# Patient Record
Sex: MALE | Race: AMERICAN INDIAN OR ALASKA NATIVE
[De-identification: names, ages, dates, MRNs, and addresses within clinical notes are randomized per-mention and may not be internally consistent; named-entity substitution may affect disease eponyms.]

---

## 2017-03-22 ENCOUNTER — HOSPITAL ENCOUNTER (INPATIENT)
Dept: HOSPITAL 31 - C.ER | Age: 67
LOS: 2 days | Discharge: HOME | DRG: 309 | End: 2017-03-24
Attending: INTERNAL MEDICINE | Admitting: INTERNAL MEDICINE
Payer: COMMERCIAL

## 2017-03-22 VITALS — BODY MASS INDEX: 34.9 KG/M2

## 2017-03-22 DIAGNOSIS — I95.9: ICD-10-CM

## 2017-03-22 DIAGNOSIS — N40.0: ICD-10-CM

## 2017-03-22 DIAGNOSIS — I10: ICD-10-CM

## 2017-03-22 DIAGNOSIS — E11.9: ICD-10-CM

## 2017-03-22 DIAGNOSIS — B19.20: ICD-10-CM

## 2017-03-22 DIAGNOSIS — N39.0: ICD-10-CM

## 2017-03-22 DIAGNOSIS — R00.1: Primary | ICD-10-CM

## 2017-03-22 LAB
ALBUMIN/GLOB SERPL: 1 {RATIO} (ref 1–2.1)
ALP SERPL-CCNC: 160 U/L (ref 38–126)
ALT SERPL-CCNC: 66 U/L (ref 21–72)
AST SERPL-CCNC: 80 U/L (ref 17–59)
BACTERIA #/AREA URNS HPF: (no result) /[HPF]
BASOPHILS # BLD AUTO: 0 K/UL (ref 0–0.2)
BASOPHILS NFR BLD: 0.6 % (ref 0–2)
BILIRUB SERPL-MCNC: 1 MG/DL (ref 0.2–1.3)
BILIRUB UR-MCNC: NEGATIVE MG/DL
BUN SERPL-MCNC: 15 MG/DL (ref 9–20)
BUN SERPL-MCNC: 16 MG/DL (ref 9–20)
CALCIUM SERPL-MCNC: 8.4 MG/DL (ref 8.6–10.4)
CALCIUM SERPL-MCNC: 8.5 MG/DL (ref 8.6–10.4)
CHLORIDE SERPL-SCNC: 100 MMOL/L (ref 98–107)
CHLORIDE SERPL-SCNC: 99 MMOL/L (ref 98–107)
CO2 SERPL-SCNC: 25 MMOL/L (ref 22–30)
CO2 SERPL-SCNC: 27 MMOL/L (ref 22–30)
EOSINOPHIL # BLD AUTO: 0 K/UL (ref 0–0.7)
EOSINOPHIL NFR BLD: 0.4 % (ref 0–4)
ERYTHROCYTE [DISTWIDTH] IN BLOOD BY AUTOMATED COUNT: 14.9 % (ref 11.5–14.5)
GLOBULIN SER-MCNC: 3.6 GM/DL (ref 2.2–3.9)
GLUCOSE SERPL-MCNC: 184 MG/DL (ref 75–110)
GLUCOSE SERPL-MCNC: 250 MG/DL (ref 75–110)
GLUCOSE UR STRIP-MCNC: NORMAL MG/DL
HCT VFR BLD CALC: 33.1 % (ref 35–51)
HYALINE CASTS #/AREA URNS LPF: (no result) /LPF (ref 0–2)
KETONES UR STRIP-MCNC: NEGATIVE MG/DL
LEUKOCYTE ESTERASE UR-ACNC: (no result) LEU/UL
LYMPHOCYTES # BLD AUTO: 1.6 K/UL (ref 1–4.3)
LYMPHOCYTES NFR BLD AUTO: 25.8 % (ref 20–40)
MCH RBC QN AUTO: 37 PG (ref 27–31)
MCHC RBC AUTO-ENTMCNC: 34.2 G/DL (ref 33–37)
MCV RBC AUTO: 108.2 FL (ref 80–94)
MONOCYTES # BLD: 0.6 K/UL (ref 0–0.8)
MONOCYTES NFR BLD: 9.2 % (ref 0–10)
NRBC BLD AUTO-RTO: 0 % (ref 0–2)
PH UR STRIP: 6 [PH] (ref 5–8)
PLATELET # BLD: 83 K/UL (ref 130–400)
PMV BLD AUTO: 9.2 FL (ref 7.2–11.7)
POTASSIUM SERPL-SCNC: 3.5 MMOL/L (ref 3.6–5.2)
POTASSIUM SERPL-SCNC: 3.5 MMOL/L (ref 3.6–5.2)
PROT SERPL-MCNC: 7.1 G/DL (ref 6.3–8.3)
PROT UR STRIP-MCNC: NEGATIVE MG/DL
RBC # UR STRIP: (no result) /UL
RBC #/AREA URNS HPF: 8 /HPF (ref 0–3)
SODIUM SERPL-SCNC: 138 MMOL/L (ref 132–148)
SODIUM SERPL-SCNC: 141 MMOL/L (ref 132–148)
SP GR UR STRIP: 1.01 (ref 1–1.03)
TSH SERPL-ACNC: 2.92 MIU/L (ref 0.46–4.68)
UROBILINOGEN UR-MCNC: 2 MG/DL (ref 0.2–1)
WBC # BLD AUTO: 6.3 K/UL (ref 4.8–10.8)
WBC #/AREA URNS HPF: 464 /HPF (ref 0–5)

## 2017-03-22 RX ADMIN — INSULIN ASPART SCH: 100 INJECTION, SOLUTION INTRAVENOUS; SUBCUTANEOUS at 22:30

## 2017-03-22 RX ADMIN — INSULIN ASPART SCH UNITS: 100 INJECTION, SUSPENSION SUBCUTANEOUS at 16:48

## 2017-03-22 RX ADMIN — CEFTRIAXONE SCH MLS/HR: 1 INJECTION, SOLUTION INTRAVENOUS at 15:26

## 2017-03-22 RX ADMIN — INSULIN ASPART SCH UNIT: 100 INJECTION, SOLUTION INTRAVENOUS; SUBCUTANEOUS at 16:45

## 2017-03-22 NOTE — C.PDOC
History Of Present Illness


67 y/o male is sent to the ED by PMD, Dr. Mckeon, for dizziness. Patient 

reports he was at his doctor's office today when he started to feel dizzy but 

symptoms have now resolved. Patient denies any shortness of breath, chest pain, 

palpitations, vomiting, or fever.





Time Seen by Provider: 17 11:20


Chief Complaint (Nursing): Dizziness/Lightheaded


History Per: Patient


History/Exam Limitations: no limitations


Onset/Duration Of Symptoms: Mins, Sudden Onset, Persistent


Current Symptoms Are (Timing): Gone


Fall Associated With With Symptoms: No


Recent travel outside of the [place]: No





Past Medical History


Reviewed: Historical Data, Nursing Documentation, Vital Signs


Vital Signs: 


 Last Vital Signs











Temp  97.4 F L  17 12:31


 


Pulse  50 L  17 12:31


 


Resp  18   17 12:31


 


BP  120/72   17 12:31


 


Pulse Ox  98   17 12:49














- Medical History


PMH: Anemia, Diabetes, Gastritis, HTN, Hyperlipidemia


Surgical History: No Surg Hx





- [facility] Procedures








ESOPHAGOGASTRODUODENOSCOPY [EGD] W/CLOSED BIOPSY (04/25/15)


INSERT GASTRIC TUBE NEC (04/25/15)








Family History: States: Unknown Family Hx





- Social History


Hx Tobacco Use: Yes


Hx Alcohol Use: Yes (occasionally)


Hx Substance Use: No





- Immunization History


Hx Tetanus Toxoid Vaccination: No


Hx Influenza Vaccination: Yes ()


Hx Pneumococcal Vaccination: No





Review Of Systems


Except As Marked, All Systems Reviewed And Found Negative.


Constitutional: Negative for: Fever


Cardiovascular: Negative for: Chest Pain, Palpitations


Respiratory: Negative for: Shortness of Breath


Gastrointestinal: Negative for: Vomiting


Neurological: Positive for: Dizziness





Physical Exam





- Physical Exam


Appears: Non-toxic, No Acute Distress


Skin: Normal Color, Warm, Dry


Head: Atraumatic, Normacephalic


Eye(s): bilateral: Normal Inspection, PERRL, EOMI


Neck: Normal ROM, Supple


Chest: Symmetrical


Cardiovascular: Rhythm Regular


Respiratory: Normal Breath Sounds, No Rales, No Rhonchi, No Wheezing


Gastrointestinal/Abdominal: Soft, No Tenderness, Hernia, Other (obese)


Back: Normal Inspection, No CVA Tenderness


Extremity: Normal ROM, No Swelling


Neurological/Psych: Oriented x3, Normal Speech, Normal Cognition, Normal 

Cranial Nerves (II-XII intact), Normal Motor, Normal Sensation


Gait: Steady





ED Course And Treatment





- Laboratory Results


Result Diagrams: 


 17 11:32





 17 11:32


Lab Interpretation: Abnormal (mild anemia, + elev glu)


ECG: Interpreted By Me


ECG Rhythm: Sinus Bradycardia


ECG Interpretation: Abnormal


Rate From EC


O2 Sat by Pulse Oximetry: 98 (ra)





- Radiology


CXR: Interpreted by Me


CXR Interpretation: Yes: No Acute Disease, Heart Size (+ megaly)


Reevaluation Time: 12:50


Reassessment Condition: Unchanged





- Physician Consult Information


Outcome Of Conversation: 1230: d/w Dr. Jatin Leon to Tele obs.





Medical Decision Making


Medical Decision Making: 


Plan:


* EKG


* CXR


* Blood Work


* Blood Culture


* Urinalysis








dizzy ? related to [place]?


baseline labs





Disposition


Doctor Will See Patient In The: Hospital


Counseled Patient/Family Regarding: Studies Performed, Diagnosis





- Disposition


Disposition: HOSPITALIZED


Disposition Time: 12:51


Condition: GOOD





- Clinical Impression


Clinical Impression: 


 Symptomatic bradycardia





- Scribe Statement


The provider has reviewed the documentation as recorded by the Scribe (Puja Miller)


Provider Attestation: 





All medical record entries made by the Scribe were at my direction and 

personally dictated by me. I have reviewed the chart and agree that the record 

accurately reflects my personal performance of the history, physical exam, 

medical decision making, and the department course for this patient. I have 

also personally directed, reviewed, and agree with the discharge instructions 

and disposition.

## 2017-03-22 NOTE — RAD
PROCEDURE:  CHEST RADIOGRAPH, 1 VIEW



HISTORY:

SOB



COMPARISON:

06/12/2015



FINDINGS:



LUNGS:

Clear.



PLEURA:

No pneumothorax or pleural fluid seen.



CARDIOVASCULAR:

The cardiomediastinal silhouette is suboptimally evaluated.  There is 

obscuration of the aortic arch.



OSSEOUS STRUCTURES:

The osseous structures demonstrate degenerative changes. 



VISUALIZED UPPER ABDOMEN:

Upper abdomen is suboptimally evaluated.



OTHER FINDINGS:

None. 



IMPRESSION:

Suboptimally evaluated cardiomediastinal silhouette. 



Obscured visualization of aortic arch. This could be due to 

technique.  Recommend PA lateral or repeat chest radiographs with 

optimal inspiration.

## 2017-03-23 VITALS — RESPIRATION RATE: 20 BRPM

## 2017-03-23 LAB — T4 SERPL-MCNC: 14.8 UG/DL (ref 5.5–11)

## 2017-03-23 RX ADMIN — FLUTICASONE PROPIONATE SCH SPRAYS: 50 SPRAY, METERED NASAL at 22:56

## 2017-03-23 RX ADMIN — INSULIN ASPART SCH UNIT: 100 INJECTION, SOLUTION INTRAVENOUS; SUBCUTANEOUS at 08:08

## 2017-03-23 RX ADMIN — PANTOPRAZOLE SODIUM SCH MG: 40 TABLET, DELAYED RELEASE ORAL at 09:24

## 2017-03-23 RX ADMIN — MULTIPLE VITAMINS W/ MINERALS TAB SCH TAB: TAB at 09:24

## 2017-03-23 RX ADMIN — INSULIN ASPART SCH UNITS: 100 INJECTION, SUSPENSION SUBCUTANEOUS at 18:25

## 2017-03-23 RX ADMIN — INSULIN ASPART SCH UNIT: 100 INJECTION, SOLUTION INTRAVENOUS; SUBCUTANEOUS at 18:24

## 2017-03-23 RX ADMIN — ENOXAPARIN SODIUM SCH MG: 40 INJECTION SUBCUTANEOUS at 09:23

## 2017-03-23 RX ADMIN — INSULIN ASPART SCH UNITS: 100 INJECTION, SUSPENSION SUBCUTANEOUS at 08:09

## 2017-03-23 RX ADMIN — INSULIN ASPART SCH: 100 INJECTION, SOLUTION INTRAVENOUS; SUBCUTANEOUS at 22:15

## 2017-03-23 RX ADMIN — CEFTRIAXONE SCH MLS/HR: 1 INJECTION, SOLUTION INTRAVENOUS at 09:24

## 2017-03-23 RX ADMIN — ROSUVASTATIN CALCIUM SCH MG: 5 TABLET, FILM COATED ORAL at 09:24

## 2017-03-23 RX ADMIN — ROSUVASTATIN CALCIUM SCH MG: 5 TABLET, FILM COATED ORAL at 21:26

## 2017-03-23 RX ADMIN — INSULIN ASPART SCH UNIT: 100 INJECTION, SOLUTION INTRAVENOUS; SUBCUTANEOUS at 12:05

## 2017-03-23 NOTE — CARD
--------------- APPROVED REPORT --------------





EKG Measurement

Heart [id number]

SD [id number]

[id number]

[id number]

[id number]



<Conclusion>

Sinus bradycardia with 1st degree AV block

Minimal voltage criteria for LVH, may be normal variant

Borderline ECG

## 2017-03-23 NOTE — CARD
--------------- APPROVED REPORT --------------





EXAM: Two-dimensional and M-mode echocardiogram with Doppler and 

color Doppler.



Other Information 

Quality : AverageRhythm : NSR



INDICATION

Syncope 



RISK FACTORS

Hypertension 

Diabetes



M-Mode DIMENSIONS 

RVDd1.57   (2.1-3.2cm)Left Atrium (MM)4.04   (2.5-4.0cm)

IVSd1.03   (0.7-1.1cm)Aortic Root2.72   (2.2-3.7cm)

LVDd5.19   (4.0-5.6cm)Aortic Cusp Exc.2.10   (1.5-2.0cm)

PWd1.03   (0.7-1.1cm)FS (%) 36   %

LVDs3.34   (2.0-3.8cm)LVEF (%)65   (>50%)



Aortic Valve

AoV Peak Gdxrthta443.0cm/Higinio Peak GR.4mmHg



Mitral Valve

MV E Utdyvmcu64.9cm/sMV A Ovdnpmnp086.1cm/sE/A ratio0.8



TDI

E/Lateral E'0.0E/Medial E'0.0



Tricuspid Valve

TR Peak Ezfmqqxi640vc/sTR Peak Gr.7xiDuJGJT11jlMk



 LEFT VENTRICLE 

The left ventricle is normal size.

There is normal left ventricular wall thickness. 

The left ventricular function is normal.

The left ventricular ejection fraction is within the normal range.

No regional wall motion abnormalities noted.

Transmitral Doppler flow pattern is Grade I-abnormal relaxation 

pattern.

No left ventricle thrombus noted on this study.

There is no ventricular septal defect visualized.

There is no left ventricular aneurysm. 

There is no mass noted in the left ventricle.



 RIGHT VENTRICLE 

The right ventricle is normal size.

There is normal right ventricular wall thickness.

The right ventricular systolic function is normal.



 ATRIA 

The left atrium is mildly dilated.

The right atrium size is normal.

The interatrial septum is intact with no evidence for an atrial 

septal defect.



 AORTIC VALVE 

The aortic valve is normal in structure and function.

No aortic regurgitation is present. 

There is no aortic valvular stenosis. 

There is no aortic valvular vegetation.



 MITRAL VALVE 

The mitral valve is normal in structure and function.

There is no evidence of mitral valve prolapse.

There is no mitral valve stenosis. 

There is no mitral valve regurgitation noted.



 TRICUSPID VALVE 

The tricuspid valve is normal in structure and function.

There is mild tricuspid regurgitation.

Right ventricular systolic pressure is estimated at less than 30 

mmHg. 

There is no tricuspid valve prolapse or vegetation.

There is no tricuspid valve stenosis. 



 PULMONIC VALVE 

The pulmonary valve is normal in structure and function.

There is no pulmonic valvular regurgitation. 

There is no pulmonic valvular stenosis.



 GREAT VESSELS 

The aortic root is normal in size.

The ascending aorta is normal in size.

The pulmonary artery is normal.

The IVC is normal in size and collapses >50% with inspiration.



 PERICARDIAL EFFUSION 

The pericardium appears normal.

There is no pleural effusion.



<Conclusion>

The left ventricular function is normal.

The left ventricular ejection fraction is within the normal range.

No regional wall motion abnormalities noted.

The left atrium is mildly dilated.

## 2017-03-23 NOTE — CP.PCM.HP
History of Present Illness





- History of Present Illness


History of Present Illness: 


cheif complain; dizziness





67 y/o AA male well known to me with h/o diabetes, htn, hyperlipidemia, BPH, 

Non complint with meds, diet & follow up, lives independently , drinks alcohol 

came in with c/o  dizzines at my office today , even he couldnot stand up so 99 

was called and he was transferred to ER  pt did not recall when he started to 

feel dizzy but symptoms have now resolved. Patient denies any shortness of 

breath, chest pain, palpitations, vomiting, or fever.





Review of Systems





- Review of Systems


Systems not reviewed;Unavailable: Acuity of Condition





- Constitutional


Constitutional: Anorexia, Fatigue, Lethargy, Malaise





- EENT


Eyes: absent: As Per HPI, Blind Spots, Blurred Vision, Change in Vision, 

Decreased Night Vision, Diplopia, Discharge, Dry Eye, Exophthalmos, Floaters, 

Irritation, Itchy Eyes, Loss of Peripheral Vision, Pain, Photophobia, Requires 

Corrective Lenses, Sees Flashes, Spots in Vision, Tunnel Vision, Other Visual 

Disturbances, Loss of Vision, Other


Ears: Dizziness.  absent: As Per HPI, Decreased Hearing, Ear Discharge, Ear Pain

, Tinnitus, Abnormal Hearing, Disequilibrium, Other


Nose/Mouth/Throat: absent: As Per HPI, Epistaxis, Nasal Congestion, Nasal 

Discharge, Nasal Obstruction, Nasal Trauma, Nose Pain, Post Nasal Drip, Sinus 

Pain, Sinus Pressure, Bleeding Gums, Change in Voice, Dental Pain, Dry Mouth, 

Dysphagia, Halitosis, Hoarsness, Lip Swelling, Mouth Lesions, Mouth Pain, 

Odynophagia, Sore Throat, Throat Swelling, Tongue Swelling, Facial Pain, Neck 

Pain, Neck Mass, Other





- Cardiovascular


Cardiovascular: absent: As Per HPI, Acrocyanosis, Chest Pain, Chest Pain at Rest

, Chest Pain with Activity, Claudication, Diaphoresis, Dyspnea, Dyspnea on 

Exertion, Edema, Irregular Heart Rhythm, Pain Radiating to Arm/Neck/Jaw, Leg 

Edema, Leg Ulcers, Lightheadedness, Orthopnea, Palpitations, Paroxysmal 

Nocturnal Dyspnea, Pedal Edema, Radiating Pain, Rapid Heart Rate, Slow Heart 

Rate, Syncope, Other





- Respiratory


Respiratory: absent: As Per HPI, Cough, Dyspnea, Hemoptysis, Dyspnea on Exertion

, Wheezing, Snoring, Stridor, Pain on Inspiration, Chest Congestion, Excessive 

Mucous Production, Change in Mucous Color, Pain with Coughing, Other





- Gastrointestinal


Gastrointestinal: absent: As Per HPI, Abdominal Pain, Belching, Bloating, 

Change in Bowel Habits, Change in Stool Character, Coffee Ground Emesis, 

Constipation, Cramping, Diarrhea, Dyspepsia, Dysphagia, Early Satiety, 

Excessive Flatus, Fecal Incontinence, Heartburn, Hematemesis, Hematochezia, 

Loose Stools, Melena, Nausea, Odynophagia, Temesmus, Vomiting, Other





- Genitourinary


Genitourinary: absent: As Per HPI, Change in Urinary Stream, Difficulty 

Urinating, Dysuria, Flank Pain, Hematuria, Pyuria, Nocturia, Urinary 

Incontinence, Urinary Frequency, Urinary Hesitance, Urinary Urgency, Voiding 

Freq/Small Amts, Freq UTI, Hx Renal/Bladder Calculi, Hx /Renal Surgery, 

Bladder Distension, Other





- Integumentary


Integumentary: absent: As Per HPI, Acne, Alopecia, Bleeding Lesions, Change in 

Hair, Change in Nails, Change in Pigmentation, Changing Lesions, Dry Skin, 

Erythema, Furuncle, Hirsutism, Lesions, New Lesions, Non-Healing Lesions, 

Photosensitivity, Pruritus, Rash, Skin Pain, Skin Ulcer, Sores, Striae, Swelling

, Unusual Bruising, Wounds, Jaundice, Other





- Neurological


Neurological: Dizziness.  absent: As Per HPI, Abnormal Gait, Abnormal Hearing, 

Abnormal Movements, Abnormal Speech, Behavioral Changes, Burning Sensations, 

Confusion, Convulsions, Disequilibrium, Numbness, Focal Weakness, Frequent Falls

, Headaches, Lack of Coordination, Loss of Vision, Memory Loss, Paresthesias, 

Radicular Pain, Restless Legs, Sensory Deficit, Syncope, Tingling, Tremor, 

Vertigo, Weakness, Other Visual Disturbances, Other





Past Patient History





- Past Medical History & Family History


Past Medical History?: Yes





- Past Social History


Smoking Status: Light Smoker < 10 Cigarettes Daily





- CARDIAC


Hx Hypertension: Yes





- PULMONARY


Hx Respiratory Disorders: No





- NEUROLOGICAL


Hx Neurological Disorder: No





- HEENT


Hx HEENT Problems: No


Hx Sinusitis: Yes





- RENAL


Other/Comment: pt claims he has kidney problem





- ENDOCRINE/METABOLIC


Hx Diabetes Mellitus Type 2: Yes





- HEMATOLOGICAL/ONCOLOGICAL


Hx Anemia: Yes





- INTEGUMENTARY


Hx Dermatological Problems: No





- MUSCULOSKELETAL/RHEUMATOLOGICAL


Hx Musculoskeletal Disorders: No


Hx Falls: No





- GASTROINTESTINAL


Hx Gastritis: Yes





- GENITOURINARY/GYNECOLOGICAL


Hx Genitourinary Disorders: Yes


Hx Urinary Tract Infection: Yes





- PSYCHIATRIC


Hx Substance Use: No





- SURGICAL HISTORY


Hx Surgeries: Yes


Hx Herniorrhaphy: Yes (2012)





- ANESTHESIA


Hx Anesthesia: Yes


Hx Anesthesia Reactions: No


Hx Malignant Hyperthermia: No





Meds


Allergies/Adverse Reactions: 


 Allergies











Allergy/AdvReac Type Severity Reaction Status Date / Time


 


aspirin Allergy   Verified 03/22/17 10:47














Physical Exam





- Constitutional


Appears: No Acute Distress





- Head Exam


Head Exam: ATRAUMATIC, NORMAL INSPECTION, NORMOCEPHALIC


Additional comments: 


adentinous oral cavity poor oral hygeine





- Eye Exam


Eye Exam: EOMI, Normal appearance, PERRL


Pupil Exam: NORMAL ACCOMODATION, PERRL





- Respiratory Exam


Respiratory Exam: Clear to Auscultation Bilateral, NORMAL BREATHING PATTERN





- Cardiovascular Exam


Cardiovascular Exam: Bradycardia, +S1, +S2





- GI/Abdominal Exam


GI & Abdominal Exam: Distended, Normal Bowel Sounds, Organomegaly.  absent: 

Bruit, Diminished Bowel Sounds, Firm, Guarding, Hernia, Hyperactive Bowel Sounds

, Hypoactive Bowel Sounds, Mass, Pulsatile Mass, Rebound, Rigid, Soft, 

Tenderness





- Extremities Exam


Extremities exam: Positive for: pedal edema





Results





- Vital Signs


Recent Vital Signs: 





 Last Vital Signs











Temp  98.2 F   03/22/17 18:25


 


Pulse  58 L  03/22/17 20:00


 


Resp  20   03/22/17 18:25


 


BP  114/72   03/22/17 18:25


 


Pulse Ox  100   03/22/17 18:25














- Labs


Result Diagrams: 


 03/22/17 11:32





 03/22/17 17:20


Labs: 





 Laboratory Results - last 24 hr











  03/22/17 03/22/17 03/22/17





  16:26 17:20 22:14


 


Sodium   138 


 


Potassium   3.5 L 


 


Chloride   99 


 


Carbon Dioxide   25 


 


Anion Gap   18 


 


BUN   16 


 


Creatinine   1.1 


 


Est GFR ( Amer)   > 60 


 


Est GFR (Non-Af Amer)   > 60 


 


POC Glucose (mg/dL)  273 H  255 H  96


 


Random Glucose   250 H 


 


Calcium   8.5 L 


 


Troponin I   < 0.0120 


 


TSH 3rd Generation   2.92 














Assessment & Plan


(1) Symptomatic bradycardia


Status: Acute





(2) Diabetes mellitus


Status: Chronic





(3) Hepatitis C infection


Status: Chronic





(4) Hypertension


Status: Chronic





(5) Hypotension


Status: Acute





(6) BPH (benign prostatic hyperplasia)


Status: Acute

## 2017-03-24 VITALS — TEMPERATURE: 97.7 F | DIASTOLIC BLOOD PRESSURE: 73 MMHG | SYSTOLIC BLOOD PRESSURE: 127 MMHG

## 2017-03-24 VITALS — OXYGEN SATURATION: 98 %

## 2017-03-24 VITALS — HEART RATE: 62 BPM

## 2017-03-24 LAB
BUN SERPL-MCNC: 17 MG/DL (ref 9–20)
CALCIUM SERPL-MCNC: 8.6 MG/DL (ref 8.6–10.4)
CHLORIDE SERPL-SCNC: 100 MMOL/L (ref 98–107)
CO2 SERPL-SCNC: 25 MMOL/L (ref 22–30)
GLUCOSE SERPL-MCNC: 147 MG/DL (ref 75–110)
POTASSIUM SERPL-SCNC: 3.4 MMOL/L (ref 3.6–5.2)
SODIUM SERPL-SCNC: 139 MMOL/L (ref 132–148)

## 2017-03-24 RX ADMIN — PANTOPRAZOLE SODIUM SCH MG: 40 TABLET, DELAYED RELEASE ORAL at 10:10

## 2017-03-24 RX ADMIN — INSULIN ASPART SCH UNITS: 100 INJECTION, SUSPENSION SUBCUTANEOUS at 08:15

## 2017-03-24 RX ADMIN — CEFTRIAXONE SCH MLS/HR: 1 INJECTION, SOLUTION INTRAVENOUS at 10:00

## 2017-03-24 RX ADMIN — MULTIPLE VITAMINS W/ MINERALS TAB SCH TAB: TAB at 10:09

## 2017-03-24 RX ADMIN — FLUTICASONE PROPIONATE SCH SPRAYS: 50 SPRAY, METERED NASAL at 10:52

## 2017-03-24 RX ADMIN — FLUTICASONE PROPIONATE SCH SPRAYS: 50 SPRAY, METERED NASAL at 10:10

## 2017-03-24 RX ADMIN — INSULIN ASPART SCH UNIT: 100 INJECTION, SOLUTION INTRAVENOUS; SUBCUTANEOUS at 08:13

## 2017-03-24 RX ADMIN — INSULIN ASPART SCH UNIT: 100 INJECTION, SOLUTION INTRAVENOUS; SUBCUTANEOUS at 13:09

## 2017-03-24 RX ADMIN — ENOXAPARIN SODIUM SCH MG: 40 INJECTION SUBCUTANEOUS at 10:10

## 2017-03-24 NOTE — CP.PCM.PN
Subjective





- Date & Time of Evaluation


Date of Evaluation: 03/23/17





- Subjective


Subjective: 


pt seen &examined, he is improving, bradycardia is improved, afebrile, no sob, 

off beta blocker, we discontinued his antihypertensive meds, continue on 

antibiotics





Objective





- Vital Signs/Intake and Output


Vital Signs (last 24 hours): 


 











Temp Pulse Resp BP Pulse Ox


 


 97.7 F   62   20   127/73   100 


 


 03/24/17 07:00  03/24/17 07:30  03/24/17 07:00  03/24/17 07:00  03/24/17 07:00








Intake and Output: 


 











 03/24/17 03/24/17





 06:59 18:59


 


Output Total 550 


 


Balance -550 














- Labs


Labs: 


 





 03/24/17 07:23 











- Constitutional


Appears: No Acute Distress





- Head Exam


Head Exam: ATRAUMATIC, NORMAL INSPECTION, NORMOCEPHALIC





- Eye Exam


Eye Exam: EOMI, Normal appearance, PERRL


Pupil Exam: NORMAL ACCOMODATION, PERRL





- Respiratory Exam


Respiratory Exam: Clear to Ausculation Bilateral, NORMAL BREATHING PATTERN





- Cardiovascular Exam


Cardiovascular Exam: REGULAR RHYTHM, +S1, +S2.  absent: Murmur





- GI/Abdominal Exam


GI & Abdominal Exam: Soft, Normal Bowel Sounds.  absent: Tenderness





Assessment and Plan


(1) Symptomatic bradycardia


Status: Acute





(2) Diabetes mellitus


Status: Chronic





(3) Hepatitis C infection


Status: Chronic





(4) Hypertension


Status: Chronic





(5) Hypotension


Status: Acute





(6) BPH (benign prostatic hyperplasia)


Status: Acute

## 2017-03-24 NOTE — CP.PCM.PN
Subjective





- Date & Time of Evaluation


Date of Evaluation: 03/24/17


Time of Evaluation: 11:00





- Subjective


Subjective: 


Pt seen today , denies any dizziness, sob, chest pain, palpitations , 


No overnight events recorded on monitor 


HR - Improved  above 60,s





Objective





- Vital Signs/Intake and Output


Vital Signs (last 24 hours): 


 











Temp Pulse Resp BP Pulse Ox


 


 97.7 F   62   20   127/73   100 


 


 03/24/17 07:00  03/24/17 07:30  03/24/17 07:00  03/24/17 07:00  03/24/17 07:00








Intake and Output: 


 











 03/24/17 03/24/17





 06:59 18:59


 


Output Total 550 


 


Balance -550 














- Medications


Medications: 


 Current Medications





Enoxaparin Sodium (Lovenox)  40 mg SC DAILY Formerly [facility], 1928–1983


   Last Admin: 03/24/17 10:10 Dose:  40 mg


Fluticasone Propionate (Flonase)  2 spr FILIPE DAILY Formerly [facility], 1928–1983


   Last Admin: 03/24/17 10:52 Dose:  2 sprays


Ceftriaxone Sodium (Rocephin Iv 1 Gm Duplex)  50 mls @ 100 mls/hr IVPB DAILY Formerly [facility], 1928–1983


   Last Admin: 03/24/17 10:00 Dose:  100 mls/hr


Insulin Aspart (Novolog)  0 unit SC ACHS Formerly [facility], 1928–1983


   PRN Reason: Protocol


   Last Admin: 03/24/17 13:09 Dose:  3 unit


Insulin Aspart (Novolog Mix 70/30 (70/30 Units/Ml))  10 units SC ACBD Formerly [facility], 1928–1983


   Last Admin: 03/24/17 08:15 Dose:  10 units


Levothyroxine Sodium (Synthroid)  25 mcg PO DAILY@0630 Formerly [facility], 1928–1983


   Last Admin: 03/24/17 06:23 Dose:  25 mcg


Metformin HCl (Glucophage)  500 mg PO BIDCC [facility], 1928–1983


   Last Admin: 03/24/17 08:15 Dose:  500 mg


Multivitamins/Minerals (Therapeutic-M Tab)  1 tab PO DAILY Formerly [facility], 1928–1983


   Last Admin: 03/24/17 10:09 Dose:  1 tab


Pantoprazole Sodium (Protonix Ec Tab)  40 mg PO DAILY Formerly [facility], 1928–1983


   Last Admin: 03/24/17 10:10 Dose:  40 mg


Rosuvastatin Calcium (Crestor)  2.5 mg PO HS [facility], 1928–1983


   Last Admin: 03/23/17 21:26 Dose:  2.5 mg


Tamsulosin HCl (Flomax)  0.4 mg PO DAILY Formerly [facility], 1928–1983


   Last Admin: 03/24/17 10:10 Dose:  0.4 mg


Tramadol HCl (Ultram)  50 mg PO TID PRN


   PRN Reason: Pain, moderate (4-7)











- Labs


Labs: 


 





 03/24/17 07:23 











Assessment and Plan





- Assessment and Plan (Free Text)


Assessment: 


A/P 


 66 yr old  male admitted for  symptomatic bradycardia/ hypotension /UTI 


HR- improved after discounted metoprolol and cardura 


HR - above 60,s  


labs- WNL 


U/a + - STRATED ON ROCEPHIN 


Urinew culture - Contaminated 


D/w Dr. Mckeon, stable for discharge home today and f/u with Dr. Mckeon 

office in 1 week 


Discharge plan discussed with patient , including meidcation and f/u visit  who 

understands an dagrees with plan 


Patient instructed to returns to ED if symptoms returns 


[facility] e [facility] to [facility]

## 2017-03-24 NOTE — CP.PCM.DIS
Provider





- Provider


Date of Admission: 


03/23/17 11:09





Attending physician: 


Harrison Mckeon MD








Diagnosis





- Discharge Diagnosis


(1) Symptomatic bradycardia


Status: Acute





(2) Diabetes mellitus


Status: Chronic





(3) Hepatitis C infection


Status: Chronic





(4) Hypertension


Status: Chronic





(5) Hypotension


Status: Acute





(6) BPH (benign prostatic hyperplasia)


Status: Acute








Hospital Course





- Lab Results


Lab Results: 


 Most Recent Lab Values











WBC  6.3 K/uL (4.8-10.8)   03/22/17  11:32    


 


RBC  3.06 Mil/uL (4.40-5.90)  L  03/22/17  11:32    


 


Hgb  11.3 g/dL (12.0-18.0)  L  03/22/17  11:32    


 


Hct  33.1 % (35.0-51.0)  L  03/22/17  11:32    


 


MCV  108.2 fL (80.0-94.0)  H D 03/22/17  11:32    


 


MCH  37.0 pg (27.0-31.0)  H  03/22/17  11:32    


 


MCHC  34.2 g/dL (33.0-37.0)   03/22/17  11:32    


 


RDW  14.9 % (11.5-14.5)  H  03/22/17  11:32    


 


Plt Count  83 K/uL (130-400)  L D 03/22/17  11:32    


 


MPV  9.2 fL (7.2-11.7)   03/22/17  11:32    


 


Neut % (Auto)  64.0 % (50.0-75.0)   03/22/17  11:32    


 


Lymph % (Auto)  25.8 % (20.0-40.0)   03/22/17  11:32    


 


Mono % (Auto)  9.2 % (0.0-10.0)   03/22/17  11:32    


 


Eos % (Auto)  0.4 % (0.0-4.0)   03/22/17  11:32    


 


Baso % (Auto)  0.6 % (0.0-2.0)   03/22/17  11:32    


 


Neut #  4.0 K/uL (1.8-7.0)   03/22/17  11:32    


 


Lymph #  1.6 K/uL (1.0-4.3)   03/22/17  11:32    


 


Mono #  0.6 K/uL (0.0-0.8)   03/22/17  11:32    


 


Eos #  0.0 K/uL (0.0-0.7)   03/22/17  11:32    


 


Baso #  0.0 K/uL (0.0-0.2)   03/22/17  11:32    


 


Differential Comment     03/22/17  11:32    


 


Sodium  139 mmol/L (132-148)   03/24/17  07:23    


 


Potassium  3.4 mmol/L (3.6-5.2)  L  03/24/17  07:23    


 


Chloride  100 mmol/L ()   03/24/17  07:23    


 


Carbon Dioxide  25 mmol/L (22-30)   03/24/17  07:23    


 


Anion Gap  17  (10-20)   03/24/17  07:23    


 


BUN  17 mg/dL (9-20)   03/24/17  07:23    


 


Creatinine  0.9 MG/DL (0.8-1.5)   03/24/17  07:23    


 


Est GFR ( Amer)  > 60   03/24/17  07:23    


 


Est GFR (Non-Af Amer)  > 60   03/24/17  07:23    


 


POC Glucose (mg/dL)  220 mg/dL ()  H  03/24/17  11:06    


 


Random Glucose  147 mg/dL ()  H  03/24/17  07:23    


 


Calcium  8.6 mg/dl (8.6-10.4)   03/24/17  07:23    


 


Total Bilirubin  1.0 mg/dL (0.2-1.3)   03/22/17  11:32    


 


AST  80 U/L (17-59)  H D 03/22/17  11:32    


 


ALT  66 U/L (21-72)   03/22/17  11:32    


 


Alkaline Phosphatase  160 U/L ()  H D 03/22/17  11:32    


 


Ammonia  9 umol/L (9-33)   03/22/17  11:32    


 


Troponin I  < 0.0120 ng/mL (0.00-0.120)   03/23/17  04:12    


 


NT-Pro-B Natriuret Pep  122 pg/mL (0-900)   03/22/17  11:32    


 


Total Protein  7.1 g/dL (6.3-8.3)   03/22/17  11:32    


 


Albumin  3.5 g/dL (3.5-5.0)  D 03/22/17  11:32    


 


Globulin  3.6 gm/dL (2.2-3.9)   03/22/17  11:32    


 


Albumin/Globulin Ratio  1.0  (1.0-2.1)   03/22/17  11:32    


 


Thyroxine (T4)  14.8 ug/dL (5.5-11.0)  H  03/23/17  04:12    


 


TSH 3rd Generation  2.92 mIU/L (0.46-4.68)   03/22/17  17:20    


 


Urine Color  Cesilia  (YELLOW)   03/22/17  12:23    


 


Urine Clarity  Hazy  (Clear)   03/22/17  12:23    


 


Urine pH  6.0  (5.0-8.0)   03/22/17  12:23    


 


Ur Specific Gravity  1.013  (1.003-1.030)   03/22/17  12:23    


 


Urine Protein  Negative mg/dL (NEGATIVE)   03/22/17  12:23    


 


Urine Glucose (UA)  Normal mg/dL (Normal)   03/22/17  12:23    


 


Urine Ketones  Negative mg/dL (NEGATIVE)   03/22/17  12:23    


 


Urine Blood  1+  (NEGATIVE)  H  03/22/17  12:23    


 


Urine Nitrate  Negative  (NEGATIVE)   03/22/17  12:23    


 


Urine Bilirubin  Negative  (NEGATIVE)   03/22/17  12:23    


 


Urine Urobilinogen  2.0 mg/dL (0.2-1.0)   03/22/17  12:23    


 


Ur Leukocyte Esterase  3+ Ricki/uL (Negative)  H  03/22/17  12:23    


 


Urine WBC (Auto)  464 /hpf (0-5)  H  03/22/17  12:23    


 


Urine RBC (Auto)  8 /hpf (0-3)  H  03/22/17  12:23    


 


Ur Squamous Epith Cells  < 1 /hpf (0-5)   03/22/17  12:23    


 


Urine Bacteria  Occ  (<OCC)  H  03/22/17  12:23    


 


Hyaline Casts  3-5 /lpf (0-2)  H  03/22/17  12:23    














- Hospital Course


Hospital Course: 


pt is for discharge, bradycardia resolved will continue out pateint antibiotics 

for uti, off beta blockers and antihypertensive meds, pt is feeling better and 

improved clinincally





Discharge Exam





- Head Exam


Head Exam: ATRAUMATIC, NORMAL INSPECTION, NORMOCEPHALIC





- Eye Exam


Eye Exam: EOMI, Normal appearance, PERRL


Pupil Exam: NORMAL ACCOMODATION, PERRL





- ENT Exam


ENT Exam: Mucous Membranes Moist





- Respiratory Exam


Respiratory Exam: Clear to PA & Lateral





- Cardiovascular Exam


Cardiovascular Exam: REGULAR RHYTHM, +S1, +S2





- GI/Abdominal Exam


GI & Abdominal Exam: Normal Bowel Sounds





- Neurological Exam


Neurological exam: Alert, CN II-XII Intact, Normal Gait, Oriented x3, Reflexes 

Normal





- Psychiatric Exam


Psychiatric exam: Normal Affect, Normal Mood





Discharge Plan





- Discharge Medications


Prescriptions: 


Losartan [Cozaar] 25 mg PO DAILY #30 tab


Tamsulosin [Flomax] 0.4 mg PO DAILY #30 cap


Fluticasone Nasal [Flonase] 2 spray FILIPE DAILY #1 spr


Insulin Lispro Protamin/Lispro [Humalog Mix 75-25 Kwikpen] 10 unit SC BID #5 

insuln.pen


Cephalexin [cephalexin] 500 mg PO BID #14 cap


Pravastatin Sodium [Pravachol] 20 mg PO DAILY #30 tab


Omeprazole Magnesium [Prilosec Otc] 1 tab PO DAILY #30 tablet.dr


Multivit, Iron, Min #5, FA [Strovite Forte Caplet] 1 tab PO DAILY #30 tablet


Levothyroxine [Synthroid] 0.025 mg PO AMHS #30 tab


metFORMIN [glucOPHAGE] 500 mg PO BIDCC #60 tab





- Follow Up Plan


Condition: GOOD


Disposition: HOME/ ROUTINE


Instructions:  Cephalexin (By mouth), Levothyroxine (By mouth), Omeprazole (By 

mouth), Pravastatin (By mouth), Losartan (By mouth), Metformin (By mouth), 

Tamsulosin (By mouth), Insulin Lispro Protamine/Insulin Lispro (By injection), 

Fluticasone (Into the nose), Heart Healthy Diet (DC), Diabetic Foot Care (DC), 

Meal Planning with the Plate Method (DC), Meal Planning with Diabetes Exchanges 

(DC), Bradycardia (DC)


Additional Instructions: 


f/u with  office in 1 week 


Continue medication as per Med. Rec


Please  the medication from [facility] pharmacy- prescription e prescribed to 

pharmacy 


Referrals: 


Harrison Mckeon MD [Staff Provider] -

## 2017-09-30 ENCOUNTER — HOSPITAL ENCOUNTER (INPATIENT)
Dept: HOSPITAL 31 - C.ER | Age: 67
LOS: 32 days | Discharge: HOME | DRG: 329 | End: 2017-11-01
Attending: INTERNAL MEDICINE | Admitting: INTERNAL MEDICINE
Payer: COMMERCIAL

## 2017-09-30 VITALS — BODY MASS INDEX: 34.9 KG/M2

## 2017-09-30 DIAGNOSIS — Z79.4: ICD-10-CM

## 2017-09-30 DIAGNOSIS — B19.20: ICD-10-CM

## 2017-09-30 DIAGNOSIS — R16.1: ICD-10-CM

## 2017-09-30 DIAGNOSIS — K74.69: ICD-10-CM

## 2017-09-30 DIAGNOSIS — F17.210: ICD-10-CM

## 2017-09-30 DIAGNOSIS — D50.0: ICD-10-CM

## 2017-09-30 DIAGNOSIS — Z87.11: ICD-10-CM

## 2017-09-30 DIAGNOSIS — K56.2: ICD-10-CM

## 2017-09-30 DIAGNOSIS — K43.0: Primary | ICD-10-CM

## 2017-09-30 DIAGNOSIS — K56.50: ICD-10-CM

## 2017-09-30 DIAGNOSIS — D69.6: ICD-10-CM

## 2017-09-30 DIAGNOSIS — E87.6: ICD-10-CM

## 2017-09-30 DIAGNOSIS — L02.211: ICD-10-CM

## 2017-09-30 DIAGNOSIS — E83.51: ICD-10-CM

## 2017-09-30 DIAGNOSIS — R18.8: ICD-10-CM

## 2017-09-30 DIAGNOSIS — K92.2: ICD-10-CM

## 2017-09-30 DIAGNOSIS — T81.4XXA: ICD-10-CM

## 2017-09-30 DIAGNOSIS — E87.2: ICD-10-CM

## 2017-09-30 DIAGNOSIS — K76.6: ICD-10-CM

## 2017-09-30 DIAGNOSIS — E78.5: ICD-10-CM

## 2017-09-30 DIAGNOSIS — I10: ICD-10-CM

## 2017-09-30 DIAGNOSIS — E46: ICD-10-CM

## 2017-09-30 DIAGNOSIS — R97.0: ICD-10-CM

## 2017-09-30 DIAGNOSIS — E11.65: ICD-10-CM

## 2017-09-30 LAB
ALBUMIN/GLOB SERPL: 1 {RATIO} (ref 1–2.1)
ALP SERPL-CCNC: 79 U/L (ref 38–126)
ALT SERPL-CCNC: 57 U/L (ref 21–72)
APTT BLD: 25 SECONDS (ref 21–34)
APTT BLD: 31 SECONDS (ref 21–34)
AST SERPL-CCNC: 58 U/L (ref 17–59)
BASOPHILS # BLD AUTO: 0.1 K/UL (ref 0–0.2)
BASOPHILS NFR BLD: 0.7 % (ref 0–2)
BILIRUB SERPL-MCNC: 0.7 MG/DL (ref 0.2–1.3)
BUN SERPL-MCNC: 21 MG/DL (ref 9–20)
CALCIUM SERPL-MCNC: 7.8 MG/DL (ref 8.6–10.4)
CHLORIDE SERPL-SCNC: 108 MMOL/L (ref 98–107)
CO2 SERPL-SCNC: 19 MMOL/L (ref 22–30)
EOSINOPHIL # BLD AUTO: 0 K/UL (ref 0–0.7)
EOSINOPHIL NFR BLD: 0.5 % (ref 0–4)
ERYTHROCYTE [DISTWIDTH] IN BLOOD BY AUTOMATED COUNT: 15.6 % (ref 11.5–14.5)
GLOBULIN SER-MCNC: 3.1 GM/DL (ref 2.2–3.9)
GLUCOSE SERPL-MCNC: 114 MG/DL (ref 75–110)
HCT VFR BLD CALC: 30.4 % (ref 35–51)
INR PPP: 1.1
INR PPP: 1.2
LYMPHOCYTES # BLD AUTO: 3.9 K/UL (ref 1–4.3)
LYMPHOCYTES NFR BLD AUTO: 45.9 % (ref 20–40)
MCH RBC QN AUTO: 37.2 PG (ref 27–31)
MCHC RBC AUTO-ENTMCNC: 34.7 G/DL (ref 33–37)
MCV RBC AUTO: 107.2 FL (ref 80–94)
MONOCYTES # BLD: 0.7 K/UL (ref 0–0.8)
MONOCYTES NFR BLD: 8.9 % (ref 0–10)
NRBC BLD AUTO-RTO: 0 % (ref 0–2)
PLATELET # BLD: 87 K/UL (ref 130–400)
PMV BLD AUTO: 8.3 FL (ref 7.2–11.7)
POTASSIUM SERPL-SCNC: 3.4 MMOL/L (ref 3.6–5.2)
PROT SERPL-MCNC: 6.3 G/DL (ref 6.3–8.3)
SODIUM SERPL-SCNC: 148 MMOL/L (ref 132–148)
WBC # BLD AUTO: 8.5 K/UL (ref 4.8–10.8)

## 2017-09-30 RX ADMIN — WATER SCH MLS/HR: 1 INJECTION INTRAMUSCULAR; INTRAVENOUS; SUBCUTANEOUS at 21:42

## 2017-09-30 RX ADMIN — FLUTICASONE PROPIONATE SCH SPRAYS: 50 SPRAY, METERED NASAL at 12:15

## 2017-09-30 RX ADMIN — DEXTROSE AND SODIUM CHLORIDE SCH MLS/HR: 5; 900 INJECTION, SOLUTION INTRAVENOUS at 16:15

## 2017-09-30 RX ADMIN — INSULIN ASPART SCH: 100 INJECTION, SOLUTION INTRAVENOUS; SUBCUTANEOUS at 08:20

## 2017-09-30 RX ADMIN — CEFTRIAXONE SCH MLS/30 MIN: 1 INJECTION, SOLUTION INTRAVENOUS at 05:20

## 2017-09-30 RX ADMIN — WATER SCH MLS/HR: 1 INJECTION INTRAMUSCULAR; INTRAVENOUS; SUBCUTANEOUS at 14:05

## 2017-09-30 RX ADMIN — ROSUVASTATIN CALCIUM SCH MG: 5 TABLET, FILM COATED ORAL at 21:42

## 2017-09-30 RX ADMIN — WATER SCH MLS/HR: 1 INJECTION INTRAMUSCULAR; INTRAVENOUS; SUBCUTANEOUS at 06:18

## 2017-09-30 RX ADMIN — DEXTROSE AND SODIUM CHLORIDE SCH MLS/HR: 5; 900 INJECTION, SOLUTION INTRAVENOUS at 05:34

## 2017-09-30 RX ADMIN — INSULIN ASPART SCH: 100 INJECTION, SOLUTION INTRAVENOUS; SUBCUTANEOUS at 21:43

## 2017-09-30 RX ADMIN — INSULIN ASPART SCH: 100 INJECTION, SOLUTION INTRAVENOUS; SUBCUTANEOUS at 12:17

## 2017-09-30 RX ADMIN — INSULIN ASPART SCH UNIT: 100 INJECTION, SOLUTION INTRAVENOUS; SUBCUTANEOUS at 17:16

## 2017-09-30 NOTE — CP.PCM.CON
History of Present Illness





- History of Present Illness


History of Present Illness: 





General Surgery- Dr. Hahn





66M PMHx HTN, HLD, DM, Ventral hernia repair w/ mesh presented to the hospital 

with ABD pain started 2 days ago described as sharp middle of abd. Pt last BM 

was yesterday. Currently passing flatus. Denies F/C CP/SOB 





PMH: DM, HTN, HLD


PSH: Hernia repair w/ Mesh


ALL; NKDA


SocialHx: ETOH,Tobacco use, denies drug use  





Review of Systems





- Review of Systems


All systems: reviewed and no additional remarkable complaints except





- Constitutional


Constitutional: As Per HPI





Past Patient History





- Past Medical History & Family History


Past Medical History?: Yes





- Past Social History


Smoking Status: Light Smoker < 10 Cigarettes Daily





- CARDIAC


Hx Hypertension: Yes





- PULMONARY


Hx Respiratory Disorders: No





- NEUROLOGICAL


Hx Neurological Disorder: No





- HEENT


Hx HEENT Problems: No





- RENAL


Other/Comment: pt claims he has kidney problem





- ENDOCRINE/METABOLIC


Hx Diabetes Mellitus Type 2: Yes





- HEMATOLOGICAL/ONCOLOGICAL


Hx Anemia: Yes





- INTEGUMENTARY


Hx Dermatological Problems: No





- MUSCULOSKELETAL/RHEUMATOLOGICAL


Hx Falls: Yes





- GASTROINTESTINAL


Hx Gastritis: Yes





- GENITOURINARY/GYNECOLOGICAL


Hx Genitourinary Disorders: Yes


Hx Urinary Tract Infection: Yes





- PSYCHIATRIC


Hx Substance Use: No





- SURGICAL HISTORY


Hx Surgeries: Yes


Hx Herniorrhaphy: Yes (2012)





- ANESTHESIA


Hx Anesthesia: Yes


Hx Anesthesia Reactions: No


Hx Malignant Hyperthermia: No





Meds


Allergies/Adverse Reactions: 


 Allergies











Allergy/AdvReac Type Severity Reaction Status Date / Time


 


aspirin Allergy   Verified 09/30/17 05:14














- Medications


Medications: 


 Current Medications





Fluticasone Propionate (Flonase)  2 spr FILIPE DAILY ALLIE


   Last Admin: 09/30/17 12:15 Dose:  2 sprays


Ceftriaxone Sodium 1 gm/ (Dextrose)  100 mls @ 50 mls/30 min IVPB Q24H ALLIE


   Last Admin: 09/30/17 05:20 Dose:  50 mls/30 min


Metronidazole (Flagyl)  500 mg in 100 mls @ 100 mls/hr IVPB Q8 ALLIE


   Last Admin: 09/30/17 14:05 Dose:  100 mls/hr


Dextrose/Sodium Chloride (Dextrose 5%/0.9% Ns 1000 Ml)  1,000 mls @ 100 mls/hr 

IV .Q10H ALLIE


   Last Admin: 09/30/17 05:34 Dose:  100 mls/hr


Insulin Aspart (Novolog)  0 unit SC ACHS [facility]


   PRN Reason: Protocol


   Last Admin: 09/30/17 12:17 Dose:  Not Given


Levothyroxine Sodium (Synthroid)  25 mcg PO 0630 [facility]


Pantoprazole Sodium (Protonix Inj)  40 mg IVP DAILY [facility]


   Last Admin: 09/30/17 10:41 Dose:  40 mg


Pneumococcal Polyvalent Vaccine (Pneumovax 23 Vaccine)  0.5 ml IM .ONCE ONE


   Stop: 10/03/17 10:01


Rosuvastatin Calcium (Crestor)  2.5 mg PO HS [facility]


Tamsulosin HCl (Flomax)  0.4 mg PO DAILY [facility]


   Last Admin: 09/30/17 10:41 Dose:  0.4 mg











Physical Exam





- Constitutional


Appears: Non-toxic, No Acute Distress





- Head Exam


Head Exam: ATRAUMATIC





- Eye Exam


Eye Exam: EOMI.  absent: Scleral icterus





- ENT Exam


ENT Exam: Mucous Membranes Moist





- Respiratory Exam


Respiratory Exam: NORMAL BREATHING PATTERN.  absent: Accessory Muscle Use, 

Respiratory Distress





- Cardiovascular Exam


Cardiovascular Exam: +S1, +S2.  absent: Bradycardia, Tachycardia





- GI/Abdominal Exam


GI & Abdominal Exam: Hernia, Normal Bowel Sounds, Soft.  absent: Rigid





- Extremities Exam


Extremities exam: Positive for: normal inspection.  Negative for: calf 

tenderness





- Back Exam


Back exam: absent: CVA tenderness (L)





- Neurological Exam


Neurological exam: Alert, Normal Gait, Oriented x3





- Skin


Skin Exam: Dry, Normal Color, Warm





Results





- Vital Signs


Recent Vital Signs: 


 Last Vital Signs











Temp  98.5 F   09/30/17 08:27


 


Pulse  60   09/30/17 08:27


 


Resp  20   09/30/17 08:27


 


BP  90/56 L  09/30/17 08:27


 


Pulse Ox  98   09/30/17 08:27














- Labs


Result Diagrams: 


 09/30/17 02:05





 09/30/17 02:05


Labs: 


 Laboratory Results - last 24 hr











  09/30/17 09/30/17 09/30/17





  02:05 02:05 02:05


 


WBC  8.5  


 


RBC  2.84 L  


 


Hgb  10.6 L  


 


Hct  30.4 L  


 


MCV  107.2 H  


 


MCH  37.2 H  


 


MCHC  34.7  


 


RDW  15.6 H  


 


Plt Count  87 L  


 


MPV  8.3  


 


Neut % (Auto)  44.0 L  


 


Lymph % (Auto)  45.9 H  


 


Mono % (Auto)  8.9  


 


Eos % (Auto)  0.5  


 


Baso % (Auto)  0.7  


 


Neut #  3.7  


 


Lymph #  3.9  


 


Mono #  0.7  


 


Eos #  0.0  


 


Baso #  0.1  


 


Differential Comment    


 


PT   13.2 H 


 


INR   1.2 


 


APTT   31 


 


Sodium    148


 


Potassium    3.4 L


 


Chloride    108 H


 


Carbon Dioxide    19 L


 


Anion Gap    24 H


 


BUN    21 H


 


Creatinine    1.2


 


Est GFR ( Amer)    > 60


 


Est GFR (Non-Af Amer)    > 60


 


POC Glucose (mg/dL)   


 


Random Glucose    114 H


 


Calcium    7.8 L


 


Total Bilirubin    0.7


 


AST    58


 


ALT    57


 


Alkaline Phosphatase    79


 


Troponin I    < 0.0120


 


Total Protein    6.3


 


Albumin    3.2 L


 


Globulin    3.1


 


Albumin/Globulin Ratio    1.0


 


Lipase    61


 


Carcinoembryonic Ag   


 


Blood Type   


 


Antibody Screen   














  09/30/17 09/30/17 09/30/17





  02:09 07:11 11:00


 


WBC   


 


RBC   


 


Hgb   


 


Hct   


 


MCV   


 


MCH   


 


MCHC   


 


RDW   


 


Plt Count   


 


MPV   


 


Neut % (Auto)   


 


Lymph % (Auto)   


 


Mono % (Auto)   


 


Eos % (Auto)   


 


Baso % (Auto)   


 


Neut #   


 


Lymph #   


 


Mono #   


 


Eos #   


 


Baso #   


 


Differential Comment   


 


PT   


 


INR   


 


APTT   


 


Sodium   


 


Potassium   


 


Chloride   


 


Carbon Dioxide   


 


Anion Gap   


 


BUN   


 


Creatinine   


 


Est GFR ( Amer)   


 


Est GFR (Non-Af Amer)   


 


POC Glucose (mg/dL)   171 H  170 H


 


Random Glucose   


 


Calcium   


 


Total Bilirubin   


 


AST   


 


ALT   


 


Alkaline Phosphatase   


 


Troponin I   


 


Total Protein   


 


Albumin   


 


Globulin   


 


Albumin/Globulin Ratio   


 


Lipase   


 


Carcinoembryonic Ag   


 


Blood Type  O POSITIVE  


 


Antibody Screen  Negative  














  09/30/17 09/30/17





  11:29 11:29


 


WBC  


 


RBC  


 


Hgb  


 


Hct  


 


MCV  


 


MCH  


 


MCHC  


 


RDW  


 


Plt Count  


 


MPV  


 


Neut % (Auto)  


 


Lymph % (Auto)  


 


Mono % (Auto)  


 


Eos % (Auto)  


 


Baso % (Auto)  


 


Neut #  


 


Lymph #  


 


Mono #  


 


Eos #  


 


Baso #  


 


Differential Comment  


 


PT  11.9 


 


INR  1.1 


 


APTT  25  D 


 


Sodium  


 


Potassium  


 


Chloride  


 


Carbon Dioxide  


 


Anion Gap  


 


BUN  


 


Creatinine  


 


Est GFR ( Amer)  


 


Est GFR (Non-Af Amer)  


 


POC Glucose (mg/dL)  


 


Random Glucose  


 


Calcium  


 


Total Bilirubin  


 


AST  


 


ALT  


 


Alkaline Phosphatase  


 


Troponin I  


 


Total Protein  


 


Albumin  


 


Globulin  


 


Albumin/Globulin Ratio  


 


Lipase  


 


Carcinoembryonic Ag   4.9 H


 


Blood Type  


 


Antibody Screen  














Assessment & Plan





- Assessment and Plan (Free Text)


Assessment: 





66M Reducible ventral hernia


Plan: 





- Reduce ventral hernia at bedside


- start CLD, ADAT


- pain control


- Patient stated he would not like surgical intervention during this visit


- When patient tolerates diet, is cleared for discharge





D/W Dr. Hahn surgical attending





Rubén Ash PGY1

## 2017-09-30 NOTE — C.PDOC
History Of Present Illness


66 year old male with a history of HTN presents to the ED with complaints of 

abdominal pain that began earlier today in the colon area. He denies fever, 

chills, nausea, vomiting, or diarrhea. 


Chief Complaint (Nursing): Abdominal Pain


History Per: Patient


History/Exam Limitations: no limitations


Onset/Duration Of Symptoms: Hrs


Current Symptoms Are (Timing): Still Present


Location Of Pain/Discomfort: Other ("colon area")


Radiation Of Pain To:: None


Quality Of Discomfort: "Pain"


Associated Symptoms: denies: Fever, Chills, Nausea, Vomiting


Exacerbating Factors: None


Alleviating Factors: None


Recent travel outside of the [place]: No





Past Medical History


Reviewed: Historical Data, Nursing Documentation, Vital Signs


Vital Signs: 


 Last Vital Signs











Temp  97.6 F   09/30/17 04:20


 


Pulse  56 L  09/30/17 04:20


 


Resp  18   09/30/17 04:20


 


BP  111/64   09/30/17 04:20


 


Pulse Ox  96   09/30/17 04:47














- Medical History


PMH: Anemia, Diabetes, Gastritis, HTN, Hyperlipidemia





- [facility] Procedures








ESOPHAGOGASTRODUODENOSCOPY [EGD] W/CLOSED BIOPSY (04/25/15)


INSERT GASTRIC TUBE NEC (04/25/15)








Family History: States: Unknown Family Hx





- Social History


Hx Tobacco Use: Yes


Hx Alcohol Use: Yes


Hx Substance Use: No





- Immunization History


Hx Tetanus Toxoid Vaccination: No


Hx Influenza Vaccination: Yes (2015)


Hx Pneumococcal Vaccination: No





Review Of Systems


Constitutional: Negative for: Fever, Chills


Cardiovascular: Negative for: Chest Pain, Palpitations


Respiratory: Negative for: Cough, Shortness of Breath


Gastrointestinal: Positive for: Abdominal Pain.  Negative for: Nausea, Vomiting





Physical Exam





- Physical Exam


Appears: Non-toxic, No Acute Distress


Skin: Warm, Dry


Head: Atraumatic, Normacephalic


Eye(s): bilateral: Normal Inspection, PERRL, EOMI


Oral Mucosa: Moist


Neck: Supple


Chest: Symmetrical


Cardiovascular: Rhythm Regular, No Murmur


Respiratory: Normal Breath Sounds, No Rales, No Rhonchi, No Wheezing


Gastrointestinal/Abdominal: Soft, Tenderness (RUQ tenderness), Distention, No 

Guarding, No Rebound


Extremity: Normal ROM, No Tenderness


Neurological/Psych: Oriented x3





ED Course And Treatment





- Laboratory Results


Result Diagrams: 


 09/30/17 02:05





 09/30/17 02:05


O2 Sat by Pulse Oximetry: 96 (RA)





- CT Scan/US


  ** Abdomen Pelvis CT


Other Rad Studies (CT/US): Read By Radiologist, Radiology Report Reviewed


CT/US Interpretation: FINDINGS:  Lower thorax: Small hiatal hernia.  ABDOMEN:  

Liver: Crohn's who is lobulated contour to liver consistent with cirrhosis. 

Diffuse hypodensity of liver.  suggestive of hepatic steatosis, as well.  

Gallbladder and bile ducts: Unremarkable. No calcified stones. No ductal 

dilation.  Pancreas: Unremarkable. No ductal dilation.  Spleen: Unremarkable. 

No splenomegaly.  Adrenals: Unremarkable. No mass.  Kidneys and ureters: Right 

kidney is unremarkable. 2 CM left lower pole renal calculus. No.  

hydronephrosis.  Stomach and bowel: Stomach is unremarkable. Anterior abdominal 

wall hernia with mesh in place.  Hernia may be recurrent. Multiple regional 

dilated small bowel loops. Some of these are dilated out.  of proportion to 

adjacent loops. Findings suggestive of small bowel obstruction. There is some.  

inflammation of the regional mesenteric fat. Colonic diverticulosis.  Appendix: 

No findings to suggest acute appendicitis.  PELVIS:  Bladder: Unremarkable. No 

stones.  Reproductive: Unremarkable as visualized.  ABDOMEN and PELVIS:  

Intraperitoneal space: Unremarkable. No free air. No significant fluid 

collection.  Bones/joints: Diffuse spinal degenerative changes, most severe at 

L5-S1. No acute fracture. No.  dislocation.  Soft tissues: Small fat containing 

left inguinal hernia.  Vasculature: Atherosclerotic vascular disease. No 

abdominal aortic aneurysm.  Lymph nodes: Unremarkable. No enlarged lymph nodes.

  IMPRESSION:  Anterior abdominal wall hernia containing multiple loops of bowel

, some of which are dilated and.  stool-filled. Several of these are dilated 

out of proportion to other small bowel loops. There may be.  an overlying mesh, 

and therefore this hernia may be recurrent. Correlate with surgical history.  

Overall findings most consistent with small bowel obstruction.


Progress Note: EKG, CXR, Abdomen pelvis CT, labs, and blood work were ordered. 

Patient was given IV fluids.  Dr. ELISE Mckeon was called approximately 8 times 

between 2:30 am and 4:30am with no answer or call back.  4:50am Dr. Mckeon was 

reached and case was discussed and patient will be admitted to his services.





Disposition


Discussed With : Harrison Mckeon


Doctor Will See Patient In The: Hospital


Counseled Patient/Family Regarding: Diagnosis





- Disposition


Disposition: HOSPITALIZED


Disposition Time: 04:46


Condition: STABLE


Forms:  [facility] (English)





- POA


Present On Arrival: None





- Clinical Impression


Clinical Impression: 


 Abdominal pain, Small bowel obstruction








- Scribe Statement


The provider has reviewed the documentation as recorded by the Scribbarry Mireles





All medical record entries made by the Kimberlyibe were at my direction and 

personally dictated by me. I have reviewed the chart and agree that the record 

accurately reflects my personal performance of the history, physical exam, 

medical decision making, and the department course for this patient. I have 

also personally directed, reviewed, and agree with the discharge instructions 

and disposition.

## 2017-09-30 NOTE — CP.PCM.HP
History of Present Illness





- History of Present Illness


History of Present Illness: 





CC: abdominal pain, nausea, vomitting





HPI: 66 AA M PMHx HTN, HLD, DM, Ventral hernia repair w/ mesh presented to the 

hospital with ABD pain started 2 days ago described as sharp middle of abd 

associated with generalized weakness, tiredness, fatigue. Pt is complaint with 

his diet, medication, follow up. Pt last BM was yesterday. Currently passing 

flatus. Denies F/C CP/SOB 





PMH: DM, HTN, HLD, Hep C


PSH: Hernia repair w/ Mesh


ALL; NKDA


SocialHx: independent in ADL ETOH,Tobacco use, denies drug use  








Present on Admission





- Present on Admission


Any Indicators Present on Admission: Yes





Review of Systems





- Review of Systems


Systems not reviewed;Unavailable: Acuity of Condition





- Constitutional


Constitutional: Fatigue, Lethargy, Malaise





- EENT


Eyes: absent: As Per HPI, Blind Spots, Blurred Vision, Change in Vision, 

Decreased Night Vision, Diplopia, Discharge, Dry Eye, Exophthalmos, Floaters, 

Irritation, Itchy Eyes, Loss of Peripheral Vision, Pain, Photophobia, Requires 

Corrective Lenses, Sees Flashes, Spots in Vision, Tunnel Vision, Other Visual 

Disturbances, Loss of Vision, Other


Ears: absent: As Per HPI, Decreased Hearing, Ear Discharge, Ear Pain, Tinnitus, 

Abnormal Hearing, Disequilibrium, Dizziness, Other


Nose/Mouth/Throat: absent: As Per HPI, Epistaxis, Nasal Congestion, Nasal 

Discharge, Nasal Obstruction, Nasal Trauma, Nose Pain, Post Nasal Drip, Sinus 

Pain, Sinus Pressure, Bleeding Gums, Change in Voice, Dental Pain, Dry Mouth, 

Dysphagia, Halitosis, Hoarsness, Lip Swelling, Mouth Lesions, Mouth Pain, 

Odynophagia, Sore Throat, Throat Swelling, Tongue Swelling, Facial Pain, Neck 

Pain, Neck Mass, Other





- Cardiovascular


Cardiovascular: absent: As Per HPI, Acrocyanosis, Chest Pain, Chest Pain at Rest

, Chest Pain with Activity, Claudication, Diaphoresis, Dyspnea, Dyspnea on 

Exertion, Edema, Irregular Heart Rhythm, Pain Radiating to Arm/Neck/Jaw, Leg 

Edema, Leg Ulcers, Lightheadedness, Orthopnea, Palpitations, Paroxysmal 

Nocturnal Dyspnea, Pedal Edema, Radiating Pain, Rapid Heart Rate, Slow Heart 

Rate, Syncope, Other





- Respiratory


Respiratory: absent: As Per HPI, Cough, Dyspnea, Hemoptysis, Dyspnea on Exertion

, Wheezing, Snoring, Stridor, Pain on Inspiration, Chest Congestion, Excessive 

Mucous Production, Change in Mucous Color, Pain with Coughing, Other





- Gastrointestinal


Gastrointestinal: Abdominal Pain, Nausea, Vomiting





- Genitourinary


Genitourinary: absent: As Per HPI, Change in Urinary Stream, Difficulty 

Urinating, Dysuria, Flank Pain, Hematuria, Pyuria, Nocturia, Urinary 

Incontinence, Urinary Frequency, Urinary Hesitance, Urinary Urgency, Voiding 

Freq/Small Amts, Freq UTI, Hx Renal/Bladder Calculi, Hx /Renal Surgery, 

Bladder Distension, Other





- Musculoskeletal


Musculoskeletal: absent: As Per HPI, Abnormal Gait, Arthralgias, Atrophy, Back 

Pain, Deformity, Joint Swelling, Limited Range of Motion, Loss of Height, 

Muscle Cramps, Muscle Weakness, Myalgias, Neck Pain, Numbness, Radiating Pain 

into Limb, Stiffness, Tingling, Other





- Integumentary


Integumentary: absent: As Per HPI, Acne, Alopecia, Bleeding Lesions, Change in 

Hair, Change in Nails, Change in Pigmentation, Changing Lesions, Dry Skin, 

Erythema, Furuncle, Hirsutism, Lesions, New Lesions, Non-Healing Lesions, 

Photosensitivity, Pruritus, Rash, Skin Pain, Skin Ulcer, Sores, Striae, Swelling

, Unusual Bruising, Wounds, Jaundice, Other





Past Patient History





- Past Medical History & Family History


Past Medical History?: Yes





- Past Social History


Smoking Status: Light Smoker < 10 Cigarettes Daily





- CARDIAC


Hx Hypertension: Yes





- PULMONARY


Hx Respiratory Disorders: No





- NEUROLOGICAL


Hx Neurological Disorder: No





- HEENT


Hx HEENT Problems: No





- RENAL


Other/Comment: pt claims he has kidney problem





- ENDOCRINE/METABOLIC


Hx Diabetes Mellitus Type 2: Yes





- HEMATOLOGICAL/ONCOLOGICAL


Hx Anemia: Yes





- INTEGUMENTARY


Hx Dermatological Problems: No





- MUSCULOSKELETAL/RHEUMATOLOGICAL


Hx Falls: Yes





- GASTROINTESTINAL


Hx Gastritis: Yes





- GENITOURINARY/GYNECOLOGICAL


Hx Genitourinary Disorders: Yes


Hx Urinary Tract Infection: Yes





- PSYCHIATRIC


Hx Substance Use: No





- SURGICAL HISTORY


Hx Surgeries: Yes


Hx Herniorrhaphy: Yes (2012)





- ANESTHESIA


Hx Anesthesia: Yes


Hx Anesthesia Reactions: No


Hx Malignant Hyperthermia: No





Meds


Allergies/Adverse Reactions: 


 Allergies











Allergy/AdvReac Type Severity Reaction Status Date / Time


 


aspirin Allergy   Verified 09/30/17 05:14














Physical Exam





- Constitutional


Appears: No Acute Distress





- Head Exam


Head Exam: ATRAUMATIC, NORMAL INSPECTION, NORMOCEPHALIC





- Eye Exam


Eye Exam: EOMI, Normal appearance, PERRL


Pupil Exam: NORMAL ACCOMODATION, PERRL


Additional comments: 





pallor





- Cardiovascular Exam


Cardiovascular Exam: REGULAR RHYTHM





- GI/Abdominal Exam


GI & Abdominal Exam: Distended, Normal Bowel Sounds


Additional comments: 





hernia at inscinal site


mid line scar of prior surgery





- Rectal Exam


Rectal Exam: Deferred





- Neurological Exam


Neurological exam: Alert, CN II-XII Intact, Normal Gait, Oriented x3, Reflexes 

Normal





- Psychiatric Exam


Psychiatric exam: Normal Affect, Normal Mood





Results





- Vital Signs


Recent Vital Signs: 





 Last Vital Signs











Temp  98.7 F   09/30/17 19:00


 


Pulse  76   09/30/17 19:00


 


Resp  20   09/30/17 19:00


 


BP  151/76 H  09/30/17 19:00


 


Pulse Ox  98   09/30/17 19:00














- Labs


Result Diagrams: 


 09/30/17 02:05





 09/30/17 02:05


Labs: 





 Laboratory Results - last 24 hr











  09/30/17 09/30/17 09/30/17





  02:05 02:05 02:05


 


WBC  8.5  


 


RBC  2.84 L  


 


Hgb  10.6 L  


 


Hct  30.4 L  


 


MCV  107.2 H  


 


MCH  37.2 H  


 


MCHC  34.7  


 


RDW  15.6 H  


 


Plt Count  87 L  


 


MPV  8.3  


 


Neut % (Auto)  44.0 L  


 


Lymph % (Auto)  45.9 H  


 


Mono % (Auto)  8.9  


 


Eos % (Auto)  0.5  


 


Baso % (Auto)  0.7  


 


Neut #  3.7  


 


Lymph #  3.9  


 


Mono #  0.7  


 


Eos #  0.0  


 


Baso #  0.1  


 


Differential Comment    


 


PT   13.2 H 


 


INR   1.2 


 


APTT   31 


 


Sodium    148


 


Potassium    3.4 L


 


Chloride    108 H


 


Carbon Dioxide    19 L


 


Anion Gap    24 H


 


BUN    21 H


 


Creatinine    1.2


 


Est GFR ( Amer)    > 60


 


Est GFR (Non-Af Amer)    > 60


 


POC Glucose (mg/dL)   


 


Random Glucose    114 H


 


Calcium    7.8 L


 


Total Bilirubin    0.7


 


AST    58


 


ALT    57


 


Alkaline Phosphatase    79


 


Troponin I    < 0.0120


 


Total Protein    6.3


 


Albumin    3.2 L


 


Globulin    3.1


 


Albumin/Globulin Ratio    1.0


 


Lipase    61


 


Carcinoembryonic Ag   


 


Blood Type   


 


Antibody Screen   














  09/30/17 09/30/17 09/30/17





  02:09 07:11 11:00


 


WBC   


 


RBC   


 


Hgb   


 


Hct   


 


MCV   


 


MCH   


 


MCHC   


 


RDW   


 


Plt Count   


 


MPV   


 


Neut % (Auto)   


 


Lymph % (Auto)   


 


Mono % (Auto)   


 


Eos % (Auto)   


 


Baso % (Auto)   


 


Neut #   


 


Lymph #   


 


Mono #   


 


Eos #   


 


Baso #   


 


Differential Comment   


 


PT   


 


INR   


 


APTT   


 


Sodium   


 


Potassium   


 


Chloride   


 


Carbon Dioxide   


 


Anion Gap   


 


BUN   


 


Creatinine   


 


Est GFR ( Amer)   


 


Est GFR (Non-Af Amer)   


 


POC Glucose (mg/dL)   171 H  170 H


 


Random Glucose   


 


Calcium   


 


Total Bilirubin   


 


AST   


 


ALT   


 


Alkaline Phosphatase   


 


Troponin I   


 


Total Protein   


 


Albumin   


 


Globulin   


 


Albumin/Globulin Ratio   


 


Lipase   


 


Carcinoembryonic Ag   


 


Blood Type  O POSITIVE  


 


Antibody Screen  Negative  














  09/30/17 09/30/17 09/30/17





  11:29 11:29 16:41


 


WBC   


 


RBC   


 


Hgb   


 


Hct   


 


MCV   


 


MCH   


 


MCHC   


 


RDW   


 


Plt Count   


 


MPV   


 


Neut % (Auto)   


 


Lymph % (Auto)   


 


Mono % (Auto)   


 


Eos % (Auto)   


 


Baso % (Auto)   


 


Neut #   


 


Lymph #   


 


Mono #   


 


Eos #   


 


Baso #   


 


Differential Comment   


 


PT  11.9  


 


INR  1.1  


 


APTT  25  D  


 


Sodium   


 


Potassium   


 


Chloride   


 


Carbon Dioxide   


 


Anion Gap   


 


BUN   


 


Creatinine   


 


Est GFR ( Amer)   


 


Est GFR (Non-Af Amer)   


 


POC Glucose (mg/dL)    170 H


 


Random Glucose   


 


Calcium   


 


Total Bilirubin   


 


AST   


 


ALT   


 


Alkaline Phosphatase   


 


Troponin I   


 


Total Protein   


 


Albumin   


 


Globulin   


 


Albumin/Globulin Ratio   


 


Lipase   


 


Carcinoembryonic Ag   4.9 H 


 


Blood Type   


 


Antibody Screen   














Assessment & Plan


(1) Intestinal obstruction


Assessment and Plan: 


most likely due to strangulated bowel loops


recurent hernia





PLAN: 


pt is for surgical consult


Status: Acute   





(2) Thrombocytopenia


Assessment and Plan: 


liliane HTn


spleenomegaly


Status: Acute   





- Assessment and Plan (Free Text)


Plan: 





cardiology Eval


possible surgery on monday


Pre OP eval

## 2017-09-30 NOTE — C.PDOC
Chief Complaint (Nursing): Abdominal Pain





Past Medical History


Vital Signs: 





 Last Vital Signs











Temp  97.6 F   09/30/17 02:01


 


Pulse  72   09/30/17 02:01


 


Resp  16   09/30/17 02:01


 


BP  91/51 L  09/30/17 02:01


 


Pulse Ox  96   09/30/17 02:01














- Medical History


PMH: Anemia, Diabetes, Gastritis, HTN, Hyperlipidemia





- [facility] Procedures











ESOPHAGOGASTRODUODENOSCOPY [EGD] W/CLOSED BIOPSY (04/25/15)


INSERT GASTRIC TUBE NEC (04/25/15)








Family History: States: Unknown Family Hx





- Social History


Hx Tobacco Use: Yes


Hx Alcohol Use: Yes


Hx Substance Use: No





- Immunization History


Hx Tetanus Toxoid Vaccination: No


Hx Influenza Vaccination: Yes (2015)


Hx Pneumococcal Vaccination: No





ED Course And Treatment


O2 Sat by Pulse Oximetry: 96





Disposition





- Disposition

## 2017-09-30 NOTE — CON
LOCATION:  350, bed B.



HISTORY OF PRESENT ILLNESS:  This is a 66 years old male seen for GI

consultation today, entire chart is reviewed including but not limited to

the most recent lab and radiology study results, current and previous

medication list, current and previous medical events, allergy to medication

list as well as all the available current and previous medical record, this

is a short consultation sheet for consultation sheet to follow.  The

patient was admitted to the hospital through the emergency room with lower

abdominal pain, was found to have by CAT scan abdominal hernia containing

some bowel loops with possible bowel obstruction.  The patient had

dyspepsia, but no reported nausea or vomiting.



Recent upper endoscopy done on 04/25/2015.  Hemoglobin is 10.6, hematocrit

30.4 with low platelet of 87 with low potassium 3.4.  No reported active

bleeding.



IMPRESSION:

1.  Known history of hypertension and hyperlipidemia.

2.  Known history of peptic ulcer disease.

3.  Diabetes mellitus by history.

4.  Anemia through large GI blood loss, upper versus lower.

5.  Admitting diagnosis of anterior abdominal wall hernia containing

multiple loops of bowel with possible partial small bowel obstruction.



SUGGESTION:

1.  Continue current management.

2.  N.p.o.

3.  Surgical consultation.

4.  Proton-pump inhibitor.

5.  Cancer markers.

6.  Correct any underlying electrolyte imbalance.

7.  Further recommendation to follow and IV antibiotics including Flagyl to

be ordered.







__________________________________________

Earle Chilel MD





DD:  09/30/2017 7:49:54

DT:  09/30/2017 9:07:52

Job # [id number]

## 2017-10-01 LAB
BUN SERPL-MCNC: 11 MG/DL (ref 9–20)
CALCIUM SERPL-MCNC: 8.3 MG/DL (ref 8.6–10.4)
CHLORIDE SERPL-SCNC: 109 MMOL/L (ref 98–107)
CO2 SERPL-SCNC: 25 MMOL/L (ref 22–30)
GLUCOSE SERPL-MCNC: 137 MG/DL (ref 75–110)
POTASSIUM SERPL-SCNC: 3.6 MMOL/L (ref 3.6–5.2)
SODIUM SERPL-SCNC: 144 MMOL/L (ref 132–148)

## 2017-10-01 RX ADMIN — INSULIN ASPART SCH UNIT: 100 INJECTION, SOLUTION INTRAVENOUS; SUBCUTANEOUS at 12:39

## 2017-10-01 RX ADMIN — CEFTRIAXONE SCH MLS/30 MIN: 1 INJECTION, SOLUTION INTRAVENOUS at 17:58

## 2017-10-01 RX ADMIN — INSULIN ASPART SCH UNIT: 100 INJECTION, SOLUTION INTRAVENOUS; SUBCUTANEOUS at 17:05

## 2017-10-01 RX ADMIN — INSULIN ASPART SCH: 100 INJECTION, SOLUTION INTRAVENOUS; SUBCUTANEOUS at 22:01

## 2017-10-01 RX ADMIN — FLUTICASONE PROPIONATE SCH SPRAYS: 50 SPRAY, METERED NASAL at 10:00

## 2017-10-01 RX ADMIN — ROSUVASTATIN CALCIUM SCH MG: 5 TABLET, FILM COATED ORAL at 22:01

## 2017-10-01 RX ADMIN — DEXTROSE AND SODIUM CHLORIDE SCH MLS/HR: 5; 900 INJECTION, SOLUTION INTRAVENOUS at 04:00

## 2017-10-01 RX ADMIN — DEXTROSE AND SODIUM CHLORIDE SCH: 5; 900 INJECTION, SOLUTION INTRAVENOUS at 12:59

## 2017-10-01 RX ADMIN — WATER SCH MLS/HR: 1 INJECTION INTRAMUSCULAR; INTRAVENOUS; SUBCUTANEOUS at 05:45

## 2017-10-01 RX ADMIN — CEFTRIAXONE SCH MLS/30 MIN: 1 INJECTION, SOLUTION INTRAVENOUS at 05:15

## 2017-10-01 RX ADMIN — DEXTROSE AND SODIUM CHLORIDE SCH: 5; 900 INJECTION, SOLUTION INTRAVENOUS at 02:15

## 2017-10-01 RX ADMIN — INSULIN ASPART SCH UNIT: 100 INJECTION, SOLUTION INTRAVENOUS; SUBCUTANEOUS at 08:08

## 2017-10-01 NOTE — CP.PCM.CON
History of Present Illness





- History of Present Illness


History of Present Illness: 





66 year old male with a history of HTN, admitted with abdominal pain, found to 

have a ventral hernia with possible small bowel obstruction, anemia, and 

thrombocytopenia.  The patient is unaware of blood problems in the past.  He 

denies bleeding and bruising easily.  He had a hernia surgery in the past and 

did not have abnormal bleeding or hemostasis issues.  He does admit to drinking 

half a pint of liquor a few times a week.





Past medical history:  HTN





Past surgical history:  Hernia repair





Family history:  Denies hematologic and oncologic problems





Social history:  Smokes 4-5 cigs daily, 1/2 pin liquor several times a weeks, 

denies illicit drug use.





Allergies:  Aspirin





Review of systems:  All remaining review of systems including HEENT, 

cardiovascular, respiratory, gastrointestinal, genitourinary, musculoskeletal, 

dermatologic, neurologic, and psychiatric are negative unless mentioned in the 

HPI.





Past Patient History





- Past Medical History & Family History


Past Medical History?: Yes





- Past Social History


Smoking Status: Light Smoker < 10 Cigarettes Daily





- CARDIAC


Hx Hypertension: Yes





- PULMONARY


Hx Respiratory Disorders: No





- NEUROLOGICAL


Hx Neurological Disorder: No





- HEENT


Hx HEENT Problems: No





- RENAL


Other/Comment: pt claims he has kidney problem





- ENDOCRINE/METABOLIC


Hx Diabetes Mellitus Type 2: Yes





- HEMATOLOGICAL/ONCOLOGICAL


Hx Anemia: Yes





- INTEGUMENTARY


Hx Dermatological Problems: No





- MUSCULOSKELETAL/RHEUMATOLOGICAL


Hx Falls: Yes





- GASTROINTESTINAL


Hx Gastritis: Yes





- GENITOURINARY/GYNECOLOGICAL


Hx Genitourinary Disorders: Yes


Hx Urinary Tract Infection: Yes





- PSYCHIATRIC


Hx Substance Use: No





- SURGICAL HISTORY


Hx Surgeries: Yes


Hx Herniorrhaphy: Yes (2012)





- ANESTHESIA


Hx Anesthesia: Yes


Hx Anesthesia Reactions: No


Hx Malignant Hyperthermia: No





Meds


Allergies/Adverse Reactions: 


 Allergies











Allergy/AdvReac Type Severity Reaction Status Date / Time


 


aspirin Allergy   Verified 09/30/17 05:14














- Medications


Medications: 


 Current Medications





Fluticasone Propionate (Flonase)  2 spr FILIPE DAILY [facility]


   Last Admin: 10/01/17 10:00 Dose:  2 sprays


Ceftriaxone Sodium 1 gm/ (Dextrose)  100 mls @ 50 mls/30 min IVPB Q24H [facility]


   Last Admin: 10/01/17 05:15 Dose:  50 mls/30 min


Metronidazole (Flagyl)  500 mg in 100 mls @ 100 mls/hr IVPB Q8 [facility]


   Last Admin: 10/01/17 05:45 Dose:  100 mls/hr


Dextrose/Sodium Chloride (Dextrose 5%/0.9% Ns 1000 Ml)  1,000 mls @ 100 mls/hr 

IV .Q10H [facility]


   Last Admin: 10/01/17 04:00 Dose:  100 mls/hr


Insulin Aspart (Novolog)  0 unit SC ACHS [facility]


   PRN Reason: Protocol


   Last Admin: 10/01/17 08:08 Dose:  1 unit


Levothyroxine Sodium (Synthroid)  25 mcg PO 0630 [facility]


   Last Admin: 10/01/17 05:55 Dose:  25 mcg


Pantoprazole Sodium (Protonix Inj)  40 mg IVP DAILY [facility]


   Last Admin: 10/01/17 10:00 Dose:  40 mg


Pneumococcal Polyvalent Vaccine (Pneumovax 23 Vaccine)  0.5 ml IM .ONCE ONE


   Stop: 10/03/17 10:01


Rosuvastatin Calcium (Crestor)  2.5 mg PO HS [facility]


   Last Admin: 09/30/17 21:42 Dose:  2.5 mg


Tamsulosin HCl (Flomax)  0.4 mg PO DAILY [facility]


   Last Admin: 10/01/17 10:00 Dose:  0.4 mg











Results





- Vital Signs


Recent Vital Signs: 


 Last Vital Signs











Temp  98.1 F   10/01/17 08:00


 


Pulse  63   10/01/17 08:00


 


Resp  20   10/01/17 08:00


 


BP  130/80   10/01/17 08:00


 


Pulse Ox  100   10/01/17 08:00














- Labs


Result Diagrams: 


 10/10/17 05:51





 10/10/17 05:51


Labs: 


 Laboratory Results - last 24 hr











  09/30/17 09/30/17 09/30/17





  11:00 11:29 11:29


 


PT   11.9 


 


INR   1.1 


 


APTT   25  D 


 


Sodium   


 


Potassium   


 


Chloride   


 


Carbon Dioxide   


 


Anion Gap   


 


BUN   


 


Creatinine   


 


Est GFR ( Amer)   


 


Est GFR (Non-Af Amer)   


 


POC Glucose (mg/dL)  170 H  


 


Random Glucose   


 


Calcium   


 


Carcinoembryonic Ag    4.9 H














  09/30/17 09/30/17 10/01/17





  16:41 21:29 07:55


 


PT   


 


INR   


 


APTT   


 


Sodium    144


 


Potassium    3.6


 


Chloride    109 H


 


Carbon Dioxide    25


 


Anion Gap    14


 


BUN    11


 


Creatinine    0.7 L


 


Est GFR ( Amer)    > 60


 


Est GFR (Non-Af Amer)    > 60


 


POC Glucose (mg/dL)  170 H  198 H 


 


Random Glucose    137 H


 


Calcium    8.3 L


 


Carcinoembryonic Ag   














Assessment & Plan


(1) Thrombocytopenia


Assessment and Plan: 


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


surgery requesting goal plt > 100,000


transfusion support


Status: Acute   





(2) Anemia


Assessment and Plan: 


will check ferritin, retic count, b12, folate, FOBT to further characterize


Status: Acute   





(3) Elevated CEA


Assessment and Plan: 


GI work up





Thank you for this interesting consult.


Status: Acute

## 2017-10-01 NOTE — PN
DATE:



SUBJECTIVE:  The patient denied any chest pain.  He has been experiencing

nausea and abdominal discomfort.  He only took liquids today.



PHYSICAL EXAMINATION:

VITAL SIGNS:  Blood pressure _____, heart rate 63, temperature 98.1,

respirations 20.

HEENT:  Normocephalic.

CHEST:  Clear.

HEART:  S1 and S2 regular.

ABDOMEN:  Incarcerated ventral hernia.  Positive bowel sounds.

EXTREMITIES:  No edema.



LABORATORY DATA:  SMA-7; sodium 144, potassium 3.6, chloride 109, CO2 25,

glucose 137, BUN 11, creatinine 0.7.



ASSESSMENT:

1.  Incarcerated ventral hernia.

2.  Hypertension.

3.  Improved hypokalemia.



RECOMMENDATIONS:  Continue IV Rocephin and oral Flagyl.  Continue Crestor

and Synthroid.  The patient is scheduled for surgery tomorrow. 

Postoperative telemetry recommended.



__________________________________________

Cezar Clark MD





DD:  10/01/2017 14:28:01

DT:  10/01/2017 19:01:49

Job # [id number]

## 2017-10-01 NOTE — CP.PCM.PN
Subjective





- Date & Time of Evaluation


Date of Evaluation: 10/01/17


Time of Evaluation: 08:30





- Subjective


Subjective: 





Pt S&E, NAEO. C/O sore abdomen below hernia making it tough to eat. Denies N/V. 

No other complaints.Pt is for OR on monday











Objective





- Vital Signs/Intake and Output


Vital Signs (last 24 hours): 


 











Temp Pulse Resp BP Pulse Ox


 


 98.2 F   70   20   144/89   100 


 


 10/01/17 16:12  10/01/17 16:12  10/01/17 16:12  10/01/17 16:12  10/01/17 16:12








Intake and Output: 


 











 10/01/17 10/02/17





 18:59 06:59


 


Intake Total 250 600


 


Balance 250 600














- Medications


Medications: 


 Current Medications





Fluticasone Propionate (Flonase)  2 spr FILIPE DAILY [facility]


   Last Admin: 10/01/17 10:00 Dose:  2 sprays


Dextrose/Sodium Chloride (Dextrose 5%/0.9% Ns 1000 Ml)  1,000 mls @ 100 mls/hr 

IV .Q10H [facility]


   Last Admin: 10/01/17 12:59 Dose:  Not Given


Ceftriaxone Sodium (Rocephin Iv 1 Gm Duplex)  50 mls @ 50 mls/30 min IVPB Q24H 

[facility]


   Last Admin: 10/01/17 17:58 Dose:  50 mls/30 min


Insulin Aspart (Novolog)  0 unit SC ACHS [facility]


   PRN Reason: Protocol


   Last Admin: 10/01/17 22:01 Dose:  Not Given


Levothyroxine Sodium (Synthroid)  25 mcg PO 0630 [facility]


   Last Admin: 10/01/17 05:55 Dose:  25 mcg


Metronidazole (Flagyl)  500 mg PO Q8 [facility]


   Last Admin: 10/01/17 22:01 Dose:  500 mg


Pantoprazole Sodium (Protonix Inj)  40 mg IVP DAILY [facility]


   Last Admin: 10/01/17 10:00 Dose:  40 mg


Pneumococcal Polyvalent Vaccine (Pneumovax 23 Vaccine)  0.5 ml IM .ONCE ONE


   Stop: 10/03/17 10:01


Rosuvastatin Calcium (Crestor)  2.5 mg PO HS [facility]


   Last Admin: 10/01/17 22:01 Dose:  2.5 mg


Tamsulosin HCl (Flomax)  0.4 mg PO DAILY [facility]


   Last Admin: 10/01/17 10:00 Dose:  0.4 mg











- Labs


Labs: 


 





 09/30/17 02:05 





 10/01/17 07:55 





 











PT  11.9 SECONDS (9.7-12.2)   09/30/17  11:29    


 


INR  1.1   09/30/17  11:29    


 


APTT  25 SECONDS (21-34)  D 09/30/17  11:29    














Assessment and Plan


(1) Intestinal obstruction


Status: Acute   





(2) Thrombocytopenia


Status: Acute

## 2017-10-01 NOTE — CP.PCM.PN
Subjective





- Date & Time of Evaluation


Date of Evaluation: 10/01/17


Time of Evaluation: 08:00





- Subjective


Subjective: 


General Surgery


Pt S&E, NAEO. C/O sore abdomen below hernia making it tough to eat. Denies N/V. 

No other complaints.








Objective





- Vital Signs/Intake and Output


Vital Signs (last 24 hours): 


 











Temp Pulse Resp BP Pulse Ox


 


 98.1 F   63   20   130/80   100 


 


 10/01/17 08:00  10/01/17 08:00  10/01/17 08:00  10/01/17 08:00  10/01/17 08:00








Intake and Output: 


 











 10/01/17 10/01/17





 06:59 18:59


 


Intake Total 2050 


 


Balance 2050 














- Medications


Medications: 


 Current Medications





Fluticasone Propionate (Flonase)  2 spr FILIPE DAILY [facility]


   Last Admin: 10/01/17 10:00 Dose:  2 sprays


Ceftriaxone Sodium 1 gm/ (Dextrose)  100 mls @ 50 mls/30 min IVPB Q24H [facility]


   Last Admin: 10/01/17 05:15 Dose:  50 mls/30 min


Metronidazole (Flagyl)  500 mg in 100 mls @ 100 mls/hr IVPB Q8 [facility]


   Last Admin: 10/01/17 05:45 Dose:  100 mls/hr


Dextrose/Sodium Chloride (Dextrose 5%/0.9% Ns 1000 Ml)  1,000 mls @ 100 mls/hr 

IV .Q10H [facility]


   Last Admin: 10/01/17 04:00 Dose:  100 mls/hr


Insulin Aspart (Novolog)  0 unit SC ACHS [facility]


   PRN Reason: Protocol


   Last Admin: 10/01/17 08:08 Dose:  1 unit


Levothyroxine Sodium (Synthroid)  25 mcg PO 0630 [facility]


   Last Admin: 10/01/17 05:55 Dose:  25 mcg


Pantoprazole Sodium (Protonix Inj)  40 mg IVP DAILY [facility]


   Last Admin: 10/01/17 10:00 Dose:  40 mg


Pneumococcal Polyvalent Vaccine (Pneumovax 23 Vaccine)  0.5 ml IM .ONCE ONE


   Stop: 10/03/17 10:01


Rosuvastatin Calcium (Crestor)  2.5 mg PO HS [facility]


   Last Admin: 09/30/17 21:42 Dose:  2.5 mg


Tamsulosin HCl (Flomax)  0.4 mg PO DAILY [facility]


   Last Admin: 10/01/17 10:00 Dose:  0.4 mg











- Labs


Labs: 


 





 09/30/17 02:05 





 10/01/17 07:55 





 











PT  11.9 SECONDS (9.7-12.2)   09/30/17  11:29    


 


INR  1.1   09/30/17  11:29    


 


APTT  25 SECONDS (21-34)  D 09/30/17  11:29    














- Constitutional


Appears: Non-toxic, No Acute Distress





- Head Exam


Head Exam: ATRAUMATIC, NORMOCEPHALIC





- Eye Exam


Eye Exam: EOMI.  absent: Scleral icterus





- Respiratory Exam


Respiratory Exam: NORMAL BREATHING PATTERN.  absent: Respiratory Distress





- GI/Abdominal Exam


GI & Abdominal Exam: Soft, Hernia (ventral).  absent: Distended, Guarding, 

Tenderness





- Neurological Exam


Neurological Exam: Alert, Awake





- Skin


Skin Exam: Dry, Warm





Assessment and Plan





- Assessment and Plan (Free Text)


Assessment: 


66M with ventral hernia





Plan: 


Plan for OR Monday pending Cardiology clearance.


Consent in chart.


NPO p MN


Analgesia PRN


D/W Dr Selma Multani PGY4

## 2017-10-01 NOTE — CON
DATE:



REASON FOR CONSULTATION:  Preoperative evaluation prior to operating an

incarcerated anterior abdominal wall hernia.



HISTORY OF PRESENT ILLNESS:  The patient is 66 years old 

male who is a smoker and occasional drinker.  Has a history of

hypertension, presented with abdominal pain and distention.  He was found

to incarcerated anterior abdominal wall hernia.  The patient is unaware of

any prior cardiac history, history of hypertension.  He denies any chest

pain or shortness of breath.  The patient has an abdominal surgery that is

not clear when and where; however, it is most likely because of hernia. 

The patient has moved his bowels recently and he denies any vomiting at

this time.



SOCIAL HISTORY:  The patient is a smoker, drinker, he lives by himself. 

His sons lives in this house.  He has his sister nearby him.



MEDICATIONS:  Rocephin 1 g intravenously daily, Crestor 2.5 mg at bedtime,

D5 normal saline at 10 mL an hour, Flagyl 500 mg intravenously q.8 hours,

Flomax 0.4 mg once a day and Synthroid 25 mcg once a day.



REVIEW OF SYSTEMS:  No chest pain.  No palpitation.  No dizziness.  No

recent fall.



PHYSICAL EXAMINATION:

GENERAL:  The patient is an elderly male who does not appears to be in

acute distress.

VITAL SIGNS:  Blood pressure 139/71, heart rate 73, temperature is 98.6 and

respiration 20.

HEENT:  Pale conjunctivae.

CHEST:  Multiple anterior chest wall scars related to sharp object injury

many years ago.

LUNGS:  Clear.

HEART:  S1 and S2 regular.  No gallop or rub.

ABDOMEN:  Incarcerated anterior abdominal wall hernia, site of previous

scar.

EXTREMITIES:  No edema.



LABORATORY DATA:  Hemoglobin and hematocrit 10.6 and 30.4, white count 8.5

and platelet count 87,000.  Sodium 148, potassium 3.4, chloride 108, CO2

19, glucose 114, BUN 21 and creatinine 1.2.  One set of troponin is

negative.  Carcinoembryonic antigen is elevated as 4.9.  INR is 1.1.  PT

and PTT are within normal limits.  EKG revealed sinus rhythm with poor R

wave progression, heart rate was 70 per minute.  Echocardiograph study

performed 03/2017, revealed normal left ventricle systolic function, normal

ejection fraction, normal regional wall motion, left atrium is mildly

dilated.  Abdomen and pelvis scan revealed anterior abdominal wall hernia

containing multiple loops of bowel, some of which are dilated and stool

filled.  Several of these are dilated out of proportion to other small

bowel loops.  There may be an overlying mesh and therefore, this hernia may

be recurrent.



ASSESSMENT:

1.  Incarcerated anterior abdominal wall hernia.

2.  Hypertension.

3.  Mild hypokalemia.

4.  Anemia, mild thrombocytopenia.



RECOMMENDATION:  Continue current IV Rocephin and IV Flagyl.  Optimize

potassium replacement and continue intravenous hydration.  The patient can

undergo hernia repair from cardiac point of view with postoperative

telemetry monitoring; however, electrolytes should be normalized before the

surgery.





__________________________________________

Cezar Clark MD



DD:  09/30/2017 18:09:12

DT:  10/01/2017 2:24:16

Job # [id number]

## 2017-10-01 NOTE — PN
DATE:  09/30/2017



LOCATION:  #350, bed #B.



SUBJECTIVE:  This 66-year-old male was seen and examined for GI

consultation yesterday, on 09/30/2017.  We examined again today, appeared

to be awake, alert, oriented with recurrent episodes of bowel movement, but

no reported nausea or vomiting with less abdominal pain with a complaint of

abdominal distention.



The entire chart was reviewed including, but no limited to the most recent

labs and radiology study results, current and previous medication list,

current and previous medical events.  Case discussed with the staff at

length on the floor and the patient is to be scheduled for OR at a.m.



The patient was seen by cardiology consultation, report is seen as well as

is scheduled to be seen by the hematology-oncology consultant on the case.



Most recent lab results showed low hemoglobin and hematocrit with normal PT

and PTT with low potassium, increased BUN with low CO2 content, indicative

of metabolic acidosis.  Today's labs still pending, but last blood glucose

level reported to be 198.



His CEA level was reported to be 4129, elevated, but normal lipase level.



PHYSICAL EXAMINATION

GENERAL:  A 66-year-old male.

VITAL SIGNS:  Afebrile with pulse of 70, respiratory rate 20 to 22, blood

pressure 132/76.

HEENT:  Showed pale, benign oral mucous membrane.  Nonicteric sclerae.

LUNGS:  Few scattered mild crepitation.  Breathing sounds are present

bilaterally.

HEART:  Positive S1 and S2.

ABDOMEN:  Soft with mild-to-moderate distension; multiple, large, midline,

ventral, incarcerated anterior abdominal wall hernia.  No other mass or

organomegaly.

EXTREMITIES:  Without edema, clubbing, or cyanosis.

NEUROLOGICAL:  No reported significant clinical changes or neurological new

deficits.

VASCULAR:  Peripheral pulses are present bilaterally, but decreased.



IMPRESSION:

1.  Incarcerated anterior abdominal wall ventral hernia with possible

partial small-bowel obstruction, the patient has bowel movement, there is

no evidence clinically of complete bowel obstruction, no nausea or vomiting

and no chills or fever.

2.  Known history of hypertension.

3.  Electrolyte imbalance with mild hypokalemia.

4.  Elevated CEA level, rule out lower gastrointestinal tract occult

malignancy.

5.  Anemia with reported thrombocytopenia.

6.  _____ of unclear etiology.

7.  Known history of poorly controlled diabetes mellitus.

8.  Malnutrition, hypoalbuminemia.

9.  Hypocalcemia.

10.  Hyperlipidemia by history as well as peptic ulcer disease.



SUGGESTION:

1.  Continue current management.

2.  Prepare hyperalimentation.

3.  Correct any underlying electrolyte imbalance.

4.  Due to the patient's elevated CEA level, colonoscopy is to be scheduled

when the patient is more stable clinically.  Further recommendations to

follow post surgery.







__________________________________________

Earle Chilel MD





DD:  10/01/2017 7:36:13

DT:  10/01/2017 9:18:25

Job # [id number]

## 2017-10-02 LAB
BASOPHILS # BLD AUTO: 0 K/UL (ref 0–0.2)
BASOPHILS NFR BLD: 0.6 % (ref 0–2)
BUN SERPL-MCNC: 6 MG/DL (ref 9–20)
CALCIUM SERPL-MCNC: 8.8 MG/DL (ref 8.6–10.4)
CHLORIDE SERPL-SCNC: 107 MMOL/L (ref 98–107)
CO2 SERPL-SCNC: 23 MMOL/L (ref 22–30)
EOSINOPHIL # BLD AUTO: 0 K/UL (ref 0–0.7)
EOSINOPHIL NFR BLD: 0.4 % (ref 0–4)
ERYTHROCYTE [DISTWIDTH] IN BLOOD BY AUTOMATED COUNT: 15.5 % (ref 11.5–14.5)
GLUCOSE SERPL-MCNC: 152 MG/DL (ref 75–110)
HCT VFR BLD CALC: 28.7 % (ref 35–51)
LYMPHOCYTES # BLD AUTO: 1.4 K/UL (ref 1–4.3)
LYMPHOCYTES NFR BLD AUTO: 27.4 % (ref 20–40)
MCH RBC QN AUTO: 37.5 PG (ref 27–31)
MCHC RBC AUTO-ENTMCNC: 34.9 G/DL (ref 33–37)
MCV RBC AUTO: 107.3 FL (ref 80–94)
MONOCYTES # BLD: 0.4 K/UL (ref 0–0.8)
MONOCYTES NFR BLD: 7.2 % (ref 0–10)
NRBC BLD AUTO-RTO: 0.1 % (ref 0–2)
PLATELET # BLD: 47 K/UL (ref 130–400)
PMV BLD AUTO: 8.7 FL (ref 7.2–11.7)
POTASSIUM SERPL-SCNC: 3.7 MMOL/L (ref 3.6–5.2)
SODIUM SERPL-SCNC: 142 MMOL/L (ref 132–148)
WBC # BLD AUTO: 4.9 K/UL (ref 4.8–10.8)

## 2017-10-02 RX ADMIN — FLUTICASONE PROPIONATE SCH SPRAYS: 50 SPRAY, METERED NASAL at 09:28

## 2017-10-02 RX ADMIN — DEXTROSE AND SODIUM CHLORIDE SCH: 5; 900 INJECTION, SOLUTION INTRAVENOUS at 18:15

## 2017-10-02 RX ADMIN — ROSUVASTATIN CALCIUM SCH: 5 TABLET, FILM COATED ORAL at 22:00

## 2017-10-02 RX ADMIN — INSULIN ASPART SCH UNIT: 100 INJECTION, SOLUTION INTRAVENOUS; SUBCUTANEOUS at 17:25

## 2017-10-02 RX ADMIN — INSULIN ASPART SCH UNIT: 100 INJECTION, SOLUTION INTRAVENOUS; SUBCUTANEOUS at 07:51

## 2017-10-02 RX ADMIN — DEXTROSE AND SODIUM CHLORIDE SCH MLS/HR: 5; 900 INJECTION, SOLUTION INTRAVENOUS at 04:00

## 2017-10-02 RX ADMIN — INSULIN ASPART SCH UNIT: 100 INJECTION, SOLUTION INTRAVENOUS; SUBCUTANEOUS at 12:01

## 2017-10-02 RX ADMIN — CEFTRIAXONE SCH MLS/30 MIN: 1 INJECTION, SOLUTION INTRAVENOUS at 17:25

## 2017-10-02 RX ADMIN — INSULIN ASPART SCH: 100 INJECTION, SOLUTION INTRAVENOUS; SUBCUTANEOUS at 22:00

## 2017-10-02 NOTE — PN
LOCATION:  [facility], bed B.



SUBJECTIVE:  This is a 66-year-old male seen and examined in rounds without

significant clinical changes or reported active bleeding with mild

intermittent period of abdominal pain and slight shortness of breath.



The patient had been tolerating oral intake as pre-yesterday with no

reported chest pain, bleeding, significant shortness of breath,

palpitation, chills, or fever.



The entire chart is reviewed including, but not limited to the most recent

lab and radiology study results, current and previous medication list,

current and previous medical events.  Today's lab showed blood glucose

level 178, elevated.  No other reported lab result recently and latest PT

and PTT reported normal with elevated CEA level to 4.9.



PHYSICAL EXAMINATION:

GENERAL:  A 66 years old male.

VITAL SIGNS:  Afebrile with pulse of 62, respiratory rate 20 to 22 with

blood pressure of 136/76.

HEENT:  Showed pale dry mucoid membranes, nonicteric sclerae.

LUNGS:  Few scattered crepitations; decreased air entry at bases.

HEART:  Positive S1 and S2 with increased rate mildly.

ABDOMEN:  Soft with mild distention with evidence of incarcerated anterior

abdominal wall hernia.

RECTAL:  The patient refused.

EXTREMITIES:  Without significant edema, clubbing, or cyanosis.

NEUROLOGIC:  No reported new neurological deficits, sensory or motor.  No

focal deficits.

VASCULAR:  Peripheral pulses are present but weak bilaterally.



IMPRESSION:

1.  Incarcerated anterior abdominal wall, diagnosed by radiology study with

possible partial small-bowel obstruction.

2.  No evidence of complete bowel obstruction clinically.  The patient

passing gas and having bowel movement recently, even post admission.

3.  Known history of hypertension.

4.  Reported electrolyte imbalance.

5.  Anemia with thrombocytopenia.

6.  Known history of poorly controlled diabetes mellitus.

7.  Hypocalcemia.

8.  History of hyperlipidemia with peptic ulcer disease.

9.  Malnutrition with hypoalbuminemia.

10.  Elevated CEA level of unclear etiology to rule out occult colon

cancer.

11.  Hypertension by history.



SUGGESTION:

1.  Continue current management.

2.  The patient is to be scheduled for potential surgery today; after which

the patient may need endoscopic evaluation of the GI tract, especially

colonoscopy due to elevated CEA level.







__________________________________________

Earle Chilel MD





DD:  10/02/2017 9:45:48

DT:  10/02/2017 10:09:34

Job # [id number]

## 2017-10-02 NOTE — CP.PCM.PN
Subjective





- Date & Time of Evaluation


Date of Evaluation: 10/02/17


Time of Evaluation: 07:00





- Subjective


Subjective: 


GENERAL SURGERY PROGRESS NOTE FOR DR. DAVE





Patient seen and examined at bedside. He reports pain when he eats and a sore 

throat. He is having normal bowel movements.





Objective





- Vital Signs/Intake and Output


Vital Signs (last 24 hours): 


 











Temp Pulse Resp BP Pulse Ox


 


 98.4 F   84   20   123/74   98 


 


 10/02/17 15:00  10/02/17 15:00  10/02/17 15:00  10/02/17 15:00  10/02/17 15:00








Intake and Output: 


 











 10/02/17 10/03/17





 18:59 06:59


 


Intake Total 510 


 


Balance 510 














- Medications


Medications: 


 Current Medications





Fluticasone Propionate (Flonase)  2 spr FILIPE DAILY [facility]


   Last Admin: 10/02/17 09:28 Dose:  1 sprays


Dextrose/Sodium Chloride (Dextrose 5%/0.9% Ns 1000 Ml)  1,000 mls @ 100 mls/hr 

IV .Q10H [facility]


   Last Admin: 10/02/17 04:00 Dose:  100 mls/hr


Ceftriaxone Sodium (Rocephin Iv 1 Gm Duplex)  50 mls @ 50 mls/30 min IVPB Q24H 

[facility]


   Last Admin: 10/02/17 17:25 Dose:  50 mls/30 min


Insulin Aspart (Novolog)  0 unit SC ACHS [facility]


   PRN Reason: Protocol


   Last Admin: 10/02/17 17:25 Dose:  2 unit


Levothyroxine Sodium (Synthroid)  25 mcg PO 0630 [facility]


   Last Admin: 10/02/17 05:43 Dose:  25 mcg


Metronidazole (Flagyl)  500 mg PO Q8 [facility]


   Last Admin: 10/02/17 14:12 Dose:  500 mg


Pantoprazole Sodium (Protonix Inj)  40 mg IVP DAILY [facility]


   Last Admin: 10/02/17 09:29 Dose:  40 mg


Pneumococcal Polyvalent Vaccine (Pneumovax 23 Vaccine)  0.5 ml IM .ONCE ONE


   Stop: 10/03/17 10:01


Rosuvastatin Calcium (Crestor)  2.5 mg PO HS [facility]


   Last Admin: 10/01/17 22:01 Dose:  2.5 mg


Tamsulosin HCl (Flomax)  0.4 mg PO DAILY [facility]


   Last Admin: 10/02/17 09:28 Dose:  0.4 mg











- Labs


Labs: 


 





 10/02/17 11:30 





 10/02/17 13:37 





 











PT  11.9 SECONDS (9.7-12.2)   09/30/17  11:29    


 


INR  1.1   09/30/17  11:29    


 


APTT  25 SECONDS (21-34)  D 09/30/17  11:29    














- Constitutional


Appears: Non-toxic, No Acute Distress





- Head Exam


Head Exam: ATRAUMATIC, NORMAL INSPECTION





- Eye Exam


Eye Exam: EOMI, Normal appearance





- Respiratory Exam


Respiratory Exam: NORMAL BREATHING PATTERN.  absent: Respiratory Distress





- Cardiovascular Exam


Cardiovascular Exam: +S1, +S2





- GI/Abdominal Exam


GI & Abdominal Exam: Soft, Hernia (reducible ventral hernia).  absent: Distended

, Firm, Tenderness, Rebound





- Neurological Exam


Neurological Exam: Alert, Awake, Oriented x3





- Psychiatric Exam


Psychiatric exam: Normal Affect, Normal Mood





- Skin


Skin Exam: Dry, Normal Color, Warm





Assessment and Plan





- Assessment and Plan (Free Text)


Assessment: 


65yo M with ventral hernia and thrombocytopenia





- Afebrile, VSS


- Platelets 47 today


- OR on hold until platelets improve


- Discussed plan with Dr. Selma Ny PGY-3

## 2017-10-02 NOTE — PN
SUBJECTIVE:  The patient's surgery was postponed because of

thrombocytopenia.  The patient is moving his bowel and is able to tolerate

solid diet.  He denies any chest pain.



PHYSICAL EXAMINATION:

VITAL SIGNS:  Blood pressure 132/68, heart rate 60, temperature 98.5,

respirations 20.

HEENT:  Normocephalic.

CHEST:  Clear.

HEART:  S1 and S2 regular.

ABDOMEN:  Reducible ventral hernia.

EXTREMITIES:  No edema.



LABORATORY DATA:  Hemoglobin and hematocrit 10 and 28.7, platelet count

today is 47,000.  SMA-7 is within normal limit except for glucose of 152,

BUN and creatinine are 16 and 0.6 respectively.



ASSESSMENT:

1.  Incarcerated ventral hernia.

2.  Hypertension.

3.  Uncontrolled diabetes mellitus.

4.  Thrombocytopenia.

5.  Mild anemia.



RECOMMENDATIONS:  Continue Crestor at 2.5 mg once a day, oral Flagyl 500 mg

q.8 hours, Protonix 40 mg intravenously daily, Synthroid 25 mg once a day. 

I will consider manual platelet count.







__________________________________________

Cezar Clark MD





DD:  10/02/2017 16:18:42

DT:  10/02/2017 17:06:27

Job # [id number]

## 2017-10-02 NOTE — CON
DATE:  09/30/2017



From Dr. Chilel to Dr. Harrison Mckeon.



REASON FOR CONSULTATION:  I was called for GI consultation by the admitting

medical team.  The patient is seen and fully examined on 09/30/2017.  A

short consultation dictation was performed before.



The entire chart is reviewed including, but not limited to the most recent

lab and radiology study results, current and previous medication list,

current and previous medical events, allergy to medication list as well as

all the available current and previous medical records.  Case was discussed

at length with the admitting medical team.



HISTORY OF PRESENT ILLNESS:  This is a 66 years old male who was admitted

to the hospital through the emergency room with a main complaint of severe

crampy abdominal pain for last few hours prior to his admission, what

appears to be sudden increase of abdominal girth.  There was mild nausea,

but no vomiting, was mild dyspepsia.  No reported active bleeding, chest

pain, shortness of breath or chills or fever at that time.



PAST MEDICAL HISTORY:  Including mainly, but not limited to:

1.  Hypertension.

2.  Peptic ulcer disease.

3.  Diabetes mellitus.

4.  Hyperlipidemia.

5.  Reported anemia.



SOCIAL HISTORY:  Positive for cigarette smoking and alcohol intake by

history.



CURRENT MEDICATIONS:  Medication list was reviewed.



ALLERGIES TO MEDICATIONS:  UNCLEAR.



FAMILY HISTORY:  Noncontributory.



LABORATORY DATA:  After being admitted to the hospital, initial blood work

showed thrombocytopenia of 87 with low hemoglobin 10.5, low hematocrit 30.4

with low potassium 3.4 with low CO2 content to 19, indicative of metabolic

acidosis with slight increase of blood glucose level to 114 and increased

BUN to 21, but not normal creatinine.



Abdomen and pelvic CAT scan was preformed indicative of anterior abdominal

wall ventral hernia continuing multiple loops of the bowel with possible

partial small obstruction.



PHYSICAL EXAMINATION:

GENERAL:  A 66 years old male complaining of abdominal pain with abdominal

distention, awake, alert and oriented.

VITAL SIGNS:  Afebrile with pulse of 60, respiratory rate 18-20 and blood

pressure 118/66.

HEENT:  Show pale dry oral mucoid membranes, anicteric sclerae.

LYMPH NODES:  No lymphadenitis or lymphadenopathy.

LUNGS:  Few scattered crepitations with decreased air entry at bases.

HEART:  Positive S1 and S2.

ABDOMEN:  Soft with mild distention and large anterior abdominal wall

hernia.  No other mass or organomegaly.  No rebound tenderness or guarding.

RECTAL:  The patient refused.

EXTREMITIES:  With slight lower extremity edematous changes.  No clubbing

or cyanosis.

NEUROLOGIC:  No reported new neurological deficits, sensory or motor.



IMPRESSION:

1.  Anterior abdominal wall hernia formation with possible partial small

bowel obstruction.

2.  Known history of, but not limited to hyperlipidemia, hypertension,

peptic ulcer disease and diabetes mellitus.

3.  Anemia.

4.  _____ gastrointestinal blood loss, upper versus lower.

5.  Rule out occult gastrointestinal malignancy.



SUGGESTIONS:

1.  Agree with your plan.

2.  Correct any underlying electrolyte imbalance.  _____.

3.  Proton pump inhibitors.

4.  Keep n.p.o. with surgical evaluation.

5.  Cancer markers.

6.  Further evaluation recommendation to follow with surgery.

7.  Prepare hyperalimentation.



Further evaluation to follow.  Thank you for letting me participate in your

patient's case management.





__________________________________________

Earle Chilel MD



cc:  Earle Chilel MD



DD:  10/01/2017 18:19:46

DT:  10/02/2017 1:36:56

Job # [id number]

## 2017-10-02 NOTE — CP.PCM.PN
Subjective





- Date & Time of Evaluation


Date of Evaluation: 10/02/17


Time of Evaluation: 21:00





- Subjective


Subjective: 





Pt S&E, NAEO. C/O sore abdomen below hernia making it tough to eat. Denies N/V. 

No other complaints.








Objective





- Vital Signs/Intake and Output


Vital Signs (last 24 hours): 


 











Temp Pulse Resp BP Pulse Ox


 


 98.4 F   84   20   123/74   98 


 


 10/02/17 15:00  10/02/17 15:00  10/02/17 15:00  10/02/17 15:00  10/02/17 15:00








Intake and Output: 


 











 10/02/17 10/03/17





 18:59 06:59


 


Intake Total 510 750


 


Output Total  400


 


Balance 510 350














- Medications


Medications: 


 Current Medications





Fluticasone Propionate (Flonase)  2 spr FILIPE DAILY [facility]


   Last Admin: 10/02/17 09:28 Dose:  1 sprays


Dextrose/Sodium Chloride (Dextrose 5%/0.9% Ns 1000 Ml)  1,000 mls @ 100 mls/hr 

IV .Q10H [facility]


   Last Admin: 10/02/17 18:15 Dose:  Not Given


Ceftriaxone Sodium (Rocephin Iv 1 Gm Duplex)  50 mls @ 50 mls/30 min IVPB Q24H 

[facility]


   Last Admin: 10/02/17 17:25 Dose:  50 mls/30 min


Insulin Aspart (Novolog)  0 unit SC ACHS [facility]


   PRN Reason: Protocol


   Last Admin: 10/02/17 17:25 Dose:  2 unit


Levothyroxine Sodium (Synthroid)  25 mcg PO 0630 [facility]


   Last Admin: 10/02/17 05:43 Dose:  25 mcg


Metronidazole (Flagyl)  500 mg PO Q8 [facility]


   Last Admin: 10/02/17 21:13 Dose:  500 mg


Pantoprazole Sodium (Protonix Inj)  40 mg IVP DAILY [facility]


   Last Admin: 10/02/17 09:29 Dose:  40 mg


Pneumococcal Polyvalent Vaccine (Pneumovax 23 Vaccine)  0.5 ml IM .ONCE ONE


   Stop: 10/03/17 10:01


Rosuvastatin Calcium (Crestor)  2.5 mg PO HS [facility]


   Last Admin: 10/01/17 22:01 Dose:  2.5 mg


Tamsulosin HCl (Flomax)  0.4 mg PO DAILY [facility]


   Last Admin: 10/02/17 09:28 Dose:  0.4 mg











- Labs


Labs: 


 





 10/02/17 11:30 





 10/02/17 13:37 





 











PT  11.9 SECONDS (9.7-12.2)   09/30/17  11:29    


 


INR  1.1   09/30/17  11:29    


 


APTT  25 SECONDS (21-34)  D 09/30/17  11:29    














- Constitutional


Appears: No Acute Distress





- Head Exam


Head Exam: ATRAUMATIC, NORMAL INSPECTION, NORMOCEPHALIC





- Eye Exam


Eye Exam: EOMI, Normal appearance, PERRL


Pupil Exam: NORMAL ACCOMODATION, PERRL





- Respiratory Exam


Respiratory Exam: Clear to Ausculation Bilateral, NORMAL BREATHING PATTERN





- GI/Abdominal Exam


GI & Abdominal Exam: Tenderness, Pulsatile Mass





- Rectal Exam


Rectal Exam: Deferred





Assessment and Plan


(1) Intestinal obstruction


Status: Acute   





(2) Thrombocytopenia


Status: Acute

## 2017-10-03 LAB
ERYTHROCYTE [DISTWIDTH] IN BLOOD BY AUTOMATED COUNT: 15.4 % (ref 11.5–14.5)
HCT VFR BLD CALC: 29.8 % (ref 35–51)
MCH RBC QN AUTO: 37.1 PG (ref 27–31)
MCHC RBC AUTO-ENTMCNC: 34.7 G/DL (ref 33–37)
MCV RBC AUTO: 107 FL (ref 80–94)
PLATELET # BLD: 47 K/UL (ref 130–400)
PMV BLD AUTO: 8.1 FL (ref 7.2–11.7)
WBC # BLD AUTO: 7 K/UL (ref 4.8–10.8)

## 2017-10-03 RX ADMIN — FLUTICASONE PROPIONATE SCH SPRAYS: 50 SPRAY, METERED NASAL at 09:12

## 2017-10-03 RX ADMIN — CEFTRIAXONE SCH MLS/30 MIN: 1 INJECTION, SOLUTION INTRAVENOUS at 17:05

## 2017-10-03 RX ADMIN — INSULIN ASPART SCH UNIT: 100 INJECTION, SOLUTION INTRAVENOUS; SUBCUTANEOUS at 17:05

## 2017-10-03 RX ADMIN — DEXTROSE AND SODIUM CHLORIDE SCH MLS/HR: 5; 900 INJECTION, SOLUTION INTRAVENOUS at 02:35

## 2017-10-03 RX ADMIN — INSULIN ASPART SCH UNIT: 100 INJECTION, SOLUTION INTRAVENOUS; SUBCUTANEOUS at 11:46

## 2017-10-03 RX ADMIN — ROSUVASTATIN CALCIUM SCH MG: 5 TABLET, FILM COATED ORAL at 21:43

## 2017-10-03 RX ADMIN — INSULIN ASPART SCH UNIT: 100 INJECTION, SOLUTION INTRAVENOUS; SUBCUTANEOUS at 07:45

## 2017-10-03 RX ADMIN — DEXTROSE AND SODIUM CHLORIDE SCH: 5; 900 INJECTION, SOLUTION INTRAVENOUS at 04:15

## 2017-10-03 NOTE — PN
DATE:



LOCATION:  [facility], bed B.



SUBJECTIVE:  This is a 66-year-old male seen and examined in rounds,

appears to be awake, alert and oriented, admitted passing bowel movement

and passing gas with occasional period of mild nausea and dyspepsia, but no

vomiting with occasional abdominal pain and mild increase of abdominal

girth at the site of the anterior abdominal wall ventral hernia today.



Most recent lab results done yesterday showed low hemoglobin and hematocrit

with very low platelet count of 47.  Today's lab is still pending, but

blood glucose level measured to be at 181.



The entire chart is reviewed including, but not limited to some of the

recent lab and radiology study results, current and previous medication

list, current and previous medical events.  Case discussed at length with

the staff on the floor.



PHYSICAL EXAMINATION:

GENERAL:  A 66-year-old male.

VITAL SIGNS:  Afebrile with pulse of 72, respiratory rate 20 to 22 with

blood pressure of 124/72.

HEENT:  Showed pale dry oral mucous membrane.  Mild icteric sclerae

bilaterally.

LUNGS:  Few scattered crepitation.  Decreased air entry at bases.

HEART:  Positive S1 and S2.

ABDOMEN:  Soft.  Bowel sounds are present with mild to moderate distention

and positive for anterior abdominal wall hernia without evidence of other

mass, lesion, or organomegaly.

EXTREMITIES:  Slight lower extremity edematous changes.

NEUROLOGICAL:  No reported neurological deficits, sensory or motor.



IMPRESSION:

1.  Incarcerated anterior abdominal wall hernia diagnosed by radiology

studies.

2.  Known history of hypertension.

3.  Anemia with thrombocytopenia of unclear etiology, the possibility of

gastrointestinal blood loss should be rule in or out.

4.  Known history of poorly controlled diabetes mellitus.

5.  Known history of peptic ulcer disease with hyperlipidemia.

6.  Elevated CEA level to rule out occult colon and gastrointestinal tract

cancer.

7.  Hypertension by history.



SUGGESTION:

1.  I agree with your plan.

2.  Treat underlying thrombocytopenia and correct and underlying

coagulopathy.

3.  Again, the patient may need endoscopic evaluation of the GI tract, both

surgery and when he is more stable clinically.



Further recommendation to follow.





__________________________________________

Earle Chilel MD



cc:  Earle Chilel MD



DD:  10/03/2017 11:20:40

DT:  10/03/2017 12:28:54

Job # [id number]

## 2017-10-03 NOTE — PN
DATE:



SUBJECTIVE:  The patient is not complaining of abdominal pain.  He is

experiencing leg cramps.



PHYSICAL EXAMINATION

VITAL SIGNS:  Blood pressure _____, heart rate _____, temperature 98.2, and

respirations 20.

HEENT:  Pale conjunctivae.

CHEST:  Clear.

HEART:  S1 and S2 regular.

EXTREMITIES:  No edema.  No calf tenderness.



ASSESSMENT:

1.  Incarcerated ventral hernia.

2.  Thrombocytopenia.

3.  Hypertension.

4.  Hypothyroidism.

5.  Leg cramps.



CONDITIONS:  The patient will be maintained on Crestor 2.5 mg once a day,

IV Rocephin at 1 g daily, and Synthroid 25 mcg once a day.  The patient

will have manual platelet count to be done today.  In the meantime, he will

sign the consent to receive platelet transfusion prior to the scheduled

surgery tomorrow.  Obtain basic electrolytes in a.m. prior to the surgery,

which I will order.





__________________________________________

Cezar Clark MD





DD:  10/03/2017 13:35:33

DT:  10/03/2017 13:53:37

Job # [id number]

## 2017-10-03 NOTE — CP.PCM.PN
Subjective





- Date & Time of Evaluation


Date of Evaluation: 10/03/17


Time of Evaluation: 07:00





- Subjective


Subjective: 


GENERAL SURGERY PROGRESS NOTE FOR DR. DAVE





Patient seen and examined at bedside. He is tolerating his diet. He denies 

nausea or vomiting. He reports pain "from time to time". He had a bowel 

movement last night and is passing flatus.





Objective





- Vital Signs/Intake and Output


Vital Signs (last 24 hours): 


 











Temp Pulse Resp BP Pulse Ox


 


 98.2 F   70   20   128/75   100 


 


 10/03/17 07:31  10/03/17 07:31  10/03/17 07:31  10/03/17 07:31  10/03/17 07:31








Intake and Output: 


 











 10/03/17 10/03/17





 06:59 18:59


 


Intake Total 1700 


 


Output Total 400 


 


Balance 1300 














- Medications


Medications: 


 Current Medications





Famotidine (Pepcid)  20 mg IVP Q12 [facility]


Fluticasone Propionate (Flonase)  2 spr FILIPE DAILY [facility]


   Last Admin: 10/03/17 09:12 Dose:  1 sprays


Ceftriaxone Sodium (Rocephin Iv 1 Gm Duplex)  50 mls @ 50 mls/30 min IVPB Q24H 

[facility]


   Last Admin: 10/02/17 17:25 Dose:  50 mls/30 min


Insulin Aspart (Novolog)  0 unit SC ACHS [facility]


   PRN Reason: Protocol


   Last Admin: 10/03/17 07:45 Dose:  1 unit


Levothyroxine Sodium (Synthroid)  25 mcg PO 0630 [facility]


   Last Admin: 10/03/17 05:32 Dose:  25 mcg


Metronidazole (Flagyl)  500 mg PO Q8 [facility]


   Last Admin: 10/03/17 05:32 Dose:  500 mg


Rosuvastatin Calcium (Crestor)  2.5 mg PO HS [facility]


   Last Admin: 10/01/17 22:01 Dose:  2.5 mg


Tamsulosin HCl (Flomax)  0.4 mg PO DAILY [facility]


   Last Admin: 10/03/17 09:14 Dose:  0.4 mg











- Labs


Labs: 


 





 10/02/17 11:30 





 10/02/17 13:37 





 











PT  11.9 SECONDS (9.7-12.2)   09/30/17  11:29    


 


INR  1.1   09/30/17  11:29    


 


APTT  25 SECONDS (21-34)  D 09/30/17  11:29    














- Constitutional


Appears: Non-toxic, No Acute Distress





- Head Exam


Head Exam: ATRAUMATIC, NORMAL INSPECTION





- Eye Exam


Eye Exam: EOMI, Normal appearance





- Respiratory Exam


Respiratory Exam: NORMAL BREATHING PATTERN.  absent: Respiratory Distress





- Cardiovascular Exam


Cardiovascular Exam: +S1, +S2





- GI/Abdominal Exam


GI & Abdominal Exam: Soft, Tenderness (mild tenderness at hernia site), Hernia (

reducible ventral hernia).  absent: Distended, Firm, Guarding, Rigid





- Neurological Exam


Neurological Exam: Alert, Awake, Oriented x3





- Psychiatric Exam


Psychiatric exam: Normal Affect, Normal Mood





- Skin


Skin Exam: Dry, Normal Color, Warm





Assessment and Plan





- Assessment and Plan (Free Text)


Assessment: 


65yo M with ventral hernia and thrombocytopenia





- Afebrile, VSS


- Platelets 47 yesterday


- OR on hold until platelets improve to >100


- Discussed plan with Dr. Selma Ny PGY-3

## 2017-10-03 NOTE — CP.PCM.PN
Subjective





- Date & Time of Evaluation


Date of Evaluation: 10/03/17


Time of Evaluation: 09:10





- Subjective


Subjective: 





Pt seen and evaluated at bedside, Has mild abdominal discomfort


for platelet transfusion and post transfusion plt count today.








Objective





- Vital Signs/Intake and Output


Vital Signs (last 24 hours): 


 











Temp Pulse Resp BP Pulse Ox


 


 98.2 F   74   20   170/89 H  100 


 


 10/03/17 15:00  10/03/17 15:00  10/03/17 15:00  10/03/17 15:00  10/03/17 15:00








Intake and Output: 


 











 10/03/17 10/04/17





 18:59 06:59


 


Intake Total 1100 


 


Balance 1100 














- Medications


Medications: 


 Current Medications





Famotidine (Pepcid)  20 mg IVP Q12 [facility]


   Last Admin: 10/03/17 21:43 Dose:  20 mg


Fluticasone Propionate (Flonase)  2 spr FILIPE DAILY [facility]


   Last Admin: 10/03/17 09:12 Dose:  1 sprays


Ceftriaxone Sodium (Rocephin Iv 1 Gm Duplex)  50 mls @ 50 mls/30 min IVPB Q24H 

[facility]


   Last Admin: 10/03/17 17:05 Dose:  50 mls/30 min


Insulin Aspart (Novolog)  0 unit SC ACHS [facility]


   PRN Reason: Protocol


   Last Admin: 10/03/17 17:05 Dose:  2 unit


Levothyroxine Sodium (Synthroid)  25 mcg PO 0630 [facility]


   Last Admin: 10/03/17 05:32 Dose:  25 mcg


Metronidazole (Flagyl)  500 mg PO Q8 [facility]


   Last Admin: 10/03/17 21:42 Dose:  500 mg


Rosuvastatin Calcium (Crestor)  2.5 mg PO HS [facility]


   Last Admin: 10/03/17 21:43 Dose:  2.5 mg


Tamsulosin HCl (Flomax)  0.4 mg PO DAILY [facility]


   Last Admin: 10/03/17 09:14 Dose:  0.4 mg











- Labs


Labs: 


 





 10/03/17 19:43 





 10/02/17 13:37 





 











PT  11.9 SECONDS (9.7-12.2)   09/30/17  11:29    


 


INR  1.1   09/30/17  11:29    


 


APTT  25 SECONDS (21-34)  D 09/30/17  11:29    














Assessment and Plan


(1) Intestinal obstruction


Status: Acute   





(2) Thrombocytopenia


Assessment & Plan: 


- Afebrile, VSS


- Platelets 47 yesterday


- OR on hold until platelets improve to >100


- Discussed plan with Dr. Hahn


Status: Acute   





(3) Ventral hernia


Assessment & Plan: 


for surgical consult


Status: Acute

## 2017-10-03 NOTE — CP.PCM.PN
Subjective





- Date & Time of Evaluation


Date of Evaluation: 10/03/17


Time of Evaluation: 13:00





- Subjective


Subjective: 





Has mild abdominal discomfort


for platelet transfusion and post transfusion plt count today.





Objective





- Vital Signs/Intake and Output


Vital Signs (last 24 hours): 


 











Temp Pulse Resp BP Pulse Ox


 


 98.2 F   74   20   170/89 H  100 


 


 10/03/17 15:00  10/03/17 15:00  10/03/17 15:00  10/03/17 15:00  10/03/17 15:00








Intake and Output: 


 











 10/03/17 10/04/17





 18:59 06:59


 


Intake Total 1100 


 


Balance 1100 














- Medications


Medications: 


 Current Medications





Famotidine (Pepcid)  20 mg IVP Q12 [facility]


   Last Admin: 10/03/17 11:45 Dose:  20 mg


Fluticasone Propionate (Flonase)  2 spr FILIPE DAILY [facility]


   Last Admin: 10/03/17 09:12 Dose:  1 sprays


Ceftriaxone Sodium (Rocephin Iv 1 Gm Duplex)  50 mls @ 50 mls/30 min IVPB Q24H 

[facility]


   Last Admin: 10/03/17 17:05 Dose:  50 mls/30 min


Insulin Aspart (Novolog)  0 unit SC ACHS [facility]


   PRN Reason: Protocol


   Last Admin: 10/03/17 17:05 Dose:  2 unit


Levothyroxine Sodium (Synthroid)  25 mcg PO 0630 [facility]


   Last Admin: 10/03/17 05:32 Dose:  25 mcg


Metronidazole (Flagyl)  500 mg PO Q8 [facility]


   Last Admin: 10/03/17 13:14 Dose:  500 mg


Rosuvastatin Calcium (Crestor)  2.5 mg PO HS [facility]


   Last Admin: 10/02/17 22:00 Dose:  Not Given


Tamsulosin HCl (Flomax)  0.4 mg PO DAILY [facility]


   Last Admin: 10/03/17 09:14 Dose:  0.4 mg











- Labs


Labs: 


 





 10/02/17 11:30 





 10/02/17 13:37 





 











PT  11.9 SECONDS (9.7-12.2)   09/30/17  11:29    


 


INR  1.1   09/30/17  11:29    


 


APTT  25 SECONDS (21-34)  D 09/30/17  11:29    














- Head Exam


Head Exam: ATRAUMATIC





- Eye Exam


Eye Exam: Normal appearance





- ENT Exam


ENT Exam: Mucous Membranes Dry





- Respiratory Exam


Respiratory Exam: NORMAL BREATHING PATTERN





- Cardiovascular Exam


Cardiovascular Exam: +S1, +S2





- GI/Abdominal Exam


GI & Abdominal Exam: Normal Bowel Sounds





Assessment and Plan


(1) Thrombocytopenia


Assessment & Plan: 


imaging suggestive of liver disease


bone marrow suppression from recent alcohol 


for plt transfusion for goal plt > 100,000 as requested by surgery


Status: Acute   





(2) Anemia


Assessment & Plan: 


f/u anemia w/u


Status: Acute

## 2017-10-03 NOTE — CARD
--------------- APPROVED REPORT --------------





EKG Measurement

Heart [id number]

MI [id number]

[id number]

[id number]

[id number]



<Conclusion>

Sinus rhythm with 1st degree AV block

Possible Anterior infarct, age undetermined

Abnormal ECG

## 2017-10-03 NOTE — CP.PCM.PN
Subjective





- Date & Time of Evaluation


Date of Evaluation: 10/03/17


Time of Evaluation: 10:30





- Subjective


Subjective: 





Patient seen today , denies any chest pan, sob, N/V,  c/o abdominal pain on an 

d off,  + BM and tolerating diet 


labs - plt - 47  








Objective





- Vital Signs/Intake and Output


Vital Signs (last 24 hours): 


 











Temp Pulse Resp BP Pulse Ox


 


 98.2 F   70   20   128/75   100 


 


 10/03/17 07:31  10/03/17 07:31  10/03/17 07:31  10/03/17 07:31  10/03/17 07:31








Intake and Output: 


 











 10/03/17 10/03/17





 06:59 18:59


 


Intake Total 1700 


 


Output Total 400 


 


Balance 1300 














- Medications


Medications: 


 Current Medications





Famotidine (Pepcid)  20 mg IVP Q12 [facility]


Fluticasone Propionate (Flonase)  2 spr FILIPE DAILY [facility]


   Last Admin: 10/03/17 09:12 Dose:  1 sprays


Ceftriaxone Sodium (Rocephin Iv 1 Gm Duplex)  50 mls @ 50 mls/30 min IVPB Q24H 

[facility]


   Last Admin: 10/02/17 17:25 Dose:  50 mls/30 min


Insulin Aspart (Novolog)  0 unit SC ACHS [facility]


   PRN Reason: Protocol


   Last Admin: 10/03/17 07:45 Dose:  1 unit


Levothyroxine Sodium (Synthroid)  25 mcg PO 0630 [facility]


   Last Admin: 10/03/17 05:32 Dose:  25 mcg


Metronidazole (Flagyl)  500 mg PO Q8 [facility]


   Last Admin: 10/03/17 05:32 Dose:  500 mg


Rosuvastatin Calcium (Crestor)  2.5 mg PO HS [facility]


   Last Admin: 10/01/17 22:01 Dose:  2.5 mg


Tamsulosin HCl (Flomax)  0.4 mg PO DAILY [facility]


   Last Admin: 10/03/17 09:14 Dose:  0.4 mg











- Labs


Labs: 


 





 10/02/17 11:30 





 10/02/17 13:37 





 











PT  11.9 SECONDS (9.7-12.2)   09/30/17  11:29    


 


INR  1.1   09/30/17  11:29    


 


APTT  25 SECONDS (21-34)  D 09/30/17  11:29    














- Constitutional


Appears: Well, No Acute Distress





- ENT Exam


ENT Exam: Mucous Membranes Moist





- Respiratory Exam


Respiratory Exam: Clear to Ausculation Bilateral, NORMAL BREATHING PATTERN





- Cardiovascular Exam


Cardiovascular Exam: REGULAR RHYTHM, +S1, +S2





- GI/Abdominal Exam


GI & Abdominal Exam: Distended, Hernia





Assessment and Plan





- Assessment and Plan (Free Text)


Assessment: 





A/P


66 yr old male admitted with  ventral hernia, thrombocytopenia


OR on hold secondary to low platelet count - 47 


D/W DR. Salomon, [facility]  surgery team, recommends plt over 100


will transfuse 1 unit of  platelet and repeat CBC after  and plt still below 

100 will transfuse 1 more unit 


D/W Dr. Danielle 


D/W DR. Mckeon, agrees with above plan

## 2017-10-03 NOTE — CP.PCM.PN
Subjective





- Date & Time of Evaluation


Date of Evaluation: 10/02/17


Time of Evaluation: 20:00





- Subjective


Subjective: 





Has abdominal discomfort


plt count downtrending





Objective





- Vital Signs/Intake and Output


Vital Signs (last 24 hours): 


 











Temp Pulse Resp BP Pulse Ox


 


 98.2 F   74   20   170/89 H  100 


 


 10/03/17 15:00  10/03/17 15:00  10/03/17 15:00  10/03/17 15:00  10/03/17 15:00








Intake and Output: 


 











 10/03/17 10/04/17





 18:59 06:59


 


Intake Total 1100 


 


Balance 1100 














- Medications


Medications: 


 Current Medications





Famotidine (Pepcid)  20 mg IVP Q12 [facility]


   Last Admin: 10/03/17 11:45 Dose:  20 mg


Fluticasone Propionate (Flonase)  2 spr FILIPE DAILY [facility]


   Last Admin: 10/03/17 09:12 Dose:  1 sprays


Ceftriaxone Sodium (Rocephin Iv 1 Gm Duplex)  50 mls @ 50 mls/30 min IVPB Q24H 

[facility]


   Last Admin: 10/03/17 17:05 Dose:  50 mls/30 min


Insulin Aspart (Novolog)  0 unit SC ACHS [facility]


   PRN Reason: Protocol


   Last Admin: 10/03/17 17:05 Dose:  2 unit


Levothyroxine Sodium (Synthroid)  25 mcg PO 0630 [facility]


   Last Admin: 10/03/17 05:32 Dose:  25 mcg


Metronidazole (Flagyl)  500 mg PO Q8 [facility]


   Last Admin: 10/03/17 13:14 Dose:  500 mg


Rosuvastatin Calcium (Crestor)  2.5 mg PO HS [facility]


   Last Admin: 10/02/17 22:00 Dose:  Not Given


Tamsulosin HCl (Flomax)  0.4 mg PO DAILY [facility]


   Last Admin: 10/03/17 09:14 Dose:  0.4 mg











- Labs


Labs: 


 





 10/02/17 11:30 





 10/02/17 13:37 





 











PT  11.9 SECONDS (9.7-12.2)   09/30/17  11:29    


 


INR  1.1   09/30/17  11:29    


 


APTT  25 SECONDS (21-34)  D 09/30/17  11:29    














- Head Exam


Head Exam: ATRAUMATIC





- Eye Exam


Eye Exam: Normal appearance





- ENT Exam


ENT Exam: Mucous Membranes Dry





- Respiratory Exam


Respiratory Exam: NORMAL BREATHING PATTERN





- Cardiovascular Exam


Cardiovascular Exam: +S1, +S2





- GI/Abdominal Exam


GI & Abdominal Exam: Normal Bowel Sounds





- Extremities Exam


Extremities Exam: Normal Inspection





Assessment and Plan


(1) Thrombocytopenia


Assessment & Plan: 


element of bone marrow suppression from alcohol


imaging suggestive of liver cirrhosis


platelet transfusion for goal plt > 50,000 for hernia repair


Status: Acute   





(2) Anemia


Assessment & Plan: 


will check ferritin, retic count, b12, folate, FOBT to further characterize


Status: Acute

## 2017-10-04 LAB
APTT BLD: 19 SECONDS (ref 21–34)
BASOPHILS # BLD AUTO: 0 K/UL (ref 0–0.2)
BASOPHILS # BLD AUTO: 0 K/UL (ref 0–0.2)
BASOPHILS NFR BLD: 0.2 % (ref 0–2)
BASOPHILS NFR BLD: 0.3 % (ref 0–2)
BUN SERPL-MCNC: 5 MG/DL (ref 9–20)
CALCIUM SERPL-MCNC: 8.3 MG/DL (ref 8.6–10.4)
CHLORIDE SERPL-SCNC: 106 MMOL/L (ref 98–107)
CO2 SERPL-SCNC: 19 MMOL/L (ref 22–30)
EOSINOPHIL # BLD AUTO: 0 K/UL (ref 0–0.7)
EOSINOPHIL # BLD AUTO: 0 K/UL (ref 0–0.7)
EOSINOPHIL NFR BLD: 0.4 % (ref 0–4)
EOSINOPHIL NFR BLD: 0.4 % (ref 0–4)
ERYTHROCYTE [DISTWIDTH] IN BLOOD BY AUTOMATED COUNT: 15.2 % (ref 11.5–14.5)
ERYTHROCYTE [DISTWIDTH] IN BLOOD BY AUTOMATED COUNT: 15.6 % (ref 11.5–14.5)
FOLATE SERPL-MCNC: 15.8 NG/ML
GLUCOSE SERPL-MCNC: 135 MG/DL (ref 75–110)
HCT VFR BLD CALC: 25.9 % (ref 35–51)
HCT VFR BLD CALC: 27.2 % (ref 35–51)
INR PPP: 1.4
LYMPHOCYTES # BLD AUTO: 1.6 K/UL (ref 1–4.3)
LYMPHOCYTES # BLD AUTO: 1.6 K/UL (ref 1–4.3)
LYMPHOCYTES NFR BLD AUTO: 20.9 % (ref 20–40)
LYMPHOCYTES NFR BLD AUTO: 25.3 % (ref 20–40)
MCH RBC QN AUTO: 37.9 PG (ref 27–31)
MCH RBC QN AUTO: 37.9 PG (ref 27–31)
MCHC RBC AUTO-ENTMCNC: 35.2 G/DL (ref 33–37)
MCHC RBC AUTO-ENTMCNC: 35.3 G/DL (ref 33–37)
MCV RBC AUTO: 107.3 FL (ref 80–94)
MCV RBC AUTO: 107.4 FL (ref 80–94)
MONOCYTES # BLD: 0.6 K/UL (ref 0–0.8)
MONOCYTES # BLD: 0.7 K/UL (ref 0–0.8)
MONOCYTES NFR BLD: 8.6 % (ref 0–10)
MONOCYTES NFR BLD: 9.3 % (ref 0–10)
NRBC BLD AUTO-RTO: 0 % (ref 0–2)
NRBC BLD AUTO-RTO: 0 % (ref 0–2)
PLATELET # BLD: 62 K/UL (ref 130–400)
PLATELET # BLD: 74 K/UL (ref 130–400)
PMV BLD AUTO: 8.4 FL (ref 7.2–11.7)
PMV BLD AUTO: 8.9 FL (ref 7.2–11.7)
POTASSIUM SERPL-SCNC: 3.2 MMOL/L (ref 3.6–5.2)
RETICS/RBC NFR: 1.5 % (ref 0.5–1.5)
SODIUM SERPL-SCNC: 135 MMOL/L (ref 132–148)
WBC # BLD AUTO: 6.4 K/UL (ref 4.8–10.8)
WBC # BLD AUTO: 7.8 K/UL (ref 4.8–10.8)

## 2017-10-04 RX ADMIN — INSULIN ASPART SCH: 100 INJECTION, SOLUTION INTRAVENOUS; SUBCUTANEOUS at 07:37

## 2017-10-04 RX ADMIN — ROSUVASTATIN CALCIUM SCH MG: 5 TABLET, FILM COATED ORAL at 22:00

## 2017-10-04 RX ADMIN — INSULIN ASPART SCH UNIT: 100 INJECTION, SOLUTION INTRAVENOUS; SUBCUTANEOUS at 11:55

## 2017-10-04 RX ADMIN — INSULIN ASPART SCH: 100 INJECTION, SOLUTION INTRAVENOUS; SUBCUTANEOUS at 22:01

## 2017-10-04 RX ADMIN — CEFTRIAXONE SCH MLS/30 MIN: 1 INJECTION, SOLUTION INTRAVENOUS at 17:54

## 2017-10-04 RX ADMIN — FLUTICASONE PROPIONATE SCH SPRAYS: 50 SPRAY, METERED NASAL at 09:22

## 2017-10-04 RX ADMIN — INSULIN ASPART SCH UNIT: 100 INJECTION, SOLUTION INTRAVENOUS; SUBCUTANEOUS at 17:55

## 2017-10-04 NOTE — PN
DATE:



LOCATION:  Room 350, bed B.



SUBJECTIVE:  This is a 66-year-old male seen and examined in rounds for

platelet transfusion today.



The patient appear to be awake, alert and oriented.  No reported active

bleeding and the entire chart is reviewed including, but not limited to

normal recent lab and radiology study results, current and previous

medication list, current and previous medical events.  The patient still

has intermittent period of abdominal pain with abdominal distention,

oncology/hematology is on case.



Entire chart is reviewed including, but not limited to the most recent lab

and radiology study results, current and previous medication list, current

and previous medical events and the patient's hemoglobin dropped to 9.1,

hematocrit 25.9 with platelets only 62.  Increased PT to 15.9 and low

potassium 3.2, low CO2 content 19 indicative of metabolic acidosis with

blood glucose level 160, and the calcium low at 8.3.



PHYSICAL EXAMINATION:

GENERAL:  A 66-year-old male, awake, and alert.

VITAL SIGNS:  Afebrile with pulse of 66, respiratory rate 20-22, blood

pressure of 132/76.

HEENT:  Showed pale, dry oral mucous membrane.  Nonicteric sclerae.

LUNGS:  Few scattered crepitation with decreased air entry at bases.

HEART:  Positive S1 and S2.

ABDOMEN:  Evidence of anterior abdominal wall hernia.  Bowel sounds are

present.  No mass or organomegaly other than the hernia.

EXTREMITIES:  Without edema, clubbing, or cyanosis.

NEUROLOGIC:  No reported neurological deficits, sensory or motor.



The patient is still passing gas and has bowel movement recently.



IMPRESSION:

1.  Incarcerated anterior abdominal wall hernia diagnosed by radiology

studies.

2.  Known history of hypertension.

3.  Known history of poorly controlled diabetes mellitus.

4.  Hyperlipidemia by history.

5.  Peptic ulcer disease by history.

6.  Elevated CEA level to rule out lower gastrointestinal tract or occult

malignancy.

7.  Anemia with subsequent drop of hemoglobin and hematocrit secondary to

above versus upper gastrointestinal blood loss.



SUGGESTION:

1.  Continue current management.

2.  Correct underlying thrombocytopenia.

3.  Endoscopic evaluation of the GI tract once the patient is more stable

clinically.  However, if there is subsequent drop of hemoglobin and

hematocrit then surgery has to be on hold until endoscopic evaluation of

the GI tract took place.





__________________________________________

Earle Chilel MD



cc:  Earle Chilel MD



DD:  10/04/2017 12:46:18

DT:  10/04/2017 13:23:10

[facility] # [id number]

## 2017-10-04 NOTE — CP.PCM.PN
Subjective





- Date & Time of Evaluation


Date of Evaluation: 10/04/17


Time of Evaluation: 21:05





- Subjective


Subjective: 





Pt seen and examined, Pt is for Or tommorow, pt platelet count has to be 100,

000 before procedure its [address] right now, he is for antothe platelet transfusion

, pt is afebrile, no shortness of breath. Large incarcerated hernia in upper 

abdominal wall





Objective





- Vital Signs/Intake and Output


Vital Signs (last 24 hours): 


 











Temp Pulse Resp BP Pulse Ox


 


 98.3 F   86   20   114/74   97 


 


 10/04/17 15:00  10/04/17 15:00  10/04/17 15:00  10/04/17 15:00  10/04/17 15:00








Intake and Output: 


 











 10/04/17 10/05/17





 18:59 06:59


 


Intake Total 900 


 


Balance 900 














- Medications


Medications: 


 Current Medications





Famotidine (Pepcid)  20 mg IVP Q12 [facility]


   Last Admin: 10/04/17 22:01 Dose:  20 mg


Fluticasone Propionate (Flonase)  2 spr FILIPE DAILY [facility]


   Last Admin: 10/04/17 09:22 Dose:  1 sprays


Ceftriaxone Sodium (Rocephin Iv 1 Gm Duplex)  50 mls @ 50 mls/30 min IVPB Q24H 

[facility]


   Last Admin: 10/04/17 17:54 Dose:  50 mls/30 min


Insulin Aspart (Novolog)  0 unit SC ACHS [facility]


   PRN Reason: Protocol


   Last Admin: 10/04/17 22:01 Dose:  Not Given


Levothyroxine Sodium (Synthroid)  25 mcg PO 0630 [facility]


   Last Admin: 10/04/17 05:51 Dose:  25 mcg


Metronidazole (Flagyl)  500 mg PO Q8 [facility]


   Last Admin: 10/04/17 22:00 Dose:  500 mg


Rosuvastatin Calcium (Crestor)  2.5 mg PO HS [facility]


   Last Admin: 10/04/17 22:00 Dose:  2.5 mg


Tamsulosin HCl (Flomax)  0.4 mg PO DAILY [facility]


   Last Admin: 10/04/17 09:22 Dose:  0.4 mg











- Labs


Labs: 


 





 10/04/17 17:25 





 10/04/17 07:34 





 











PT  15.7 SECONDS (9.7-12.2)  H  10/04/17  07:43    


 


INR  1.4   10/04/17  07:43    


 


APTT  19 SECONDS (21-34)  L  10/04/17  07:43    














- Constitutional


Appears: No Acute Distress





- Head Exam


Head Exam: ATRAUMATIC, NORMAL INSPECTION, NORMOCEPHALIC





- Cardiovascular Exam


Cardiovascular Exam: REGULAR RHYTHM, +S1, +S2.  absent: Murmur





- GI/Abdominal Exam


GI & Abdominal Exam: Mass


Additional comments: 





incarcerated hernia in abdomnial wall





- Rectal Exam


Rectal Exam: Deferred





Assessment and Plan


(1) Intestinal obstruction


Status: Acute   





(2) Thrombocytopenia


Assessment & Plan: 


due to combination of alcohol abuse and hyperspleenism


Status: Acute   





(3) Ventral hernia


Assessment & Plan: 


for OR tommorow


Status: Acute   





(4) HTN (hypertension)


Status: Acute   





(5) Diabetes mellitus


Status: Chronic

## 2017-10-04 NOTE — CP.PCM.PN
Subjective





- Date & Time of Evaluation


Date of Evaluation: 10/04/17


Time of Evaluation: 12:44





- Subjective


Subjective: 





Surgery: Dr. Hahn





Pt seen and exmained. Resting comfortably in bed. Received 1U PRBC this AM. 

Plts remain low 62. Will post-pone surgery.





Objective





- Vital Signs/Intake and Output


Vital Signs (last 24 hours): 


 











Temp Pulse Resp BP Pulse Ox


 


 98.4 F   62   20   135/82   97 


 


 10/04/17 08:21  10/04/17 08:21  10/04/17 08:21  10/04/17 08:21  10/04/17 08:21








Intake and Output: 


 











 10/04/17 10/04/17





 06:59 18:59


 


Intake Total 980 


 


Output Total 400 


 


Balance 580 














- Medications


Medications: 


 Current Medications





Famotidine (Pepcid)  20 mg IVP Q12 [facility]


   Last Admin: 10/04/17 09:23 Dose:  20 mg


Fluticasone Propionate (Flonase)  2 spr FILIPE DAILY [facility]


   Last Admin: 10/04/17 09:22 Dose:  1 sprays


Ceftriaxone Sodium (Rocephin Iv 1 Gm Duplex)  50 mls @ 50 mls/30 min IVPB Q24H 

[facility]


   Last Admin: 10/03/17 17:05 Dose:  50 mls/30 min


Potassium Chloride (Potassium Chloride 20 Meq/100 Ml)  20 meq in 100 mls @ 50 

mls/hr IVPB Q2 [facility]


   Stop: 10/04/17 13:59


   Last Admin: 10/04/17 11:18 Dose:  50 mls/hr


Insulin Aspart (Novolog)  0 unit SC ACHS [facility]


   PRN Reason: Protocol


   Last Admin: 10/04/17 11:55 Dose:  1 unit


Levothyroxine Sodium (Synthroid)  25 mcg PO 0630 [facility]


   Last Admin: 10/04/17 05:51 Dose:  25 mcg


Metronidazole (Flagyl)  500 mg PO Q8 [facility]


   Last Admin: 10/04/17 05:16 Dose:  500 mg


Rosuvastatin Calcium (Crestor)  2.5 mg PO HS [facility]


   Last Admin: 10/03/17 21:43 Dose:  2.5 mg


Tamsulosin HCl (Flomax)  0.4 mg PO DAILY [facility]


   Last Admin: 10/04/17 09:22 Dose:  0.4 mg











- Labs


Labs: 


 





 10/04/17 07:34 





 10/04/17 07:34 





 











PT  15.7 SECONDS (9.7-12.2)  H  10/04/17  07:43    


 


INR  1.4   10/04/17  07:43    


 


APTT  19 SECONDS (21-34)  L  10/04/17  07:43    














- Constitutional


Appears: Non-toxic, No Acute Distress





- Head Exam


Head Exam: ATRAUMATIC, NORMOCEPHALIC





- Eye Exam


Eye Exam: EOMI





- ENT Exam


ENT Exam: Mucous Membranes Moist





- Neck Exam


Neck Exam: Full ROM





- Respiratory Exam


Respiratory Exam: NORMAL BREATHING PATTERN.  absent: Accessory Muscle Use, 

Respiratory Distress





- GI/Abdominal Exam


GI & Abdominal Exam: Soft, Hernia (ventral, reducible).  absent: Distended, Firm

, Guarding, Rigid, Tenderness, Rebound





- Neurological Exam


Neurological Exam: Alert, Awake, Oriented x3





- Skin


Skin Exam: Dry, Warm





Assessment and Plan





- Assessment and Plan (Free Text)


Assessment: 





66M w. ventral hernia


-persistent thromboyctopenia despite Plt transfusion


-would like plts to be >100k for surgery


-follow up heme/onc recommendations


-d/w attending





Isaiah PGY3

## 2017-10-05 LAB
BASOPHILS # BLD AUTO: 0 K/UL (ref 0–0.2)
BASOPHILS NFR BLD: 0.1 % (ref 0–2)
BUN SERPL-MCNC: 6 MG/DL (ref 9–20)
CALCIUM SERPL-MCNC: 8.6 MG/DL (ref 8.6–10.4)
CHLORIDE SERPL-SCNC: 105 MMOL/L (ref 98–107)
CO2 SERPL-SCNC: 20 MMOL/L (ref 22–30)
EOSINOPHIL # BLD AUTO: 0 K/UL (ref 0–0.7)
EOSINOPHIL NFR BLD: 0.3 % (ref 0–4)
ERYTHROCYTE [DISTWIDTH] IN BLOOD BY AUTOMATED COUNT: 15.8 % (ref 11.5–14.5)
GLUCOSE SERPL-MCNC: 134 MG/DL (ref 75–110)
HCT VFR BLD CALC: 28.8 % (ref 35–51)
LYMPHOCYTES # BLD AUTO: 0.8 K/UL (ref 1–4.3)
LYMPHOCYTES NFR BLD AUTO: 11.4 % (ref 20–40)
MCH RBC QN AUTO: 38 PG (ref 27–31)
MCHC RBC AUTO-ENTMCNC: 35.4 G/DL (ref 33–37)
MCV RBC AUTO: 107.3 FL (ref 80–94)
MONOCYTES # BLD: 0.4 K/UL (ref 0–0.8)
MONOCYTES NFR BLD: 6 % (ref 0–10)
NRBC BLD AUTO-RTO: 0 % (ref 0–2)
PLATELET # BLD: 94 K/UL (ref 130–400)
PMV BLD AUTO: 8.8 FL (ref 7.2–11.7)
POTASSIUM SERPL-SCNC: 3.4 MMOL/L (ref 3.6–5.2)
SODIUM SERPL-SCNC: 137 MMOL/L (ref 132–148)
WBC # BLD AUTO: 6.8 K/UL (ref 4.8–10.8)

## 2017-10-05 RX ADMIN — INSULIN ASPART SCH: 100 INJECTION, SOLUTION INTRAVENOUS; SUBCUTANEOUS at 08:26

## 2017-10-05 RX ADMIN — INSULIN ASPART SCH UNIT: 100 INJECTION, SOLUTION INTRAVENOUS; SUBCUTANEOUS at 16:51

## 2017-10-05 RX ADMIN — FLUTICASONE PROPIONATE SCH SPRAYS: 50 SPRAY, METERED NASAL at 09:45

## 2017-10-05 RX ADMIN — INSULIN ASPART SCH: 100 INJECTION, SOLUTION INTRAVENOUS; SUBCUTANEOUS at 21:30

## 2017-10-05 RX ADMIN — CEFTRIAXONE SCH MLS/30 MIN: 1 INJECTION, SOLUTION INTRAVENOUS at 16:51

## 2017-10-05 RX ADMIN — POTASSIUM CHLORIDE SCH MEQ: 10 TABLET, FILM COATED, EXTENDED RELEASE ORAL at 13:56

## 2017-10-05 RX ADMIN — INSULIN ASPART SCH: 100 INJECTION, SOLUTION INTRAVENOUS; SUBCUTANEOUS at 12:00

## 2017-10-05 RX ADMIN — ROSUVASTATIN CALCIUM SCH MG: 5 TABLET, FILM COATED ORAL at 21:29

## 2017-10-05 NOTE — PN
HISTORY OF PRESENT ILLNESS:  The patient denies any abdominal pain.  His

leg cramps have improved.  He denies any chest pain or dizziness.



PHYSICAL EXAMINATION:

VITAL SIGNS:  Blood pressure 114/74, heart rate 86, temperature is 98.3 and

respiration 20.

HEENT:  Pale conjunctivae.

CHEST:  Clear.

HEART:  S1 and S2 regular.

ABDOMEN:  Soft.

EXTREMITIES:  No edema.



LABORATORY DATA:  Trace hemoglobin and hematocrit 9.1 and 25.9, platelet

count is 62,000.  Today's INR is 1.4 and PTT 19.



ASSESSMENT:

1.  Hypertension.

2.  Incarcerated anterior abdominal wall hernia.

3.  History of hyperlipidemia.

4.  Elevated carcinoembryonic antigen level, rule out occult malignancy.

5.  Thrombocytopenia.

6.  Mild anemia.



RECOMMENDATIONS:  Continue Crestor at 2.5 mg once a day, Flagyl at 500 mg

orally q. 8 hours, Pepcid 20 mg intravenous twice a day, Synthroid 25 mcg

once a day, IV Rocephin at 1 g daily.  I did review gastroenterology

evaluation who recommended endoscopic workup once the patient is more

stable.







__________________________________________

Cezar Clark MD





DD:  10/04/2017 17:19:11

DT:  10/04/2017 17:34:25

[facility] # [id number]

## 2017-10-05 NOTE — CP.PCM.PN
Subjective





- Date & Time of Evaluation


Date of Evaluation: 10/05/17


Time of Evaluation: 09:00





- Subjective


Subjective: 


GENERAL SURGERY PROGRESS NOTE FOR DR. DAVE





Patient seen and examined at bedside. He states that he wants his surgery done 

soon. He had a bowel movement today. He reports occasional abdominal pain.








Objective





- Vital Signs/Intake and Output


Vital Signs (last 24 hours): 


 











Temp Pulse Resp BP Pulse Ox


 


 99.4 F   88   20   127/76   98 


 


 10/05/17 07:22  10/05/17 07:22  10/05/17 07:22  10/05/17 07:22  10/05/17 07:22








Intake and Output: 


 











 10/05/17 10/05/17





 06:59 18:59


 


Intake Total 1030 360


 


Output Total 250 


 


Balance 780 360














- Medications


Medications: 


 Current Medications





Famotidine (Pepcid)  20 mg IVP Q12 [facility]


   Last Admin: 10/05/17 09:45 Dose:  20 mg


Fluticasone Propionate (Flonase)  2 spr FILIPE DAILY [facility]


   Last Admin: 10/05/17 09:45 Dose:  2 sprays


Ceftriaxone Sodium (Rocephin Iv 1 Gm Duplex)  50 mls @ 50 mls/30 min IVPB Q24H 

[facility]


   Last Admin: 10/04/17 17:54 Dose:  50 mls/30 min


Insulin Aspart (Novolog)  0 unit SC ACHS [facility]


   PRN Reason: Protocol


   Last Admin: 10/05/17 12:00 Dose:  Not Given


Levothyroxine Sodium (Synthroid)  25 mcg PO 0630 [facility]


   Last Admin: 10/05/17 06:20 Dose:  25 mcg


Metronidazole (Flagyl)  500 mg PO Q8 [facility]


   Last Admin: 10/05/17 13:25 Dose:  500 mg


Potassium Chloride (Klor-Con 10)  10 meq PO BRK [facility]


   Last Admin: 10/05/17 13:56 Dose:  10 meq


Rosuvastatin Calcium (Crestor)  2.5 mg PO HS [facility]


   Last Admin: 10/04/17 22:00 Dose:  2.5 mg


Tamsulosin HCl (Flomax)  0.4 mg PO DAILY [facility]


   Last Admin: 10/05/17 09:45 Dose:  0.4 mg











- Labs


Labs: 


 





 10/05/17 07:10 





 10/05/17 08:25 





 











PT  15.7 SECONDS (9.7-12.2)  H  10/04/17  07:43    


 


INR  1.4   10/04/17  07:43    


 


APTT  19 SECONDS (21-34)  L  10/04/17  07:43    














- Constitutional


Appears: Non-toxic, No Acute Distress





- Head Exam


Head Exam: ATRAUMATIC, NORMAL INSPECTION





- Respiratory Exam


Respiratory Exam: NORMAL BREATHING PATTERN.  absent: Respiratory Distress





- Cardiovascular Exam


Cardiovascular Exam: +S1, +S2





- GI/Abdominal Exam


GI & Abdominal Exam: Soft, Hernia (reducible ventral hernia).  absent: Distended

, Firm, Guarding, Rigid, Tenderness, Rebound





- Neurological Exam


Neurological Exam: Alert, Awake, Oriented x3





- Psychiatric Exam


Psychiatric exam: Normal Affect, Normal Mood





- Skin


Skin Exam: Dry, Normal Color, Warm





Assessment and Plan





- Assessment and Plan (Free Text)


Assessment: 


67yo M with ventral hernia and thrombocytopenia





- Afebrile, VSS


- Platelets 94 this morning after platelets early this AM and yesterday 

afternoon


- OR tomorrow for ventral hernia repair around noon


- Will give another unit of platelets tomorrow morning at 8AM prior to OR


- NPO past midnight


- Discussed plan with Dr. Selma Ny PGY-3

## 2017-10-05 NOTE — CP.PCM.PN
Subjective





- Date & Time of Evaluation


Date of Evaluation: 10/05/17


Time of Evaluation: 09:25





- Subjective


Subjective: 





Pt is for OR tommorow for ventral hernia repair





Objective





- Vital Signs/Intake and Output


Vital Signs (last 24 hours): 


 











Temp Pulse Resp BP Pulse Ox


 


 98.4 F   72   18   136/57 L  97 


 


 10/05/17 02:41  10/05/17 02:41  10/05/17 02:41  10/05/17 02:41  10/05/17 00:00








Intake and Output: 


 











 10/05/17 10/05/17





 06:59 18:59


 


Intake Total 1030 


 


Output Total 250 


 


Balance 780 














- Medications


Medications: 


 Current Medications





Famotidine (Pepcid)  20 mg IVP Q12 [facility]


   Last Admin: 10/04/17 22:01 Dose:  20 mg


Fluticasone Propionate (Flonase)  2 spr FILIPE DAILY [facility]


   Last Admin: 10/04/17 09:22 Dose:  1 sprays


Ceftriaxone Sodium (Rocephin Iv 1 Gm Duplex)  50 mls @ 50 mls/30 min IVPB Q24H 

[facility]


   Last Admin: 10/04/17 17:54 Dose:  50 mls/30 min


Insulin Aspart (Novolog)  0 unit SC ACHS [facility]


   PRN Reason: Protocol


   Last Admin: 10/04/17 22:01 Dose:  Not Given


Levothyroxine Sodium (Synthroid)  25 mcg PO 0630 [facility]


   Last Admin: 10/05/17 06:20 Dose:  25 mcg


Metronidazole (Flagyl)  500 mg PO Q8 [facility]


   Last Admin: 10/05/17 06:20 Dose:  500 mg


Rosuvastatin Calcium (Crestor)  2.5 mg PO HS [facility]


   Last Admin: 10/04/17 22:00 Dose:  2.5 mg


Tamsulosin HCl (Flomax)  0.4 mg PO DAILY [facility]


   Last Admin: 10/04/17 09:22 Dose:  0.4 mg











- Labs


Labs: 


 





 10/04/17 17:25 





 10/04/17 07:34 





 











PT  15.7 SECONDS (9.7-12.2)  H  10/04/17  07:43    


 


INR  1.4   10/04/17  07:43    


 


APTT  19 SECONDS (21-34)  L  10/04/17  07:43    














- Constitutional


Appears: No Acute Distress





- Head Exam


Head Exam: ATRAUMATIC, NORMAL INSPECTION, NORMOCEPHALIC





- Eye Exam


Eye Exam: EOMI, Normal appearance, PERRL


Pupil Exam: NORMAL ACCOMODATION, PERRL





- Respiratory Exam


Respiratory Exam: Clear to Ausculation Bilateral, NORMAL BREATHING PATTERN





- Cardiovascular Exam


Cardiovascular Exam: REGULAR RHYTHM, +S1, +S2.  absent: Murmur





Assessment and Plan


(1) Intestinal obstruction


Status: Acute   





(2) Thrombocytopenia


Status: Acute   





(3) Ventral hernia


Status: Acute   





(4) HTN (hypertension)


Status: Acute   





(5) Diabetes mellitus


Status: Chronic

## 2017-10-06 LAB
BASOPHILS # BLD AUTO: 0 K/UL (ref 0–0.2)
BASOPHILS NFR BLD: 0.3 % (ref 0–2)
BUN SERPL-MCNC: 8 MG/DL (ref 9–20)
CALCIUM SERPL-MCNC: 8.3 MG/DL (ref 8.6–10.4)
CHLORIDE SERPL-SCNC: 105 MMOL/L (ref 98–107)
CO2 SERPL-SCNC: 23 MMOL/L (ref 22–30)
EOSINOPHIL # BLD AUTO: 0 K/UL (ref 0–0.7)
EOSINOPHIL NFR BLD: 0.7 % (ref 0–4)
ERYTHROCYTE [DISTWIDTH] IN BLOOD BY AUTOMATED COUNT: 15.5 % (ref 11.5–14.5)
GLUCOSE SERPL-MCNC: 139 MG/DL (ref 75–110)
HCT VFR BLD CALC: 25 % (ref 35–51)
LYMPHOCYTES # BLD AUTO: 1.6 K/UL (ref 1–4.3)
LYMPHOCYTES NFR BLD AUTO: 29.8 % (ref 20–40)
MCH RBC QN AUTO: 37.5 PG (ref 27–31)
MCHC RBC AUTO-ENTMCNC: 35 G/DL (ref 33–37)
MCV RBC AUTO: 107.2 FL (ref 80–94)
MONOCYTES # BLD: 0.8 K/UL (ref 0–0.8)
MONOCYTES NFR BLD: 15.3 % (ref 0–10)
NRBC BLD AUTO-RTO: 0.2 % (ref 0–2)
PLATELET # BLD: 85 K/UL (ref 130–400)
PMV BLD AUTO: 9.1 FL (ref 7.2–11.7)
POTASSIUM SERPL-SCNC: 3.5 MMOL/L (ref 3.6–5.2)
SODIUM SERPL-SCNC: 136 MMOL/L (ref 132–148)
WBC # BLD AUTO: 5.5 K/UL (ref 4.8–10.8)

## 2017-10-06 RX ADMIN — FLUTICASONE PROPIONATE SCH SPRAYS: 50 SPRAY, METERED NASAL at 09:13

## 2017-10-06 RX ADMIN — CEFTRIAXONE SCH MLS/30 MIN: 1 INJECTION, SOLUTION INTRAVENOUS at 17:36

## 2017-10-06 RX ADMIN — POTASSIUM CHLORIDE SCH MEQ: 10 TABLET, FILM COATED, EXTENDED RELEASE ORAL at 08:45

## 2017-10-06 RX ADMIN — INSULIN ASPART SCH UNIT: 100 INJECTION, SOLUTION INTRAVENOUS; SUBCUTANEOUS at 17:37

## 2017-10-06 RX ADMIN — INSULIN ASPART SCH: 100 INJECTION, SOLUTION INTRAVENOUS; SUBCUTANEOUS at 08:30

## 2017-10-06 RX ADMIN — ROSUVASTATIN CALCIUM SCH MG: 5 TABLET, FILM COATED ORAL at 21:30

## 2017-10-06 RX ADMIN — INSULIN ASPART SCH UNIT: 100 INJECTION, SOLUTION INTRAVENOUS; SUBCUTANEOUS at 11:49

## 2017-10-06 NOTE — CP.PCM.PN
Subjective





- Date & Time of Evaluation


Date of Evaluation: 10/06/17


Time of Evaluation: 07:00





- Subjective


Subjective: 


GENERAL SURGERY PROGRESS NOTE FOR DR. DAVE





Patient seen and examined at bedside. He was scheduled for ventral hernia 

repair today but AM labs showed a drop in hemoglobin to 8.8 and a drop in 

platelets to 85. Surgery was cancelled. Patient was put back on regular diet.





Objective





- Vital Signs/Intake and Output


Vital Signs (last 24 hours): 


 











Temp Pulse Resp BP Pulse Ox


 


 98.0 F   76   20   144/85   100 


 


 10/06/17 08:28  10/06/17 08:28  10/06/17 08:28  10/06/17 08:28  10/06/17 08:28








Intake and Output: 


 











 10/06/17 10/06/17





 06:59 18:59


 


Intake Total 700 


 


Balance 700 














- Medications


Medications: 


 Current Medications





Famotidine (Pepcid)  20 mg IVP Q12 [facility]


   Last Admin: 10/06/17 09:13 Dose:  20 mg


Fluticasone Propionate (Flonase)  2 spr FILIPE DAILY [facility]


   Last Admin: 10/06/17 09:13 Dose:  2 sprays


Ceftriaxone Sodium (Rocephin Iv 1 Gm Duplex)  50 mls @ 50 mls/30 min IVPB Q24H 

[facility]


   Last Admin: 10/05/17 16:51 Dose:  50 mls/30 min


Insulin Aspart (Novolog)  0 unit SC ACHS [facility]


   PRN Reason: Protocol


   Last Admin: 10/06/17 11:49 Dose:  1 unit


Levothyroxine Sodium (Synthroid)  25 mcg PO 0630 [facility]


   Last Admin: 10/06/17 05:38 Dose:  25 mcg


Metronidazole (Flagyl)  500 mg PO Q8 [facility]


   Last Admin: 10/06/17 05:38 Dose:  500 mg


Potassium Chloride (Klor-Con 10)  10 meq PO BRK [facility]


   Last Admin: 10/06/17 08:45 Dose:  10 meq


Rosuvastatin Calcium (Crestor)  2.5 mg PO HS [facility]


   Last Admin: 10/05/17 21:29 Dose:  2.5 mg


Tamsulosin HCl (Flomax)  0.4 mg PO DAILY [facility]


   Last Admin: 10/06/17 09:13 Dose:  0.4 mg











- Labs


Labs: 


 





 10/06/17 07:01 





 10/06/17 07:01 





 











PT  15.7 SECONDS (9.7-12.2)  H  10/04/17  07:43    


 


INR  1.4   10/04/17  07:43    


 


APTT  19 SECONDS (21-34)  L  10/04/17  07:43    














- Constitutional


Appears: Non-toxic, No Acute Distress





- Head Exam


Head Exam: ATRAUMATIC, NORMAL INSPECTION





- Respiratory Exam


Respiratory Exam: NORMAL BREATHING PATTERN.  absent: Respiratory Distress





- Cardiovascular Exam


Cardiovascular Exam: +S1, +S2





- GI/Abdominal Exam


GI & Abdominal Exam: Soft, Hernia (reducible nontender ventral hernia).  absent

: Distended, Firm, Guarding, Rigid, Tenderness, Rebound





- Neurological Exam


Neurological Exam: Alert, Awake





- Psychiatric Exam


Psychiatric exam: Normal Affect, Normal Mood





- Skin


Skin Exam: Dry, Normal Color, Warm





Assessment and Plan





- Assessment and Plan (Free Text)


Assessment: 


67yo M with ventral hernia and thrombocytopenia





- Afebrile, VSS


- Hemoglobin dropped to 8.8 from 10.2


- Platelets 85 this morning (decreased from 94)


- OR cancelled


- Regular diet resumed


- Patient asymptomatic, no pain, tolerating diet, having normal bowel movements


- Will do surgery electively as an outpatient once medically optimized


- Discussed plan with Dr. Selma Ny PGY-3

## 2017-10-06 NOTE — PN
DATE:



LOCATION:  [facility], bed B.



SUBJECTIVE:  This 66-year-old male seen and examined in rounds without

significant clinical changes, reported active bleeding, still has

intermittent period of abdominal distension, passing gas with bowel

movement reported to be positive.  No reported active bleeding, but nausea

with mild dyspepsia.  The entire chart was reviewed including, but not

limited to the most recent lab and radiology study results, current and

previous medication list, current and previous medical events.  Case

discussed with the staff at length.



It has to be mentioned that the patient has subsequent drop of his

hemoglobin with today's hemoglobin of 8.8, hematocrit dropped to 25, with

platelet count still low of 95, was low with 73.5, blood glucose level 166

with low calcium of 8.3.



PHYSICAL EXAMINATION:

GENERAL:  A 66-year-old male awake, alert, and oriented.

VITAL SIGNS:  Afebrile, with pulse of 72, respiratory rate 20-22, blood

pressure 136/82.

HEENT:  Show pale dry oral mucoid membranes, anicteric sclerae.

LUNGS:  Few scattered crepitations with decreased air entry at bases.

HEART:  Positive S1 and S2.

ABDOMEN:  Soft with positive midline abdominal hernia.  Bowel sounds are

hypoactive.  No other mass or organomegaly.

EXTREMITIES:  Without edema, clubbing, or cyanosis.

NEUROLOGIC:  No reported new neurological deficits, sensory, or motor.



IMPRESSION:

1.  Incarcerated anterior abdominal wall hernia.

2.  Anemia through gastrointestinal blood loss, upper versus lower.

3.  Known history of hypertension, poorly controlled diabetes mellitus.

4.  Hyperlipidemia by history.

5.  Exacerbation of peptic ulcer disease.

6.  Elevated CEA level to rule out occult lower gastrointestinal

malignancy.



SUGGESTIONS:

1.  Continue current management.

2.  Blood transfusion to keep hemoglobin around 10 g percent.  Case is to

be discussed with the Surgical Consultant on the case.





__________________________________________

Earle Chilel MD



cc:  Earle Chilel MD



DD:  10/06/2017 11:03:40

DT:  10/06/2017 11:30:05

Job # [id number]

## 2017-10-06 NOTE — PN
SUBJECTIVE:  The patient denies any chest pain or shortness of breath at

this time.  He was to get n.p.o. last night; however, he was allowed to be

fed today.



PHYSICAL EXAMINATION:

VITAL SIGNS:  Blood pressure 144/85, heart rate 76, temperature 98,

respirations 20.

HEENT:  Pale conjunctivae.

CHEST:  Clear.

HEART:  S1 and S2 regular.

ABDOMEN:  Soft.

EXTREMITIES:  Trace leg edema.



LABORATORY DATA:  Hemoglobin and hematocrit 8.8 and 25.0.  Today's platelet

count has dropped to 85,000 compared to 94,000 yesterday.  White count is

within normal limits.  Today's potassium is 3.6, BUN and creatinine are 8

and 0.8 respectively.  Calcium is within normal limit 8.3.  Glucose

elevated at 139.



ASSESSMENT:

1.  Hypertension.

2.  Diabetes mellitus.

3.  Hypokalemia.

4.  Incarcerated ventral hernia.

5.  Mild hypocalcemia.

6.  Thrombocytopenia.



RECOMMENDATIONS:  Continue Crestor at 2.5 mg once a day, oral Flagyl 500 mg

q.8 hours, increase Klor-Con to 20 mEq daily, continue IV Rocephin. 

Surgery was canceled today because of the drop in the platelet count.







__________________________________________

Cezar Clark MD





DD:  10/06/2017 12:59:07

DT:  10/06/2017 13:52:10

Job # [id number]

## 2017-10-06 NOTE — PN
SUBJECTIVE:  The surgery is postponed.  The patient denies any chest pain.



PHYSICAL EXAMINATION:

VITAL SIGNS:  Blood pressure 127/76, heart rate 88, temperature 99.4 and

respiration 20.

HEENT:  Normocephalic.

CHEST:  Clear.

HEART:  S1 and S2 regular.

EXTREMITIES:  Trace leg edema.



LABORATORY DATA:  Hemoglobin and hematocrit 10.2 and 28.6; platelet count

is 94,000.  Today's potassium is 3.4, BUN and creatinine are 6 and 0.7

respectively, glucose is elevated at 134.



ASSESSMENT:

1.  Incarcerated anterior abdominal wall hernia.

2.  Hypertension.

3.  Hypokalemia.

4.  Improving thrombocytopenia.

5.  Mild anemia.



RECOMMENDATIONS:  Continue Crestor at 2.5 mg once a day, Synthroid 25 mcg

once a day, Flagyl 500 mg q .8 hours, start K-Dur at 10 mEq orally daily,

and obtain BMP in a.m.







__________________________________________

Cezar Clark MD





DD:  10/05/2017 13:33:26

DT:  10/05/2017 13:45:44

Job # [id number]

## 2017-10-07 LAB
BASOPHILS # BLD AUTO: 0 K/UL (ref 0–0.2)
BASOPHILS NFR BLD: 0.4 % (ref 0–2)
EOSINOPHIL # BLD AUTO: 0 K/UL (ref 0–0.7)
EOSINOPHIL NFR BLD: 0.5 % (ref 0–4)
ERYTHROCYTE [DISTWIDTH] IN BLOOD BY AUTOMATED COUNT: 16.3 % (ref 11.5–14.5)
HCT VFR BLD CALC: 26.2 % (ref 35–51)
LYMPHOCYTES # BLD AUTO: 1.8 K/UL (ref 1–4.3)
LYMPHOCYTES NFR BLD AUTO: 31.1 % (ref 20–40)
MCH RBC QN AUTO: 37.6 PG (ref 27–31)
MCHC RBC AUTO-ENTMCNC: 35.1 G/DL (ref 33–37)
MCV RBC AUTO: 107.3 FL (ref 80–94)
MONOCYTES # BLD: 1.2 K/UL (ref 0–0.8)
MONOCYTES NFR BLD: 21.2 % (ref 0–10)
NEUTROPHILS NFR BLD AUTO: 59 % (ref 50–75)
NRBC BLD AUTO-RTO: 0.2 % (ref 0–2)
PLATELET # BLD: 88 K/UL (ref 130–400)
PMV BLD AUTO: 9.6 FL (ref 7.2–11.7)
TOTAL CELLS COUNTED BLD: 100
WBC # BLD AUTO: 5.7 K/UL (ref 4.8–10.8)

## 2017-10-07 PROCEDURE — 30233N1 TRANSFUSION OF NONAUTOLOGOUS RED BLOOD CELLS INTO PERIPHERAL VEIN, PERCUTANEOUS APPROACH: ICD-10-PCS | Performed by: INTERNAL MEDICINE

## 2017-10-07 RX ADMIN — CEFTRIAXONE SCH MLS/30 MIN: 1 INJECTION, SOLUTION INTRAVENOUS at 17:04

## 2017-10-07 RX ADMIN — INSULIN ASPART SCH UNIT: 100 INJECTION, SOLUTION INTRAVENOUS; SUBCUTANEOUS at 11:49

## 2017-10-07 RX ADMIN — FLUTICASONE PROPIONATE SCH SPRAYS: 50 SPRAY, METERED NASAL at 10:11

## 2017-10-07 RX ADMIN — INSULIN ASPART SCH UNIT: 100 INJECTION, SOLUTION INTRAVENOUS; SUBCUTANEOUS at 07:50

## 2017-10-07 RX ADMIN — POTASSIUM CHLORIDE SCH MEQ: 20 TABLET, EXTENDED RELEASE ORAL at 09:00

## 2017-10-07 RX ADMIN — INSULIN ASPART SCH: 100 INJECTION, SOLUTION INTRAVENOUS; SUBCUTANEOUS at 21:39

## 2017-10-07 RX ADMIN — INSULIN ASPART SCH UNIT: 100 INJECTION, SOLUTION INTRAVENOUS; SUBCUTANEOUS at 17:03

## 2017-10-07 RX ADMIN — ROSUVASTATIN CALCIUM SCH MG: 5 TABLET, FILM COATED ORAL at 21:35

## 2017-10-07 NOTE — CP.PCM.PN
Subjective





- Date & Time of Evaluation


Date of Evaluation: 10/07/17


Time of Evaluation: 12:53





- Subjective


Subjective: 





needs more blood.





High complication rate anticipated particularly in view of coagulation diorder.





all are aware of the situation


Prbc ordered today \\plateletes for Monday





Objective





- Vital Signs/Intake and Output


Vital Signs (last 24 hours): 


 











Temp Pulse Resp BP Pulse Ox


 


 98.4 F   78   20   130/81   99 


 


 10/07/17 08:12  10/07/17 08:12  10/07/17 08:12  10/07/17 08:12  10/07/17 08:12








Intake and Output: 


 











 10/07/17 10/07/17





 06:59 18:59


 


Intake Total 490 


 


Output Total 350 


 


Balance 140 














- Medications


Medications: 


 Current Medications





Famotidine (Pepcid)  20 mg IVP Q12 [facility]


   Last Admin: 10/07/17 10:09 Dose:  20 mg


Fluticasone Propionate (Flonase)  2 spr FILIPE DAILY [facility]


   Last Admin: 10/07/17 10:11 Dose:  1 sprays


Ceftriaxone Sodium (Rocephin Iv 1 Gm Duplex)  50 mls @ 50 mls/30 min IVPB Q24H 

[facility]


   Last Admin: 10/06/17 17:36 Dose:  50 mls/30 min


Insulin Aspart (Novolog)  0 unit SC ACHS [facility]


   PRN Reason: Protocol


   Last Admin: 10/07/17 11:49 Dose:  3 unit


Levothyroxine Sodium (Synthroid)  25 mcg PO 0630 [facility]


   Last Admin: 10/07/17 06:11 Dose:  25 mcg


Metronidazole (Flagyl)  500 mg PO Q8 [facility]


   Last Admin: 10/07/17 06:11 Dose:  500 mg


Potassium Chloride (K-Dur 20 Meq Er Tab)  20 meq PO BRK [facility]


   Last Admin: 10/07/17 09:00 Dose:  20 meq


Rosuvastatin Calcium (Crestor)  2.5 mg PO HS [facility]


   Last Admin: 10/06/17 21:30 Dose:  2.5 mg


Tamsulosin HCl (Flomax)  0.4 mg PO DAILY [facility]


   Last Admin: 10/07/17 10:10 Dose:  0.4 mg











- Labs


Labs: 


 





 10/07/17 06:25 





 10/06/17 07:01 





 











PT  15.7 SECONDS (9.7-12.2)  H  10/04/17  07:43    


 


INR  1.4   10/04/17  07:43    


 


APTT  19 SECONDS (21-34)  L  10/04/17  07:43

## 2017-10-07 NOTE — CP.PCM.PN
Subjective





- Date & Time of Evaluation


Date of Evaluation: 10/07/17


Time of Evaluation: 09:00





- Subjective


Subjective: 





PT SEEN & EXAMINED AT BEDSIDE





Objective





- Vital Signs/Intake and Output


Vital Signs (last 24 hours): 


 











Temp Pulse Resp BP Pulse Ox


 


 98.2 F   75   20   131/75   99 


 


 10/07/17 00:00  10/07/17 00:00  10/07/17 00:00  10/07/17 00:00  10/07/17 00:00








Intake and Output: 


 











 10/06/17 10/07/17





 18:59 06:59


 


Intake Total 600 250


 


Output Total  350


 


Balance 600 -100














- Medications


Medications: 


 Current Medications





Famotidine (Pepcid)  20 mg IVP Q12 [facility]


   Last Admin: 10/06/17 21:30 Dose:  20 mg


Fluticasone Propionate (Flonase)  2 spr FILIPE DAILY [facility]


   Last Admin: 10/06/17 09:13 Dose:  2 sprays


Ceftriaxone Sodium (Rocephin Iv 1 Gm Duplex)  50 mls @ 50 mls/30 min IVPB Q24H 

[facility]


   Last Admin: 10/06/17 17:36 Dose:  50 mls/30 min


Insulin Aspart (Novolog)  0 unit SC ACHS [facility]


   PRN Reason: Protocol


   Last Admin: 10/06/17 17:37 Dose:  1 unit


Levothyroxine Sodium (Synthroid)  25 mcg PO 0630 [facility]


   Last Admin: 10/06/17 05:38 Dose:  25 mcg


Metronidazole (Flagyl)  500 mg PO Q8 [facility]


   Last Admin: 10/06/17 21:30 Dose:  500 mg


Potassium Chloride (K-Dur 20 Meq Er Tab)  20 meq PO BRK [facility]


Rosuvastatin Calcium (Crestor)  2.5 mg PO HS [facility]


   Last Admin: 10/06/17 21:30 Dose:  2.5 mg


Tamsulosin HCl (Flomax)  0.4 mg PO DAILY [facility]


   Last Admin: 10/06/17 09:13 Dose:  0.4 mg











- Labs


Labs: 


 





 10/06/17 07:01 





 10/06/17 07:01 





 











PT  15.7 SECONDS (9.7-12.2)  H  10/04/17  07:43    


 


INR  1.4   10/04/17  07:43    


 


APTT  19 SECONDS (21-34)  L  10/04/17  07:43    














Assessment and Plan


(1) Intestinal obstruction


Status: Acute   





(2) Thrombocytopenia


Status: Acute   





(3) Ventral hernia


Status: Acute   





(4) HTN (hypertension)


Status: Acute   





(5) Diabetes mellitus


Status: Chronic

## 2017-10-07 NOTE — CP.PCM.PN
Subjective





- Date & Time of Evaluation


Date of Evaluation: 10/06/17


Time of Evaluation: 17:20





- Subjective


Subjective: 





67yo M pateint seen and examined is for OR for ventral hernia repair ,with 

ventral hernia and thrombocytopenia


- stable, NAD





Objective





- Vital Signs/Intake and Output


Vital Signs (last 24 hours): 


 











Temp Pulse Resp BP Pulse Ox


 


 98.2 F   75   20   131/75   99 


 


 10/07/17 00:00  10/07/17 00:00  10/07/17 00:00  10/07/17 00:00  10/07/17 00:00








Intake and Output: 


 











 10/06/17 10/07/17





 18:59 06:59


 


Intake Total 600 250


 


Output Total  350


 


Balance 600 -100














- Medications


Medications: 


 Current Medications





Famotidine (Pepcid)  20 mg IVP Q12 [facility]


   Last Admin: 10/06/17 21:30 Dose:  20 mg


Fluticasone Propionate (Flonase)  2 spr FILIEP DAILY [facility]


   Last Admin: 10/06/17 09:13 Dose:  2 sprays


Ceftriaxone Sodium (Rocephin Iv 1 Gm Duplex)  50 mls @ 50 mls/30 min IVPB Q24H 

[facility]


   Last Admin: 10/06/17 17:36 Dose:  50 mls/30 min


Insulin Aspart (Novolog)  0 unit SC ACHS [facility]


   PRN Reason: Protocol


   Last Admin: 10/06/17 17:37 Dose:  1 unit


Levothyroxine Sodium (Synthroid)  25 mcg PO 0630 [facility]


   Last Admin: 10/06/17 05:38 Dose:  25 mcg


Metronidazole (Flagyl)  500 mg PO Q8 [facility]


   Last Admin: 10/06/17 21:30 Dose:  500 mg


Potassium Chloride (K-Dur 20 Meq Er Tab)  20 meq PO BRK [facility]


Rosuvastatin Calcium (Crestor)  2.5 mg PO HS [facility]


   Last Admin: 10/06/17 21:30 Dose:  2.5 mg


Tamsulosin HCl (Flomax)  0.4 mg PO DAILY [facility]


   Last Admin: 10/06/17 09:13 Dose:  0.4 mg











- Labs


Labs: 


 





 10/06/17 07:01 





 10/06/17 07:01 





 











PT  15.7 SECONDS (9.7-12.2)  H  10/04/17  07:43    


 


INR  1.4   10/04/17  07:43    


 


APTT  19 SECONDS (21-34)  L  10/04/17  07:43    














- Constitutional


Appears: No Acute Distress





- Head Exam


Head Exam: ATRAUMATIC, NORMAL INSPECTION, NORMOCEPHALIC





- Eye Exam


Eye Exam: EOMI, Normal appearance, PERRL


Pupil Exam: NORMAL ACCOMODATION, PERRL





- Respiratory Exam


Respiratory Exam: Clear to Ausculation Bilateral, NORMAL BREATHING PATTERN





- Cardiovascular Exam


Cardiovascular Exam: REGULAR RHYTHM, +S1, +S2.  absent: Murmur





- GI/Abdominal Exam


GI & Abdominal Exam: Soft, Normal Bowel Sounds, Pulsatile Mass.  absent: 

Tenderness





- Rectal Exam


Rectal Exam: Deferred





Assessment and Plan


(1) Intestinal obstruction


Status: Acute   





(2) Thrombocytopenia


Status: Acute   





(3) Ventral hernia


Assessment & Plan: 


Pt is anticipating surgery


Status: Acute   





(4) HTN (hypertension)


Status: Acute   





(5) Diabetes mellitus


Status: Chronic

## 2017-10-07 NOTE — CP.PCM.PN
Subjective





- Date & Time of Evaluation


Date of Evaluation: 10/07/17


Time of Evaluation: 07:00





- Subjective


Subjective: 





General Surgery


Dr. Hahn





Pt S&E @[facility]. BRAULIO. denies abd pain, F/C, N/V, D/C. tolerating diet. 





Objective





- Vital Signs/Intake and Output


Vital Signs (last 24 hours): 


 











Temp Pulse Resp BP Pulse Ox


 


 98.4 F   78   20   130/81   99 


 


 10/07/17 08:12  10/07/17 08:12  10/07/17 08:12  10/07/17 08:12  10/07/17 08:12








Intake and Output: 


 











 10/07/17 10/07/17





 06:59 18:59


 


Intake Total 490 


 


Output Total 350 


 


Balance 140 














- Medications


Medications: 


 Current Medications





Famotidine (Pepcid)  20 mg IVP Q12 [facility]


   Last Admin: 10/06/17 21:30 Dose:  20 mg


Fluticasone Propionate (Flonase)  2 spr FILIPE DAILY [facility]


   Last Admin: 10/06/17 09:13 Dose:  2 sprays


Ceftriaxone Sodium (Rocephin Iv 1 Gm Duplex)  50 mls @ 50 mls/30 min IVPB Q24H 

[facility]


   Last Admin: 10/06/17 17:36 Dose:  50 mls/30 min


Insulin Aspart (Novolog)  0 unit SC ACHS [facility]


   PRN Reason: Protocol


   Last Admin: 10/07/17 07:50 Dose:  2 unit


Levothyroxine Sodium (Synthroid)  25 mcg PO 0630 [facility]


   Last Admin: 10/07/17 06:11 Dose:  25 mcg


Metronidazole (Flagyl)  500 mg PO Q8 [facility]


   Last Admin: 10/07/17 06:11 Dose:  500 mg


Potassium Chloride (K-Dur 20 Meq Er Tab)  20 meq PO BRK [facility]


Rosuvastatin Calcium (Crestor)  2.5 mg PO HS [facility]


   Last Admin: 10/06/17 21:30 Dose:  2.5 mg


Tamsulosin HCl (Flomax)  0.4 mg PO DAILY [facility]


   Last Admin: 10/06/17 09:13 Dose:  0.4 mg











- Labs


Labs: 


 





 10/07/17 06:25 





 10/06/17 07:01 





 











PT  15.7 SECONDS (9.7-12.2)  H  10/04/17  07:43    


 


INR  1.4   10/04/17  07:43    


 


APTT  19 SECONDS (21-34)  L  10/04/17  07:43    














- Constitutional


Appears: Non-toxic, No Acute Distress





- Head Exam


Head Exam: NORMAL INSPECTION





- Eye Exam


Eye Exam: Normal appearance





- ENT Exam


ENT Exam: Mucous Membranes Moist





- Respiratory Exam


Respiratory Exam: NORMAL BREATHING PATTERN.  absent: Accessory Muscle Use, 

Respiratory Distress





- Cardiovascular Exam


Cardiovascular Exam: absent: Bradycardia, Tachycardia





- GI/Abdominal Exam


GI & Abdominal Exam: Soft, Hernia.  absent: Distended, Tenderness





- Extremities Exam


Extremities Exam: Normal Inspection





- Neurological Exam


Neurological Exam: Alert, Awake, Oriented x3





- Psychiatric Exam


Psychiatric exam: Normal Affect, Normal Mood





- Skin


Skin Exam: Dry, Intact, Normal Color, Warm





Assessment and Plan





- Assessment and Plan (Free Text)


Assessment: 





67 y/o M w/ ventral hernia and thrombocytopenia





- cont regular diet


- transfuse for Hgb <8


- surgery rescheduled for Monday


- transfuse platelets Monday morning prior to surgery





Pt discussed w/ Dr. Selma Garcia DO PGY2

## 2017-10-07 NOTE — PN
LOCATION:  #350, bed B.



SUBJECTIVE:  The patient is a 66-year-old male, seen and examined in rounds

without significant clinical changes, but reported active bleeding with

revaluation for platelets as well as blood transfusion as needed, start

before any surgical aggressive procedure.



The patient tolerated oral intake well and passing gas with reported bowel

movement recently.  The entire chart is reviewed, including but not limited

to the most recent lab and radiology study results, current and the

previous medication list, current and the previous medical events.  Case

discussed with the staff at length.



Today's lab showed hemoglobin is still low of 9.2, his hematocrit is 26.2

with thrombocytopenia of 88 and blood glucose level of 250.



PHYSICAL EXAMINATION:

GENERAL:  A 66-year-old male, appears to be awake, alert and oriented.

VITAL SIGNS:  Afebrile with pulse of 82, respiratory rate of 20 to 22,

blood pressure of 136/78.

HEENT:  Showed pale, dry oral mucous membrane.  Nonicteric sclerae.

LUNGS:  Few scattered crepitation.  Decreased air entry at bases.

HEART:  Positive S1 and S2.

ABDOMEN:  Soft.  Bowel sounds are present.  No mass or organomegaly.  No

rebound tenderness or guarding.  Anterior abdominal wall hernia seen,

pulsating.

EXTREMITIES:  Without lower extremity slight edematous changes.  No

clubbing or cyanosis.

NEUROLOGIC:  No reported new neurological deficits, sensory or motor.  No

focal deficits.



IMPRESSION:

1.  Incarcerated anterior abdominal wall hernia.

2.  Anemia.

3.  Thrombocytopenia.

4.  Known history of hyperlipidemia.

5.  Known history of hypertension.

6.  Poorly controlled diabetes mellitus.

7.  Re-exacerbation of the peptic ulcer disease.

8.  Elevated CEA level, the possibility of lower gastrointestinal tract

occult malignancy should be kept in line.

9.  Recent history of hypokalemia and hypocalcemia.



SUGGESTION:

1.  Continue current management.

2.  Antireflux measure.

3.  Further recommendation to follow.



Still awaiting for surgical interference after correcting the patient's

thrombocytopenia.







__________________________________________

Earle Chilel MD



cc:  Earle Chilel MD



DD:  10/07/2017 12:13:37

DT:  10/07/2017 13:26:35

Job # [id number]

## 2017-10-07 NOTE — PN
DATE:



SUBJECTIVE:  The patient is currently receiving packed RBC transfusion.  He

denies any shortness of breath or chest pain.



PHYSICAL EXAMINATION

VITAL SIGNS:  Blood pressure 147/81, heart rate 68, temperature 97.9,

respirations 21.

HEENT:  Pale conjunctivae.

CHEST:  Clear.

HEART:  S1 and S2, regular.

EXTREMITIES:  Trace leg edema.



LABORATORY DATA:  Today's blood sugars up to 106, 250 and 218.  Today's

hemoglobin and hematocrit is 9.1 and 26.2, white count 5.7, platelet count

88, 000.



ASSESSMENT:

1.  Hypertension.

2.  Hyperlipidemia.

3.  Incarcerated anterior abdominal wall hernia.

4.  Anemia.

5.  Thrombocytopenia.



RECOMMENDATIONS:  Continue Crestor 2.5 mg daily, Flagyl 500 mg orally q. 8

hours, K-Dur 20 mEq orally daily, Synthroid 25 mcg once a day, continue IV

Rocephin 1 g daily, administer Lasix 20 mg IV push and further completion

of packed RBC transfusion, obtain BNP and CBC in a.m.





__________________________________________

Cezar Clark MD



DD:  10/07/2017 18:08:06

DT:  10/07/2017 19:14:25

Job # [id number]

## 2017-10-08 LAB
BUN SERPL-MCNC: 11 MG/DL (ref 9–20)
CALCIUM SERPL-MCNC: 8.6 MG/DL (ref 8.6–10.4)
CHLORIDE SERPL-SCNC: 103 MMOL/L (ref 98–107)
CO2 SERPL-SCNC: 21 MMOL/L (ref 22–30)
ERYTHROCYTE [DISTWIDTH] IN BLOOD BY AUTOMATED COUNT: 18.6 % (ref 11.5–14.5)
GLUCOSE SERPL-MCNC: 169 MG/DL (ref 75–110)
HCT VFR BLD CALC: 30.3 % (ref 35–51)
MCH RBC QN AUTO: 36.5 PG (ref 27–31)
MCHC RBC AUTO-ENTMCNC: 34.9 G/DL (ref 33–37)
MCV RBC AUTO: 104.6 FL (ref 80–94)
PLATELET # BLD: 94 K/UL (ref 130–400)
PMV BLD AUTO: 8.8 FL (ref 7.2–11.7)
POTASSIUM SERPL-SCNC: 3.7 MMOL/L (ref 3.6–5.2)
SODIUM SERPL-SCNC: 135 MMOL/L (ref 132–148)
WBC # BLD AUTO: 6.4 K/UL (ref 4.8–10.8)

## 2017-10-08 RX ADMIN — ROSUVASTATIN CALCIUM SCH MG: 5 TABLET, FILM COATED ORAL at 21:01

## 2017-10-08 RX ADMIN — CEFTRIAXONE SCH MLS/30 MIN: 1 INJECTION, SOLUTION INTRAVENOUS at 17:34

## 2017-10-08 RX ADMIN — FLUTICASONE PROPIONATE SCH SPRAYS: 50 SPRAY, METERED NASAL at 09:11

## 2017-10-08 RX ADMIN — POTASSIUM CHLORIDE SCH MEQ: 20 TABLET, EXTENDED RELEASE ORAL at 07:32

## 2017-10-08 RX ADMIN — INSULIN ASPART SCH UNIT: 100 INJECTION, SOLUTION INTRAVENOUS; SUBCUTANEOUS at 17:23

## 2017-10-08 RX ADMIN — INSULIN ASPART SCH UNIT: 100 INJECTION, SOLUTION INTRAVENOUS; SUBCUTANEOUS at 11:48

## 2017-10-08 RX ADMIN — INSULIN ASPART SCH UNIT: 100 INJECTION, SOLUTION INTRAVENOUS; SUBCUTANEOUS at 07:30

## 2017-10-08 RX ADMIN — INSULIN ASPART SCH: 100 INJECTION, SOLUTION INTRAVENOUS; SUBCUTANEOUS at 22:08

## 2017-10-08 RX ADMIN — POTASSIUM CHLORIDE SCH MEQ: 20 TABLET, EXTENDED RELEASE ORAL at 17:22

## 2017-10-08 NOTE — PN
DATE:



LOCATION:  [address] [place] [address] B.



SUBJECTIVE:  A 66-year-old male and examined in rounds early in the morning

without significant clinical changes, reported active bleeding.  Appear to

be awake, alert and oriented with intermittent period of mild abdominal

pain.  Again, the patient is passing gas as well as bowel movements and

denied any chest pain, palpitation or significant shortness of breath.  No

reported bleeding.



PHYSICAL EXAMINATION:

GENERAL:  A 66-year-old male awake, alert and oriented.

VITAL SIGNS:  Afebrile with pulse of 83, respiratory rate of 20 to 22,

blood pressure of 124/76.

HEENT:  Showed pale, dry oral mucous membrane.  Nonicteric sclerae.

LUNGS:  Few scattered crepitation.  Decreased air entry at bases.

HEART:  Positive S1 and S2.

ABDOMEN:  Soft. Bowel sounds are present, but hypoactive.  Positive for

anterior abdominal wall hernia.  No other mass or organomegaly.  No rebound

tenderness or guarding.

EXTREMITIES:  Without significant edema, clubbing or cyanosis.

NEUROLOGIC:  No reported new neurological deficits, sensory or motor.

VASCULAR:  Peripheral pulses are present bilaterally.



No reported active bleeding despite the subsequent drop of hemoglobin and

hematocrit.



LABORATORY DATA:  Most recent lab results showed hemoglobin of 10.6,

hematocrit 30.3, with platelet count still 94, low with blood glucose level

179.



IMPRESSION:

1.  Incarcerated anterior abdominal wall hernia.

2.  Known history of hypertension.

3.  Known history of hyperlipidemia.

4.  Anemia, the possibility of gastrointestinal blood loss was raised.

5.  Thrombocytopenia of unclear etiology.

6.  Poorly controlled diabetes mellitus.

7.  Re-exacerbation of peptic ulcer disease.

8.  Electrolyte imbalance gradually improving including hypokalemia and

hypocalcemia.

9.  Elevated CEA level raising the question of possible lower

gastrointestinal neoplastic lesion.



SUGGESTION:

1.  Continue current management.

2.  Correct underlying thrombocytopenia.

3.  Surgical re-evaluation for possible OR at a.m.

4.  The patient will need; however, in general need endoscopic evaluation

of the GI tract surgically and when he is more stable clinically.





__________________________________________

Earle Chilel MD



cc:  Earle Chilel MD



DD:  10/08/2017 7:42:16

DT:  10/08/2017 8:32:41

Job # [id number]

## 2017-10-08 NOTE — CP.PCM.PN
Subjective





- Date & Time of Evaluation


Date of Evaluation: 10/08/17


Time of Evaluation: 06:45





- Subjective


Subjective: 





General Surgery


Dr. Hahn





Pt S&E @bedside. NAEO. no complaints. denies F/C, N/V, abd pain. tolerating 

regular diet.





Objective





- Vital Signs/Intake and Output


Vital Signs (last 24 hours): 


 











Temp Pulse Resp BP Pulse Ox


 


 98.6 F   79   20   115/71   97 


 


 10/08/17 00:14  10/08/17 00:14  10/08/17 00:14  10/08/17 00:14  10/08/17 00:14








Intake and Output: 


 











 10/08/17 10/08/17





 06:59 18:59


 


Intake Total 965 


 


Output Total 1500 


 


Balance -535 














- Medications


Medications: 


 Current Medications





Famotidine (Pepcid)  20 mg IVP Q12 [facility]


   Last Admin: 10/07/17 21:35 Dose:  20 mg


Fluticasone Propionate (Flonase)  2 spr FILIPE DAILY [facility]


   Last Admin: 10/07/17 10:11 Dose:  1 sprays


Ceftriaxone Sodium (Rocephin Iv 1 Gm Duplex)  50 mls @ 50 mls/30 min IVPB Q24H 

[facility]


   Last Admin: 10/07/17 17:04 Dose:  50 mls/30 min


Insulin Aspart (Novolog)  0 unit SC ACHS [facility]


   PRN Reason: Protocol


   Last Admin: 10/08/17 07:30 Dose:  1 unit


Levothyroxine Sodium (Synthroid)  25 mcg PO 0630 [facility]


   Last Admin: 10/08/17 05:36 Dose:  25 mcg


Metronidazole (Flagyl)  500 mg PO Q8 [facility]


   Last Admin: 10/08/17 05:36 Dose:  500 mg


Potassium Chloride (K-Dur 20 Meq Er Tab)  20 meq PO BRK [facility]


   Last Admin: 10/08/17 07:32 Dose:  20 meq


Rosuvastatin Calcium (Crestor)  2.5 mg PO HS [facility]


   Last Admin: 10/07/17 21:35 Dose:  2.5 mg


Tamsulosin HCl (Flomax)  0.4 mg PO DAILY [facility]


   Last Admin: 10/07/17 10:10 Dose:  0.4 mg











- Labs


Labs: 


 





 10/08/17 07:01 





 10/08/17 07:01 





 











PT  15.7 SECONDS (9.7-12.2)  H  10/04/17  07:43    


 


INR  1.4   10/04/17  07:43    


 


APTT  19 SECONDS (21-34)  L  10/04/17  07:43    














- Constitutional


Appears: Non-toxic, No Acute Distress





- Head Exam


Head Exam: NORMAL INSPECTION





- Eye Exam


Eye Exam: Normal appearance





- ENT Exam


ENT Exam: Mucous Membranes Moist





- Respiratory Exam


Respiratory Exam: NORMAL BREATHING PATTERN.  absent: Accessory Muscle Use, 

Respiratory Distress





- Cardiovascular Exam


Cardiovascular Exam: absent: Bradycardia, Tachycardia





- GI/Abdominal Exam


GI & Abdominal Exam: Soft, Hernia (ventral).  absent: Distended, Guarding, 

Tenderness, Rebound





- Extremities Exam


Extremities Exam: Normal Inspection





- Neurological Exam


Neurological Exam: Alert, Awake, Oriented x3





- Psychiatric Exam


Psychiatric exam: Normal Affect, Normal Mood





- Skin


Skin Exam: Dry, Intact, Normal Color, Warm





Assessment and Plan





- Assessment and Plan (Free Text)


Assessment: 





67 y/o M w/ ventral hernia and thrombocytopenia





- cont regular diet


- NPO @midnight


- transfuse 1 unit pRBC today


- surgery rescheduled for Monday


- transfuse platelets Monday morning prior to surgery








Pt discussed w/ Dr. Selma Garcia DO PGY2

## 2017-10-08 NOTE — CP.PCM.PN
Subjective





- Date & Time of Evaluation


Date of Evaluation: 10/07/17


Time of Evaluation: 16:00





- Subjective


Subjective: 





No complaints.





Objective





- Vital Signs/Intake and Output


Vital Signs (last 24 hours): 


 











Temp Pulse Resp BP Pulse Ox


 


 98.2 F   72   20   126/74   96 


 


 10/08/17 16:52  10/08/17 16:52  10/08/17 16:52  10/08/17 17:22  10/08/17 16:52








Intake and Output: 


 











 10/08/17 10/09/17





 18:59 06:59


 


Intake Total 895 550


 


Output Total  1200


 


Balance 895 -650














- Medications


Medications: 


 Current Medications





Famotidine (Pepcid)  20 mg IVP Q12 [facility]


   Last Admin: 10/08/17 21:17 Dose:  20 mg


Fluticasone Propionate (Flonase)  2 spr FILIPE DAILY [facility]


   Last Admin: 10/08/17 09:11 Dose:  1 sprays


Furosemide (Lasix)  40 mg IVP DAILY [facility]


   Last Admin: 10/08/17 17:22 Dose:  40 mg


Ceftriaxone Sodium (Rocephin Iv 1 Gm Duplex)  50 mls @ 50 mls/30 min IVPB Q24H 

[facility]


   Last Admin: 10/08/17 17:34 Dose:  50 mls/30 min


Lactated Ringer's (Lactated Ringer's)  1,000 mls @ 125 mls/hr IV .Q8H [facility]


Insulin Aspart (Novolog)  0 unit SC ACHS [facility]


   PRN Reason: Protocol


   Last Admin: 10/08/17 22:08 Dose:  Not Given


Levothyroxine Sodium (Synthroid)  25 mcg PO 0630 [facility]


   Last Admin: 10/08/17 05:36 Dose:  25 mcg


Metronidazole (Flagyl)  500 mg PO Q8 [facility]


   Last Admin: 10/08/17 21:01 Dose:  500 mg


Potassium Chloride (K-Dur 20 Meq Er Tab)  20 meq PO BID [facility]


   Last Admin: 10/08/17 17:22 Dose:  20 meq


Rosuvastatin Calcium (Crestor)  2.5 mg PO HS [facility]


   Last Admin: 10/08/17 21:01 Dose:  2.5 mg


Tamsulosin HCl (Flomax)  0.4 mg PO DAILY [facility]


   Last Admin: 10/08/17 09:11 Dose:  0.4 mg











- Labs


Labs: 


 





 10/08/17 07:01 





 10/08/17 07:01 





 











PT  15.7 SECONDS (9.7-12.2)  H  10/04/17  07:43    


 


INR  1.4   10/04/17  07:43    


 


APTT  19 SECONDS (21-34)  L  10/04/17  07:43    














- Head Exam


Head Exam: ATRAUMATIC





- Eye Exam


Eye Exam: Normal appearance





- ENT Exam


ENT Exam: Mucous Membranes Dry





- Respiratory Exam


Respiratory Exam: NORMAL BREATHING PATTERN





- Cardiovascular Exam


Cardiovascular Exam: +S1, +S2





- GI/Abdominal Exam


GI & Abdominal Exam: Normal Bowel Sounds





- Extremities Exam


Extremities Exam: Normal Inspection





Assessment and Plan


(1) Thrombocytopenia


Assessment & Plan: 


liver disease, alcohol abuse


transfusion support 


surgery requesting goal plt 100,000


Status: Acute   





(2) Anemia


Assessment & Plan: 


chronic disease, alcohol abuse


transfusion support


Status: Acute   





(3) Elevated CEA


Assessment & Plan: 


GI w/u


Status: Acute

## 2017-10-08 NOTE — PN
DATE:



SUBJECTIVE:  The patient is experiencing worsening right leg swelling, mild

abdominal pain.  No chest pain.



PHYSICAL EXAMINATION

VITAL SIGNS:  Blood pressure 150/85, heart rate 76, temperature 97.7,

respirations 20.

HEENT:  Pale conjunctiva.

CHEST:  Clear.

HEART:  S1 and S2 regular.

EXTREMITIES:  A 2+ pitting edema.



LABORATORY DATA:  Hemoglobin and hematocrit 10.6 and 33.3, white count 6.4,

platelet count 94,000.  SMA-7 within normal limits except for glucose 169

and carbon dioxide 21.



ASSESSMENT:

1.  Anterior. abdominal wall hernia.

2.  Hypertension.

3.  Uncontrolled diabetes mellitus.

4.  Diastolic heart failure.



RECOMMENDATIONS:  Continue Crestor 2.5 mg once a day, IV Rocephin 1 g _____

daily, _____ 40 mg once a day, and increase K-Dur 20 mEq orally once a day.





__________________________________________

Cezar Clark MD





DD:  10/08/2017 16:33:50

DT:  10/08/2017 18:45:05

Job # [id number]

## 2017-10-08 NOTE — CP.PCM.PN
Subjective





- Date & Time of Evaluation


Date of Evaluation: 10/06/17


Time of Evaluation: 19:00





- Subjective


Subjective: 





Surgery canceled due to anemia and thrombocytopenia





Objective





- Vital Signs/Intake and Output


Vital Signs (last 24 hours): 


 











Temp Pulse Resp BP Pulse Ox


 


 98.2 F   72   20   126/74   96 


 


 10/08/17 16:52  10/08/17 16:52  10/08/17 16:52  10/08/17 17:22  10/08/17 16:52








Intake and Output: 


 











 10/08/17 10/09/17





 18:59 06:59


 


Intake Total 895 550


 


Output Total  1200


 


Balance 895 -650














- Medications


Medications: 


 Current Medications





Famotidine (Pepcid)  20 mg IVP Q12 [facility]


   Last Admin: 10/08/17 21:17 Dose:  20 mg


Fluticasone Propionate (Flonase)  2 spr FILIPE DAILY [facility]


   Last Admin: 10/08/17 09:11 Dose:  1 sprays


Furosemide (Lasix)  40 mg IVP DAILY [facility]


   Last Admin: 10/08/17 17:22 Dose:  40 mg


Ceftriaxone Sodium (Rocephin Iv 1 Gm Duplex)  50 mls @ 50 mls/30 min IVPB Q24H 

[facility]


   Last Admin: 10/08/17 17:34 Dose:  50 mls/30 min


Lactated Ringer's (Lactated Ringer's)  1,000 mls @ 125 mls/hr IV .Q8H [facility]


Insulin Aspart (Novolog)  0 unit SC ACHS [facility]


   PRN Reason: Protocol


   Last Admin: 10/08/17 22:08 Dose:  Not Given


Levothyroxine Sodium (Synthroid)  25 mcg PO 0630 [facility]


   Last Admin: 10/08/17 05:36 Dose:  25 mcg


Metronidazole (Flagyl)  500 mg PO Q8 [facility]


   Last Admin: 10/08/17 21:01 Dose:  500 mg


Potassium Chloride (K-Dur 20 Meq Er Tab)  20 meq PO BID [facility]


   Last Admin: 10/08/17 17:22 Dose:  20 meq


Rosuvastatin Calcium (Crestor)  2.5 mg PO HS [facility]


   Last Admin: 10/08/17 21:01 Dose:  2.5 mg


Tamsulosin HCl (Flomax)  0.4 mg PO DAILY [facility]


   Last Admin: 10/08/17 09:11 Dose:  0.4 mg











- Labs


Labs: 


 





 10/08/17 07:01 





 10/08/17 07:01 





 











PT  15.7 SECONDS (9.7-12.2)  H  10/04/17  07:43    


 


INR  1.4   10/04/17  07:43    


 


APTT  19 SECONDS (21-34)  L  10/04/17  07:43    














- Head Exam


Head Exam: ATRAUMATIC





- Eye Exam


Eye Exam: Normal appearance





- ENT Exam


ENT Exam: Mucous Membranes Dry





- Respiratory Exam


Respiratory Exam: NORMAL BREATHING PATTERN





- Cardiovascular Exam


Cardiovascular Exam: +S1, +S2





- GI/Abdominal Exam


GI & Abdominal Exam: Normal Bowel Sounds





- Extremities Exam


Extremities Exam: Normal Inspection





Assessment and Plan


(1) Thrombocytopenia


Assessment & Plan: 


liver disease, alcohol abuse


transfusion support 


surgery requesting goal plt 100,000


Status: Acute   





(2) Anemia


Assessment & Plan: 


chronic disease, alcohol abuse


s/p transfusion support


Status: Acute   





(3) Elevated CEA


Assessment & Plan: 


GI w/u


Status: Acute

## 2017-10-08 NOTE — CP.PCM.PN
Subjective





- Date & Time of Evaluation


Date of Evaluation: 10/04/17


Time of Evaluation: 18:00





- Subjective


Subjective: 





Surgery postponed for thrombocytopenia


s/p 1U PRBC this AM





Objective





- Vital Signs/Intake and Output


Vital Signs (last 24 hours): 


 











Temp Pulse Resp BP Pulse Ox


 


 98.2 F   72   20   126/74   96 


 


 10/08/17 16:52  10/08/17 16:52  10/08/17 16:52  10/08/17 17:22  10/08/17 16:52








Intake and Output: 


 











 10/08/17 10/09/17





 18:59 06:59


 


Intake Total 895 550


 


Output Total  1200


 


Balance 895 -650














- Medications


Medications: 


 Current Medications





Famotidine (Pepcid)  20 mg IVP Q12 [facility]


   Last Admin: 10/08/17 21:17 Dose:  20 mg


Fluticasone Propionate (Flonase)  2 spr FILIPE DAILY [facility]


   Last Admin: 10/08/17 09:11 Dose:  1 sprays


Furosemide (Lasix)  40 mg IVP DAILY [facility]


   Last Admin: 10/08/17 17:22 Dose:  40 mg


Ceftriaxone Sodium (Rocephin Iv 1 Gm Duplex)  50 mls @ 50 mls/30 min IVPB Q24H 

[facility]


   Last Admin: 10/08/17 17:34 Dose:  50 mls/30 min


Lactated Ringer's (Lactated Ringer's)  1,000 mls @ 125 mls/hr IV .Q8H [facility]


Insulin Aspart (Novolog)  0 unit SC ACHS [facility]


   PRN Reason: Protocol


   Last Admin: 10/08/17 22:08 Dose:  Not Given


Levothyroxine Sodium (Synthroid)  25 mcg PO 0630 [facility]


   Last Admin: 10/08/17 05:36 Dose:  25 mcg


Metronidazole (Flagyl)  500 mg PO Q8 [facility]


   Last Admin: 10/08/17 21:01 Dose:  500 mg


Potassium Chloride (K-Dur 20 Meq Er Tab)  20 meq PO BID [facility]


   Last Admin: 10/08/17 17:22 Dose:  20 meq


Rosuvastatin Calcium (Crestor)  2.5 mg PO HS [facility]


   Last Admin: 10/08/17 21:01 Dose:  2.5 mg


Tamsulosin HCl (Flomax)  0.4 mg PO DAILY [facility]


   Last Admin: 10/08/17 09:11 Dose:  0.4 mg











- Labs


Labs: 


 





 10/08/17 07:01 





 10/08/17 07:01 





 











PT  15.7 SECONDS (9.7-12.2)  H  10/04/17  07:43    


 


INR  1.4   10/04/17  07:43    


 


APTT  19 SECONDS (21-34)  L  10/04/17  07:43    














- Head Exam


Head Exam: ATRAUMATIC





- Eye Exam


Eye Exam: Normal appearance





- ENT Exam


ENT Exam: Mucous Membranes Dry





- Respiratory Exam


Respiratory Exam: NORMAL BREATHING PATTERN





- Cardiovascular Exam


Cardiovascular Exam: +S1, +S2





- GI/Abdominal Exam


GI & Abdominal Exam: Normal Bowel Sounds





- Extremities Exam


Extremities Exam: Normal Inspection





Assessment and Plan


(1) Thrombocytopenia


Assessment & Plan: 


liver disease, alcohol abuse


transfusion support 


surgery requesting goal plt 100,000


Status: Acute   





(2) Anemia


Assessment & Plan: 


chronic disease, alcohol abuse


s/p 1U PRBC today


Status: Acute   





(3) Elevated CEA


Assessment & Plan: 


outpatient GI w/u after hernia repair.


Status: Acute

## 2017-10-08 NOTE — CP.PCM.PN
Subjective





- Date & Time of Evaluation


Date of Evaluation: 10/05/17


Time of Evaluation: 13:00





- Subjective


Subjective: 





No complaints


plt count 94,000





Objective





- Vital Signs/Intake and Output


Vital Signs (last 24 hours): 


 











Temp Pulse Resp BP Pulse Ox


 


 98.2 F   72   20   126/74   96 


 


 10/08/17 16:52  10/08/17 16:52  10/08/17 16:52  10/08/17 17:22  10/08/17 16:52








Intake and Output: 


 











 10/08/17 10/09/17





 18:59 06:59


 


Intake Total 895 550


 


Output Total  1200


 


Balance 895 -650














- Medications


Medications: 


 Current Medications





Famotidine (Pepcid)  20 mg IVP Q12 [facility]


   Last Admin: 10/08/17 21:17 Dose:  20 mg


Fluticasone Propionate (Flonase)  2 spr FILIPE DAILY [facility]


   Last Admin: 10/08/17 09:11 Dose:  1 sprays


Furosemide (Lasix)  40 mg IVP DAILY [facility]


   Last Admin: 10/08/17 17:22 Dose:  40 mg


Ceftriaxone Sodium (Rocephin Iv 1 Gm Duplex)  50 mls @ 50 mls/30 min IVPB Q24H 

[facility]


   Last Admin: 10/08/17 17:34 Dose:  50 mls/30 min


Lactated Ringer's (Lactated Ringer's)  1,000 mls @ 125 mls/hr IV .Q8H [facility]


Insulin Aspart (Novolog)  0 unit SC ACHS [facility]


   PRN Reason: Protocol


   Last Admin: 10/08/17 22:08 Dose:  Not Given


Levothyroxine Sodium (Synthroid)  25 mcg PO 0630 [facility]


   Last Admin: 10/08/17 05:36 Dose:  25 mcg


Metronidazole (Flagyl)  500 mg PO Q8 [facility]


   Last Admin: 10/08/17 21:01 Dose:  500 mg


Potassium Chloride (K-Dur 20 Meq Er Tab)  20 meq PO BID [facility]


   Last Admin: 10/08/17 17:22 Dose:  20 meq


Rosuvastatin Calcium (Crestor)  2.5 mg PO HS [facility]


   Last Admin: 10/08/17 21:01 Dose:  2.5 mg


Tamsulosin HCl (Flomax)  0.4 mg PO DAILY [facility]


   Last Admin: 10/08/17 09:11 Dose:  0.4 mg











- Labs


Labs: 


 





 10/08/17 07:01 





 10/08/17 07:01 





 











PT  15.7 SECONDS (9.7-12.2)  H  10/04/17  07:43    


 


INR  1.4   10/04/17  07:43    


 


APTT  19 SECONDS (21-34)  L  10/04/17  07:43    














- Head Exam


Head Exam: ATRAUMATIC





- Eye Exam


Eye Exam: Normal appearance





- ENT Exam


ENT Exam: Mucous Membranes Dry





- Respiratory Exam


Respiratory Exam: NORMAL BREATHING PATTERN





- Cardiovascular Exam


Cardiovascular Exam: +S1, +S2





- GI/Abdominal Exam


GI & Abdominal Exam: Normal Bowel Sounds





- Extremities Exam


Extremities Exam: Normal Inspection





Assessment and Plan


(1) Thrombocytopenia


Assessment & Plan: 


liver disease, alcohol abuse


transfusion support 


surgery requesting goal plt 100,000


Status: Acute   





(2) Anemia


Assessment & Plan: 


chronic disease, alcohol abuse


s/p PRBC tx


Status: Acute   





(3) Elevated CEA


Assessment & Plan: 


outpatient GI w/u post surgery


Status: Acute

## 2017-10-08 NOTE — CP.PCM.PN
Subjective





- Date & Time of Evaluation


Date of Evaluation: 10/08/17


Time of Evaluation: 08:45





- Subjective


Subjective: 





PT SEEN & EXAMINED AT BEDSIDE





Objective





- Vital Signs/Intake and Output


Vital Signs (last 24 hours): 


 











Temp Pulse Resp BP Pulse Ox


 


 98.2 F   72   20   126/74   96 


 


 10/08/17 16:52  10/08/17 16:52  10/08/17 16:52  10/08/17 17:22  10/08/17 16:52








Intake and Output: 


 











 10/08/17 10/09/17





 18:59 06:59


 


Intake Total 895 550


 


Output Total  1200


 


Balance 895 -650














- Medications


Medications: 


 Current Medications





Famotidine (Pepcid)  20 mg IVP Q12 [facility]


   Last Admin: 10/08/17 21:17 Dose:  20 mg


Fluticasone Propionate (Flonase)  2 spr FILIPE DAILY [facility]


   Last Admin: 10/08/17 09:11 Dose:  1 sprays


Furosemide (Lasix)  40 mg IVP DAILY [facility]


   Last Admin: 10/08/17 17:22 Dose:  40 mg


Ceftriaxone Sodium (Rocephin Iv 1 Gm Duplex)  50 mls @ 50 mls/30 min IVPB Q24H 

[facility]


   Last Admin: 10/08/17 17:34 Dose:  50 mls/30 min


Lactated Ringer's (Lactated Ringer's)  1,000 mls @ 125 mls/hr IV .Q8H [facility]


Insulin Aspart (Novolog)  0 unit SC ACHS [facility]


   PRN Reason: Protocol


   Last Admin: 10/08/17 22:08 Dose:  Not Given


Levothyroxine Sodium (Synthroid)  25 mcg PO 0630 [facility]


   Last Admin: 10/08/17 05:36 Dose:  25 mcg


Metronidazole (Flagyl)  500 mg PO Q8 ALLIE


   Last Admin: 10/08/17 21:01 Dose:  500 mg


Potassium Chloride (K-Dur 20 Meq Er Tab)  20 meq PO BID ALLIE


   Last Admin: 10/08/17 17:22 Dose:  20 meq


Rosuvastatin Calcium (Crestor)  2.5 mg PO HS [facility]


   Last Admin: 10/08/17 21:01 Dose:  2.5 mg


Tamsulosin HCl (Flomax)  0.4 mg PO DAILY [facility]


   Last Admin: 10/08/17 09:11 Dose:  0.4 mg











- Labs


Labs: 


 





 10/08/17 07:01 





 10/08/17 07:01 





 











PT  15.7 SECONDS (9.7-12.2)  H  10/04/17  07:43    


 


INR  1.4   10/04/17  07:43    


 


APTT  19 SECONDS (21-34)  L  10/04/17  07:43    














Assessment and Plan


(1) Intestinal obstruction


Status: Acute   





(2) Thrombocytopenia


Status: Acute   





(3) Ventral hernia


Status: Acute   





(4) HTN (hypertension)


Status: Acute   





(5) Diabetes mellitus


Status: Chronic

## 2017-10-09 LAB
ALBUMIN/GLOB SERPL: 0.9 {RATIO} (ref 1–2.1)
ALP SERPL-CCNC: 100 U/L (ref 38–126)
ALT SERPL-CCNC: 44 U/L (ref 21–72)
APTT BLD: 31 SECONDS (ref 21–34)
AST SERPL-CCNC: 73 U/L (ref 17–59)
BASOPHILS # BLD AUTO: 0 K/UL (ref 0–0.2)
BASOPHILS NFR BLD: 0.5 % (ref 0–2)
BILIRUB SERPL-MCNC: 1 MG/DL (ref 0.2–1.3)
BUN SERPL-MCNC: 11 MG/DL (ref 9–20)
BUN SERPL-MCNC: 12 MG/DL (ref 9–20)
CALCIUM SERPL-MCNC: 8.2 MG/DL (ref 8.6–10.4)
CALCIUM SERPL-MCNC: 8.8 MG/DL (ref 8.6–10.4)
CHLORIDE SERPL-SCNC: 102 MMOL/L (ref 98–107)
CHLORIDE SERPL-SCNC: 102 MMOL/L (ref 98–107)
CO2 SERPL-SCNC: 20 MMOL/L (ref 22–30)
CO2 SERPL-SCNC: 23 MMOL/L (ref 22–30)
EOSINOPHIL # BLD AUTO: 0 K/UL (ref 0–0.7)
EOSINOPHIL NFR BLD: 0.3 % (ref 0–4)
ERYTHROCYTE [DISTWIDTH] IN BLOOD BY AUTOMATED COUNT: 19.6 % (ref 11.5–14.5)
ERYTHROCYTE [DISTWIDTH] IN BLOOD BY AUTOMATED COUNT: 20.3 % (ref 11.5–14.5)
GLOBULIN SER-MCNC: 3.6 GM/DL (ref 2.2–3.9)
GLUCOSE SERPL-MCNC: 138 MG/DL (ref 75–110)
GLUCOSE SERPL-MCNC: 168 MG/DL (ref 75–110)
HCT VFR BLD CALC: 32.6 % (ref 35–51)
HCT VFR BLD CALC: 33.9 % (ref 35–51)
INR PPP: 1.4
LYMPHOCYTES # BLD AUTO: 2.1 K/UL (ref 1–4.3)
LYMPHOCYTES NFR BLD AUTO: 29.5 % (ref 20–40)
MCH RBC QN AUTO: 35.1 PG (ref 27–31)
MCH RBC QN AUTO: 35.7 PG (ref 27–31)
MCHC RBC AUTO-ENTMCNC: 34.4 G/DL (ref 33–37)
MCHC RBC AUTO-ENTMCNC: 34.7 G/DL (ref 33–37)
MCV RBC AUTO: 102.3 FL (ref 80–94)
MCV RBC AUTO: 102.9 FL (ref 80–94)
MONOCYTES # BLD: 1.2 K/UL (ref 0–0.8)
MONOCYTES NFR BLD: 16.8 % (ref 0–10)
NRBC BLD AUTO-RTO: 0 % (ref 0–2)
PLATELET # BLD: 117 K/UL (ref 130–400)
PLATELET # BLD: 176 K/UL (ref 130–400)
PMV BLD AUTO: 8.3 FL (ref 7.2–11.7)
PMV BLD AUTO: 8.5 FL (ref 7.2–11.7)
POTASSIUM SERPL-SCNC: 3.5 MMOL/L (ref 3.6–5.2)
POTASSIUM SERPL-SCNC: 3.9 MMOL/L (ref 3.6–5.2)
PROT SERPL-MCNC: 6.8 G/DL (ref 6.3–8.3)
SODIUM SERPL-SCNC: 133 MMOL/L (ref 132–148)
SODIUM SERPL-SCNC: 135 MMOL/L (ref 132–148)
WBC # BLD AUTO: 11.3 K/UL (ref 4.8–10.8)
WBC # BLD AUTO: 7 K/UL (ref 4.8–10.8)

## 2017-10-09 PROCEDURE — 0DN80ZZ RELEASE SMALL INTESTINE, OPEN APPROACH: ICD-10-PCS | Performed by: SURGERY

## 2017-10-09 PROCEDURE — 0WUF0JZ SUPPLEMENT ABDOMINAL WALL WITH SYNTHETIC SUBSTITUTE, OPEN APPROACH: ICD-10-PCS | Performed by: SURGERY

## 2017-10-09 PROCEDURE — 0DB80ZZ EXCISION OF SMALL INTESTINE, OPEN APPROACH: ICD-10-PCS | Performed by: SURGERY

## 2017-10-09 PROCEDURE — 30233R1 TRANSFUSION OF NONAUTOLOGOUS PLATELETS INTO PERIPHERAL VEIN, PERCUTANEOUS APPROACH: ICD-10-PCS | Performed by: INTERNAL MEDICINE

## 2017-10-09 RX ADMIN — ROSUVASTATIN CALCIUM SCH: 5 TABLET, FILM COATED ORAL at 21:30

## 2017-10-09 RX ADMIN — HYDROMORPHONE HYDROCHLORIDE PRN MG: 1 INJECTION, SOLUTION INTRAMUSCULAR; INTRAVENOUS; SUBCUTANEOUS at 13:21

## 2017-10-09 RX ADMIN — INSULIN ASPART SCH: 100 INJECTION, SOLUTION INTRAVENOUS; SUBCUTANEOUS at 07:44

## 2017-10-09 RX ADMIN — FLUTICASONE PROPIONATE SCH: 50 SPRAY, METERED NASAL at 10:52

## 2017-10-09 RX ADMIN — HYDROMORPHONE HYDROCHLORIDE PRN MG: 1 INJECTION, SOLUTION INTRAMUSCULAR; INTRAVENOUS; SUBCUTANEOUS at 13:37

## 2017-10-09 RX ADMIN — HYDROMORPHONE HYDROCHLORIDE PRN MG: 1 INJECTION, SOLUTION INTRAMUSCULAR; INTRAVENOUS; SUBCUTANEOUS at 22:11

## 2017-10-09 RX ADMIN — INSULIN ASPART SCH: 100 INJECTION, SOLUTION INTRAVENOUS; SUBCUTANEOUS at 21:42

## 2017-10-09 RX ADMIN — HYDROMORPHONE HYDROCHLORIDE PRN MG: 1 INJECTION, SOLUTION INTRAMUSCULAR; INTRAVENOUS; SUBCUTANEOUS at 17:33

## 2017-10-09 RX ADMIN — TAZOBACTAM SODIUM AND PIPERACILLIN SODIUM SCH MLS/HR: 375; 3 INJECTION, SOLUTION INTRAVENOUS at 22:25

## 2017-10-09 RX ADMIN — WATER SCH MLS/HR: 1 INJECTION INTRAMUSCULAR; INTRAVENOUS; SUBCUTANEOUS at 22:05

## 2017-10-09 RX ADMIN — POTASSIUM CHLORIDE SCH: 20 TABLET, EXTENDED RELEASE ORAL at 10:52

## 2017-10-09 RX ADMIN — INSULIN ASPART SCH: 100 INJECTION, SOLUTION INTRAVENOUS; SUBCUTANEOUS at 11:54

## 2017-10-09 NOTE — [id number]
Surgeon's Initial Post Op Note





- Surgeon's Notes


Surgeon: Dr. Hahn


Assistant: Dr. Ny PGY3, Dr. Garcia PGY2


Type of Anesthesia: General Endo


Pre-Operative Diagnosis: ventral hernia


Operative Findings: see dictation


Post-Operative Diagnosis: same


Operation Performed: open ventral hernia repair w/ biologic mesh, small bowel 

resection, BREANNA


Specimen/Specimens Removed: small bowel


Estimated Blood Loss: EBL {In ML}: 350


Blood Products Given: N/A


Drains Used: No Drains


Post-Op Condition: Fair


Date of Surgery/Procedure: 10/09/17


Time of Surgery/Procedure: 09:30

## 2017-10-09 NOTE — CP.PCM.PN
Subjective





- Date & Time of Evaluation


Date of Evaluation: 10/09/17


Time of Evaluation: 20:20





- Subjective


Subjective: 





Pt seen and examined at bedside s/o OR, recovering from surgery NAD





Objective





- Vital Signs/Intake and Output


Vital Signs (last 24 hours): 


 











Temp Pulse Resp BP Pulse Ox


 


 98.7 F   96 H  18   112/66   97 


 


 10/09/17 21:40  10/09/17 22:21  10/09/17 22:21  10/09/17 22:21  10/09/17 21:24








Intake and Output: 


 











 10/09/17 10/10/17





 18:59 06:59


 


Intake Total 0 


 


Output Total 100 275


 


Balance -100 -275














- Medications


Medications: 


 Current Medications





Famotidine (Pepcid)  20 mg IVP Q12 [facility]


   Last Admin: 10/09/17 22:05 Dose:  20 mg


Fluticasone Propionate (Flonase)  2 spr FILIPE DAILY [facility]


   Last Admin: 10/09/17 10:52 Dose:  Not Given


Furosemide (Lasix)  40 mg IVP DAILY [facility]


   Last Admin: 10/09/17 10:52 Dose:  Not Given


Hydromorphone HCl (Dilaudid)  1 mg IVP Q3 PRN


   PRN Reason: Pain, severe (8-10)


   Last Admin: 10/09/17 22:11 Dose:  1 mg


Hydromorphone HCl (Dilaudid)  0.5 mg IVP Q3 PRN


   PRN Reason: Pain, moderate (4-7)


   Last Admin: 10/09/17 17:33 Dose:  0.5 mg


Lactated Ringer's (Lactated Ringer's)  1,000 mls @ 125 mls/hr IV .Q8H [facility]


   Last Admin: 10/09/17 08:00 Dose:  Not Given


Piperacillin Sod/Tazobactam Sod (Zosyn 3.375 Gm Iv Premix)  3.375 gm in 50 mls 

@ 100 mls/hr IVPB Q6H [facility]


   Last Admin: 10/09/17 22:25 Dose:  100 mls/hr


Metronidazole (Flagyl)  500 mg in 100 mls @ 100 mls/hr IVPB Q8 [facility]


   Last Admin: 10/09/17 22:05 Dose:  100 mls/hr


Insulin Aspart (Novolog)  0 unit SC ACHS [facility]


   PRN Reason: Protocol


   Last Admin: 10/09/17 21:42 Dose:  Not Given


Levothyroxine Sodium (Synthroid)  25 mcg PO 0630 [facility]


   Last Admin: 10/09/17 06:22 Dose:  25 mcg


Potassium Chloride (K-Dur 20 Meq Er Tab)  20 meq PO BID [facility]


   Last Admin: 10/09/17 10:52 Dose:  Not Given


Rosuvastatin Calcium (Crestor)  2.5 mg PO HS [facility]


   Last Admin: 10/09/17 21:30 Dose:  Not Given


Tamsulosin HCl (Flomax)  0.4 mg PO DAILY [facility]


   Last Admin: 10/09/17 10:52 Dose:  Not Given











- Labs


Labs: 


 





 10/09/17 21:04 





 10/09/17 21:04 





 











PT  15.5 SECONDS (9.7-12.2)  H  10/09/17  08:47    


 


INR  1.4   10/09/17  08:47    


 


APTT  31 SECONDS (21-34)   10/09/17  08:47    














- Constitutional


Appears: No Acute Distress





- Head Exam


Head Exam: ATRAUMATIC, NORMAL INSPECTION, NORMOCEPHALIC





- Eye Exam


Eye Exam: EOMI, Normal appearance, PERRL


Pupil Exam: NORMAL ACCOMODATION, PERRL





- Respiratory Exam


Respiratory Exam: Clear to Ausculation Bilateral, NORMAL BREATHING PATTERN





- Cardiovascular Exam


Cardiovascular Exam: REGULAR RHYTHM, +S1, +S2.  absent: Murmur





- GI/Abdominal Exam


GI & Abdominal Exam: Tenderness


Additional comments: 





post op





Assessment and Plan


(1) Intestinal obstruction


Assessment & Plan: 


s/p surgery of incarcerated hernia


Status: Acute   





(2) Thrombocytopenia


Status: Acute   





(3) Ventral hernia


Status: Acute   





(4) HTN (hypertension)


Status: Acute   





(5) Diabetes mellitus


Status: Chronic

## 2017-10-09 NOTE — PN
LOCATION:  350, bed B.



SUBJECTIVE:  This is a 66-year-old male with no significant clinical

changes, is scheduled for the OR today.  The entire chart is reviewed,

including but not limited to the most recent lab and radiology study

results, current and the previous medication list, current and the previous

medical events.  Case discussed at length with the staff including the

surgical team.



Today's lab showed hemoglobin of 11.3, hematocrit is 32.6 with platelet

count improved to 117, still low, with PT of 15.5 with low potassium of

3.5, blood glucose level of 138, was elevated.  AST 73 but normal, ALT was

low, albumin 3.2.



Today's chest x-ray report and results was seen indicative of no active

disease.



PHYSICAL EXAMINATION:

GENERAL:  A 66 years old male.

VITAL SIGNS:  Afebrile with pulse of 68, respiratory rate of 20 to 22 with

blood pressure of 136/80.

HEENT:  Showed pale, dry oral mucous membrane.  Nonicteric sclerae.

LUNGS:  Few scattered crepitation.  Decreased air entry at bases.

HEART:  Positive S1 and S2.

ABDOMEN:  Soft.  There is mild distention.  Positive for anterior abdominal

wall hernia.

EXTREMITIES:  Without significant edema, clubbing or cyanosis.

NEUROLOGIC:  No reported new neurological deficits, sensory or motor.



IMPRESSION:

1.  Anterior abdominal wall hernia.

2.  Exacerbation of peptic ulcer disease.

3.  Anemia most likely secondary to peritoneal line and known history of

hyperlipidemia.

4.  Hypertension by history.

5.  Thrombocytopenia of unclear etiology, gradually improving.

6.  Poorly controlled diabetes mellitus.

7.  Elevated CEA level, should rule out possible lower gastrointestinal

tract occult malignancy.



SUGGESTION:

1.  I agree with your plan.

2.  Central hyperalimentation.

3.  We will follow up for surgery.

4.  Further recommendation to follow and again the patient may need

endoscopic evaluation of the GI tract post surgery and when he is more

stable clinically.







__________________________________________

Earle Chilel MD





DD:  10/09/2017 13:53:39

DT:  10/09/2017 15:17:18

Job # [id number]

## 2017-10-09 NOTE — PN
SUBJECTIVE:  The patient underwent central hernia repair with a mesh and a

small bowel resection as well as lysis of adhesions.  The patient is

currently in the recovery room.  He denies any chest pain or shortness of

breath.



PHYSICAL EXAMINATION

VITAL SIGNS:  Blood pressure 96/63, heart rate 97, temperature 96.9,

respirations 19.

HEENT:  Normocephalic.

CHEST:  Clear.

HEART:  S1 and S2 regular.

EXTREMITIES:  1+ pitting edema.



LABORATORY DATA:  Hemoglobin and hematocrit today is 11.3 and 32.6. 

Today's platelet count 117 prior to the surgery.  Today's SMA-7 in the

morning was within normal limit except for potassium 3.5 and glucose 138. 

Today's INR before surgery is 1.4.



ASSESSMENT:

1.  Status post ventricular hernia repair with a mesh as well as small

bowel resection and lysis of adhesions.

2.  Mild hypokalemia.

3.  Hypertension and mild hypertensive heart failure.



RECOMMENDATIONS:  Continue Crestor at 2.5 mg once a day, IV Dilaudid

p.r.n., Flagyl 500 mg q. 8 hours.  Obtain CBC, SMA-7 and EKG

postoperatively.  The patient will be transferred to the ICU.







__________________________________________

Cezar Clark MD





DD:  10/09/2017 15:26:34

DT:  10/09/2017 15:50:44

Job # [id number]

## 2017-10-10 LAB
ALBUMIN/GLOB SERPL: 0.9 {RATIO} (ref 1–2.1)
ALP SERPL-CCNC: 59 U/L (ref 38–126)
ALT SERPL-CCNC: 42 U/L (ref 21–72)
AST SERPL-CCNC: 45 U/L (ref 17–59)
BASOPHILS # BLD AUTO: 0 K/UL (ref 0–0.2)
BASOPHILS NFR BLD: 0.3 % (ref 0–2)
BILIRUB SERPL-MCNC: 1.2 MG/DL (ref 0.2–1.3)
BUN SERPL-MCNC: 11 MG/DL (ref 9–20)
CALCIUM SERPL-MCNC: 8.1 MG/DL (ref 8.6–10.4)
CHLORIDE SERPL-SCNC: 102 MMOL/L (ref 98–107)
CO2 SERPL-SCNC: 21 MMOL/L (ref 22–30)
EOSINOPHIL # BLD AUTO: 0 K/UL (ref 0–0.7)
EOSINOPHIL NFR BLD: 0 % (ref 0–4)
ERYTHROCYTE [DISTWIDTH] IN BLOOD BY AUTOMATED COUNT: 20.5 % (ref 11.5–14.5)
GLOBULIN SER-MCNC: 3.4 GM/DL (ref 2.2–3.9)
GLUCOSE SERPL-MCNC: 162 MG/DL (ref 75–110)
HCT VFR BLD CALC: 32.2 % (ref 35–51)
LYMPHOCYTES # BLD AUTO: 1.4 K/UL (ref 1–4.3)
LYMPHOCYTES NFR BLD AUTO: 12.2 % (ref 20–40)
MCH RBC QN AUTO: 34.9 PG (ref 27–31)
MCHC RBC AUTO-ENTMCNC: 34.4 G/DL (ref 33–37)
MCV RBC AUTO: 101.4 FL (ref 80–94)
MONOCYTES # BLD: 1.6 K/UL (ref 0–0.8)
MONOCYTES NFR BLD: 13.5 % (ref 0–10)
NRBC BLD AUTO-RTO: 0 % (ref 0–2)
PLATELET # BLD: 164 K/UL (ref 130–400)
PMV BLD AUTO: 8.4 FL (ref 7.2–11.7)
POTASSIUM SERPL-SCNC: 3.9 MMOL/L (ref 3.6–5.2)
PROT SERPL-MCNC: 6.2 G/DL (ref 6.3–8.3)
SODIUM SERPL-SCNC: 134 MMOL/L (ref 132–148)
WBC # BLD AUTO: 11.8 K/UL (ref 4.8–10.8)

## 2017-10-10 RX ADMIN — HYDROMORPHONE HYDROCHLORIDE PRN MG: 1 INJECTION, SOLUTION INTRAMUSCULAR; INTRAVENOUS; SUBCUTANEOUS at 12:21

## 2017-10-10 RX ADMIN — WATER SCH MLS/HR: 1 INJECTION INTRAMUSCULAR; INTRAVENOUS; SUBCUTANEOUS at 05:59

## 2017-10-10 RX ADMIN — HYDROMORPHONE HYDROCHLORIDE PRN MG: 1 INJECTION, SOLUTION INTRAMUSCULAR; INTRAVENOUS; SUBCUTANEOUS at 06:21

## 2017-10-10 RX ADMIN — POTASSIUM CHLORIDE SCH: 20 TABLET, EXTENDED RELEASE ORAL at 12:34

## 2017-10-10 RX ADMIN — HYDROMORPHONE HYDROCHLORIDE PRN MG: 1 INJECTION, SOLUTION INTRAMUSCULAR; INTRAVENOUS; SUBCUTANEOUS at 03:02

## 2017-10-10 RX ADMIN — HYDROMORPHONE HYDROCHLORIDE PRN MG: 1 INJECTION, SOLUTION INTRAMUSCULAR; INTRAVENOUS; SUBCUTANEOUS at 22:53

## 2017-10-10 RX ADMIN — TAZOBACTAM SODIUM AND PIPERACILLIN SODIUM SCH MLS/HR: 375; 3 INJECTION, SOLUTION INTRAVENOUS at 09:38

## 2017-10-10 RX ADMIN — TAZOBACTAM SODIUM AND PIPERACILLIN SODIUM SCH MLS/HR: 375; 3 INJECTION, SOLUTION INTRAVENOUS at 16:48

## 2017-10-10 RX ADMIN — INSULIN ASPART SCH: 100 INJECTION, SOLUTION INTRAVENOUS; SUBCUTANEOUS at 22:08

## 2017-10-10 RX ADMIN — TAZOBACTAM SODIUM AND PIPERACILLIN SODIUM SCH MLS/HR: 375; 3 INJECTION, SOLUTION INTRAVENOUS at 16:40

## 2017-10-10 RX ADMIN — POTASSIUM CHLORIDE SCH: 20 TABLET, EXTENDED RELEASE ORAL at 18:48

## 2017-10-10 RX ADMIN — FLUTICASONE PROPIONATE SCH SPRAYS: 50 SPRAY, METERED NASAL at 10:00

## 2017-10-10 RX ADMIN — WATER SCH MLS/HR: 1 INJECTION INTRAMUSCULAR; INTRAVENOUS; SUBCUTANEOUS at 21:48

## 2017-10-10 RX ADMIN — INSULIN ASPART SCH: 100 INJECTION, SOLUTION INTRAVENOUS; SUBCUTANEOUS at 16:34

## 2017-10-10 RX ADMIN — TAZOBACTAM SODIUM AND PIPERACILLIN SODIUM SCH MLS/HR: 375; 3 INJECTION, SOLUTION INTRAVENOUS at 21:35

## 2017-10-10 RX ADMIN — INSULIN ASPART SCH: 100 INJECTION, SOLUTION INTRAVENOUS; SUBCUTANEOUS at 12:34

## 2017-10-10 RX ADMIN — ROSUVASTATIN CALCIUM SCH: 5 TABLET, FILM COATED ORAL at 21:04

## 2017-10-10 RX ADMIN — TAZOBACTAM SODIUM AND PIPERACILLIN SODIUM SCH MLS/HR: 375; 3 INJECTION, SOLUTION INTRAVENOUS at 04:04

## 2017-10-10 RX ADMIN — INSULIN ASPART SCH: 100 INJECTION, SOLUTION INTRAVENOUS; SUBCUTANEOUS at 07:49

## 2017-10-10 RX ADMIN — HYDROMORPHONE HYDROCHLORIDE PRN MG: 1 INJECTION, SOLUTION INTRAMUSCULAR; INTRAVENOUS; SUBCUTANEOUS at 18:31

## 2017-10-10 RX ADMIN — WATER SCH MLS/HR: 1 INJECTION INTRAMUSCULAR; INTRAVENOUS; SUBCUTANEOUS at 14:57

## 2017-10-10 NOTE — CP.PCM.PN
Subjective





- Date & Time of Evaluation


Date of Evaluation: 10/10/17


Time of Evaluation: 07:00





- Subjective


Subjective: 


GENERAL SURGERY PROGRESS NOTE FOR DR. DAVE





Patient seen and examined at bedside in the ICU. He is status post ex lap with 

hernia repair. Patient reports back pain when he moves and some mild abdominal 

pain that is controlled with the medications. He denies nausea or vomiting. He 

remains NPO with NG tube. He has not passed flatus or had a BM yet since 

surgery. He has not been OOB yet but states that he wants to get out of bed. He 

is voiding.





Objective





- Vital Signs/Intake and Output


Vital Signs (last 24 hours): 


 











Temp Pulse Resp BP Pulse Ox


 


 98.5 F   94 H  16   159/93 H  99 


 


 10/10/17 04:00  10/10/17 07:00  10/10/17 07:00  10/10/17 09:39  10/10/17 07:00








Intake and Output: 


 











 10/10/17 10/10/17





 06:59 18:59


 


Intake Total 1125 425


 


Output Total 825 300


 


Balance 300 125














- Medications


Medications: 


 Current Medications





Famotidine (Pepcid)  20 mg IVP Q12 [facility]


   Last Admin: 10/10/17 09:41 Dose:  20 mg


Fluticasone Propionate (Flonase)  2 spr FILIPE DAILY [facility]


   Last Admin: 10/09/17 10:52 Dose:  Not Given


Furosemide (Lasix)  40 mg IVP DAILY [facility]


   Last Admin: 10/10/17 09:39 Dose:  40 mg


Hydromorphone HCl (Dilaudid)  1 mg IVP Q3 PRN


   PRN Reason: Pain, severe (8-10)


   Last Admin: 10/10/17 06:21 Dose:  1 mg


Hydromorphone HCl (Dilaudid)  0.5 mg IVP Q3 PRN


   PRN Reason: Pain, moderate (4-7)


   Last Admin: 10/09/17 17:33 Dose:  0.5 mg


Lactated Ringer's (Lactated Ringer's)  1,000 mls @ 125 mls/hr IV .Q8H [facility]


   Last Admin: 10/10/17 07:54 Dose:  125 mls/hr


Piperacillin Sod/Tazobactam Sod (Zosyn 3.375 Gm Iv Premix)  3.375 gm in 50 mls 

@ 100 mls/hr IVPB Q6H [facility]


   Last Admin: 10/10/17 09:38 Dose:  100 mls/hr


Metronidazole (Flagyl)  500 mg in 100 mls @ 100 mls/hr IVPB Q8 [facility]


   Last Admin: 10/10/17 05:59 Dose:  100 mls/hr


Insulin Aspart (Novolog)  0 unit SC ACHS [facility]


   PRN Reason: Protocol


   Last Admin: 10/10/17 07:49 Dose:  Not Given


Levothyroxine Sodium (Synthroid)  25 mcg PO 0630 [facility]


   Last Admin: 10/10/17 05:59 Dose:  Not Given


Potassium Chloride (K-Dur 20 Meq Er Tab)  20 meq PO BID [facility]


   Last Admin: 10/09/17 10:52 Dose:  Not Given


Rosuvastatin Calcium (Crestor)  2.5 mg PO HS [facility]


   Last Admin: 10/09/17 21:30 Dose:  Not Given


Tamsulosin HCl (Flomax)  0.4 mg PO DAILY [facility]


   Last Admin: 10/09/17 10:52 Dose:  Not Given











- Labs


Labs: 


 





 10/10/17 05:51 





 10/10/17 05:51 





 











PT  15.5 SECONDS (9.7-12.2)  H  10/09/17  08:47    


 


INR  1.4   10/09/17  08:47    


 


APTT  31 SECONDS (21-34)   10/09/17  08:47    














- Constitutional


Appears: Non-toxic, No Acute Distress





- Head Exam


Head Exam: ATRAUMATIC, NORMAL INSPECTION





- Eye Exam


Eye Exam: EOMI, Normal appearance





- Respiratory Exam


Respiratory Exam: NORMAL BREATHING PATTERN.  absent: Respiratory Distress





- Cardiovascular Exam


Cardiovascular Exam: +S1, +S2





- GI/Abdominal Exam


GI & Abdominal Exam: Distended (mild), Soft, Tenderness (mild tenderness around 

incision).  absent: Firm, Guarding, Rigid, Rebound


Additional comments: 


NG tube in place


Abdominal binder in place


Dressing clean/dry/intact





- Neurological Exam


Neurological Exam: Alert, Awake, Oriented x3





- Psychiatric Exam


Psychiatric exam: Normal Affect, Normal Mood





- Skin


Skin Exam: Dry, Normal Color





Assessment and Plan





- Assessment and Plan (Free Text)


Assessment: 


67yo M with recurrent ventral hernia and thrombocytopenia s/p exploratory 

laparotomy, extensive BREANNA, repair of recurrent ventral hernia with biologic mesh

, small bowel resection with primary anastomosis POD#1





- Afebrile, VSS


- WBC 11.8


- Hemoglobin stable 11.1


- Platelets improved 164


- Continue bowel rest, NPO with NG tube decompression


- Physical therapy ordered


- Encouraged OOB, ambulation, and IS use


- Will monitor for return of bowel function


- Discussed plan with Dr. Selma Ny PGY-3

## 2017-10-10 NOTE — PN
SUBJECTIVE:  The patient is currently in his postoperative day #1, had NG

tube suction.  No reported ventricular arrhythmia.  He denies any chest

pain.



PHYSICAL EXAMINATION:

VITAL SIGNS:  Blood pressure 134/84, heart rate 100, respirations 17,

temperature 98.5.

HEENT:  Normocephalic.

CHEST:  Diminished breath sounds over the bases.

HEART:  S1 and S2 regular.

ABDOMEN:  Absent bowel sounds.

EXTREMITIES:  1+ pitting edema.



LABORATORY DATA:  Hemoglobin and hematocrit of 11.1 and 32.2; white count

11.8; platelet count 164,000.  The SMA-7 was within normal limits except

for glucose of 162, carbon dioxide of 21 and creatinine of 0.7. 

Yesterday's EKG was done, but not accessible on the [facility] database and I

will try to access the paper version.



ASSESSMENT:

1.  Status post anterior abdominal wall hernia repair with a mesh and a

small-bowel resection.

2.  Hypertension.

3.  Mild diastolic heart failure.



RECOMMENDATIONS:  Continue IV Dilaudid p.r.n.  Continue Flagyl at 500 mg

intravenously q. 8 hours.  Continue Lasix at 40 mg intravenously daily. 

Continue Zosyn at 3.375 g intravenously q. 6 hours.  Oral medications are

still on hold.







__________________________________________

Cezar Clark MD





DD:  10/10/2017 14:34:51

DT:  10/10/2017 15:19:04

Job # [id number]

## 2017-10-10 NOTE — CP.PCM.CON
<Floyd Barreto E - Last Filed: 10/10/17 17:46>





History of Present Illness





- History of Present Illness


History of Present Illness: 


ICU consult note:





HPI as per admission note 


HPI: Patient is a 66 year old  male with past medical history 

of HTN, HLD, DM, Ventral hernia repair w/ mesh presented to the hospital with 

abdominal pain started 2 days ago described as sharp that is associated with 

generalized weakness, tiredness, fatigue. Patient is complaint with his diet, 

medication and folloe-up. Pt last BM was yesterday. Currently passing flatus. 

Denies F/C CP/SOB 





PMH: DM, HTN, HLD, Hep C


PSH: Hernia repair w/ Mesh


ALL; NKDA


SocialHx: independent in ADL ETOH,Tobacco use, denies drug use  








Review of Systems





- Constitutional


Constitutional: absent: Chills, Headache





- EENT


Eyes: absent: Change in Vision





- Cardiovascular


Cardiovascular: absent: Chest Pain, Dyspnea, Lightheadedness, Palpitations





- Gastrointestinal


Gastrointestinal: Abdominal Pain (S/p open ventral hernia repair with 

biological mesh, small bowel resection ).  absent: Nausea, Vomiting





- Neurological


Neurological: Weakness.  absent: Dizziness, Headaches, Syncope





- Endocrine


Endocrine: Fatigue.  absent: Palpitations





Past Patient History





- Past Medical History & Family History


Past Medical History?: Yes





- Past Social History


Smoking Status: Light Smoker < 10 Cigarettes Daily





- CARDIAC


Hx Hypertension: Yes





- PULMONARY


Hx Respiratory Disorders: No





- NEUROLOGICAL


Hx Neurological Disorder: No





- HEENT


Hx HEENT Problems: No





- RENAL


Other/Comment: pt claims he has kidney problem





- ENDOCRINE/METABOLIC


Hx Diabetes Mellitus Type 2: Yes





- HEMATOLOGICAL/ONCOLOGICAL


Hx Anemia: Yes





- INTEGUMENTARY


Hx Dermatological Problems: No





- MUSCULOSKELETAL/RHEUMATOLOGICAL


Hx Falls: Yes





- GASTROINTESTINAL


Hx Gastritis: Yes





- GENITOURINARY/GYNECOLOGICAL


Hx Genitourinary Disorders: Yes


Hx Urinary Tract Infection: Yes





- PSYCHIATRIC


Hx Substance Use: No





- SURGICAL HISTORY


Hx Surgeries: Yes


Hx Herniorrhaphy: Yes (2012)





- ANESTHESIA


Hx Anesthesia: Yes


Hx Anesthesia Reactions: No


Hx Malignant Hyperthermia: No





Meds


Allergies/Adverse Reactions: 


 Allergies











Allergy/AdvReac Type Severity Reaction Status Date / Time


 


aspirin Allergy   Verified 09/30/17 05:14














- Medications


Medications: 


 Current Medications





Famotidine (Pepcid)  20 mg IVP Q12 [facility]


   Last Admin: 10/10/17 09:41 Dose:  20 mg


Fluticasone Propionate (Flonase)  2 spr FILIPE DAILY [facility]


   Last Admin: 10/10/17 10:00 Dose:  2 sprays


Furosemide (Lasix)  40 mg IVP DAILY [facility]


   Last Admin: 10/10/17 09:39 Dose:  40 mg


Hydromorphone HCl (Dilaudid)  1 mg IVP Q3 PRN


   PRN Reason: Pain, severe (8-10)


   Last Admin: 10/10/17 12:21 Dose:  1 mg


Hydromorphone HCl (Dilaudid)  0.5 mg IVP Q3 PRN


   PRN Reason: Pain, moderate (4-7)


   Last Admin: 10/09/17 17:33 Dose:  0.5 mg


Lactated Ringer's (Lactated Ringer's)  1,000 mls @ 125 mls/hr IV .Q8H [facility]


   Last Admin: 10/10/17 07:54 Dose:  125 mls/hr


Piperacillin Sod/Tazobactam Sod (Zosyn 3.375 Gm Iv Premix)  3.375 gm in 50 mls 

@ 100 mls/hr IVPB Q6H [facility]


   Last Admin: 10/10/17 09:38 Dose:  100 mls/hr


Metronidazole (Flagyl)  500 mg in 100 mls @ 100 mls/hr IVPB Q8 [facility]


   Last Admin: 10/10/17 14:57 Dose:  100 mls/hr


Insulin Aspart (Novolog)  0 unit SC ACHS [facility]


   PRN Reason: Protocol


   Last Admin: 10/10/17 12:34 Dose:  Not Given


Levothyroxine Sodium (Synthroid)  25 mcg PO 0630 [facility]


   Last Admin: 10/10/17 05:59 Dose:  Not Given


Potassium Chloride (K-Dur 20 Meq Er Tab)  20 meq PO BID [facility]


   Last Admin: 10/10/17 12:34 Dose:  Not Given


Rosuvastatin Calcium (Crestor)  2.5 mg PO HS [facility]


   Last Admin: 10/09/17 21:30 Dose:  Not Given


Tamsulosin HCl (Flomax)  0.4 mg PO DAILY [facility]


   Last Admin: 10/10/17 12:33 Dose:  Not Given











Physical Exam





- Head Exam


Head Exam: ATRAUMATIC





- Eye Exam


Eye Exam: EOMI





- ENT Exam


ENT Exam: Mucous Membranes Dry





- Respiratory Exam


Respiratory Exam: NORMAL BREATHING PATTERN





- Cardiovascular Exam


Cardiovascular Exam: REGULAR RHYTHM, +S1, +S2





- GI/Abdominal Exam


GI & Abdominal Exam: Diminished Bowel Sounds, Tenderness (S/p open ventral 

hernia repair with biological mesh, small bowel resection )





- Extremities Exam


Extremities exam: Positive for: pedal edema.  Negative for: calf tenderness





- Neurological Exam


Neurological exam: Alert, Oriented x3





Results





- Vital Signs


Recent Vital Signs: 


 Last Vital Signs











Temp  98.4 F   10/10/17 08:00


 


Pulse  100 H  10/10/17 10:21


 


Resp  17   10/10/17 10:21


 


BP  134/84   10/10/17 10:21


 


Pulse Ox  95   10/10/17 09:21














- Labs


Result Diagrams: 


 10/10/17 05:51





 10/10/17 05:51


Labs: 


 Laboratory Results - last 24 hr











  10/09/17 10/09/17 10/09/17





  14:42 21:04 21:04


 


WBC   11.3 H D 


 


RBC   3.31 L 


 


Hgb   11.6 L 


 


Hct   33.9 L 


 


MCV   102.3 H 


 


MCH   35.1 H 


 


MCHC   34.4 


 


RDW   20.3 H 


 


Plt Count   176 


 


MPV   8.5 


 


Neut % (Auto)   


 


Lymph % (Auto)   


 


Mono % (Auto)   


 


Eos % (Auto)   


 


Baso % (Auto)   


 


Neut #   


 


Lymph #   


 


Mono #   


 


Eos #   


 


Baso #   


 


Sodium    133


 


Potassium    3.9


 


Chloride    102


 


Carbon Dioxide    20 L


 


Anion Gap    15


 


BUN    11


 


Creatinine    0.7 L


 


Est GFR ( Amer)    > 60


 


Est GFR (Non-Af Amer)    > 60


 


POC Glucose (mg/dL)   


 


Random Glucose    168 H


 


Calcium    8.2 L


 


Total Bilirubin   


 


AST   


 


ALT   


 


Alkaline Phosphatase   


 


Total Protein   


 


Albumin   


 


Globulin   


 


Albumin/Globulin Ratio   


 


Blood Type  O POSITIVE  


 


Antibody Screen  Negative  


 


Crossmatch  See Detail  














  10/09/17 10/10/17 10/10/17





  21:41 05:51 05:51


 


WBC   11.8 H 


 


RBC   3.18 L 


 


Hgb   11.1 L 


 


Hct   32.2 L 


 


MCV   101.4 H 


 


MCH   34.9 H 


 


MCHC   34.4 


 


RDW   20.5 H 


 


Plt Count   164 


 


MPV   8.4 


 


Neut % (Auto)   74.0 


 


Lymph % (Auto)   12.2 L 


 


Mono % (Auto)   13.5 H 


 


Eos % (Auto)   0.0 


 


Baso % (Auto)   0.3 


 


Neut #   8.7 H 


 


Lymph #   1.4 


 


Mono #   1.6 H 


 


Eos #   0.0 


 


Baso #   0.0 


 


Sodium    134


 


Potassium    3.9


 


Chloride    102


 


Carbon Dioxide    21 L


 


Anion Gap    15


 


BUN    11


 


Creatinine    0.7 L


 


Est GFR ( Amer)    > 60


 


Est GFR (Non-Af Amer)    > 60


 


POC Glucose (mg/dL)  213 H  


 


Random Glucose    162 H


 


Calcium    8.1 L


 


Total Bilirubin    1.2


 


AST    45


 


ALT    42


 


Alkaline Phosphatase    59


 


Total Protein    6.2 L


 


Albumin    2.9 L


 


Globulin    3.4


 


Albumin/Globulin Ratio    0.9 L


 


Blood Type   


 


Antibody Screen   


 


Crossmatch   














  10/10/17 10/10/17 10/10/17





  07:29 11:33 16:09


 


WBC   


 


RBC   


 


Hgb   


 


Hct   


 


MCV   


 


MCH   


 


MCHC   


 


RDW   


 


Plt Count   


 


MPV   


 


Neut % (Auto)   


 


Lymph % (Auto)   


 


Mono % (Auto)   


 


Eos % (Auto)   


 


Baso % (Auto)   


 


Neut #   


 


Lymph #   


 


Mono #   


 


Eos #   


 


Baso #   


 


Sodium   


 


Potassium   


 


Chloride   


 


Carbon Dioxide   


 


Anion Gap   


 


BUN   


 


Creatinine   


 


Est GFR ( Amer)   


 


Est GFR (Non-Af Amer)   


 


POC Glucose (mg/dL)  168 H  187 H  191 H


 


Random Glucose   


 


Calcium   


 


Total Bilirubin   


 


AST   


 


ALT   


 


Alkaline Phosphatase   


 


Total Protein   


 


Albumin   


 


Globulin   


 


Albumin/Globulin Ratio   


 


Blood Type   


 


Antibody Screen   


 


Crossmatch   














Assessment & Plan





- Assessment and Plan (Free Text)


Assessment: 


Patient is a 66 year old  male with past medical history of HTN

, HLD, DM, Ventral hernia repair w/ mesh presented to the hospital with 

abdominal pain s/p open ventral hernia repair w/ biologic mesh, small bowel 

resection, BREANNA, POD #1 





Today


Plan: Transfer to medical surgical floor as per General surgery 





Plan: 


Neuro: Alert, Awake and oriented 





Cardio: Hx of HTN AND HLD 


Medication/Management:


* Crestor 2.5mg PO HS


* Lasix 40mg IVP daily 





Pulm: No acute issues 





GI: s/p open ventral hernia repair w/ biologic mesh, small bowel resection, BREANNA

, POD #1


General Surgery, Dr. Hahn----> Help appreciated


Medication/Management:


* Dilaudid 1mg IVP q3 PRN and 0.5mg Q3prn (pain control)  


* LR @125mls/hr 





Endo: Hx of DM and Hypothyrodism


Medication/Management:


* Accuchecks


* ISS low dose protocol 


* Levothyroxine 25mcg PO daily 





ID:s/p open ventral hernia repair w/ biologic mesh, small bowel resection, BREANNA, 

POD #1


Prophylaxis: Flagyl 500mg IV Q8H and Zosyn 3.375 gm IV q6 





: Hx of BPH


Medication/Management: Tamulosin 0.4mg PO daily 





Prophylaxis:


DVT: SCDs


GI: Pepcid 20mg IVP Q12H 





<Mina Chilel P - Last Filed: 10/17/17 07:58>





Meds





- Medications


Medications: 


 Current Medications





Benzocaine/Menthol (Cepacol Sore Throat)  1 lexx MT Q2 PRN


   PRN Reason: Sore Throat


   Last Admin: 10/15/17 09:19 Dose:  1 lexx


Clonidine HCl (Catapres Tts1 0.1 Mg/24 Hr)  1 patch TD Q7D@1000 [facility]


   Last Admin: 10/12/17 00:24 Dose:  1 patch


Famotidine (Pepcid)  20 mg IVP Q12 [facility]


   Last Admin: 10/16/17 22:18 Dose:  20 mg


Heparin Sodium (Porcine) (Heparin)  5,000 units SC Q8 [facility]


   Last Admin: 10/16/17 22:50 Dose:  Not Given


Hydromorphone HCl (Dilaudid)  0.5 mg IVP Q3 PRN


   PRN Reason: Pain, severe (8-10)


   Last Admin: 10/17/17 02:35 Dose:  0.5 mg


Piperacillin Sod/Tazobactam Sod (Zosyn 3.375 Gm Iv Premix)  3.375 gm in 50 mls 

@ 100 mls/hr IVPB Q6H [facility]


   Last Admin: 10/17/17 04:04 Dose:  100 mls/hr


Chromium/Copper/Manganese/Zinc 1 ml/ Multivitamins/Vitamin C 10 ml/ Amino Acids

  1,011 mls @ 65 mls/hr IV .[id number] [facility]


   Stop: 10/17/17 09:33


   Last Admin: 10/16/17 18:26 Dose:  65 mls/hr


Chromium/Copper/Manganese/Zinc (1 ml/ Amino Acids)  1,001 mls @ 65 mls/hr IV 

.[id number] [facility]


   Stop: 10/17/17 17:59


Insulin Aspart (Novolog)  0 unit SC ACHS [facility]


   PRN Reason: Protocol


   Last Admin: 10/16/17 22:19 Dose:  Not Given


Ondansetron HCl (Zofran Inj)  4 mg IVP Q4 PRN


   PRN Reason: nausea


   Last Admin: 10/16/17 23:18 Dose:  4 mg


Oxycodone/Acetaminophen (Percocet 5/325 Mg Tab)  1 tab PO Q4H [facility]


   PRN Reason: Pain, moderate (4-7)


   Stop: 10/19/17 15:42


Potassium Chloride (K-Dur 20 Meq Er Tab)  20 meq PO BID [facility]


   Last Admin: 10/12/17 19:29 Dose:  Not Given











Results





- Vital Signs


Recent Vital Signs: 


 Last Vital Signs











Temp  98.2 F   10/17/17 07:00


 


Pulse  85   10/17/17 07:00


 


Resp  20   10/17/17 07:00


 


BP  120/73   10/17/17 07:00


 


Pulse Ox  97   10/17/17 07:00














- Labs


Result Diagrams: 


 10/17/17 06:50





 10/17/17 06:50


Labs: 


 Laboratory Results - last 24 hr











  10/16/17 10/16/17 10/16/17





  07:07 12:04 16:53


 


WBC   


 


RBC   


 


Hgb   


 


Hct   


 


MCV   


 


MCH   


 


MCHC   


 


RDW   


 


Plt Count   


 


MPV   


 


Neut % (Auto)   


 


Lymph % (Auto)   


 


Mono % (Auto)   


 


Eos % (Auto)   


 


Baso % (Auto)   


 


Neut #   


 


Lymph #   


 


Mono #   


 


Eos #   


 


Baso #   


 


Sodium  142  


 


Potassium  3.3 L  


 


Chloride  99  


 


Carbon Dioxide  37 H  


 


Anion Gap  9 L  


 


BUN  16  


 


Creatinine  0.7 L  


 


Est GFR ( Amer)  > 60  


 


Est GFR (Non-Af Amer)  > 60  


 


POC Glucose (mg/dL)   191 H  196 H


 


Random Glucose  167 H  


 


Calcium  8.3 L  


 


Phosphorus  3.2  


 


Magnesium  1.8  


 


Total Bilirubin  0.9  


 


AST  40  


 


ALT  28  


 


Alkaline Phosphatase  58  


 


Total Protein  6.5  


 


Albumin  2.8 L  


 


Globulin  3.7  


 


Albumin/Globulin Ratio  0.8 L  


 


Triglycerides  83  


 


Cholesterol  73  


 


LDL Cholesterol Direct  40  


 


HDL Cholesterol  19 L  














  10/16/17 10/17/17 10/17/17





  21:09 06:36 06:50


 


WBC    13.1 H


 


RBC    2.50 L


 


Hgb    8.7 L


 


Hct    25.8 L


 


MCV    103.4 H


 


MCH    34.7 H


 


MCHC    33.6


 


RDW    19.1 H


 


Plt Count    101 L


 


MPV    9.5


 


Neut % (Auto)    71.7


 


Lymph % (Auto)    18.2 L


 


Mono % (Auto)    9.5


 


Eos % (Auto)    0.2


 


Baso % (Auto)    0.4


 


Neut #    9.4 H


 


Lymph #    2.4


 


Mono #    1.2 H


 


Eos #    0.0


 


Baso #    0.1


 


Sodium   


 


Potassium   


 


Chloride   


 


Carbon Dioxide   


 


Anion Gap   


 


BUN   


 


Creatinine   


 


Est GFR ( Amer)   


 


Est GFR (Non-Af Amer)   


 


POC Glucose (mg/dL)  224 H  225 H 


 


Random Glucose   


 


Calcium   


 


Phosphorus   


 


Magnesium   


 


Total Bilirubin   


 


AST   


 


ALT   


 


Alkaline Phosphatase   


 


Total Protein   


 


Albumin   


 


Globulin   


 


Albumin/Globulin Ratio   


 


Triglycerides   


 


Cholesterol   


 


LDL Cholesterol Direct   


 


HDL Cholesterol   














  10/17/17





  06:50


 


WBC 


 


RBC 


 


Hgb 


 


Hct 


 


MCV 


 


MCH 


 


MCHC 


 


RDW 


 


Plt Count 


 


MPV 


 


Neut % (Auto) 


 


Lymph % (Auto) 


 


Mono % (Auto) 


 


Eos % (Auto) 


 


Baso % (Auto) 


 


Neut # 


 


Lymph # 


 


Mono # 


 


Eos # 


 


Baso # 


 


Sodium  133


 


Potassium  3.1 L


 


Chloride  94 L


 


Carbon Dioxide  33 H


 


Anion Gap  9 L


 


BUN  21 H


 


Creatinine  0.8


 


Est GFR ( Amer)  > 60


 


Est GFR (Non-Af Amer)  > 60


 


POC Glucose (mg/dL) 


 


Random Glucose  187 H


 


Calcium  7.8 L


 


Phosphorus  3.2


 


Magnesium  1.7


 


Total Bilirubin  0.7


 


AST  36


 


ALT  31


 


Alkaline Phosphatase  57


 


Total Protein  6.1 L


 


Albumin  2.7 L


 


Globulin  3.4


 


Albumin/Globulin Ratio  0.8 L


 


Triglycerides  96


 


Cholesterol  61


 


LDL Cholesterol Direct  32


 


HDL Cholesterol  16 L














Attending/Attestation





- Attestation


I have personally seen and examined this patient.: Yes


I have fully participated in the care of the patient.: Yes


I have reviewed all pertinent clinical information: Yes


Notes (Text): 





See note on the same day.

## 2017-10-10 NOTE — CP.CCUPN
<Floyd Barreto E - Last Filed: 10/10/17 18:12>





CCU Subjective





- Physician Review


Subjective (Free Text): 


Patient was seen and examined at bedside. Patient reports that he is doing well 

but admits to moderate abdominal pain as he is s/p open ventral hernia repair w

/ biologic mesh, small bowel resection, BREANNA, POD #1. Patient denies chest pain, 

palpitations, SOB, fever, chills, nausea and vomiting. 








CCU Objective





- Vital Signs / Intake & Output


Intake and Output (Last 8hrs): 


 Intake & Output











 10/10/17 10/10/17 10/10/17





 06:59 14:59 22:59


 


Intake Total 1000 800 


 


Output Total 550 1100 


 


Balance 450 -300 


 


Intake:   


 


  Intake, IV Amount 1000 800 


 


    Left Wrist 1000 800 


 


Output:   


 


  Gastric Amount 50  


 


    Right Nares 50  


 


  Urine 500 1100 


 


    Urine, Voided 500 1100 














- Physical Exam


Head: Positive for: Atraumatic


Extroacular Muscles: Positive for: EOMI


Mouth: Positive for: Dry


Respiratory/Chest: Positive for: Clear to Auscultation.  Negative for: 

Accessory Muscle Use


Cardiovascular: Positive for: Regular Rate and Rhythm, Murmurs


Abdomen: Positive for: Tenderness, Other (Decreased BS, s/p open ventral hernia 

repair w/ biologic mesh, small bowel resection, BREANNA, POD #1 )


Upper Extremity: Positive for: Normal Inspection


Lower Extremity: Positive for: Normal Inspection


Neurological: Positive for: GCS=15, Speech Normal


Skin: Positive for: Normal Color


Psychiatric: Positive for: Alert, Oriented x 3





- Medications


Active Medications: 


Active Medications











Generic Name Dose Route Start Last Admin





  Trade Name Freq  PRN Reason Stop Dose Admin


 


Famotidine  20 mg  10/03/17 10:00  10/10/17 09:41





  Pepcid  IVP   20 mg





  Q12 ALLIE   Administration


 


Fluticasone Propionate  2 spr  09/30/17 10:00  10/10/17 10:00





  Flonase  FILIPE   2 sprays





  DAILY ALLIE   Administration


 


Furosemide  40 mg  10/08/17 16:45  10/10/17 09:39





  Lasix  IVP   40 mg





  DAILY ALLIE   Administration


 


Hydromorphone HCl  1 mg  10/09/17 13:13  10/10/17 12:21





  Dilaudid  IVP   1 mg





  Q3 PRN   Administration





  Pain, severe (8-10)   


 


Hydromorphone HCl  0.5 mg  10/09/17 13:13  10/09/17 17:33





  Dilaudid  IVP   0.5 mg





  Q3 PRN   Administration





  Pain, moderate (4-7)   


 


Lactated Ringer's  1,000 mls @ 125 mls/hr  10/09/17 00:01  10/10/17 16:42





  Lactated Ringer's  IV   125 mls/hr





  .Q8H ALLIE   Administration


 


Piperacillin Sod/Tazobactam Sod  3.375 gm in 50 mls @ 100 mls/hr  10/09/17 16:

00  10/10/17 16:48





  Zosyn 3.375 Gm Iv Premix  IVPB   100 mls/hr





  Q6H ALLIE   Administration


 


Metronidazole  500 mg in 100 mls @ 100 mls/hr  10/09/17 22:00  10/10/17 14:57





  Flagyl  IVPB   100 mls/hr





  Q8 ALLIE   Administration


 


Insulin Aspart  0 unit  09/30/17 07:30  10/10/17 16:34





  Novolog  SC   Not Given





  [facility]   





  Protocol   


 


Levothyroxine Sodium  25 mcg  10/01/17 06:30  10/10/17 05:59





  Synthroid  PO   Not Given





  0630 ALLIE   


 


Potassium Chloride  20 meq  10/08/17 18:00  10/10/17 12:34





  K-Dur 20 Meq Er Tab  PO   Not Given





  BID ALLIE   


 


Rosuvastatin Calcium  2.5 mg  09/30/17 22:00  10/09/17 21:30





  Crestor  PO   Not Given





  HS ALLIE   


 


Tamsulosin HCl  0.4 mg  09/30/17 10:00  10/10/17 12:33





  Flomax  PO   Not Given





  DAILY ALLIE   














- Patient Studies


Lab Studies: 


 Lab Studies











  10/10/17 10/10/17 10/10/17 Range/Units





  16:09 11:33 07:29 


 


WBC     (4.8-10.8)  K/uL


 


RBC     (4.40-5.90)  Mil/uL


 


Hgb     (12.0-18.0)  g/dL


 


Hct     (35.0-51.0)  %


 


MCV     (80.0-94.0)  fL


 


MCH     (27.0-31.0)  pg


 


MCHC     (33.0-37.0)  g/dL


 


RDW     (11.5-14.5)  %


 


Plt Count     (130-400)  K/uL


 


MPV     (7.2-11.7)  fL


 


Neut % (Auto)     (50.0-75.0)  %


 


Lymph % (Auto)     (20.0-40.0)  %


 


Mono % (Auto)     (0.0-10.0)  %


 


Eos % (Auto)     (0.0-4.0)  %


 


Baso % (Auto)     (0.0-2.0)  %


 


Neut #     (1.8-7.0)  K/uL


 


Lymph #     (1.0-4.3)  K/uL


 


Mono #     (0.0-0.8)  K/uL


 


Eos #     (0.0-0.7)  K/uL


 


Baso #     (0.0-0.2)  K/uL


 


Sodium     (132-148)  mmol/L


 


Potassium     (3.6-5.2)  mmol/L


 


Chloride     ()  mmol/L


 


Carbon Dioxide     (22-30)  mmol/L


 


Anion Gap     (10-20)  


 


BUN     (9-20)  mg/dL


 


Creatinine     (0.8-1.5)  mg/dL


 


Est GFR (African Amer)     


 


Est GFR (Non-Af Amer)     


 


POC Glucose (mg/dL)  191 H  187 H  168 H  ()  mg/dL


 


Random Glucose     ()  mg/dL


 


Calcium     (8.6-10.4)  mg/dl


 


Total Bilirubin     (0.2-1.3)  mg/dL


 


AST     (17-59)  U/L


 


ALT     (21-72)  U/L


 


Alkaline Phosphatase     ()  U/L


 


Total Protein     (6.3-8.3)  g/dL


 


Albumin     (3.5-5.0)  g/dL


 


Globulin     (2.2-3.9)  gm/dL


 


Albumin/Globulin Ratio     (1.0-2.1)  


 


Blood Type     


 


Antibody Screen     


 


Crossmatch     














  10/10/17 10/10/17 10/09/17 Range/Units





  05:51 05:51 21:41 


 


WBC   11.8 H   (4.8-10.8)  K/uL


 


RBC   3.18 L   (4.40-5.90)  Mil/uL


 


Hgb   11.1 L   (12.0-18.0)  g/dL


 


Hct   32.2 L   (35.0-51.0)  %


 


MCV   101.4 H   (80.0-94.0)  fL


 


MCH   34.9 H   (27.0-31.0)  pg


 


MCHC   34.4   (33.0-37.0)  g/dL


 


RDW   20.5 H   (11.5-14.5)  %


 


Plt Count   164   (130-400)  K/uL


 


MPV   8.4   (7.2-11.7)  fL


 


Neut % (Auto)   74.0   (50.0-75.0)  %


 


Lymph % (Auto)   12.2 L   (20.0-40.0)  %


 


Mono % (Auto)   13.5 H   (0.0-10.0)  %


 


Eos % (Auto)   0.0   (0.0-4.0)  %


 


Baso % (Auto)   0.3   (0.0-2.0)  %


 


Neut #   8.7 H   (1.8-7.0)  K/uL


 


Lymph #   1.4   (1.0-4.3)  K/uL


 


Mono #   1.6 H   (0.0-0.8)  K/uL


 


Eos #   0.0   (0.0-0.7)  K/uL


 


Baso #   0.0   (0.0-0.2)  K/uL


 


Sodium  134    (132-148)  mmol/L


 


Potassium  3.9    (3.6-5.2)  mmol/L


 


Chloride  102    ()  mmol/L


 


Carbon Dioxide  21 L    (22-30)  mmol/L


 


Anion Gap  15    (10-20)  


 


BUN  11    (9-20)  mg/dL


 


Creatinine  0.7 L    (0.8-1.5)  mg/dL


 


Est GFR (African Amer)  > 60    


 


Est GFR (Non-Af Amer)  > 60    


 


POC Glucose (mg/dL)    213 H  ()  mg/dL


 


Random Glucose  162 H    ()  mg/dL


 


Calcium  8.1 L    (8.6-10.4)  mg/dl


 


Total Bilirubin  1.2    (0.2-1.3)  mg/dL


 


AST  45    (17-59)  U/L


 


ALT  42    (21-72)  U/L


 


Alkaline Phosphatase  59    ()  U/L


 


Total Protein  6.2 L    (6.3-8.3)  g/dL


 


Albumin  2.9 L    (3.5-5.0)  g/dL


 


Globulin  3.4    (2.2-3.9)  gm/dL


 


Albumin/Globulin Ratio  0.9 L    (1.0-2.1)  


 


Blood Type     


 


Antibody Screen     


 


Crossmatch     














  10/09/17 10/09/17 10/09/17 Range/Units





  21:04 21:04 14:42 


 


WBC   11.3 H D   (4.8-10.8)  K/uL


 


RBC   3.31 L   (4.40-5.90)  Mil/uL


 


Hgb   11.6 L   (12.0-18.0)  g/dL


 


Hct   33.9 L   (35.0-51.0)  %


 


MCV   102.3 H   (80.0-94.0)  fL


 


MCH   35.1 H   (27.0-31.0)  pg


 


MCHC   34.4   (33.0-37.0)  g/dL


 


RDW   20.3 H   (11.5-14.5)  %


 


Plt Count   176   (130-400)  K/uL


 


MPV   8.5   (7.2-11.7)  fL


 


Neut % (Auto)     (50.0-75.0)  %


 


Lymph % (Auto)     (20.0-40.0)  %


 


Mono % (Auto)     (0.0-10.0)  %


 


Eos % (Auto)     (0.0-4.0)  %


 


Baso % (Auto)     (0.0-2.0)  %


 


Neut #     (1.8-7.0)  K/uL


 


Lymph #     (1.0-4.3)  K/uL


 


Mono #     (0.0-0.8)  K/uL


 


Eos #     (0.0-0.7)  K/uL


 


Baso #     (0.0-0.2)  K/uL


 


Sodium  133    (132-148)  mmol/L


 


Potassium  3.9    (3.6-5.2)  mmol/L


 


Chloride  102    ()  mmol/L


 


Carbon Dioxide  20 L    (22-30)  mmol/L


 


Anion Gap  15    (10-20)  


 


BUN  11    (9-20)  mg/dL


 


Creatinine  0.7 L    (0.8-1.5)  mg/dL


 


Est GFR (African Amer)  > 60    


 


Est GFR (Non-Af Amer)  > 60    


 


POC Glucose (mg/dL)     ()  mg/dL


 


Random Glucose  168 H    ()  mg/dL


 


Calcium  8.2 L    (8.6-10.4)  mg/dl


 


Total Bilirubin     (0.2-1.3)  mg/dL


 


AST     (17-59)  U/L


 


ALT     (21-72)  U/L


 


Alkaline Phosphatase     ()  U/L


 


Total Protein     (6.3-8.3)  g/dL


 


Albumin     (3.5-5.0)  g/dL


 


Globulin     (2.2-3.9)  gm/dL


 


Albumin/Globulin Ratio     (1.0-2.1)  


 


Blood Type    O POSITIVE  


 


Antibody Screen    Negative  


 


Crossmatch    See Detail  








 Laboratory Results - last 24 hr











  10/09/17 10/09/17 10/09/17





  14:42 21:04 21:04


 


WBC   11.3 H D 


 


RBC   3.31 L 


 


Hgb   11.6 L 


 


Hct   33.9 L 


 


MCV   102.3 H 


 


MCH   35.1 H 


 


MCHC   34.4 


 


RDW   20.3 H 


 


Plt Count   176 


 


MPV   8.5 


 


Neut % (Auto)   


 


Lymph % (Auto)   


 


Mono % (Auto)   


 


Eos % (Auto)   


 


Baso % (Auto)   


 


Neut #   


 


Lymph #   


 


Mono #   


 


Eos #   


 


Baso #   


 


Sodium    133


 


Potassium    3.9


 


Chloride    102


 


Carbon Dioxide    20 L


 


Anion Gap    15


 


BUN    11


 


Creatinine    0.7 L


 


Est GFR ( Amer)    > 60


 


Est GFR (Non-Af Amer)    > 60


 


POC Glucose (mg/dL)   


 


Random Glucose    168 H


 


Calcium    8.2 L


 


Total Bilirubin   


 


AST   


 


ALT   


 


Alkaline Phosphatase   


 


Total Protein   


 


Albumin   


 


Globulin   


 


Albumin/Globulin Ratio   


 


Blood Type  O POSITIVE  


 


Antibody Screen  Negative  


 


Crossmatch  See Detail  














  10/09/17 10/10/17 10/10/17





  21:41 05:51 05:51


 


WBC   11.8 H 


 


RBC   3.18 L 


 


Hgb   11.1 L 


 


Hct   32.2 L 


 


MCV   101.4 H 


 


MCH   34.9 H 


 


MCHC   34.4 


 


RDW   20.5 H 


 


Plt Count   164 


 


MPV   8.4 


 


Neut % (Auto)   74.0 


 


Lymph % (Auto)   12.2 L 


 


Mono % (Auto)   13.5 H 


 


Eos % (Auto)   0.0 


 


Baso % (Auto)   0.3 


 


Neut #   8.7 H 


 


Lymph #   1.4 


 


Mono #   1.6 H 


 


Eos #   0.0 


 


Baso #   0.0 


 


Sodium    134


 


Potassium    3.9


 


Chloride    102


 


Carbon Dioxide    21 L


 


Anion Gap    15


 


BUN    11


 


Creatinine    0.7 L


 


Est GFR ( Amer)    > 60


 


Est GFR (Non-Af Amer)    > 60


 


POC Glucose (mg/dL)  213 H  


 


Random Glucose    162 H


 


Calcium    8.1 L


 


Total Bilirubin    1.2


 


AST    45


 


ALT    42


 


Alkaline Phosphatase    59


 


Total Protein    6.2 L


 


Albumin    2.9 L


 


Globulin    3.4


 


Albumin/Globulin Ratio    0.9 L


 


Blood Type   


 


Antibody Screen   


 


Crossmatch   














  10/10/17 10/10/17 10/10/17





  07:29 11:33 16:09


 


WBC   


 


RBC   


 


Hgb   


 


Hct   


 


MCV   


 


MCH   


 


MCHC   


 


RDW   


 


Plt Count   


 


MPV   


 


Neut % (Auto)   


 


Lymph % (Auto)   


 


Mono % (Auto)   


 


Eos % (Auto)   


 


Baso % (Auto)   


 


Neut #   


 


Lymph #   


 


Mono #   


 


Eos #   


 


Baso #   


 


Sodium   


 


Potassium   


 


Chloride   


 


Carbon Dioxide   


 


Anion Gap   


 


BUN   


 


Creatinine   


 


Est GFR ( Amer)   


 


Est GFR (Non-Af Amer)   


 


POC Glucose (mg/dL)  168 H  187 H  191 H


 


Random Glucose   


 


Calcium   


 


Total Bilirubin   


 


AST   


 


ALT   


 


Alkaline Phosphatase   


 


Total Protein   


 


Albumin   


 


Globulin   


 


Albumin/Globulin Ratio   


 


Blood Type   


 


Antibody Screen   


 


Crossmatch   











Fingerstick Blood Sugar Results: 168





Review of Systems





- Constitutional


Constitutional: Weakness.  absent: Fever, Chills





- EENT


Eyes: absent: Change in Vision


Ears: absent: Dizziness





- Cardiovascular


Cardiovascular: absent: Chest Pain, Diaphoresis, Dyspnea, Lightheadedness





- Respiratory


Respiratory: absent: Dyspnea





- Gastrointestinal


Gastrointestinal: Abdominal Pain (s/p open ventral hernia repair w/ biologic 

mesh, small bowel resection, BREANNA, POD #1 ).  absent: Nausea, Vomiting





- Neurological


Neurological: Weakness.  absent: Dizziness, Headaches, Syncope





- Endocrine


Endocrine: absent: Fatigue, Palpitations





Critical Care Progress Note





- Nutrition


Nutrition: 


 Nutrition











 Category Date Time Status


 


 NPO Diet [DIET] Diets  10/09/17 Dinner Active














Assessment/Plan





- Assessment and Plan (Free Text)


Assessment: 


Patient is a 66 year old  male with past medical history of HTN

, HLD, DM, Ventral hernia repair w/ mesh presented to the hospital with 

abdominal pain s/p open ventral hernia repair w/ biologic mesh, small bowel 

resection, BREANNA, POD #1 





Today


Plan: Transfer to medical surgical floor as per General surgery 


Plan: 


Neuro: Alert, Awake and oriented 





Cardio: Hx of HTN AND HLD 


Medication/Management:


* Crestor 2.5mg PO HS


* Lasix 40mg IVP daily 





Pulm: No acute issues 





GI: s/p open ventral hernia repair w/ biologic mesh, small bowel resection, BREANNA

, POD #1


General Surgery, Dr. Hahn----> Help appreciated


Medication/Management:


* Dilaudid 1mg IVP q3 PRN and 0.5mg Q3prn (pain control)  


* LR @125mls/hr 





Endo: Hx of DM and Hypothyrodism


Medication/Management:


* Accuchecks


* ISS low dose protocol 


* Levothyroxine 25mcg PO daily 





ID:s/p open ventral hernia repair w/ biologic mesh, small bowel resection, BREANNA, 

POD #1


Prophylaxis: Flagyl 500mg IV Q8H and Zosyn 3.375 gm IV q6 





: Hx of BPH


Medication/Management: Tamulosin 0.4mg PO daily 





Prophylaxis:


DVT: SCDs


GI: Pepcid 20mg IVP Q12H 











<Mina Chilel P - Last Filed: 10/10/17 18:42>





CCU Objective





- Vital Signs / Intake & Output


Intake and Output (Last 8hrs): 


 Intake & Output











 10/10/17 10/10/17 10/10/17





 06:59 14:59 22:59


 


Intake Total 1000 800 


 


Output Total 550 1100 


 


Balance 450 -300 


 


Intake:   


 


  Intake, IV Amount 1000 800 


 


    Left Wrist 1000 800 


 


Output:   


 


  Gastric Amount 50  


 


    Right Nares 50  


 


  Urine 500 1100 


 


    Urine, Voided 500 1100 














- Medications


Active Medications: 


Active Medications











Generic Name Dose Route Start Last Admin





  Trade Name Freq  PRN Reason Stop Dose Admin


 


Famotidine  20 mg  10/03/17 10:00  10/10/17 09:41





  Pepcid  IVP   20 mg





  Q12 ALLIE   Administration


 


Fluticasone Propionate  2 spr  09/30/17 10:00  10/10/17 10:00





  Flonase  FILIPE   2 sprays





  DAILY ALLIE   Administration


 


Furosemide  40 mg  10/08/17 16:45  10/10/17 09:39





  Lasix  IVP   40 mg





  DAILY ALLIE   Administration


 


Hydromorphone HCl  1 mg  10/09/17 13:13  10/10/17 18:31





  Dilaudid  IVP   1 mg





  Q3 PRN   Administration





  Pain, severe (8-10)   


 


Hydromorphone HCl  0.5 mg  10/09/17 13:13  10/09/17 17:33





  Dilaudid  IVP   0.5 mg





  Q3 PRN   Administration





  Pain, moderate (4-7)   


 


Lactated Ringer's  1,000 mls @ 125 mls/hr  10/09/17 00:01  10/10/17 16:42





  Lactated Ringer's  IV   125 mls/hr





  .Q8H ALLIE   Administration


 


Piperacillin Sod/Tazobactam Sod  3.375 gm in 50 mls @ 100 mls/hr  10/09/17 16:

00  10/10/17 16:48





  Zosyn 3.375 Gm Iv Premix  IVPB   100 mls/hr





  Q6H ALLIE   Administration


 


Metronidazole  500 mg in 100 mls @ 100 mls/hr  10/09/17 22:00  10/10/17 14:57





  Flagyl  IVPB   100 mls/hr





  Q8 ALLIE   Administration


 


Insulin Aspart  0 unit  09/30/17 07:30  10/10/17 16:34





  Novolog  SC   Not Given





  [facility]   





  Protocol   


 


Levothyroxine Sodium  25 mcg  10/01/17 06:30  10/10/17 05:59





  Synthroid  PO   Not Given





  0630 ALLIE   


 


Potassium Chloride  20 meq  10/08/17 18:00  10/10/17 12:34





  K-Dur 20 Meq Er Tab  PO   Not Given





  BID ALLIE   


 


Rosuvastatin Calcium  2.5 mg  09/30/17 22:00  10/09/17 21:30





  Crestor  PO   Not Given





  HS ALLIE   


 


Tamsulosin HCl  0.4 mg  09/30/17 10:00  10/10/17 12:33





  Flomax  PO   Not Given





  DAILY ALLIE   














- Patient Studies


Lab Studies: 


 Lab Studies











  10/10/17 10/10/17 10/10/17 Range/Units





  16:09 11:33 07:29 


 


WBC     (4.8-10.8)  K/uL


 


RBC     (4.40-5.90)  Mil/uL


 


Hgb     (12.0-18.0)  g/dL


 


Hct     (35.0-51.0)  %


 


MCV     (80.0-94.0)  fL


 


MCH     (27.0-31.0)  pg


 


MCHC     (33.0-37.0)  g/dL


 


RDW     (11.5-14.5)  %


 


Plt Count     (130-400)  K/uL


 


MPV     (7.2-11.7)  fL


 


Neut % (Auto)     (50.0-75.0)  %


 


Lymph % (Auto)     (20.0-40.0)  %


 


Mono % (Auto)     (0.0-10.0)  %


 


Eos % (Auto)     (0.0-4.0)  %


 


Baso % (Auto)     (0.0-2.0)  %


 


Neut #     (1.8-7.0)  K/uL


 


Lymph #     (1.0-4.3)  K/uL


 


Mono #     (0.0-0.8)  K/uL


 


Eos #     (0.0-0.7)  K/uL


 


Baso #     (0.0-0.2)  K/uL


 


Sodium     (132-148)  mmol/L


 


Potassium     (3.6-5.2)  mmol/L


 


Chloride     ()  mmol/L


 


Carbon Dioxide     (22-30)  mmol/L


 


Anion Gap     (10-20)  


 


BUN     (9-20)  mg/dL


 


Creatinine     (0.8-1.5)  mg/dL


 


Est GFR (African Amer)     


 


Est GFR (Non-Af Amer)     


 


POC Glucose (mg/dL)  191 H  187 H  168 H  ()  mg/dL


 


Random Glucose     ()  mg/dL


 


Calcium     (8.6-10.4)  mg/dl


 


Total Bilirubin     (0.2-1.3)  mg/dL


 


AST     (17-59)  U/L


 


ALT     (21-72)  U/L


 


Alkaline Phosphatase     ()  U/L


 


Total Protein     (6.3-8.3)  g/dL


 


Albumin     (3.5-5.0)  g/dL


 


Globulin     (2.2-3.9)  gm/dL


 


Albumin/Globulin Ratio     (1.0-2.1)  


 


Blood Type     


 


Antibody Screen     


 


Crossmatch     














  10/10/17 10/10/17 10/09/17 Range/Units





  05:51 05:51 21:41 


 


WBC   11.8 H   (4.8-10.8)  K/uL


 


RBC   3.18 L   (4.40-5.90)  Mil/uL


 


Hgb   11.1 L   (12.0-18.0)  g/dL


 


Hct   32.2 L   (35.0-51.0)  %


 


MCV   101.4 H   (80.0-94.0)  fL


 


MCH   34.9 H   (27.0-31.0)  pg


 


MCHC   34.4   (33.0-37.0)  g/dL


 


RDW   20.5 H   (11.5-14.5)  %


 


Plt Count   164   (130-400)  K/uL


 


MPV   8.4   (7.2-11.7)  fL


 


Neut % (Auto)   74.0   (50.0-75.0)  %


 


Lymph % (Auto)   12.2 L   (20.0-40.0)  %


 


Mono % (Auto)   13.5 H   (0.0-10.0)  %


 


Eos % (Auto)   0.0   (0.0-4.0)  %


 


Baso % (Auto)   0.3   (0.0-2.0)  %


 


Neut #   8.7 H   (1.8-7.0)  K/uL


 


Lymph #   1.4   (1.0-4.3)  K/uL


 


Mono #   1.6 H   (0.0-0.8)  K/uL


 


Eos #   0.0   (0.0-0.7)  K/uL


 


Baso #   0.0   (0.0-0.2)  K/uL


 


Sodium  134    (132-148)  mmol/L


 


Potassium  3.9    (3.6-5.2)  mmol/L


 


Chloride  102    ()  mmol/L


 


Carbon Dioxide  21 L    (22-30)  mmol/L


 


Anion Gap  15    (10-20)  


 


BUN  11    (9-20)  mg/dL


 


Creatinine  0.7 L    (0.8-1.5)  mg/dL


 


Est GFR (African Amer)  > 60    


 


Est GFR (Non-Af Amer)  > 60    


 


POC Glucose (mg/dL)    213 H  ()  mg/dL


 


Random Glucose  162 H    ()  mg/dL


 


Calcium  8.1 L    (8.6-10.4)  mg/dl


 


Total Bilirubin  1.2    (0.2-1.3)  mg/dL


 


AST  45    (17-59)  U/L


 


ALT  42    (21-72)  U/L


 


Alkaline Phosphatase  59    ()  U/L


 


Total Protein  6.2 L    (6.3-8.3)  g/dL


 


Albumin  2.9 L    (3.5-5.0)  g/dL


 


Globulin  3.4    (2.2-3.9)  gm/dL


 


Albumin/Globulin Ratio  0.9 L    (1.0-2.1)  


 


Blood Type     


 


Antibody Screen     


 


Crossmatch     














  10/09/17 10/09/17 10/09/17 Range/Units





  21:04 21:04 14:42 


 


WBC   11.3 H D   (4.8-10.8)  K/uL


 


RBC   3.31 L   (4.40-5.90)  Mil/uL


 


Hgb   11.6 L   (12.0-18.0)  g/dL


 


Hct   33.9 L   (35.0-51.0)  %


 


MCV   102.3 H   (80.0-94.0)  fL


 


MCH   35.1 H   (27.0-31.0)  pg


 


MCHC   34.4   (33.0-37.0)  g/dL


 


RDW   20.3 H   (11.5-14.5)  %


 


Plt Count   176   (130-400)  K/uL


 


MPV   8.5   (7.2-11.7)  fL


 


Neut % (Auto)     (50.0-75.0)  %


 


Lymph % (Auto)     (20.0-40.0)  %


 


Mono % (Auto)     (0.0-10.0)  %


 


Eos % (Auto)     (0.0-4.0)  %


 


Baso % (Auto)     (0.0-2.0)  %


 


Neut #     (1.8-7.0)  K/uL


 


Lymph #     (1.0-4.3)  K/uL


 


Mono #     (0.0-0.8)  K/uL


 


Eos #     (0.0-0.7)  K/uL


 


Baso #     (0.0-0.2)  K/uL


 


Sodium  133    (132-148)  mmol/L


 


Potassium  3.9    (3.6-5.2)  mmol/L


 


Chloride  102    ()  mmol/L


 


Carbon Dioxide  20 L    (22-30)  mmol/L


 


Anion Gap  15    (10-20)  


 


BUN  11    (9-20)  mg/dL


 


Creatinine  0.7 L    (0.8-1.5)  mg/dL


 


Est GFR (African Amer)  > 60    


 


Est GFR (Non-Af Amer)  > 60    


 


POC Glucose (mg/dL)     ()  mg/dL


 


Random Glucose  168 H    ()  mg/dL


 


Calcium  8.2 L    (8.6-10.4)  mg/dl


 


Total Bilirubin     (0.2-1.3)  mg/dL


 


AST     (17-59)  U/L


 


ALT     (21-72)  U/L


 


Alkaline Phosphatase     ()  U/L


 


Total Protein     (6.3-8.3)  g/dL


 


Albumin     (3.5-5.0)  g/dL


 


Globulin     (2.2-3.9)  gm/dL


 


Albumin/Globulin Ratio     (1.0-2.1)  


 


Blood Type    O POSITIVE  


 


Antibody Screen    Negative  


 


Crossmatch    See Detail  








 Laboratory Results - last 24 hr











  10/09/17 10/09/17 10/09/17





  14:42 21:04 21:04


 


WBC   11.3 H D 


 


RBC   3.31 L 


 


Hgb   11.6 L 


 


Hct   33.9 L 


 


MCV   102.3 H 


 


MCH   35.1 H 


 


MCHC   34.4 


 


RDW   20.3 H 


 


Plt Count   176 


 


MPV   8.5 


 


Neut % (Auto)   


 


Lymph % (Auto)   


 


Mono % (Auto)   


 


Eos % (Auto)   


 


Baso % (Auto)   


 


Neut #   


 


Lymph #   


 


Mono #   


 


Eos #   


 


Baso #   


 


Sodium    133


 


Potassium    3.9


 


Chloride    102


 


Carbon Dioxide    20 L


 


Anion Gap    15


 


BUN    11


 


Creatinine    0.7 L


 


Est GFR ( Amer)    > 60


 


Est GFR (Non-Af Amer)    > 60


 


POC Glucose (mg/dL)   


 


Random Glucose    168 H


 


Calcium    8.2 L


 


Total Bilirubin   


 


AST   


 


ALT   


 


Alkaline Phosphatase   


 


Total Protein   


 


Albumin   


 


Globulin   


 


Albumin/Globulin Ratio   


 


Blood Type  O POSITIVE  


 


Antibody Screen  Negative  


 


Crossmatch  See Detail  














  10/09/17 10/10/17 10/10/17





  21:41 05:51 05:51


 


WBC   11.8 H 


 


RBC   3.18 L 


 


Hgb   11.1 L 


 


Hct   32.2 L 


 


MCV   101.4 H 


 


MCH   34.9 H 


 


MCHC   34.4 


 


RDW   20.5 H 


 


Plt Count   164 


 


MPV   8.4 


 


Neut % (Auto)   74.0 


 


Lymph % (Auto)   12.2 L 


 


Mono % (Auto)   13.5 H 


 


Eos % (Auto)   0.0 


 


Baso % (Auto)   0.3 


 


Neut #   8.7 H 


 


Lymph #   1.4 


 


Mono #   1.6 H 


 


Eos #   0.0 


 


Baso #   0.0 


 


Sodium    134


 


Potassium    3.9


 


Chloride    102


 


Carbon Dioxide    21 L


 


Anion Gap    15


 


BUN    11


 


Creatinine    0.7 L


 


Est GFR ( Amer)    > 60


 


Est GFR (Non-Af Amer)    > 60


 


POC Glucose (mg/dL)  213 H  


 


Random Glucose    162 H


 


Calcium    8.1 L


 


Total Bilirubin    1.2


 


AST    45


 


ALT    42


 


Alkaline Phosphatase    59


 


Total Protein    6.2 L


 


Albumin    2.9 L


 


Globulin    3.4


 


Albumin/Globulin Ratio    0.9 L


 


Blood Type   


 


Antibody Screen   


 


Crossmatch   














  10/10/17 10/10/17 10/10/17





  07:29 11:33 16:09


 


WBC   


 


RBC   


 


Hgb   


 


Hct   


 


MCV   


 


MCH   


 


MCHC   


 


RDW   


 


Plt Count   


 


MPV   


 


Neut % (Auto)   


 


Lymph % (Auto)   


 


Mono % (Auto)   


 


Eos % (Auto)   


 


Baso % (Auto)   


 


Neut #   


 


Lymph #   


 


Mono #   


 


Eos #   


 


Baso #   


 


Sodium   


 


Potassium   


 


Chloride   


 


Carbon Dioxide   


 


Anion Gap   


 


BUN   


 


Creatinine   


 


Est GFR ( Amer)   


 


Est GFR (Non-Af Amer)   


 


POC Glucose (mg/dL)  168 H  187 H  191 H


 


Random Glucose   


 


Calcium   


 


Total Bilirubin   


 


AST   


 


ALT   


 


Alkaline Phosphatase   


 


Total Protein   


 


Albumin   


 


Globulin   


 


Albumin/Globulin Ratio   


 


Blood Type   


 


Antibody Screen   


 


Crossmatch   














Critical Care Progress Note





- Nutrition


Nutrition: 


 Nutrition











 Category Date Time Status


 


 NPO Diet [DIET] Diets  10/09/17 Dinner Active














Attending/Attestation





- Attestation


I have personally seen and examined this patient.: Yes


I have fully participated in the care of the patient.: Yes


I have reviewed all pertinent clinical information: Yes


Notes (Text): 





No acute events, patient still not passing gas, is awake, oriented and ask to 

remove NG, only 100 ml overnight drainage. VS stable, abd soft has binder, 

chest clear, no edema. Labs reviewed. Patient stable to be transferred to the 

floor.

## 2017-10-10 NOTE — CP.PCM.PN
Subjective





- Date & Time of Evaluation


Date of Evaluation: 10/10/17


Time of Evaluation: 18:00





- Subjective


Subjective: 





Has abdominal pain post surgery





Objective





- Vital Signs/Intake and Output


Vital Signs (last 24 hours): 


 











Temp Pulse Resp BP Pulse Ox


 


 98.6 F   105 H  29 H  176/81 H  95 


 


 10/10/17 16:00  10/10/17 18:22  10/10/17 18:22  10/10/17 18:22  10/10/17 17:05








Intake and Output: 


 











 10/10/17 10/11/17





 18:59 06:59


 


Intake Total 1325 125


 


Output Total 1500 100


 


Balance -175 25














- Medications


Medications: 


 Current Medications





Famotidine (Pepcid)  20 mg IVP Q12 [facility]


   Last Admin: 10/10/17 09:41 Dose:  20 mg


Fluticasone Propionate (Flonase)  2 spr FILIPE DAILY [facility]


   Last Admin: 10/10/17 10:00 Dose:  2 sprays


Furosemide (Lasix)  40 mg IVP DAILY [facility]


   Last Admin: 10/10/17 09:39 Dose:  40 mg


Hydromorphone HCl (Dilaudid)  1 mg IVP Q3 PRN


   PRN Reason: Pain, severe (8-10)


   Last Admin: 10/10/17 18:31 Dose:  1 mg


Hydromorphone HCl (Dilaudid)  0.5 mg IVP Q3 PRN


   PRN Reason: Pain, moderate (4-7)


   Last Admin: 10/09/17 17:33 Dose:  0.5 mg


Lactated Ringer's (Lactated Ringer's)  1,000 mls @ 125 mls/hr IV .Q8H [facility]


   Last Admin: 10/10/17 16:42 Dose:  125 mls/hr


Piperacillin Sod/Tazobactam Sod (Zosyn 3.375 Gm Iv Premix)  3.375 gm in 50 mls 

@ 100 mls/hr IVPB Q6H [facility]


   Last Admin: 10/10/17 16:48 Dose:  100 mls/hr


Metronidazole (Flagyl)  500 mg in 100 mls @ 100 mls/hr IVPB Q8 [facility]


   Last Admin: 10/10/17 14:57 Dose:  100 mls/hr


Insulin Aspart (Novolog)  0 unit SC ACHS [facility]


   PRN Reason: Protocol


   Last Admin: 10/10/17 16:34 Dose:  Not Given


Levothyroxine Sodium (Synthroid)  25 mcg PO 0630 [facility]


   Last Admin: 10/10/17 05:59 Dose:  Not Given


Potassium Chloride (K-Dur 20 Meq Er Tab)  20 meq PO BID [facility]


   Last Admin: 10/10/17 18:48 Dose:  Not Given


Rosuvastatin Calcium (Crestor)  2.5 mg PO HS [facility]


   Last Admin: 10/09/17 21:30 Dose:  Not Given


Tamsulosin HCl (Flomax)  0.4 mg PO DAILY [facility]


   Last Admin: 10/10/17 12:33 Dose:  Not Given











- Labs


Labs: 


 





 10/10/17 05:51 





 10/10/17 05:51 





 











PT  15.5 SECONDS (9.7-12.2)  H  10/09/17  08:47    


 


INR  1.4   10/09/17  08:47    


 


APTT  31 SECONDS (21-34)   10/09/17  08:47    














- Head Exam


Head Exam: ATRAUMATIC





- Eye Exam


Eye Exam: Normal appearance





- ENT Exam


ENT Exam: Mucous Membranes Dry





- Respiratory Exam


Respiratory Exam: NORMAL BREATHING PATTERN





- Cardiovascular Exam


Cardiovascular Exam: +S1, +S2





- GI/Abdominal Exam


GI & Abdominal Exam: Normal Bowel Sounds





Assessment and Plan


(1) Thrombocytopenia


Assessment & Plan: 


resolved with transfusion


alcohol and liver disease


Status: Acute   





(2) Anemia


Assessment & Plan: 


chronic disease


s/p prbc transfusion


Status: Acute   





(3) Elevated CEA


Assessment & Plan: 


GI w/u


Status: Acute

## 2017-10-10 NOTE — PN
LOCATION:  ICU #14, bed A.



SUBJECTIVE:  This 66-year-old male was seen and examined in rounds early

today without significant clinical changes post open ventral hernia repair

with partial small-bowel resection.



The entire chart is reviewed, including but not limited to the most recent

lab and radiology study results, current and the previous medication list,

current and the previous medical events and today's lab showed white blood

cell elevated to 11.3 with low hemoglobin of 11.1, low hematocrit of 32.2

with platelet count of 164 with CO2 content 21, indicative of metabolic

acidosis.  Blood glucose level 187 with low calcium and low albumin of 2.7

and low total protein of 6.2.



Most recent chest x-ray done yesterday, results and films are seen.



PHYSICAL EXAMINATION:

GENERAL:  A 66-year-old male.

VITAL SIGNS:  Afebrile with pulse of 96, blood pressure of 140/82.

HEENT:  Shows pale, dry oral mucous membrane.  Nonicteric sclerae.

LUNGS:  Scattered bilateral crepitation.  Decreased air entry at bases.

HEART:  Positive S1 and S2 with increased rate.

ABDOMEN:  With clean dressing, bowel sounds are zero with mild distention.

EXTREMITIES:  With mild lower extremity edematous changes.

NEUROLOGIC:  No reported new neurological deficits, sensory or motor.



IMPRESSION:

1.  Status post abdominal ventral hernia repair with partial small-bowel

resection as per surgical report.

2.  Anemia.

3.  Re-exacerbation of peptic ulcer disease.

4.  Known history of hypertension.

5.  Poorly controlled diabetes mellitus.

6.  Thrombocytopenia, more stable.



SUGGESTIONS:

1.  Continue current management.

2.  Central hyperalimentation.

3.  Further recommendations to follow.







__________________________________________

Earle Chilel MD



cc:  Earle Chilel MD



DD:  10/10/2017 12:17:33

DT:  10/10/2017 13:20:28

Job # [id number]

## 2017-10-10 NOTE — CP.PCM.PN
Subjective





- Date & Time of Evaluation


Date of Evaluation: 10/10/17


Time of Evaluation: 20:35





- Subjective


Subjective: 





pt is seen in ICU, recovering from surgery, not in distress





Objective





- Vital Signs/Intake and Output


Vital Signs (last 24 hours): 


 











Temp Pulse Resp BP Pulse Ox


 


 99.8 F H  93 H  21   176/81 H  92 L


 


 10/10/17 20:00  10/10/17 22:21  10/10/17 22:21  10/10/17 22:21  10/10/17 19:21








Intake and Output: 


 











 10/10/17 10/11/17





 18:59 06:59


 


Intake Total 1325 125


 


Output Total 1500 100


 


Balance -175 25














- Medications


Medications: 


 Current Medications





Famotidine (Pepcid)  20 mg IVP Q12 [facility]


   Last Admin: 10/10/17 22:18 Dose:  20 mg


Fluticasone Propionate (Flonase)  2 spr FILIPE DAILY [facility]


   Last Admin: 10/10/17 10:00 Dose:  2 sprays


Furosemide (Lasix)  40 mg IVP DAILY [facility]


   Last Admin: 10/10/17 09:39 Dose:  40 mg


Hydromorphone HCl (Dilaudid)  1 mg IVP Q3 PRN


   PRN Reason: Pain, severe (8-10)


   Last Admin: 10/10/17 22:53 Dose:  1 mg


Hydromorphone HCl (Dilaudid)  0.5 mg IVP Q3 PRN


   PRN Reason: Pain, moderate (4-7)


   Last Admin: 10/09/17 17:33 Dose:  0.5 mg


Lactated Ringer's (Lactated Ringer's)  1,000 mls @ 125 mls/hr IV .Q8H [facility]


   Last Admin: 10/10/17 16:42 Dose:  125 mls/hr


Piperacillin Sod/Tazobactam Sod (Zosyn 3.375 Gm Iv Premix)  3.375 gm in 50 mls 

@ 100 mls/hr IVPB Q6H [facility]


   Last Admin: 10/10/17 21:35 Dose:  100 mls/hr


Metronidazole (Flagyl)  500 mg in 100 mls @ 100 mls/hr IVPB Q8 [facility]


   Last Admin: 10/10/17 21:48 Dose:  100 mls/hr


Insulin Aspart (Novolog)  0 unit SC ACHS [facility]


   PRN Reason: Protocol


   Last Admin: 10/10/17 22:08 Dose:  Not Given


Levothyroxine Sodium (Synthroid)  25 mcg PO 0630 [facility]


   Last Admin: 10/10/17 05:59 Dose:  Not Given


Potassium Chloride (K-Dur 20 Meq Er Tab)  20 meq PO BID [facility]


   Last Admin: 10/10/17 18:48 Dose:  Not Given


Rosuvastatin Calcium (Crestor)  2.5 mg PO HS [facility]


   Last Admin: 10/10/17 21:04 Dose:  Not Given


Tamsulosin HCl (Flomax)  0.4 mg PO DAILY [facility]


   Last Admin: 10/10/17 12:33 Dose:  Not Given











- Labs


Labs: 


 





 10/10/17 05:51 





 10/10/17 05:51 





 











PT  15.5 SECONDS (9.7-12.2)  H  10/09/17  08:47    


 


INR  1.4   10/09/17  08:47    


 


APTT  31 SECONDS (21-34)   10/09/17  08:47    














- Constitutional


Appears: No Acute Distress





- Head Exam


Head Exam: ATRAUMATIC, NORMAL INSPECTION, NORMOCEPHALIC





- Eye Exam


Eye Exam: EOMI, Normal appearance, PERRL


Pupil Exam: NORMAL ACCOMODATION, PERRL





- Respiratory Exam


Respiratory Exam: Clear to Ausculation Bilateral, NORMAL BREATHING PATTERN





- Cardiovascular Exam


Cardiovascular Exam: REGULAR RHYTHM, +S1, +S2.  absent: Murmur





- GI/Abdominal Exam


GI & Abdominal Exam: Soft, Normal Bowel Sounds.  absent: Tenderness


Additional comments: 





post op 


incarcerated hernia surgery





- Rectal Exam


Rectal Exam: Deferred





Assessment and Plan


(1) Intestinal obstruction


Status: Acute   





(2) Thrombocytopenia


Status: Acute   





(3) Ventral hernia


Status: Acute   





(4) HTN (hypertension)


Status: Acute   





(5) Diabetes mellitus


Status: Chronic

## 2017-10-10 NOTE — CP.PCM.PN
Subjective





- Date & Time of Evaluation


Date of Evaluation: 10/09/17


Time of Evaluation: 09:00





- Subjective


Subjective: 





For OR





Objective





- Vital Signs/Intake and Output


Vital Signs (last 24 hours): 


 











Temp Pulse Resp BP Pulse Ox


 


 98.6 F   105 H  29 H  176/81 H  95 


 


 10/10/17 16:00  10/10/17 18:22  10/10/17 18:22  10/10/17 18:22  10/10/17 17:05








Intake and Output: 


 











 10/10/17 10/11/17





 18:59 06:59


 


Intake Total 1325 125


 


Output Total 1500 100


 


Balance -175 25














- Medications


Medications: 


 Current Medications





Famotidine (Pepcid)  20 mg IVP Q12 [facility]


   Last Admin: 10/10/17 09:41 Dose:  20 mg


Fluticasone Propionate (Flonase)  2 spr FILIPE DAILY [facility]


   Last Admin: 10/10/17 10:00 Dose:  2 sprays


Furosemide (Lasix)  40 mg IVP DAILY [facility]


   Last Admin: 10/10/17 09:39 Dose:  40 mg


Hydromorphone HCl (Dilaudid)  1 mg IVP Q3 PRN


   PRN Reason: Pain, severe (8-10)


   Last Admin: 10/10/17 18:31 Dose:  1 mg


Hydromorphone HCl (Dilaudid)  0.5 mg IVP Q3 PRN


   PRN Reason: Pain, moderate (4-7)


   Last Admin: 10/09/17 17:33 Dose:  0.5 mg


Lactated Ringer's (Lactated Ringer's)  1,000 mls @ 125 mls/hr IV .Q8H [facility]


   Last Admin: 10/10/17 16:42 Dose:  125 mls/hr


Piperacillin Sod/Tazobactam Sod (Zosyn 3.375 Gm Iv Premix)  3.375 gm in 50 mls 

@ 100 mls/hr IVPB Q6H [facility]


   Last Admin: 10/10/17 16:48 Dose:  100 mls/hr


Metronidazole (Flagyl)  500 mg in 100 mls @ 100 mls/hr IVPB Q8 [facility]


   Last Admin: 10/10/17 14:57 Dose:  100 mls/hr


Insulin Aspart (Novolog)  0 unit SC ACHS [facility]


   PRN Reason: Protocol


   Last Admin: 10/10/17 16:34 Dose:  Not Given


Levothyroxine Sodium (Synthroid)  25 mcg PO 0630 [facility]


   Last Admin: 10/10/17 05:59 Dose:  Not Given


Potassium Chloride (K-Dur 20 Meq Er Tab)  20 meq PO BID [facility]


   Last Admin: 10/10/17 18:48 Dose:  Not Given


Rosuvastatin Calcium (Crestor)  2.5 mg PO HS [facility]


   Last Admin: 10/09/17 21:30 Dose:  Not Given


Tamsulosin HCl (Flomax)  0.4 mg PO DAILY [facility]


   Last Admin: 10/10/17 12:33 Dose:  Not Given











- Labs


Labs: 


 





 10/10/17 05:51 





 10/10/17 05:51 





 











PT  15.5 SECONDS (9.7-12.2)  H  10/09/17  08:47    


 


INR  1.4   10/09/17  08:47    


 


APTT  31 SECONDS (21-34)   10/09/17  08:47    














- Head Exam


Head Exam: ATRAUMATIC





- Eye Exam


Eye Exam: Normal appearance





- ENT Exam


ENT Exam: Mucous Membranes Dry





- Respiratory Exam


Respiratory Exam: NORMAL BREATHING PATTERN





- Cardiovascular Exam


Cardiovascular Exam: +S1, +S2





- GI/Abdominal Exam


GI & Abdominal Exam: Normal Bowel Sounds





- Extremities Exam


Extremities Exam: Normal Inspection





Assessment and Plan


(1) Thrombocytopenia


Assessment & Plan: 


liver disease, alcohol abuse


s/p plt transfusion


cleared for surgery from heme standpoint plt > 100,000


Status: Acute   





(2) Anemia


Assessment & Plan: 


chronic disease


Status: Acute   





(3) Elevated CEA


Assessment & Plan: 


GI w/u


Status: Acute

## 2017-10-10 NOTE — OP
PROCEDURE DATE:  10/09/2017



PREOPERATIVE DIAGNOSIS:  History of incarcerated abdominal wall hernia.



POSTOPERATIVE DIAGNOSIS:  History of incarcerated abdominal wall hernia.



PROCEDURE CARRIED OUT:  Exploratory laparotomy, small bowel resection with

anastomosis, lysis of adhesions and repair of recurrent incisional hernia

with mesh.



SURGEON:  Kevin Hahn Jr., MD



ASSISTANTS:  Dr. Ny and Dr. Garcia.



TYPE OF ANESTHESIA:  General anesthesia.



ANESTHESIOLOGIST:  Dr. Leal.



INDICATIONS:  The patient is a man with a history of hepatitis C and

variety of other medical problems who presents with bowel obstruction. 

After this had resolved, he had thrombocytopenia, which prevented us from

operating and eventually, he was explored.



OPERATIVE FINDINGS:  The liver was nodular and cirrhotic.  The rest of the

abdominal findings show that there is a previous mesh on the left side of

the abdomen.  There were multiple Swiss cheese type defects in the anterior

abdominal wall and there were dense adhesions between the previously placed

mesh and the bowel.  At one segment of bowel, we had approximately five

serosal tears and enterotomies, which required resection of approximately

18 inches to 2 feet of small bowel.  The rest of the abdominal exploration

and findings were unremarkable.



DESCRIPTION OF PROCEDURE:  The patient was given general anesthesia and

intravenous antibiotics.  Venodyne boots were applied.  A midline incision

was carried down.  The _____ were present.  The mesh had shifted to the

patient's left side.  This was all freed up.  The bowel was run three

separate times.  The decision to resect the segment of bowel was made.  The

anastomosis was carried out.  Given the situation of the contamination of

the peritoneum and the other problems, we then elected to use a Stratticc

mesh measuring 20 x 30 cm to allow foreclosure of this, this was secured at

the top, bottom, and one side and then secured in to place with the

previously placed abdominal wall being closed over this.  Blood loss of the

procedure was approximately 300 mL.  There were no operative complications

or problems.

















The operation carried out is exploratory laparotomy, repair of recurrent

incisional hernia and use of biologic mesh.





__________________________________________

Kevin Hahn Jr., MD



cc:  Harrison Mckeon MD



DD:  10/09/2017 12:47:28

DT:  10/09/2017 13:45:16

Job # [id number]

## 2017-10-11 LAB
ALBUMIN/GLOB SERPL: 0.8 {RATIO} (ref 1–2.1)
ALP SERPL-CCNC: 58 U/L (ref 38–126)
ALT SERPL-CCNC: 39 U/L (ref 21–72)
AST SERPL-CCNC: 34 U/L (ref 17–59)
BASOPHILS # BLD AUTO: 0.1 K/UL (ref 0–0.2)
BASOPHILS NFR BLD: 0.4 % (ref 0–2)
BILIRUB SERPL-MCNC: 1.5 MG/DL (ref 0.2–1.3)
BUN SERPL-MCNC: 9 MG/DL (ref 9–20)
CALCIUM SERPL-MCNC: 8.4 MG/DL (ref 8.6–10.4)
CHLORIDE SERPL-SCNC: 102 MMOL/L (ref 98–107)
CO2 SERPL-SCNC: 22 MMOL/L (ref 22–30)
EOSINOPHIL # BLD AUTO: 0 K/UL (ref 0–0.7)
EOSINOPHIL NFR BLD: 0.1 % (ref 0–4)
ERYTHROCYTE [DISTWIDTH] IN BLOOD BY AUTOMATED COUNT: 20 % (ref 11.5–14.5)
GLOBULIN SER-MCNC: 4 GM/DL (ref 2.2–3.9)
GLUCOSE SERPL-MCNC: 132 MG/DL (ref 75–110)
HCT VFR BLD CALC: 33.7 % (ref 35–51)
LYMPHOCYTES # BLD AUTO: 2 K/UL (ref 1–4.3)
LYMPHOCYTES NFR BLD AUTO: 12.7 % (ref 20–40)
MCH RBC QN AUTO: 35.1 PG (ref 27–31)
MCHC RBC AUTO-ENTMCNC: 34 G/DL (ref 33–37)
MCV RBC AUTO: 103.2 FL (ref 80–94)
MONOCYTES # BLD: 2.1 K/UL (ref 0–0.8)
MONOCYTES NFR BLD: 13.3 % (ref 0–10)
NRBC BLD AUTO-RTO: 0 % (ref 0–2)
PLATELET # BLD: 149 K/UL (ref 130–400)
PMV BLD AUTO: 8.1 FL (ref 7.2–11.7)
POTASSIUM SERPL-SCNC: 3.7 MMOL/L (ref 3.6–5.2)
PROT SERPL-MCNC: 7.1 G/DL (ref 6.3–8.3)
SODIUM SERPL-SCNC: 135 MMOL/L (ref 132–148)
WBC # BLD AUTO: 16 K/UL (ref 4.8–10.8)

## 2017-10-11 RX ADMIN — INSULIN ASPART SCH: 100 INJECTION, SOLUTION INTRAVENOUS; SUBCUTANEOUS at 08:41

## 2017-10-11 RX ADMIN — TAZOBACTAM SODIUM AND PIPERACILLIN SODIUM SCH MLS/HR: 375; 3 INJECTION, SOLUTION INTRAVENOUS at 21:45

## 2017-10-11 RX ADMIN — INSULIN ASPART SCH: 100 INJECTION, SOLUTION INTRAVENOUS; SUBCUTANEOUS at 16:43

## 2017-10-11 RX ADMIN — WATER SCH MLS/HR: 1 INJECTION INTRAMUSCULAR; INTRAVENOUS; SUBCUTANEOUS at 05:00

## 2017-10-11 RX ADMIN — POTASSIUM CHLORIDE SCH: 20 TABLET, EXTENDED RELEASE ORAL at 09:38

## 2017-10-11 RX ADMIN — WATER SCH MLS/HR: 1 INJECTION INTRAMUSCULAR; INTRAVENOUS; SUBCUTANEOUS at 21:45

## 2017-10-11 RX ADMIN — POTASSIUM CHLORIDE SCH: 20 TABLET, EXTENDED RELEASE ORAL at 18:00

## 2017-10-11 RX ADMIN — ROSUVASTATIN CALCIUM SCH: 5 TABLET, FILM COATED ORAL at 23:12

## 2017-10-11 RX ADMIN — HYDROMORPHONE HYDROCHLORIDE PRN MG: 1 INJECTION, SOLUTION INTRAMUSCULAR; INTRAVENOUS; SUBCUTANEOUS at 13:24

## 2017-10-11 RX ADMIN — HYDROMORPHONE HYDROCHLORIDE PRN MG: 1 INJECTION, SOLUTION INTRAMUSCULAR; INTRAVENOUS; SUBCUTANEOUS at 19:20

## 2017-10-11 RX ADMIN — INSULIN ASPART SCH: 100 INJECTION, SOLUTION INTRAVENOUS; SUBCUTANEOUS at 21:46

## 2017-10-11 RX ADMIN — FLUTICASONE PROPIONATE SCH SPRAYS: 50 SPRAY, METERED NASAL at 09:40

## 2017-10-11 RX ADMIN — WATER SCH MLS/HR: 1 INJECTION INTRAMUSCULAR; INTRAVENOUS; SUBCUTANEOUS at 14:18

## 2017-10-11 RX ADMIN — HYDROMORPHONE HYDROCHLORIDE PRN MG: 1 INJECTION, SOLUTION INTRAMUSCULAR; INTRAVENOUS; SUBCUTANEOUS at 08:43

## 2017-10-11 RX ADMIN — TAZOBACTAM SODIUM AND PIPERACILLIN SODIUM SCH MLS/HR: 375; 3 INJECTION, SOLUTION INTRAVENOUS at 04:21

## 2017-10-11 RX ADMIN — INSULIN ASPART SCH: 100 INJECTION, SOLUTION INTRAVENOUS; SUBCUTANEOUS at 12:22

## 2017-10-11 RX ADMIN — TAZOBACTAM SODIUM AND PIPERACILLIN SODIUM SCH MLS/HR: 375; 3 INJECTION, SOLUTION INTRAVENOUS at 09:39

## 2017-10-11 RX ADMIN — TAZOBACTAM SODIUM AND PIPERACILLIN SODIUM SCH MLS/HR: 375; 3 INJECTION, SOLUTION INTRAVENOUS at 16:56

## 2017-10-11 RX ADMIN — HYDROMORPHONE HYDROCHLORIDE PRN MG: 1 INJECTION, SOLUTION INTRAMUSCULAR; INTRAVENOUS; SUBCUTANEOUS at 04:22

## 2017-10-11 NOTE — CP.PCM.PN
Subjective





- Date & Time of Evaluation


Date of Evaluation: 10/11/17


Time of Evaluation: 20:20





- Subjective


Subjective: 





Pt seen a examined, no fever, chills, is doing well, s/p surgery of 

incarcerated hernia





Objective





- Vital Signs/Intake and Output


Vital Signs (last 24 hours): 


 











Temp Pulse Resp BP Pulse Ox


 


 98.3 F   91 H  20   160/92 H  92 L


 


 10/11/17 18:36  10/11/17 18:36  10/11/17 18:36  10/11/17 18:36  10/11/17 18:36








Intake and Output: 


 











 10/11/17 10/12/17





 18:59 06:59


 


Output Total 1600 


 


Balance -1600 














- Medications


Medications: 


 Current Medications





Benzocaine/Menthol (Cepacol Sore Throat)  1 lexx MT Q2 PRN


   PRN Reason: Sore Throat


Clonidine HCl (Catapres Tts1 0.1 Mg/24 Hr)  1 patch TD Q7D@1000 [facility]


Famotidine (Pepcid)  20 mg IVP Q12 [facility]


   Last Admin: 10/11/17 21:46 Dose:  20 mg


Fluticasone Propionate (Flonase)  2 spr FILIPE DAILY [facility]


   Last Admin: 10/11/17 09:40 Dose:  1 sprays


Furosemide (Lasix)  40 mg IVP DAILY [facility]


   Last Admin: 10/11/17 09:37 Dose:  40 mg


Hydromorphone HCl (Dilaudid)  1 mg IVP Q3 PRN


   PRN Reason: Pain, severe (8-10)


   Last Admin: 10/11/17 13:24 Dose:  1 mg


Hydromorphone HCl (Dilaudid)  0.5 mg IVP Q3 PRN


   PRN Reason: Pain, moderate (4-7)


   Last Admin: 10/11/17 19:20 Dose:  0.5 mg


Lactated Ringer's (Lactated Ringer's)  1,000 mls @ 125 mls/hr IV .Q8H [facility]


   Last Admin: 10/11/17 18:00 Dose:  Not Given


Piperacillin Sod/Tazobactam Sod (Zosyn 3.375 Gm Iv Premix)  3.375 gm in 50 mls 

@ 100 mls/hr IVPB Q6H [facility]


   Last Admin: 10/11/17 21:45 Dose:  100 mls/hr


Metronidazole (Flagyl)  500 mg in 100 mls @ 100 mls/hr IVPB Q8 [facility]


   Last Admin: 10/11/17 21:45 Dose:  100 mls/hr


Insulin Aspart (Novolog)  0 unit SC ACHS [facility]


   PRN Reason: Protocol


   Last Admin: 10/11/17 21:46 Dose:  Not Given


Levothyroxine Sodium (Synthroid)  25 mcg PO 0630 [facility]


   Last Admin: 10/11/17 09:39 Dose:  Not Given


Ondansetron HCl (Zofran Inj)  4 mg IVP Q4 PRN


   PRN Reason: nausea


Potassium Chloride (K-Dur 20 Meq Er Tab)  20 meq PO BID [facility]


   Last Admin: 10/11/17 18:00 Dose:  Not Given


Rosuvastatin Calcium (Crestor)  2.5 mg PO HS [facility]


   Last Admin: 10/11/17 23:12 Dose:  Not Given


Tamsulosin HCl (Flomax)  0.4 mg PO DAILY [facility]


   Last Admin: 10/11/17 09:38 Dose:  Not Given











- Labs


Labs: 


 





 10/11/17 06:15 





 10/11/17 06:19 





 











PT  15.5 SECONDS (9.7-12.2)  H  10/09/17  08:47    


 


INR  1.4   10/09/17  08:47    


 


APTT  31 SECONDS (21-34)   10/09/17  08:47    














- Constitutional


Appears: No Acute Distress





- Head Exam


Head Exam: ATRAUMATIC, NORMAL INSPECTION, NORMOCEPHALIC





- Eye Exam


Eye Exam: EOMI, Normal appearance, PERRL


Pupil Exam: NORMAL ACCOMODATION, PERRL





- Respiratory Exam


Respiratory Exam: Clear to Ausculation Bilateral





- Cardiovascular Exam


Cardiovascular Exam: REGULAR RHYTHM, +S1, +S2.  absent: Murmur





- GI/Abdominal Exam


GI & Abdominal Exam: Soft, Normal Bowel Sounds.  absent: Tenderness





- Rectal Exam


Rectal Exam: Deferred





Assessment and Plan


(1) Intestinal obstruction


Assessment & Plan: 


on post op care


Iv fluids


Status: Acute   





(2) Thrombocytopenia


Status: Acute   





(3) Ventral hernia


Status: Acute   





(4) HTN (hypertension)


Status: Acute   





(5) Diabetes mellitus


Status: Chronic

## 2017-10-11 NOTE — PN
LOCATION:  ICU 14, bed E.



SUBJECTIVE:  This is an 66 years old male post abdominal hernia repair with

partial small-bowel resection; seen and examined in rounds; out of bed to

chair and no reported bowel movement or passing gas but with intermittent

period of abdominal pain at the sight of the surgery.  No chills or fever

and no reported actual palpitation or chest pain.



The entire chart is reviewed, including but not limited to the most recent

lab and radiology study results, current and the previous medication list,

current and the previous medical events.  Case discussed at length with the

staff in the intensive care unit.



Today's lab showed white blood cell of 16.0 with low hemoglobin at 11.5,

hematocrit 33.7 with subsequent increase of platelet count to 149, but with

low albumin of 0.7, with blood glucose level of 150 with low calcium 8.4

with mildly elevated total bilirubin to 1.5 with low albumin 3.1.



PHYSICAL EXAMINATION:

GENERAL:  A 66 years old male, awake, alert, oriented, out of bed.

VITAL SIGNS:  Afebrile with pulse of 92, respiratory rate of 16 to 18 with

blood pressure of 128/72.

HEENT:  Shows pale, dry oral mucous membrane.  Mild nonicteric sclerae.

LUNGS:  Few scattered crepitation.  Decreased air entry at bases.

HEART:  Positive S1 and S2.

ABDOMEN:  Soft with mild distension with generalized tenderness; covered

with clean dressing, bowel sounds are absent.

EXTREMITIES:  With lower extremity mild edematous changes.  No clubbing or

cyanosis.

NEUROLOGIC:  No reported new neurological deficits, sensory or motor.



IMPRESSION:

1.  Anterior abdominal wall incarcerated hernia with status post hernia

repair or partial bowel resection.

2.  Thrombocytopenia, improving gradually.

3.  Known history of hypertension.

4.  Poorly controlled diabetes mellitus.

5.  Anemia, most likely secondary to above.

6.  Re-exacerbation of peptic ulcer disease.

7.  Malnutritions, hypoalbuminemia.



SUGGESTION:

1.  Central hyperalimentation.

2.  Physical therapy as tolerated.

3.  Due to the patient's _____ level, endoscopic evaluation of the GI tract

to be kept in mind only when the patient is more stable clinically that

could be done; however, as an outpatient.

4.  Further recommendation to follow.







__________________________________________

Earle Chilel MD





DD:  10/11/2017 11:10:33

DT:  10/11/2017 11:55:18

[facility] # [id number]

## 2017-10-11 NOTE — CP.PCM.PN
Subjective





- Date & Time of Evaluation


Date of Evaluation: 10/11/17


Time of Evaluation: 07:00





- Subjective


Subjective: 


GENERAL SURGERY PROGRESS NOTE FOR DR. DAVE





Patient seen and examined at bedside in the ICU. He is OOB to chair. He denies 

nausea or vomiting. He remains NPO with NG tube. He is unsure if he has passed 

flatus. He has not had a BM yet since surgery.








Objective





- Vital Signs/Intake and Output


Vital Signs (last 24 hours): 


 











Temp Pulse Resp BP Pulse Ox


 


 98.4 F   91 H  16   170/89 H  100 


 


 10/11/17 12:00  10/11/17 14:21  10/11/17 14:21  10/11/17 14:21  10/11/17 13:21








Intake and Output: 


 











 10/11/17 10/11/17





 06:59 18:59


 


Intake Total 1925 


 


Output Total 1500 1600


 


Balance 425 -1600














- Medications


Medications: 


 Current Medications





Benzocaine/Menthol (Cepacol Sore Throat)  1 lexx MT Q2 PRN


   PRN Reason: Sore Throat


Famotidine (Pepcid)  20 mg IVP Q12 [facility]


   Last Admin: 10/11/17 09:37 Dose:  20 mg


Fluticasone Propionate (Flonase)  2 spr FILIPE DAILY [facility]


   Last Admin: 10/11/17 09:40 Dose:  1 sprays


Furosemide (Lasix)  40 mg IVP DAILY [facility]


   Last Admin: 10/11/17 09:37 Dose:  40 mg


Hydromorphone HCl (Dilaudid)  1 mg IVP Q3 PRN


   PRN Reason: Pain, severe (8-10)


   Last Admin: 10/11/17 13:24 Dose:  1 mg


Hydromorphone HCl (Dilaudid)  0.5 mg IVP Q3 PRN


   PRN Reason: Pain, moderate (4-7)


   Last Admin: 10/09/17 17:33 Dose:  0.5 mg


Lactated Ringer's (Lactated Ringer's)  1,000 mls @ 125 mls/hr IV .Q8H [facility]


   Last Admin: 10/11/17 13:26 Dose:  125 mls/hr


Piperacillin Sod/Tazobactam Sod (Zosyn 3.375 Gm Iv Premix)  3.375 gm in 50 mls 

@ 100 mls/hr IVPB Q6H [facility]


   Last Admin: 10/11/17 09:39 Dose:  100 mls/hr


Metronidazole (Flagyl)  500 mg in 100 mls @ 100 mls/hr IVPB Q8 [facility]


   Last Admin: 10/11/17 14:18 Dose:  100 mls/hr


Insulin Aspart (Novolog)  0 unit SC ACHS [facility]


   PRN Reason: Protocol


   Last Admin: 10/11/17 12:22 Dose:  Not Given


Levothyroxine Sodium (Synthroid)  25 mcg PO 0630 [facility]


   Last Admin: 10/11/17 09:39 Dose:  Not Given


Ondansetron HCl (Zofran Inj)  4 mg IVP Q4 PRN


   PRN Reason: nausea


Potassium Chloride (K-Dur 20 Meq Er Tab)  20 meq PO BID [facility]


   Last Admin: 10/11/17 09:38 Dose:  Not Given


Rosuvastatin Calcium (Crestor)  2.5 mg PO HS [facility]


   Last Admin: 10/10/17 21:04 Dose:  Not Given


Tamsulosin HCl (Flomax)  0.4 mg PO DAILY [facility]


   Last Admin: 10/11/17 09:38 Dose:  Not Given











- Labs


Labs: 


 





 10/11/17 06:15 





 10/11/17 06:19 





 











PT  15.5 SECONDS (9.7-12.2)  H  10/09/17  08:47    


 


INR  1.4   10/09/17  08:47    


 


APTT  31 SECONDS (21-34)   10/09/17  08:47    














- Constitutional


Appears: Non-toxic, No Acute Distress





- Head Exam


Head Exam: ATRAUMATIC, NORMAL INSPECTION





- Eye Exam


Eye Exam: EOMI, Normal appearance





- Respiratory Exam


Respiratory Exam: NORMAL BREATHING PATTERN.  absent: Respiratory Distress





- Cardiovascular Exam


Cardiovascular Exam: +S1, +S2





- GI/Abdominal Exam


GI & Abdominal Exam: Soft, Tenderness (mild tenderness at incision site).  

absent: Distended, Firm, Guarding, Rigid, Rebound


Additional comments: 


NG tube in place


Abdominal binder in place


Dressing clean/dry/intact





- Neurological Exam


Neurological Exam: Alert, Awake, Oriented x3





- Psychiatric Exam


Psychiatric exam: Normal Affect, Normal Mood





- Skin


Skin Exam: Dry, Normal Color, Warm





Assessment and Plan





- Assessment and Plan (Free Text)


Assessment: 


65yo M with recurrent ventral hernia and thrombocytopenia s/p exploratory 

laparotomy, extensive BREANNA, repair of recurrent ventral hernia with biologic mesh

, small bowel resection with primary anastomosis POD#2





- Afebrile, VSS


- WBC increased to 16.0


- Hemoglobin stable 


- Thrombocytopenia resolved


- Continue bowel rest, NPO


- NG tube had 600cc output overnight, will continue NG tube decompression


- Physical therapy ordered


- Encouraged OOB, ambulation, and IS use


- Will monitor for return of bowel function


- Clear for transfer out of ICU from surgical standpoint


- Discussed plan with Dr. Selma Ny PGY-3

## 2017-10-11 NOTE — CARD
--------------- APPROVED REPORT --------------





EKG Measurement

Heart [id number]

[place] 180P40

[id number]

[id number]

[id number]



<Conclusion>

Normal sinus rhythm

Low voltage QRS

Nonspecific T wave abnormality

Abnormal ECG

## 2017-10-11 NOTE — PN
DATE:



SUBJECTIVE:  The patient is still on NG tube suction.  No reported

ventricular arrhythmia.



PHYSICAL EXAMINATION

VITAL SIGNS:  Blood pressure 166/87, heart rate 115, temperature 98.9,

respirations 18.

HEENT:  Normocephalic.

CHEST:  Diminished breath sounds over the bases.

HEART:  S1 and S2 regular.

ABDOMEN:  Absent bowel sounds.

EXTREMITIES:  1+ pitting edema.



LABORATORY DATA:  Hemoglobin and hematocrit of 11.5 and 33.7; white count

16,000; platelet count 149,000.  Today's SMA-7 was within normal limits

except for glucose of 132 and creatinine of 0.7, calcium is 8.4. 

Postoperative EKG revealed normal sinus rhythm, low-voltage QRS complex

with nonspecific T wave changes.



ASSESSMENT:

1.  Uncontrolled hypertension.

2.  Status post ventral hernia repair with mesh and a small bowel

resection.



RECOMMENDATIONS:  Continue IV Zosyn 3.375 g intravenously q. 6 hours. 

Continue Lasix at 40 mg intravenously daily.  Continue IV Flagyl 500 mg

intravenously q. 8 hours, start clonidine patch at 0.1 mg weekly.  The

patient will be transferred to telemetry.





__________________________________________

Cezar Clark MD





DD:  10/11/2017 18:04:08

DT:  10/11/2017 19:16:24

Job # [id number]

## 2017-10-11 NOTE — CARD
--------------- APPROVED REPORT --------------





EKG Measurement

Heart [id number]

WA [id number]

[id number]

[id number]

[id number]



<Conclusion>

Normal sinus rhythm

Prolonged QT

Abnormal ECG

## 2017-10-12 LAB
ERYTHROCYTE [DISTWIDTH] IN BLOOD BY AUTOMATED COUNT: 19.4 % (ref 11.5–14.5)
HCT VFR BLD CALC: 31.5 % (ref 35–51)
MCH RBC QN AUTO: 35 PG (ref 27–31)
MCHC RBC AUTO-ENTMCNC: 34.2 G/DL (ref 33–37)
MCV RBC AUTO: 102.3 FL (ref 80–94)
PLATELET # BLD: 137 K/UL (ref 130–400)
PMV BLD AUTO: 7.9 FL (ref 7.2–11.7)
WBC # BLD AUTO: 13.7 K/UL (ref 4.8–10.8)

## 2017-10-12 RX ADMIN — POTASSIUM CHLORIDE SCH: 20 TABLET, EXTENDED RELEASE ORAL at 09:27

## 2017-10-12 RX ADMIN — TAZOBACTAM SODIUM AND PIPERACILLIN SODIUM SCH MLS/HR: 375; 3 INJECTION, SOLUTION INTRAVENOUS at 17:15

## 2017-10-12 RX ADMIN — INSULIN ASPART SCH: 100 INJECTION, SOLUTION INTRAVENOUS; SUBCUTANEOUS at 12:11

## 2017-10-12 RX ADMIN — TAZOBACTAM SODIUM AND PIPERACILLIN SODIUM SCH MLS/HR: 375; 3 INJECTION, SOLUTION INTRAVENOUS at 21:11

## 2017-10-12 RX ADMIN — TAZOBACTAM SODIUM AND PIPERACILLIN SODIUM SCH MLS/HR: 375; 3 INJECTION, SOLUTION INTRAVENOUS at 04:00

## 2017-10-12 RX ADMIN — WATER SCH MLS/HR: 1 INJECTION INTRAMUSCULAR; INTRAVENOUS; SUBCUTANEOUS at 14:17

## 2017-10-12 RX ADMIN — INSULIN ASPART SCH: 100 INJECTION, SOLUTION INTRAVENOUS; SUBCUTANEOUS at 07:50

## 2017-10-12 RX ADMIN — POTASSIUM CHLORIDE SCH: 20 TABLET, EXTENDED RELEASE ORAL at 19:29

## 2017-10-12 RX ADMIN — ROSUVASTATIN CALCIUM SCH: 5 TABLET, FILM COATED ORAL at 21:59

## 2017-10-12 RX ADMIN — WATER SCH MLS/HR: 1 INJECTION INTRAMUSCULAR; INTRAVENOUS; SUBCUTANEOUS at 05:46

## 2017-10-12 RX ADMIN — WATER SCH MLS/HR: 1 INJECTION INTRAMUSCULAR; INTRAVENOUS; SUBCUTANEOUS at 22:00

## 2017-10-12 RX ADMIN — HYDROMORPHONE HYDROCHLORIDE PRN MG: 1 INJECTION, SOLUTION INTRAMUSCULAR; INTRAVENOUS; SUBCUTANEOUS at 00:24

## 2017-10-12 RX ADMIN — FLUTICASONE PROPIONATE SCH SPRAYS: 50 SPRAY, METERED NASAL at 11:30

## 2017-10-12 RX ADMIN — INSULIN ASPART SCH: 100 INJECTION, SOLUTION INTRAVENOUS; SUBCUTANEOUS at 19:29

## 2017-10-12 RX ADMIN — TAZOBACTAM SODIUM AND PIPERACILLIN SODIUM SCH MLS/HR: 375; 3 INJECTION, SOLUTION INTRAVENOUS at 09:27

## 2017-10-12 RX ADMIN — INSULIN ASPART SCH: 100 INJECTION, SOLUTION INTRAVENOUS; SUBCUTANEOUS at 21:07

## 2017-10-12 NOTE — CP.PCM.PN
Subjective





- Date & Time of Evaluation


Date of Evaluation: 10/12/17


Time of Evaluation: 10:00





Objective





- Vital Signs/Intake and Output


Vital Signs (last 24 hours): 


 











Temp Pulse Resp BP Pulse Ox


 


 98.4 F   94 H  18   137/71   95 


 


 10/12/17 07:30  10/12/17 07:30  10/12/17 07:30  10/12/17 09:26  10/12/17 07:30








Intake and Output: 


 











 10/12/17 10/12/17





 06:59 18:59


 


Intake Total 1675 


 


Output Total 500 


 


Balance 1175 














- Medications


Medications: 


 Current Medications





Benzocaine/Menthol (Cepacol Sore Throat)  1 lexx MT Q2 PRN


   PRN Reason: Sore Throat


Clonidine HCl (Catapres Tts1 0.1 Mg/24 Hr)  1 patch TD Q7D@1000 [facility]


   Last Admin: 10/12/17 00:24 Dose:  1 patch


Famotidine (Pepcid)  20 mg IVP Q12 [facility]


   Last Admin: 10/12/17 09:26 Dose:  20 mg


Fluticasone Propionate (Flonase)  2 spr FILIPE DAILY [facility]


   Last Admin: 10/12/17 11:30 Dose:  2 sprays


Furosemide (Lasix)  40 mg IVP DAILY [facility]


   Last Admin: 10/12/17 09:26 Dose:  40 mg


Hydromorphone HCl (Dilaudid)  0.5 mg IVP Q3 PRN


   PRN Reason: Pain, moderate (4-7)


   Last Admin: 10/12/17 00:24 Dose:  0.5 mg


Hydromorphone HCl (Dilaudid)  1 mg IVP Q3 PRN


   PRN Reason: Pain, severe (8-10)


   Last Admin: 10/12/17 14:13 Dose:  1 mg


Piperacillin Sod/Tazobactam Sod (Zosyn 3.375 Gm Iv Premix)  3.375 gm in 50 mls 

@ 100 mls/hr IVPB Q6H [facility]


   Last Admin: 10/12/17 09:27 Dose:  100 mls/hr


Metronidazole (Flagyl)  500 mg in 100 mls @ 100 mls/hr IVPB Q8 [facility]


   Last Admin: 10/12/17 14:17 Dose:  100 mls/hr


Insulin Aspart (Novolog)  0 unit SC ACHS [facility]


   PRN Reason: Protocol


   Last Admin: 10/12/17 12:11 Dose:  Not Given


Levothyroxine Sodium (Synthroid)  25 mcg PO 0630 [facility]


   Last Admin: 10/12/17 07:00 Dose:  Not Given


Ondansetron HCl (Zofran Inj)  4 mg IVP Q4 PRN


   PRN Reason: nausea


Potassium Chloride (K-Dur 20 Meq Er Tab)  20 meq PO BID [facility]


   Last Admin: 10/12/17 09:27 Dose:  Not Given


Rosuvastatin Calcium (Crestor)  2.5 mg PO HS [facility]


   Last Admin: 10/11/17 23:12 Dose:  Not Given


Tamsulosin HCl (Flomax)  0.4 mg PO DAILY [facility]


   Last Admin: 10/12/17 09:26 Dose:  Not Given











- Labs


Labs: 


 





 10/12/17 06:21 





 10/11/17 06:19 





 











PT  15.5 SECONDS (9.7-12.2)  H  10/09/17  08:47    


 


INR  1.4   10/09/17  08:47    


 


APTT  31 SECONDS (21-34)   10/09/17  08:47    














Assessment and Plan


(1) Intestinal obstruction


Status: Acute   





(2) Thrombocytopenia


Status: Acute   





(3) Ventral hernia


Status: Acute   





(4) HTN (hypertension)


Status: Acute   





(5) Diabetes mellitus


Status: Chronic

## 2017-10-12 NOTE — CP.PCM.PN
Subjective





- Date & Time of Evaluation


Date of Evaluation: 10/12/17


Time of Evaluation: 09:50





- Subjective


Subjective: 





Pt seen and examined at bedside, has NG tube with suction of brown fluid, he 

denies any abdominal pain, afebrile





Objective





- Vital Signs/Intake and Output


Vital Signs (last 24 hours): 


 











Temp Pulse Resp BP Pulse Ox


 


 98.4 F   94 H  18   137/71   95 


 


 10/12/17 07:30  10/12/17 07:30  10/12/17 07:30  10/12/17 09:26  10/12/17 07:30








Intake and Output: 


 











 10/12/17 10/12/17





 06:59 18:59


 


Intake Total 1675 


 


Output Total 500 


 


Balance 1175 














- Medications


Medications: 


 Current Medications





Benzocaine/Menthol (Cepacol Sore Throat)  1 lexx MT Q2 PRN


   PRN Reason: Sore Throat


Clonidine HCl (Catapres Tts1 0.1 Mg/24 Hr)  1 patch TD Q7D@1000 [facility]


   Last Admin: 10/12/17 00:24 Dose:  1 patch


Famotidine (Pepcid)  20 mg IVP Q12 [facility]


   Last Admin: 10/12/17 09:26 Dose:  20 mg


Fluticasone Propionate (Flonase)  2 spr FILIPE DAILY [facility]


   Last Admin: 10/12/17 11:30 Dose:  2 sprays


Furosemide (Lasix)  40 mg IVP DAILY [facility]


   Last Admin: 10/12/17 09:26 Dose:  40 mg


Hydromorphone HCl (Dilaudid)  0.5 mg IVP Q3 PRN


   PRN Reason: Pain, moderate (4-7)


   Last Admin: 10/12/17 00:24 Dose:  0.5 mg


Hydromorphone HCl (Dilaudid)  1 mg IVP Q3 PRN


   PRN Reason: Pain, severe (8-10)


   Last Admin: 10/12/17 14:13 Dose:  1 mg


Piperacillin Sod/Tazobactam Sod (Zosyn 3.375 Gm Iv Premix)  3.375 gm in 50 mls 

@ 100 mls/hr IVPB Q6H [facility]


   Last Admin: 10/12/17 09:27 Dose:  100 mls/hr


Metronidazole (Flagyl)  500 mg in 100 mls @ 100 mls/hr IVPB Q8 [facility]


   Last Admin: 10/12/17 14:17 Dose:  100 mls/hr


Insulin Aspart (Novolog)  0 unit SC ACHS [facility]


   PRN Reason: Protocol


   Last Admin: 10/12/17 12:11 Dose:  Not Given


Levothyroxine Sodium (Synthroid)  25 mcg PO 0630 [facility]


   Last Admin: 10/12/17 07:00 Dose:  Not Given


Ondansetron HCl (Zofran Inj)  4 mg IVP Q4 PRN


   PRN Reason: nausea


Potassium Chloride (K-Dur 20 Meq Er Tab)  20 meq PO BID [facility]


   Last Admin: 10/12/17 09:27 Dose:  Not Given


Rosuvastatin Calcium (Crestor)  2.5 mg PO HS [facility]


   Last Admin: 10/11/17 23:12 Dose:  Not Given


Tamsulosin HCl (Flomax)  0.4 mg PO DAILY [facility]


   Last Admin: 10/12/17 09:26 Dose:  Not Given











- Labs


Labs: 


 





 10/12/17 06:21 





 10/11/17 06:19 





 











PT  15.5 SECONDS (9.7-12.2)  H  10/09/17  08:47    


 


INR  1.4   10/09/17  08:47    


 


APTT  31 SECONDS (21-34)   10/09/17  08:47    














- Constitutional


Appears: No Acute Distress





- Head Exam


Head Exam: ATRAUMATIC, NORMAL INSPECTION, NORMOCEPHALIC





- Eye Exam


Eye Exam: EOMI, Normal appearance, PERRL


Pupil Exam: NORMAL ACCOMODATION, PERRL





- Respiratory Exam


Respiratory Exam: Clear to Ausculation Bilateral, NORMAL BREATHING PATTERN





- Cardiovascular Exam


Cardiovascular Exam: REGULAR RHYTHM, +S1, +S2.  absent: Murmur





- GI/Abdominal Exam


GI & Abdominal Exam: Distended


Additional comments: 





post Op bowel sounds are present





Assessment and Plan


(1) Intestinal obstruction


Status: Acute   





(2) Thrombocytopenia


Status: Acute   





(3) Ventral hernia


Status: Acute   





(4) HTN (hypertension)


Status: Acute   





(5) Diabetes mellitus


Status: Chronic   





- Assessment and Plan (Free Text)


Plan: 





65 y/o M POD#3 s/p ex-lap, extensive BREANNA, small bowel resection, and repair of 

recurrent ventral hernia w/ biologic mesh





- Cont NPO, IVF


_consider TPN if pt still have NG tube tommorow


- cont pain management


- Monitor bowel fxn


- cont NGT to LCS, clamp NGT for Ambulation


- Encouraged OOB to chair/Amb/IS use

## 2017-10-12 NOTE — PN
LOCATION:  660, bed A.



SUBJECTIVE:  This is a 66 years old male with status post abdominal hernia

repair with partial small bowel resection, seen and examined in rounds,

appeared to be more stable out of the intensive care unit with intermittent

period of severe crampy, possibly on the left abdominal pain.



Complained at some time of not passing gas with abdominal distention and no

complete bowel movement yet.



The patient appeared to be awake, alert, and oriented, had NG tube, is in

place for intermittent suction before.



The entire chart is reviewed including but not limited to most recent lab

and radiological results, current and the previous medication list, current

and the previous medical events as well as all the medical and surgical

staff notes were reviewed.  Case discussed with the staff at length.



LABORATORY DATA:  Today's lab showed white blood cells of 13.7, elevated

with drop of hemoglobin to 10.8, drop of hematocrit to 31.5, but normal

platelet count with blood glucose level of 130.  The patient still has low

albumin as well as low calcium.



PHYSICAL EXAMINATION:

GENERAL:  A 66-year-old male.

VITAL SIGNS:  Afebrile with heart rate of 92, respiratory rate 20 to 22,

blood pressure of 140/82.

HEENT:  Showed pale, dry oral mucous membrane.  Nonicteric sclerae.

LUNGS:  Few scattered crepitation.  Decreased air entry at bases.

HEART:  Positive S1 and S2.

ABDOMEN:  Soft with mild distention.  Bowel sounds are negative.  No mass

or organomegaly.  Clean dressing is in place.

EXTREMITIES:  Slight lower extremity edematous changes.

NEUROLOGIC:  No reported new neurological deficits, sensory or motor.



IMPRESSION:

1.  Status post abdominal wall hernia repair, it was incarcerated with

partial resection of small bowel.

2.  Thrombocytopenia, improving gradually.

3.  Known history of peptic ulcer disease, hypertension as well as poorly

controlled diabetes mellitus.

4.  Anemia secondary to above.

5.  Malnutritions, hypoalbuminemia.

6.  Elevated carcinoembryonic antigen level by recent history.



SUGGESTION:

1.  Continue current management.

2.  Central hyperalimentation.

3.  Endoscopic evaluation of the GI tract only when the patient is more

stable clinically.  Otherwise, close observation to follow.







__________________________________________

Earle Chilel MD



cc:  Earle Chilel MD



DD:  10/12/2017 11:19:57

DT:  10/12/2017 12:36:32

Job # [id number]

## 2017-10-12 NOTE — PN
SUBJECTIVE:  The patient still on NG tube suction.  He denies any chest

pain or shortness of breath.



PHYSICAL EXAMINATION:

VITAL SIGNS:  Blood pressure 160/71, heart rate 94, temperature 98.4,

respirations 18.

HEENT:  Pale conjunctivae.

CHEST:  Diminished breath sounds over the bases.

HEART:  S1 and S2 regular.

EXTREMITIES:  1+ pitting edema.



LABORATORY DATA:  Today's sugars are 130 and 142 respectively.  Hemoglobin

and hematocrit 10.8 and 31.5, white count 13.7, platelet count 137,000.



ASSESSMENT:

1.  Hypertension, hypertensive heart disease.

2.  Status post repair of anterior wall incisional hernia with a mesh

placement as well as a small bowel resection.

3.  Ileus.



RECOMMENDATIONS:  Continue nasogastric tube suction.  Continue clonidine

patch at 0.1 mg daily.  Continue IV Flagyl and p.r.n. IV Dilaudid.  Please

continue Lasix at 40 mg intravenously daily and Zofran 4 mg intravenously

q. 4 hours p.r.n. as well as IV Zosyn at 3.375 g intravenously q. 6 hours.







__________________________________________

Cezar Clark MD





DD:  10/12/2017 13:56:14

DT:  10/12/2017 14:25:16

Job # [id number]

## 2017-10-12 NOTE — CP.PCM.PN
Subjective





- Date & Time of Evaluation


Date of Evaluation: 10/12/17


Time of Evaluation: 11:00





- Subjective


Subjective: 


General Surgery


Dr. Singleton





Pt S&E @bedside. NAEO. pt has been OOB to chair. Pt reports phlegm and throat 

irritation 2/2 NGT. Pt denies F/C, N/V, D/C. (-)BM/Flatus.





Objective





- Vital Signs/Intake and Output


Vital Signs (last 24 hours): 


 











Temp Pulse Resp BP Pulse Ox


 


 98.4 F   94 H  18   137/71   95 


 


 10/12/17 07:30  10/12/17 07:30  10/12/17 07:30  10/12/17 09:26  10/12/17 07:30








Intake and Output: 


 











 10/12/17 10/12/17





 06:59 18:59


 


Intake Total 1675 


 


Output Total 500 


 


Balance 1175 














- Medications


Medications: 


 Current Medications





Benzocaine/Menthol (Cepacol Sore Throat)  1 lexx MT Q2 PRN


   PRN Reason: Sore Throat


Clonidine HCl (Catapres Tts1 0.1 Mg/24 Hr)  1 patch TD Q7D@1000 [facility]


   Last Admin: 10/12/17 00:24 Dose:  1 patch


Famotidine (Pepcid)  20 mg IVP Q12 [facility]


   Last Admin: 10/12/17 09:26 Dose:  20 mg


Fluticasone Propionate (Flonase)  2 spr FILIPE DAILY [facility]


   Last Admin: 10/12/17 11:30 Dose:  2 sprays


Furosemide (Lasix)  40 mg IVP DAILY [facility]


   Last Admin: 10/12/17 09:26 Dose:  40 mg


Hydromorphone HCl (Dilaudid)  0.5 mg IVP Q3 PRN


   PRN Reason: Pain, moderate (4-7)


   Last Admin: 10/12/17 00:24 Dose:  0.5 mg


Hydromorphone HCl (Dilaudid)  1 mg IVP Q3 PRN


   PRN Reason: Pain, severe (8-10)


   Last Admin: 10/12/17 09:23 Dose:  1 mg


Piperacillin Sod/Tazobactam Sod (Zosyn 3.375 Gm Iv Premix)  3.375 gm in 50 mls 

@ 100 mls/hr IVPB Q6H [facility]


   Last Admin: 10/12/17 09:27 Dose:  100 mls/hr


Metronidazole (Flagyl)  500 mg in 100 mls @ 100 mls/hr IVPB Q8 [facility]


   Last Admin: 10/12/17 05:46 Dose:  100 mls/hr


Insulin Aspart (Novolog)  0 unit SC ACHS [facility]


   PRN Reason: Protocol


   Last Admin: 10/12/17 07:50 Dose:  Not Given


Levothyroxine Sodium (Synthroid)  25 mcg PO 0630 [facility]


   Last Admin: 10/12/17 07:00 Dose:  Not Given


Ondansetron HCl (Zofran Inj)  4 mg IVP Q4 [facility]


   PRN Reason: nausea


Potassium Chloride (K-Dur 20 Meq Er Tab)  20 meq PO BID [facility]


   Last Admin: 10/12/17 09:27 Dose:  Not Given


Rosuvastatin Calcium (Crestor)  2.5 mg PO HS [facility]


   Last Admin: 10/11/17 23:12 Dose:  Not Given


Tamsulosin HCl (Flomax)  0.4 mg PO DAILY [facility]


   Last Admin: 10/12/17 09:26 Dose:  Not Given











- Labs


Labs: 


 





 10/12/17 06:21 





 10/11/17 06:19 





 











PT  15.5 SECONDS (9.7-12.2)  H  10/09/17  08:47    


 


INR  1.4   10/09/17  08:47    


 


APTT  31 SECONDS (21-34)   10/09/17  08:47    














- Constitutional


Appears: Non-toxic, No Acute Distress





- Head Exam


Head Exam: NORMAL INSPECTION





- Eye Exam


Eye Exam: Normal appearance





- ENT Exam


ENT Exam: Mucous Membranes Moist


Additional comments: 





NGT in place





- Respiratory Exam


Respiratory Exam: NORMAL BREATHING PATTERN.  absent: Accessory Muscle Use, 

Respiratory Distress





- Cardiovascular Exam


Cardiovascular Exam: absent: Bradycardia, Tachycardia





- GI/Abdominal Exam


GI & Abdominal Exam: Distended (minimally), Soft, Tenderness (emerson-incisional 

TTP).  absent: Firm, Guarding, Hernia, Rebound


Additional comments: 





incision c/d/i. 


dressing stained 


abd binder in place.





- Neurological Exam


Neurological Exam: Alert, Awake, Oriented x3





- Psychiatric Exam


Psychiatric exam: Normal Affect, Normal Mood





- Skin


Skin Exam: Dry, Normal Color, Warm





Assessment and Plan





- Assessment and Plan (Free Text)


Assessment: 


65 y/o M POD#3 s/p ex-lap, extensive BREANNA, small bowel resection, and repair of 

recurrent ventral hernia w/ biologic mesh





- Cont NPO, IVF


- cont pain management


- Monitor bowel fxn


- cont NGT to LCS, clamp NGT for Ambulation


- Encouraged OOB to chair/Amb/IS use








Pt discussed w/ Dr. Selma Garcia DO PGY2

## 2017-10-13 LAB
BASOPHILS # BLD AUTO: 0.1 K/UL (ref 0–0.2)
BASOPHILS NFR BLD: 0.6 % (ref 0–2)
BUN SERPL-MCNC: 11 MG/DL (ref 9–20)
CALCIUM SERPL-MCNC: 8.3 MG/DL (ref 8.6–10.4)
CHLORIDE SERPL-SCNC: 97 MMOL/L (ref 98–107)
CO2 SERPL-SCNC: 32 MMOL/L (ref 22–30)
EOSINOPHIL # BLD AUTO: 0 K/UL (ref 0–0.7)
EOSINOPHIL NFR BLD: 0.2 % (ref 0–4)
ERYTHROCYTE [DISTWIDTH] IN BLOOD BY AUTOMATED COUNT: 19 % (ref 11.5–14.5)
GLUCOSE SERPL-MCNC: 132 MG/DL (ref 75–110)
HCT VFR BLD CALC: 30.4 % (ref 35–51)
LYMPHOCYTES # BLD AUTO: 1.4 K/UL (ref 1–4.3)
LYMPHOCYTES NFR BLD AUTO: 13.7 % (ref 20–40)
MCH RBC QN AUTO: 35 PG (ref 27–31)
MCHC RBC AUTO-ENTMCNC: 34.3 G/DL (ref 33–37)
MCV RBC AUTO: 102 FL (ref 80–94)
MONOCYTES # BLD: 1.4 K/UL (ref 0–0.8)
MONOCYTES NFR BLD: 13.1 % (ref 0–10)
NRBC BLD AUTO-RTO: 0 % (ref 0–2)
PLATELET # BLD: 142 K/UL (ref 130–400)
PMV BLD AUTO: 7.9 FL (ref 7.2–11.7)
POTASSIUM SERPL-SCNC: 2.8 MMOL/L (ref 3.6–5.2)
SODIUM SERPL-SCNC: 138 MMOL/L (ref 132–148)
WBC # BLD AUTO: 10.4 K/UL (ref 4.8–10.8)

## 2017-10-13 RX ADMIN — TAZOBACTAM SODIUM AND PIPERACILLIN SODIUM SCH MLS/HR: 375; 3 INJECTION, SOLUTION INTRAVENOUS at 16:39

## 2017-10-13 RX ADMIN — TAZOBACTAM SODIUM AND PIPERACILLIN SODIUM SCH MLS/HR: 375; 3 INJECTION, SOLUTION INTRAVENOUS at 22:02

## 2017-10-13 RX ADMIN — WATER SCH MLS/HR: 1 INJECTION INTRAMUSCULAR; INTRAVENOUS; SUBCUTANEOUS at 05:36

## 2017-10-13 RX ADMIN — TAZOBACTAM SODIUM AND PIPERACILLIN SODIUM SCH MLS/HR: 375; 3 INJECTION, SOLUTION INTRAVENOUS at 03:24

## 2017-10-13 RX ADMIN — WATER SCH MLS/HR: 1 INJECTION INTRAMUSCULAR; INTRAVENOUS; SUBCUTANEOUS at 22:03

## 2017-10-13 RX ADMIN — HYDROMORPHONE HYDROCHLORIDE PRN MG: 1 INJECTION, SOLUTION INTRAMUSCULAR; INTRAVENOUS; SUBCUTANEOUS at 11:29

## 2017-10-13 RX ADMIN — FLUTICASONE PROPIONATE SCH SPRAYS: 50 SPRAY, METERED NASAL at 09:22

## 2017-10-13 RX ADMIN — WATER SCH MLS/HR: 1 INJECTION INTRAMUSCULAR; INTRAVENOUS; SUBCUTANEOUS at 13:17

## 2017-10-13 RX ADMIN — INSULIN ASPART SCH: 100 INJECTION, SOLUTION INTRAVENOUS; SUBCUTANEOUS at 13:18

## 2017-10-13 RX ADMIN — ROSUVASTATIN CALCIUM SCH: 5 TABLET, FILM COATED ORAL at 22:15

## 2017-10-13 RX ADMIN — INSULIN ASPART SCH: 100 INJECTION, SOLUTION INTRAVENOUS; SUBCUTANEOUS at 17:53

## 2017-10-13 RX ADMIN — HYDROMORPHONE HYDROCHLORIDE PRN MG: 1 INJECTION, SOLUTION INTRAMUSCULAR; INTRAVENOUS; SUBCUTANEOUS at 15:19

## 2017-10-13 RX ADMIN — INSULIN ASPART SCH: 100 INJECTION, SOLUTION INTRAVENOUS; SUBCUTANEOUS at 07:39

## 2017-10-13 RX ADMIN — TAZOBACTAM SODIUM AND PIPERACILLIN SODIUM SCH MLS/HR: 375; 3 INJECTION, SOLUTION INTRAVENOUS at 13:15

## 2017-10-13 RX ADMIN — INSULIN ASPART SCH: 100 INJECTION, SOLUTION INTRAVENOUS; SUBCUTANEOUS at 22:16

## 2017-10-13 RX ADMIN — HYDROMORPHONE HYDROCHLORIDE PRN MG: 1 INJECTION, SOLUTION INTRAMUSCULAR; INTRAVENOUS; SUBCUTANEOUS at 22:14

## 2017-10-13 NOTE — PN
DATE:



LOCATION:  Room 660, bed A.



SUBJECTIVE:  This is a 66-year-old male post abdominal hernia repair, seen

and examined in rounds, still with NG tube is in place intermittent

suction, still n.p.o., however.



The entire chart was reviewed including, but not limited to the most recent

lab and radiology study results, current and the previous medication list,

current and the previous medical events.  Case discussed with the staff at

length and the patient still have intermittent period of abdominal pain at

the site of the surgery.  Today's chest x-ray reported and seen.



PHYSICAL EXAMINATION

GENERAL:  A 66-year-old male.

VITAL SIGNS:  Afebrile with pulse of 92, respiratory rate 20 to 22, blood

pressure of 160/78.

HEENT:  Show mildly pale dry oral mucous membranes.  Nonicteric sclerae.

LUNGS:  Few scattered crepitation.  Decreased air entry at bases.

HEART:  Positive S1 and S2 with increased rate.

ABDOMEN:  Soft with generalized tenderness.  No mass or organomegaly.  No

rebound tenderness or guarding.  Bowel sounds are absent.  NG tube is in

place.

EXTREMITIES:  With mild edematous changes in the lower extremity.  No

clubbing or cyanosis.

NEUROLOGICAL:  No reported new neurologic deficits, sensory, or motor.



IMPRESSION:

1.  Incarcerated abdominal wall ventral hernia treated surgically with

partial small bowel resection.

2.  Thrombocytopenia, improved.

3.  Re-exacerbation of peptic ulcer disease.

4.  Known history of poorly controlled diabetes mellitus, hypertension.

5.  Anemia secondary to above.

6.  Hypoalbuminemia with malnutrition.

7.  Elevated CEA level.



SUGGESTIONS:

1.  Continue current management.

2.  The patient will need central hyperalimentation at this point.

3.  Endoscopic evaluation of the GI tract when the patient is more stable

clinically.





__________________________________________

Earle Chilel MD





cc:  Earle Chilel MD



DD:  10/13/2017 12:34:42

DT:  10/13/2017 12:59:22

Job # [id number]

## 2017-10-13 NOTE — CP.PCM.PN
Subjective





- Date & Time of Evaluation


Date of Evaluation: 10/13/17


Time of Evaluation: 06:30





- Subjective


Subjective: 


GENERAL SURGERY PROGRESS NOTE FOR DR. DAVE





Patient seen and examined at bedside. He denies nausea or vomiting. He remains 

NPO with NG tube. He denies passing flatus or having a BM yet since surgery.





Objective





- Vital Signs/Intake and Output


Vital Signs (last 24 hours): 


 











Temp Pulse Resp BP Pulse Ox


 


 98.8 F   94 H  16   156/82 H  98 


 


 10/13/17 11:53  10/13/17 11:53  10/13/17 11:53  10/13/17 11:53  10/13/17 11:53








Intake and Output: 


 











 10/13/17 10/13/17





 06:59 18:59


 


Intake Total 1300 


 


Output Total 1600 600


 


Balance -300 -600














- Medications


Medications: 


 Current Medications





Benzocaine/Menthol (Cepacol Sore Throat)  1 lexx MT Q2 PRN


   PRN Reason: Sore Throat


Clonidine HCl (Catapres Tts1 0.1 Mg/24 Hr)  1 patch TD Q7D@1000 [facility]


   Last Admin: 10/12/17 00:24 Dose:  1 patch


Famotidine (Pepcid)  20 mg IVP Q12 [facility]


   Last Admin: 10/13/17 09:26 Dose:  20 mg


Fluticasone Propionate (Flonase)  2 spr FILIPE DAILY [facility]


   Last Admin: 10/13/17 09:22 Dose:  2 sprays


Furosemide (Lasix)  40 mg IVP DAILY [facility]


   Last Admin: 10/13/17 09:10 Dose:  40 mg


Hydromorphone HCl (Dilaudid)  0.5 mg IVP Q3 PRN


   PRN Reason: Pain, moderate (4-7)


   Last Admin: 10/13/17 11:29 Dose:  0.5 mg


Hydromorphone HCl (Dilaudid)  1 mg IVP Q3 PRN


   PRN Reason: Pain, severe (8-10)


   Last Admin: 10/13/17 09:14 Dose:  1 mg


Piperacillin Sod/Tazobactam Sod (Zosyn 3.375 Gm Iv Premix)  3.375 gm in 50 mls 

@ 100 mls/hr IVPB Q6H [facility]


   Last Admin: 10/13/17 13:15 Dose:  100 mls/hr


Metronidazole (Flagyl)  500 mg in 100 mls @ 100 mls/hr IVPB Q8 [facility]


   Last Admin: 10/13/17 13:17 Dose:  100 mls/hr


Chromium/Copper/Manganese/Zinc 1 ml/ Multivitamins/Vitamin C 10 ml/ Amino Acids

  1,011 mls @ 65 mls/hr IV .[id number] [facility]


   Stop: 10/14/17 09:33


Chromium/Copper/Manganese/Zinc (1 ml/ Amino Acids)  1,001 mls @ 65 mls/hr IV 

.[id number] [facility]


   Stop: 10/14/17 17:59


Potassium Chloride/Dextrose/Sod Cl (Potassium Chl 20 Meq In D5-1/2ns)  1,000 

mls @ 150 mls/hr IV .[id number] [facility]


   Last Admin: 10/13/17 11:12 Dose:  Not Given


Insulin Aspart (Novolog)  0 unit SC ACHS [facility]


   PRN Reason: Protocol


   Last Admin: 10/13/17 13:18 Dose:  Not Given


Levothyroxine Sodium (Synthroid)  25 mcg PO 0630 [facility]


   Last Admin: 10/13/17 05:36 Dose:  Not Given


Ondansetron HCl (Zofran Inj)  4 mg IVP Q4 PRN


   PRN Reason: nausea


Potassium Chloride (K-Dur 20 Meq Er Tab)  20 meq PO BID [facility]


   Last Admin: 10/12/17 19:29 Dose:  Not Given


Rosuvastatin Calcium (Crestor)  2.5 mg PO HS [facility]


   Last Admin: 10/12/17 21:59 Dose:  Not Given


Tamsulosin HCl (Flomax)  0.4 mg PO DAILY [facility]


   Last Admin: 10/13/17 09:24 Dose:  Not Given











- Labs


Labs: 


 





 10/13/17 07:07 





 10/13/17 07:07 





 











PT  15.5 SECONDS (9.7-12.2)  H  10/09/17  08:47    


 


INR  1.4   10/09/17  08:47    


 


APTT  31 SECONDS (21-34)   10/09/17  08:47    














- Constitutional


Appears: Non-toxic





- Head Exam


Head Exam: ATRAUMATIC, NORMAL INSPECTION





- Eye Exam


Eye Exam: EOMI, Normal appearance





- Respiratory Exam


Respiratory Exam: NORMAL BREATHING PATTERN.  absent: Respiratory Distress





- Cardiovascular Exam


Cardiovascular Exam: +S1, +S2





- GI/Abdominal Exam


GI & Abdominal Exam: Soft, Tenderness (mild tenderness around incision site).  

absent: Distended, Firm, Guarding, Rigid, Rebound


Additional comments: 


NG tube in place with 600cc over 11pm-7am shift





- Neurological Exam


Neurological Exam: Alert, Awake, Oriented x3





- Psychiatric Exam


Psychiatric exam: Normal Affect, Normal Mood





- Skin


Skin Exam: Dry, Normal Color, Warm





Assessment and Plan





- Assessment and Plan (Free Text)


Assessment: 


65yo M with recurrent ventral hernia and thrombocytopenia s/p exploratory 

laparotomy, extensive BREANNA, repair of recurrent ventral hernia with biologic mesh

, small bowel resection with primary anastomosis POD#4





- Afebrile, VSS


- Leukocytosis resolved


- Hemoglobin stable 


- Thrombocytopenia resolved


- Hypokalemia K 2.8 - switched fluids to D5 1/2NS + 20meq K 


- Continue bowel rest, NPO


- NG tube had 600cc output 11pm-7am shift and another 600cc the prior 3pm-11pm 

shift


- CXR was done to confirm placement due to continued high output from NG tube


- Will continue NG tube decompression


- Physical therapy ordered


- Encouraged OOB, ambulation, and IS use


- Heparin PPx


- Will monitor for return of bowel function


- Discussed plan with Dr. Selma Ny PGY-3

## 2017-10-13 NOTE — PN
SUBJECTIVE:  The patient is still having the nasogastric tube suctioned. 

She denies any chest pain or shortness of breath.



PHYSICAL EXAMINATION:

VITAL SIGNS:  Blood pressure 151/73, heart rate 82, temperature 98.9,

respirations 18.

HEENT:  Normocephalic.

CHEST:  Bibasilar rhonchi.

HEART:  S1 and S2 regular.

ABDOMEN:  Absent bowel sounds.

EXTREMITIES:  1+ pitting edema.



LABORATORY DATA:  Hemoglobin and hematocrit are 10.4 and 30.4, white count

and platelet count are within normal limit.  Today's BUN and creatinine are

11 and 0.7, glucose 132.



ASSESSMENT:

1.  Status-post repair of incarcerated anterior abdominal wall hernia with

a mesh placement and small bowel resection.

2.  Ileus.

3.  Hypokalemia.  Today's potassium level is 2.8.

4.  Hypertension.



RECOMMENDATIONS:  Continue current clonidine patch at 0.1 mg daily.  The

patient was started on PPN.  Continue IV Flagyl.  Total of 30 mEq of

intravenous potassium was replaced today.  Obtain a followup BNP in a.m.







__________________________________________

Cezar Clark MD





DD:  10/13/2017 16:32:30

DT:  10/13/2017 17:45:23

Job # [id number]

## 2017-10-13 NOTE — CP.PCM.PN
Subjective





- Date & Time of Evaluation


Date of Evaluation: 10/13/17


Time of Evaluation: 18:00





- Subjective


Subjective: 





started on TPN, afberile no shortness of breath s/p incacerated hernia repair 

surgery, no flatus since then





Objective





- Vital Signs/Intake and Output


Vital Signs (last 24 hours): 


 











Temp Pulse Resp BP Pulse Ox


 


 98.9 F   82   18   151/73 H  96 


 


 10/13/17 15:02  10/13/17 15:02  10/13/17 15:02  10/13/17 15:02  10/13/17 15:02








Intake and Output: 


 











 10/13/17 10/14/17





 18:59 06:59


 


Output Total 600 


 


Balance -600 














- Medications


Medications: 


 Current Medications





Benzocaine/Menthol (Cepacol Sore Throat)  1 lexx MT Q2 PRN


   PRN Reason: Sore Throat


Clonidine HCl (Catapres Tts1 0.1 Mg/24 Hr)  1 patch TD Q7D@1000 [facility]


   Last Admin: 10/12/17 00:24 Dose:  1 patch


Famotidine (Pepcid)  20 mg IVP Q12 [facility]


   Last Admin: 10/13/17 22:02 Dose:  20 mg


Fluticasone Propionate (Flonase)  2 spr FILIPE DAILY [facility]


   Last Admin: 10/13/17 09:22 Dose:  2 sprays


Furosemide (Lasix)  40 mg IVP DAILY [facility]


   Last Admin: 10/13/17 09:10 Dose:  40 mg


Heparin Sodium (Porcine) (Heparin)  5,000 units SC Q8 [facility]


   Last Admin: 10/13/17 22:02 Dose:  5,000 units


Hydromorphone HCl (Dilaudid)  0.5 mg IVP Q3 PRN


   PRN Reason: Pain, moderate (4-7)


   Last Admin: 10/13/17 22:14 Dose:  0.5 mg


Piperacillin Sod/Tazobactam Sod (Zosyn 3.375 Gm Iv Premix)  3.375 gm in 50 mls 

@ 100 mls/hr IVPB Q6H [facility]


   Last Admin: 10/13/17 22:02 Dose:  100 mls/hr


Metronidazole (Flagyl)  500 mg in 100 mls @ 100 mls/hr IVPB Q8 [facility]


   Last Admin: 10/13/17 22:03 Dose:  100 mls/hr


Chromium/Copper/Manganese/Zinc 1 ml/ Multivitamins/Vitamin C 10 ml/ Amino Acids

  1,011 mls @ 65 mls/hr IV .[id number] [facility]


   Stop: 10/14/17 09:33


   Last Admin: 10/13/17 18:15 Dose:  65 mls/hr


Chromium/Copper/Manganese/Zinc (1 ml/ Amino Acids)  1,001 mls @ 65 mls/hr IV 

.[id number] [facility]


   Stop: 10/14/17 17:59


Sodium Chloride (Sodium Chloride 0.9%)  1,000 mls @ 65 mls/hr IV .[id number] [facility]


   Last Admin: 10/13/17 18:16 Dose:  65 mls/hr


Insulin Aspart (Novolog)  0 unit SC ACHS [facility]


   PRN Reason: Protocol


   Last Admin: 10/13/17 22:16 Dose:  Not Given


Levothyroxine Sodium (Synthroid)  25 mcg PO 0630 [facility]


   Last Admin: 10/13/17 05:36 Dose:  Not Given


Ondansetron HCl (Zofran Inj)  4 mg IVP Q4 PRN


   PRN Reason: nausea


Potassium Chloride (K-Dur 20 Meq Er Tab)  20 meq PO BID [facility]


   Last Admin: 10/12/17 19:29 Dose:  Not Given


Rosuvastatin Calcium (Crestor)  2.5 mg PO HS [facility]


   Last Admin: 10/13/17 22:15 Dose:  Not Given


Tamsulosin HCl (Flomax)  0.4 mg PO DAILY [facility]


   Last Admin: 10/13/17 09:24 Dose:  Not Given











- Labs


Labs: 


 





 10/13/17 07:07 





 10/13/17 07:07 





 











PT  15.5 SECONDS (9.7-12.2)  H  10/09/17  08:47    


 


INR  1.4   10/09/17  08:47    


 


APTT  31 SECONDS (21-34)   10/09/17  08:47    














- Constitutional


Appears: No Acute Distress





- Head Exam


Head Exam: ATRAUMATIC, NORMAL INSPECTION, NORMOCEPHALIC





- Eye Exam


Eye Exam: EOMI, Normal appearance, PERRL


Pupil Exam: NORMAL ACCOMODATION, PERRL





- Respiratory Exam


Respiratory Exam: Clear to Ausculation Bilateral, NORMAL BREATHING PATTERN





- Cardiovascular Exam


Cardiovascular Exam: REGULAR RHYTHM, +S1, +S2.  absent: Murmur





- GI/Abdominal Exam


GI & Abdominal Exam: Soft, Diminished Bowel Sounds.  absent: Tenderness


Additional comments: 





post op abdomen





- Rectal Exam


Rectal Exam: Deferred





Assessment and Plan


(1) Intestinal obstruction


Status: Acute   





(2) Thrombocytopenia


Status: Acute   





(3) Ventral hernia


Status: Acute   





(4) HTN (hypertension)


Status: Acute   





(5) Diabetes mellitus


Status: Chronic

## 2017-10-14 LAB
BASOPHILS # BLD AUTO: 0 K/UL (ref 0–0.2)
BASOPHILS NFR BLD: 0.4 % (ref 0–2)
BUN SERPL-MCNC: 11 MG/DL (ref 9–20)
CALCIUM SERPL-MCNC: 8.2 MG/DL (ref 8.6–10.4)
CHLORIDE SERPL-SCNC: 97 MMOL/L (ref 98–107)
CO2 SERPL-SCNC: 33 MMOL/L (ref 22–30)
EOSINOPHIL # BLD AUTO: 0 K/UL (ref 0–0.7)
EOSINOPHIL NFR BLD: 0 % (ref 0–4)
ERYTHROCYTE [DISTWIDTH] IN BLOOD BY AUTOMATED COUNT: 18.9 % (ref 11.5–14.5)
GLUCOSE SERPL-MCNC: 153 MG/DL (ref 75–110)
HCT VFR BLD CALC: 30.1 % (ref 35–51)
LYMPHOCYTES # BLD AUTO: 1.5 K/UL (ref 1–4.3)
LYMPHOCYTES NFR BLD AUTO: 12.7 % (ref 20–40)
MAGNESIUM SERPL-MCNC: 1.4 MG/DL (ref 1.6–2.3)
MCH RBC QN AUTO: 35.1 PG (ref 27–31)
MCHC RBC AUTO-ENTMCNC: 34.4 G/DL (ref 33–37)
MCV RBC AUTO: 102.3 FL (ref 80–94)
MONOCYTES # BLD: 1.5 K/UL (ref 0–0.8)
MONOCYTES NFR BLD: 12.1 % (ref 0–10)
NRBC BLD AUTO-RTO: 0.1 % (ref 0–2)
PHOSPHATE SERPL-MCNC: 2.9 MG/DL (ref 2.5–4.5)
PLATELET # BLD: 113 K/UL (ref 130–400)
PMV BLD AUTO: 8.4 FL (ref 7.2–11.7)
POTASSIUM SERPL-SCNC: 2.7 MMOL/L (ref 3.6–5.2)
SODIUM SERPL-SCNC: 141 MMOL/L (ref 132–148)
WBC # BLD AUTO: 12 K/UL (ref 4.8–10.8)

## 2017-10-14 RX ADMIN — TAZOBACTAM SODIUM AND PIPERACILLIN SODIUM SCH MLS/HR: 375; 3 INJECTION, SOLUTION INTRAVENOUS at 10:00

## 2017-10-14 RX ADMIN — FLUTICASONE PROPIONATE SCH: 50 SPRAY, METERED NASAL at 11:00

## 2017-10-14 RX ADMIN — WATER SCH MLS/HR: 1 INJECTION INTRAMUSCULAR; INTRAVENOUS; SUBCUTANEOUS at 21:50

## 2017-10-14 RX ADMIN — WATER SCH MLS/HR: 1 INJECTION INTRAMUSCULAR; INTRAVENOUS; SUBCUTANEOUS at 14:57

## 2017-10-14 RX ADMIN — TAZOBACTAM SODIUM AND PIPERACILLIN SODIUM SCH MLS/HR: 375; 3 INJECTION, SOLUTION INTRAVENOUS at 04:30

## 2017-10-14 RX ADMIN — TAZOBACTAM SODIUM AND PIPERACILLIN SODIUM SCH: 375; 3 INJECTION, SOLUTION INTRAVENOUS at 16:29

## 2017-10-14 RX ADMIN — FLUTICASONE PROPIONATE SCH SPRAYS: 50 SPRAY, METERED NASAL at 11:10

## 2017-10-14 RX ADMIN — INSULIN ASPART SCH UNIT: 100 INJECTION, SOLUTION INTRAVENOUS; SUBCUTANEOUS at 12:30

## 2017-10-14 RX ADMIN — INSULIN ASPART SCH: 100 INJECTION, SOLUTION INTRAVENOUS; SUBCUTANEOUS at 08:46

## 2017-10-14 RX ADMIN — HYDROMORPHONE HYDROCHLORIDE PRN MG: 1 INJECTION, SOLUTION INTRAMUSCULAR; INTRAVENOUS; SUBCUTANEOUS at 05:53

## 2017-10-14 RX ADMIN — HYDROMORPHONE HYDROCHLORIDE PRN MG: 1 INJECTION, SOLUTION INTRAMUSCULAR; INTRAVENOUS; SUBCUTANEOUS at 14:56

## 2017-10-14 RX ADMIN — ROSUVASTATIN CALCIUM SCH: 5 TABLET, FILM COATED ORAL at 21:20

## 2017-10-14 RX ADMIN — INSULIN ASPART SCH: 100 INJECTION, SOLUTION INTRAVENOUS; SUBCUTANEOUS at 21:49

## 2017-10-14 RX ADMIN — WATER SCH MLS/HR: 1 INJECTION INTRAMUSCULAR; INTRAVENOUS; SUBCUTANEOUS at 05:58

## 2017-10-14 RX ADMIN — TAZOBACTAM SODIUM AND PIPERACILLIN SODIUM SCH MLS/HR: 375; 3 INJECTION, SOLUTION INTRAVENOUS at 22:01

## 2017-10-14 RX ADMIN — HYDROMORPHONE HYDROCHLORIDE PRN MG: 1 INJECTION, SOLUTION INTRAMUSCULAR; INTRAVENOUS; SUBCUTANEOUS at 02:39

## 2017-10-14 RX ADMIN — INSULIN ASPART SCH: 100 INJECTION, SOLUTION INTRAVENOUS; SUBCUTANEOUS at 20:26

## 2017-10-14 RX ADMIN — HYDROMORPHONE HYDROCHLORIDE PRN MG: 1 INJECTION, SOLUTION INTRAMUSCULAR; INTRAVENOUS; SUBCUTANEOUS at 20:29

## 2017-10-14 NOTE — CP.PCM.PN
Subjective





- Date & Time of Evaluation


Date of Evaluation: 10/14/17


Time of Evaluation: 06:45





- Subjective


Subjective: 


General Surgery


Dr. Hahn





Pt S&E @bedside. GERONIMO pt c/o phlegm and throat irritation from NGT. abd pain 

well controlled. deneis F/C, N/V, D/C. (+)Flatus, (-)BM.





NGT w/ 600cc out overnight. 





Objective





- Vital Signs/Intake and Output


Vital Signs (last 24 hours): 


 











Temp Pulse Resp BP Pulse Ox


 


 99.9 F H  84   20   148/82   95 


 


 10/13/17 23:40  10/13/17 23:40  10/13/17 23:40  10/13/17 23:40  10/13/17 23:40








Intake and Output: 


 











 10/14/17 10/14/17





 06:59 18:59


 


Intake Total 1520 


 


Output Total 1350 


 


Balance 170 














- Medications


Medications: 


 Current Medications





Benzocaine/Menthol (Cepacol Sore Throat)  1 lexx MT Q2 PRN


   PRN Reason: Sore Throat


Clonidine HCl (Catapres Tts1 0.1 Mg/24 Hr)  1 patch TD Q7D@1000 [facility]


   Last Admin: 10/12/17 00:24 Dose:  1 patch


Famotidine (Pepcid)  20 mg IVP Q12 [facility]


   Last Admin: 10/13/17 22:02 Dose:  20 mg


Fluticasone Propionate (Flonase)  2 spr FILIPE DAILY [facility]


   Last Admin: 10/13/17 09:22 Dose:  2 sprays


Furosemide (Lasix)  40 mg IVP DAILY [facility]


   Last Admin: 10/13/17 09:10 Dose:  40 mg


Heparin Sodium (Porcine) (Heparin)  5,000 units SC Q8 [facility]


   Last Admin: 10/14/17 06:43 Dose:  5,000 units


Hydromorphone HCl (Dilaudid)  0.5 mg IVP Q3 PRN


   PRN Reason: Pain, moderate (4-7)


   Last Admin: 10/14/17 05:53 Dose:  0.5 mg


Piperacillin Sod/Tazobactam Sod (Zosyn 3.375 Gm Iv Premix)  3.375 gm in 50 mls 

@ 100 mls/hr IVPB Q6H [facility]


   Last Admin: 10/14/17 04:30 Dose:  100 mls/hr


Metronidazole (Flagyl)  500 mg in 100 mls @ 100 mls/hr IVPB Q8 [facility]


   Last Admin: 10/14/17 05:58 Dose:  100 mls/hr


Chromium/Copper/Manganese/Zinc 1 ml/ Multivitamins/Vitamin C 10 ml/ Amino Acids

  1,011 mls @ 65 mls/hr IV .[id number] [facility]


   Stop: 10/14/17 09:33


   Last Admin: 10/13/17 18:15 Dose:  65 mls/hr


Chromium/Copper/Manganese/Zinc (1 ml/ Amino Acids)  1,001 mls @ 65 mls/hr IV 

.[id number] [facility]


   Stop: 10/14/17 17:59


Sodium Chloride (Sodium Chloride 0.9%)  1,000 mls @ 65 mls/hr IV .[id number] [facility]


   Last Admin: 10/13/17 18:16 Dose:  65 mls/hr


Chromium/Copper/Manganese/Zinc 1 ml/ Multivitamins/Vitamin C 10 ml/ Amino Acids

  1,011 mls @ 65 mls/hr IV .[id number] [facility]


   Stop: 10/15/17 09:33


Chromium/Copper/Manganese/Zinc (1 ml/ Amino Acids)  1,001 mls @ 65 mls/hr IV 

.[id number] [facility]


   Stop: 10/15/17 17:59


Insulin Aspart (Novolog)  0 unit SC ACHS [facility]


   PRN Reason: Protocol


   Last Admin: 10/14/17 08:46 Dose:  Not Given


Levothyroxine Sodium (Synthroid)  25 mcg PO 0630 [facility]


   Last Admin: 10/14/17 06:29 Dose:  Not Given


Ondansetron HCl (Zofran Inj)  4 mg IVP Q4 PRN


   PRN Reason: nausea


Potassium Chloride (K-Dur 20 Meq Er Tab)  20 meq PO BID [facility]


   Last Admin: 10/12/17 19:29 Dose:  Not Given


Rosuvastatin Calcium (Crestor)  2.5 mg PO HS [facility]


   Last Admin: 10/13/17 22:15 Dose:  Not Given


Tamsulosin HCl (Flomax)  0.4 mg PO DAILY [facility]


   Last Admin: 10/13/17 09:24 Dose:  Not Given











- Labs


Labs: 


 





 10/14/17 07:03 





 10/14/17 07:03 





 











PT  15.5 SECONDS (9.7-12.2)  H  10/09/17  08:47    


 


INR  1.4   10/09/17  08:47    


 


APTT  31 SECONDS (21-34)   10/09/17  08:47    














- Constitutional


Appears: Non-toxic, No Acute Distress





- Head Exam


Head Exam: NORMAL INSPECTION





- Eye Exam


Eye Exam: Normal appearance





- ENT Exam


ENT Exam: Mucous Membranes Moist


Additional comments: 





NGT in place





- Respiratory Exam


Respiratory Exam: NORMAL BREATHING PATTERN.  absent: Accessory Muscle Use, 

Respiratory Distress





- Cardiovascular Exam


Cardiovascular Exam: absent: Bradycardia, Tachycardia





- GI/Abdominal Exam


GI & Abdominal Exam: Distended (improved), Soft, Tenderness (minimal emerson-

incisional TTP).  absent: Firm, Guarding, Rebound


Additional comments: 


mild erythema around incision site


serosanguinous drainage from caudal aspect of incision.


skin well approximated





- Extremities Exam


Extremities Exam: Normal Inspection





- Neurological Exam


Neurological Exam: Alert, Awake, Oriented x3





- Psychiatric Exam


Psychiatric exam: Normal Affect, Normal Mood





- Skin


Skin Exam: Dry, Warm





Assessment and Plan





- Assessment and Plan (Free Text)


Assessment: 





67 y/o M w/ recurrent ventral hernia POD#5 s/p ex-lap, extensive BREANNA, repair of 

ventral hernia w/ biologic mesh, and small bowel resection





- increasing Leukocytosis


- Hypokalemia - replace electrolytes PRN


- NGT clamp trial - advance to [facility] pending results


- Encourage OOB to chair/amb/IS use


- monitor bowel function





Will discuss w/ Dr. Selma Garcia DO PGY2

## 2017-10-14 NOTE — PN
LOCATION:  Room #660, bed #A.



SUBJECTIVE:  This 66-year-old male, seen and examined in rounds, without

significant clinical changes or reported active bleeding, with drainage at

the site of recently done surgery.  The patient does still have an NG tube

in place.



No complain of intermittent period of severe abdominal pain post surgically

is reported.



The entire chart is reviewed including but not limited to most recent lab

and radiology study results, current and previous medication list, current

and previous medical events.  Case was discussed at length with the staff.



LABORATORY DATA:  Today's lab showed leukocytosis of 12.0 with low

hemoglobin 10.4, low hematocrit 30.1 with thrombocytopenia of 113, and low

potassium 2.7 with increased CO2 content to 33 indicative of respiratory

alkalosis with low calcium 8.2 and low magnesium 1.4.



The patient still has reported low albumin.



Chest x-ray done yesterday, report and films are seen.



PHYSICAL EXAMINATION:

GENERAL:  A 66-year-old male, awake, alert, oriented.

VITAL SIGNS:  Low-grade temperature with pulse of 80, respiratory rate of

20 to 22, blood pressure 140/78.

HEENT:  Showed pale, dry oral mucous membranes.  Anicteric sclerae.

LUNGS:  A few scattered crepitation.  Decreased air entry at bases.

HEART:  Positive S1 and S2.

ABDOMEN:  Soft.  Bowel sounds are absent so far.  No mass or organomegaly. 

No rebound tenderness or guarding but with generalized tenderness.  NG tube

is in place.

EXTREMITIES:  Lower extremities with mild edematous changes.  No clubbing

or cyanosis.

NEUROLOGIC:  No new neurologic deficits, sensory, or motor.



IMPRESSION:

1.  Incarcerated anterior abdominal wall hernia, treated surgically with

partial small bowel resection.

2.  Anemia, most likely secondary to above.

3.  Thrombocytopenia.

4.  Exacerbation of peptic ulcer disease.

5.  Known history of poorly-controlled hypertension and diabetes mellitus.

6.  Malnutrition with hypoalbuminemia.

7.  Elevated CEA level.



SUGGESTIONS:

1.  Agree with your plan.

2.  Increase the rate of the peripheral hyperalimentation.

3.  Flat and upright abdominal x-ray.

4.  Further recommendation to follow up with Dr. Earle Chilel.







Earle Chilel MD



DD:  10/14/2017 12:05:37

DT:  10/14/2017 14:41:47

Job # [id number]

## 2017-10-14 NOTE — CP.PCM.PN
Subjective





- Date & Time of Evaluation


Date of Evaluation: 10/14/17


Time of Evaluation: 19:35





- Subjective


Subjective: 





Pt seen and examined, on post op care, feeling better





Objective





- Vital Signs/Intake and Output


Vital Signs (last 24 hours): 


 











Temp Pulse Resp BP Pulse Ox


 


 99.5 F   96 H  20   142/81   94 L


 


 10/14/17 15:32  10/14/17 15:32  10/14/17 15:32  10/14/17 15:32  10/14/17 15:32








Intake and Output: 


 











 10/14/17 10/15/17





 18:59 06:59


 


Intake Total 1040 


 


Output Total 1250 


 


Balance -210 














- Medications


Medications: 


 Current Medications





Benzocaine/Menthol (Cepacol Sore Throat)  1 lexx MT Q2 PRN


   PRN Reason: Sore Throat


Clonidine HCl (Catapres Tts1 0.1 Mg/24 Hr)  1 patch TD Q7D@1000 [facility]


   Last Admin: 10/12/17 00:24 Dose:  1 patch


Famotidine (Pepcid)  20 mg IVP Q12 [facility]


   Last Admin: 10/14/17 11:07 Dose:  20 mg


Fluticasone Propionate (Flonase)  2 spr FILIPE DAILY [facility]


   Last Admin: 10/14/17 11:00 Dose:  Not Given


Heparin Sodium (Porcine) (Heparin)  5,000 units SC Q8 [facility]


   Last Admin: 10/14/17 13:36 Dose:  Not Given


Hydromorphone HCl (Dilaudid)  0.5 mg IVP Q3 PRN


   PRN Reason: Pain, moderate (4-7)


   Last Admin: 10/14/17 20:29 Dose:  0.5 mg


Piperacillin Sod/Tazobactam Sod (Zosyn 3.375 Gm Iv Premix)  3.375 gm in 50 mls 

@ 100 mls/hr IVPB Q6H [facility]


   Last Admin: 10/14/17 16:29 Dose:  Not Given


Metronidazole (Flagyl)  500 mg in 100 mls @ 100 mls/hr IVPB Q8 [facility]


   Last Admin: 10/14/17 14:57 Dose:  100 mls/hr


Chromium/Copper/Manganese/Zinc 1 ml/ Multivitamins/Vitamin C 10 ml/ Amino Acids

  1,011 mls @ 65 mls/hr IV .[id number] [facility]


   Stop: 10/15/17 09:33


   Last Admin: 10/14/17 20:26 Dose:  65 mls/hr


Chromium/Copper/Manganese/Zinc (1 ml/ Amino Acids)  1,001 mls @ 65 mls/hr IV 

.[id number] [facility]


   Stop: 10/15/17 17:59


Potassium Chloride (Potassium Chloride 20 Meq/100 Ml)  20 meq in 100 mls @ 50 

mls/hr IVPB Q4 [facility]


   Stop: 10/14/17 21:59


   Last Admin: 10/14/17 20:29 Dose:  50 mls/hr


Potassium Chloride 40 meq/ (Sodium Chloride)  1,020 mls @ 65 mls/hr IV .[id number] 

[facility]


Insulin Aspart (Novolog)  0 unit SC ACHS [facility]


   PRN Reason: Protocol


   Last Admin: 10/14/17 20:26 Dose:  Not Given


Levothyroxine Sodium (Synthroid)  25 mcg PO 0630 [facility]


   Last Admin: 10/14/17 06:29 Dose:  Not Given


Ondansetron HCl (Zofran Inj)  4 mg IVP Q4 PRN


   PRN Reason: nausea


Potassium Chloride (K-Dur 20 Meq Er Tab)  20 meq PO BID [facility]


   Last Admin: 10/12/17 19:29 Dose:  Not Given


Rosuvastatin Calcium (Crestor)  2.5 mg PO HS [facility]


   Last Admin: 10/14/17 21:20 Dose:  Not Given


Tamsulosin HCl (Flomax)  0.4 mg PO DAILY [facility]


   Last Admin: 10/14/17 09:26 Dose:  Not Given











- Labs


Labs: 


 





 10/14/17 07:03 





 10/14/17 17:38 





 











PT  15.5 SECONDS (9.7-12.2)  H  10/09/17  08:47    


 


INR  1.4   10/09/17  08:47    


 


APTT  31 SECONDS (21-34)   10/09/17  08:47    














- Constitutional


Appears: No Acute Distress





- Head Exam


Head Exam: ATRAUMATIC, NORMAL INSPECTION, NORMOCEPHALIC





- Eye Exam


Eye Exam: EOMI, Normal appearance, PERRL


Pupil Exam: NORMAL ACCOMODATION, PERRL





- Respiratory Exam


Respiratory Exam: Clear to Ausculation Bilateral, NORMAL BREATHING PATTERN





- Cardiovascular Exam


Cardiovascular Exam: REGULAR RHYTHM, +S1, +S2.  absent: Murmur





- GI/Abdominal Exam


GI & Abdominal Exam: Soft, Normal Bowel Sounds.  absent: Tenderness





Assessment and Plan


(1) Intestinal obstruction


Status: Acute   





(2) Thrombocytopenia


Status: Acute   





(3) Ventral hernia


Assessment & Plan: 


65 y/o M w/ recurrent ventral hernia POD#5 s/p ex-lap, extensive BREANNA, repair of 

ventral hernia w/ biologic mesh, and small bowel resection





- increasing Leukocytosis


- Hypokalemia - replace electrolytes PRN


- NGT clamp trial - advance to [facility] pending results


- Encourage OOB to chair/amb/IS use


- monitor bowel function





Status: Acute   





(4) HTN (hypertension)


Status: Acute   





(5) Diabetes mellitus


Status: Chronic

## 2017-10-14 NOTE — CP.PCM.PN
Subjective





- Date & Time of Evaluation


Date of Evaluation: 10/12/17


Time of Evaluation: 16:00





- Subjective


Subjective: 





Sore throat from NGT





Objective





- Vital Signs/Intake and Output


Vital Signs (last 24 hours): 


 











Temp Pulse Resp BP Pulse Ox


 


 99.5 F   96 H  20   142/81   94 L


 


 10/14/17 15:32  10/14/17 15:32  10/14/17 15:32  10/14/17 15:32  10/14/17 15:32








Intake and Output: 


 











 10/14/17 10/15/17





 18:59 06:59


 


Intake Total 1040 


 


Output Total 1250 


 


Balance -210 














- Medications


Medications: 


 Current Medications





Benzocaine/Menthol (Cepacol Sore Throat)  1 lexx MT Q2 PRN


   PRN Reason: Sore Throat


Clonidine HCl (Catapres Tts1 0.1 Mg/24 Hr)  1 patch TD Q7D@1000 [facility]


   Last Admin: 10/12/17 00:24 Dose:  1 patch


Famotidine (Pepcid)  20 mg IVP Q12 [facility]


   Last Admin: 10/14/17 21:50 Dose:  20 mg


Fluticasone Propionate (Flonase)  2 spr FILIPE DAILY [facility]


   Last Admin: 10/14/17 11:00 Dose:  Not Given


Heparin Sodium (Porcine) (Heparin)  5,000 units SC Q8 [facility]


   Last Admin: 10/14/17 21:50 Dose:  5,000 units


Hydromorphone HCl (Dilaudid)  0.5 mg IVP Q3 PRN


   PRN Reason: Pain, moderate (4-7)


   Last Admin: 10/14/17 20:29 Dose:  0.5 mg


Piperacillin Sod/Tazobactam Sod (Zosyn 3.375 Gm Iv Premix)  3.375 gm in 50 mls 

@ 100 mls/hr IVPB Q6H [facility]


   Last Admin: 10/14/17 22:01 Dose:  100 mls/hr


Metronidazole (Flagyl)  500 mg in 100 mls @ 100 mls/hr IVPB Q8 [facility]


   Last Admin: 10/14/17 21:50 Dose:  100 mls/hr


Chromium/Copper/Manganese/Zinc 1 ml/ Multivitamins/Vitamin C 10 ml/ Amino Acids

  1,011 mls @ 65 mls/hr IV .[id number] [facility]


   Stop: 10/15/17 09:33


   Last Admin: 10/14/17 20:26 Dose:  65 mls/hr


Chromium/Copper/Manganese/Zinc (1 ml/ Amino Acids)  1,001 mls @ 65 mls/hr IV 

.[id number] [facility]


   Stop: 10/15/17 17:59


Potassium Chloride 40 meq/ (Sodium Chloride)  1,020 mls @ 65 mls/hr IV .[id number] 

[facility]


   Last Admin: 10/14/17 21:50 Dose:  65 mls/hr


Insulin Aspart (Novolog)  0 unit SC ACHS [facility]


   PRN Reason: Protocol


   Last Admin: 10/14/17 21:49 Dose:  Not Given


Levothyroxine Sodium (Synthroid)  25 mcg PO 0630 [facility]


   Last Admin: 10/14/17 06:29 Dose:  Not Given


Ondansetron HCl (Zofran Inj)  4 mg IVP Q4 PRN


   PRN Reason: nausea


Potassium Chloride (K-Dur 20 Meq Er Tab)  20 meq PO BID [facility]


   Last Admin: 10/12/17 19:29 Dose:  Not Given


Rosuvastatin Calcium (Crestor)  2.5 mg PO HS [facility]


   Last Admin: 10/14/17 21:20 Dose:  Not Given


Tamsulosin HCl (Flomax)  0.4 mg PO DAILY [facility]


   Last Admin: 10/14/17 09:26 Dose:  Not Given











- Labs


Labs: 


 





 10/14/17 07:03 





 10/14/17 17:38 





 











PT  15.5 SECONDS (9.7-12.2)  H  10/09/17  08:47    


 


INR  1.4   10/09/17  08:47    


 


APTT  31 SECONDS (21-34)   10/09/17  08:47    














- Head Exam


Head Exam: ATRAUMATIC





- Eye Exam


Eye Exam: Normal appearance





- ENT Exam


ENT Exam: Mucous Membranes Dry





- Respiratory Exam


Respiratory Exam: NORMAL BREATHING PATTERN





- Cardiovascular Exam


Cardiovascular Exam: +S1, +S2





- GI/Abdominal Exam


GI & Abdominal Exam: Normal Bowel Sounds





Assessment and Plan


(1) Thrombocytopenia


Assessment & Plan: 


liver disease


Status: Acute   





(2) Anemia


Assessment & Plan: 


chronic disease


Status: Acute   





(3) Elevated CEA


Assessment & Plan: 


GI w/u


Status: Acute

## 2017-10-14 NOTE — CP.PCM.PN
Subjective





- Date & Time of Evaluation


Date of Evaluation: 10/11/17


Time of Evaluation: 12:00





- Subjective


Subjective: 





No complaints.





Objective





- Vital Signs/Intake and Output


Vital Signs (last 24 hours): 


 











Temp Pulse Resp BP Pulse Ox


 


 99.5 F   96 H  20   142/81   94 L


 


 10/14/17 15:32  10/14/17 15:32  10/14/17 15:32  10/14/17 15:32  10/14/17 15:32








Intake and Output: 


 











 10/14/17 10/15/17





 18:59 06:59


 


Intake Total 1040 


 


Output Total 1250 


 


Balance -210 














- Medications


Medications: 


 Current Medications





Benzocaine/Menthol (Cepacol Sore Throat)  1 lexx MT Q2 PRN


   PRN Reason: Sore Throat


Clonidine HCl (Catapres Tts1 0.1 Mg/24 Hr)  1 patch TD Q7D@1000 [facility]


   Last Admin: 10/12/17 00:24 Dose:  1 patch


Famotidine (Pepcid)  20 mg IVP Q12 [facility]


   Last Admin: 10/14/17 21:50 Dose:  20 mg


Fluticasone Propionate (Flonase)  2 spr FILIPE DAILY [facility]


   Last Admin: 10/14/17 11:00 Dose:  Not Given


Heparin Sodium (Porcine) (Heparin)  5,000 units SC Q8 [facility]


   Last Admin: 10/14/17 21:50 Dose:  5,000 units


Hydromorphone HCl (Dilaudid)  0.5 mg IVP Q3 PRN


   PRN Reason: Pain, moderate (4-7)


   Last Admin: 10/14/17 20:29 Dose:  0.5 mg


Piperacillin Sod/Tazobactam Sod (Zosyn 3.375 Gm Iv Premix)  3.375 gm in 50 mls 

@ 100 mls/hr IVPB Q6H [facility]


   Last Admin: 10/14/17 22:01 Dose:  100 mls/hr


Metronidazole (Flagyl)  500 mg in 100 mls @ 100 mls/hr IVPB Q8 [facility]


   Last Admin: 10/14/17 21:50 Dose:  100 mls/hr


Chromium/Copper/Manganese/Zinc 1 ml/ Multivitamins/Vitamin C 10 ml/ Amino Acids

  1,011 mls @ 65 mls/hr IV .[id number] [facility]


   Stop: 10/15/17 09:33


   Last Admin: 10/14/17 20:26 Dose:  65 mls/hr


Chromium/Copper/Manganese/Zinc (1 ml/ Amino Acids)  1,001 mls @ 65 mls/hr IV 

.[id number] [facility]


   Stop: 10/15/17 17:59


Potassium Chloride 40 meq/ (Sodium Chloride)  1,020 mls @ 65 mls/hr IV .[id number] 

[facility]


   Last Admin: 10/14/17 21:50 Dose:  65 mls/hr


Insulin Aspart (Novolog)  0 unit SC ACHS [facility]


   PRN Reason: Protocol


   Last Admin: 10/14/17 21:49 Dose:  Not Given


Levothyroxine Sodium (Synthroid)  25 mcg PO 0630 [facility]


   Last Admin: 10/14/17 06:29 Dose:  Not Given


Ondansetron HCl (Zofran Inj)  4 mg IVP Q4 PRN


   PRN Reason: nausea


Potassium Chloride (K-Dur 20 Meq Er Tab)  20 meq PO BID [facility]


   Last Admin: 10/12/17 19:29 Dose:  Not Given


Rosuvastatin Calcium (Crestor)  2.5 mg PO HS [facility]


   Last Admin: 10/14/17 21:20 Dose:  Not Given


Tamsulosin HCl (Flomax)  0.4 mg PO DAILY [facility]


   Last Admin: 10/14/17 09:26 Dose:  Not Given











- Labs


Labs: 


 





 10/14/17 07:03 





 10/14/17 17:38 





 











PT  15.5 SECONDS (9.7-12.2)  H  10/09/17  08:47    


 


INR  1.4   10/09/17  08:47    


 


APTT  31 SECONDS (21-34)   10/09/17  08:47    














- Head Exam


Head Exam: ATRAUMATIC





- Eye Exam


Eye Exam: Normal appearance





- ENT Exam


ENT Exam: Mucous Membranes Dry





- Respiratory Exam


Respiratory Exam: NORMAL BREATHING PATTERN





- Cardiovascular Exam


Cardiovascular Exam: +S1, +S2





- GI/Abdominal Exam


GI & Abdominal Exam: Normal Bowel Sounds





- Extremities Exam


Extremities Exam: Normal Inspection





Assessment and Plan


(1) Thrombocytopenia


Assessment & Plan: 


liver disease


s/p plt transfusion


Status: Acute   





(2) Anemia


Assessment & Plan: 


chronic disease


Status: Acute   





(3) Elevated CEA


Assessment & Plan: 


GI w/u


Status: Acute

## 2017-10-14 NOTE — CP.PCM.PN
Subjective





- Date & Time of Evaluation


Date of Evaluation: 10/14/17


Time of Evaluation: 20:00





- Subjective


Subjective: 





Has throat discomfort from NGT





Objective





- Vital Signs/Intake and Output


Vital Signs (last 24 hours): 


 











Temp Pulse Resp BP Pulse Ox


 


 99.5 F   96 H  20   142/81   94 L


 


 10/14/17 15:32  10/14/17 15:32  10/14/17 15:32  10/14/17 15:32  10/14/17 15:32








Intake and Output: 


 











 10/14/17 10/15/17





 18:59 06:59


 


Intake Total 1040 


 


Output Total 1250 


 


Balance -210 














- Medications


Medications: 


 Current Medications





Benzocaine/Menthol (Cepacol Sore Throat)  1 lexx MT Q2 PRN


   PRN Reason: Sore Throat


Clonidine HCl (Catapres Tts1 0.1 Mg/24 Hr)  1 patch TD Q7D@1000 [facility]


   Last Admin: 10/12/17 00:24 Dose:  1 patch


Famotidine (Pepcid)  20 mg IVP Q12 [facility]


   Last Admin: 10/14/17 21:50 Dose:  20 mg


Fluticasone Propionate (Flonase)  2 spr FILIPE DAILY [facility]


   Last Admin: 10/14/17 11:00 Dose:  Not Given


Heparin Sodium (Porcine) (Heparin)  5,000 units SC Q8 [facility]


   Last Admin: 10/14/17 21:50 Dose:  5,000 units


Hydromorphone HCl (Dilaudid)  0.5 mg IVP Q3 PRN


   PRN Reason: Pain, moderate (4-7)


   Last Admin: 10/14/17 20:29 Dose:  0.5 mg


Piperacillin Sod/Tazobactam Sod (Zosyn 3.375 Gm Iv Premix)  3.375 gm in 50 mls 

@ 100 mls/hr IVPB Q6H [facility]


   Last Admin: 10/14/17 22:01 Dose:  100 mls/hr


Metronidazole (Flagyl)  500 mg in 100 mls @ 100 mls/hr IVPB Q8 [facility]


   Last Admin: 10/14/17 21:50 Dose:  100 mls/hr


Chromium/Copper/Manganese/Zinc 1 ml/ Multivitamins/Vitamin C 10 ml/ Amino Acids

  1,011 mls @ 65 mls/hr IV .[id number] [facility]


   Stop: 10/15/17 09:33


   Last Admin: 10/14/17 20:26 Dose:  65 mls/hr


Chromium/Copper/Manganese/Zinc (1 ml/ Amino Acids)  1,001 mls @ 65 mls/hr IV 

.[id number] [facility]


   Stop: 10/15/17 17:59


Potassium Chloride 40 meq/ (Sodium Chloride)  1,020 mls @ 65 mls/hr IV .[id number] 

[facility]


   Last Admin: 10/14/17 21:50 Dose:  65 mls/hr


Insulin Aspart (Novolog)  0 unit SC ACHS [facility]


   PRN Reason: Protocol


   Last Admin: 10/14/17 21:49 Dose:  Not Given


Levothyroxine Sodium (Synthroid)  25 mcg PO 0630 [facility]


   Last Admin: 10/14/17 06:29 Dose:  Not Given


Ondansetron HCl (Zofran Inj)  4 mg IVP Q4 PRN


   PRN Reason: nausea


Potassium Chloride (K-Dur 20 Meq Er Tab)  20 meq PO BID [facility]


   Last Admin: 10/12/17 19:29 Dose:  Not Given


Rosuvastatin Calcium (Crestor)  2.5 mg PO HS [facility]


   Last Admin: 10/14/17 21:20 Dose:  Not Given


Tamsulosin HCl (Flomax)  0.4 mg PO DAILY [facility]


   Last Admin: 10/14/17 09:26 Dose:  Not Given











- Labs


Labs: 


 





 10/14/17 07:03 





 10/14/17 17:38 





 











PT  15.5 SECONDS (9.7-12.2)  H  10/09/17  08:47    


 


INR  1.4   10/09/17  08:47    


 


APTT  31 SECONDS (21-34)   10/09/17  08:47    














- Head Exam


Head Exam: ATRAUMATIC





- Eye Exam


Eye Exam: Normal appearance





- ENT Exam


ENT Exam: Mucous Membranes Dry





- Respiratory Exam


Respiratory Exam: NORMAL BREATHING PATTERN





- Cardiovascular Exam


Cardiovascular Exam: +S1, +S2





- GI/Abdominal Exam


GI & Abdominal Exam: Normal Bowel Sounds





Assessment and Plan


(1) Thrombocytopenia


Assessment & Plan: 


down trending


suspect liver disease from chronic alcoholism


Status: Acute   





(2) Anemia


Assessment & Plan: 


chronic disease


Status: Acute   





(3) Elevated CEA


Assessment & Plan: 


GI w/u


Status: Acute

## 2017-10-14 NOTE — CP.PCM.PN
Subjective





- Date & Time of Evaluation


Date of Evaluation: 10/13/17


Time of Evaluation: 19:00





- Subjective


Subjective: 





No complaints.





Objective





- Vital Signs/Intake and Output


Vital Signs (last 24 hours): 


 











Temp Pulse Resp BP Pulse Ox


 


 99.5 F   96 H  20   142/81   94 L


 


 10/14/17 15:32  10/14/17 15:32  10/14/17 15:32  10/14/17 15:32  10/14/17 15:32








Intake and Output: 


 











 10/14/17 10/15/17





 18:59 06:59


 


Intake Total 1040 


 


Output Total 1250 


 


Balance -210 














- Medications


Medications: 


 Current Medications





Benzocaine/Menthol (Cepacol Sore Throat)  1 lexx MT Q2 PRN


   PRN Reason: Sore Throat


Clonidine HCl (Catapres Tts1 0.1 Mg/24 Hr)  1 patch TD Q7D@1000 [facility]


   Last Admin: 10/12/17 00:24 Dose:  1 patch


Famotidine (Pepcid)  20 mg IVP Q12 [facility]


   Last Admin: 10/14/17 21:50 Dose:  20 mg


Fluticasone Propionate (Flonase)  2 spr FILIPE DAILY [facility]


   Last Admin: 10/14/17 11:00 Dose:  Not Given


Heparin Sodium (Porcine) (Heparin)  5,000 units SC Q8 [facility]


   Last Admin: 10/14/17 21:50 Dose:  5,000 units


Hydromorphone HCl (Dilaudid)  0.5 mg IVP Q3 [facility]N


   PRN Reason: Pain, moderate (4-7)


   Last Admin: 10/14/17 20:29 Dose:  0.5 mg


Piperacillin Sod/Tazobactam Sod (Zosyn 3.375 Gm Iv Premix)  3.375 gm in 50 mls 

@ 100 mls/hr IVPB Q6H [facility]


   Last Admin: 10/14/17 22:01 Dose:  100 mls/hr


Metronidazole (Flagyl)  500 mg in 100 mls @ 100 mls/hr IVPB Q8 [facility]


   Last Admin: 10/14/17 21:50 Dose:  100 mls/hr


Chromium/Copper/Manganese/Zinc 1 ml/ Multivitamins/Vitamin C 10 ml/ Amino Acids

  1,011 mls @ 65 mls/hr IV .[id number] [facility]


   Stop: 10/15/17 09:33


   Last Admin: 10/14/17 20:26 Dose:  65 mls/hr


Chromium/Copper/Manganese/Zinc (1 ml/ Amino Acids)  1,001 mls @ 65 mls/hr IV 

.[id number] [facility]


   Stop: 10/15/17 17:59


Potassium Chloride 40 meq/ (Sodium Chloride)  1,020 mls @ 65 mls/hr IV .[id number] 

[facility]


   Last Admin: 10/14/17 21:50 Dose:  65 mls/hr


Insulin Aspart (Novolog)  0 unit SC ACHS [facility]


   PRN Reason: Protocol


   Last Admin: 10/14/17 21:49 Dose:  Not Given


Levothyroxine Sodium (Synthroid)  25 mcg PO 0630 [facility]


   Last Admin: 10/14/17 06:29 Dose:  Not Given


Ondansetron HCl (Zofran Inj)  4 mg IVP Q4 PRN


   PRN Reason: nausea


Potassium Chloride (K-Dur 20 Meq Er Tab)  20 meq PO BID [facility]


   Last Admin: 10/12/17 19:29 Dose:  Not Given


Rosuvastatin Calcium (Crestor)  2.5 mg PO HS [facility]


   Last Admin: 10/14/17 21:20 Dose:  Not Given


Tamsulosin HCl (Flomax)  0.4 mg PO DAILY [facility]


   Last Admin: 10/14/17 09:26 Dose:  Not Given











- Labs


Labs: 


 





 10/14/17 07:03 





 10/14/17 17:38 





 











PT  15.5 SECONDS (9.7-12.2)  H  10/09/17  08:47    


 


INR  1.4   10/09/17  08:47    


 


APTT  31 SECONDS (21-34)   10/09/17  08:47    














- Head Exam


Head Exam: ATRAUMATIC





- Eye Exam


Eye Exam: Normal appearance





- ENT Exam


ENT Exam: Mucous Membranes Dry





- Respiratory Exam


Respiratory Exam: NORMAL BREATHING PATTERN





- Cardiovascular Exam


Cardiovascular Exam: +S1, +S2





- GI/Abdominal Exam


GI & Abdominal Exam: Normal Bowel Sounds





Assessment and Plan


(1) Thrombocytopenia


Assessment & Plan: 





suspect liver disease from chronic ETOH


Status: Acute   





(2) Anemia


Assessment & Plan: 


anemia of chronic disease


Status: Acute   





(3) Elevated CEA


Assessment & Plan: 


GI w/u


Status: Acute

## 2017-10-15 LAB
ALBUMIN/GLOB SERPL: 0.7 {RATIO} (ref 1–2.1)
ALP SERPL-CCNC: 64 U/L (ref 38–126)
ALT SERPL-CCNC: 35 U/L (ref 21–72)
AST SERPL-CCNC: 27 U/L (ref 17–59)
BASOPHILS # BLD AUTO: 0 K/UL (ref 0–0.2)
BASOPHILS NFR BLD: 0.3 % (ref 0–2)
BILIRUB SERPL-MCNC: 1.2 MG/DL (ref 0.2–1.3)
BUN SERPL-MCNC: 12 MG/DL (ref 9–20)
CALCIUM SERPL-MCNC: 8.3 MG/DL (ref 8.6–10.4)
CHLORIDE SERPL-SCNC: 96 MMOL/L (ref 98–107)
CHOLEST SERPL-MCNC: 75 MG/DL (ref 0–199)
CO2 SERPL-SCNC: 39 MMOL/L (ref 22–30)
EOSINOPHIL # BLD AUTO: 0 K/UL (ref 0–0.7)
EOSINOPHIL NFR BLD: 0.1 % (ref 0–4)
ERYTHROCYTE [DISTWIDTH] IN BLOOD BY AUTOMATED COUNT: 19.2 % (ref 11.5–14.5)
GLOBULIN SER-MCNC: 4 GM/DL (ref 2.2–3.9)
GLUCOSE SERPL-MCNC: 164 MG/DL (ref 75–110)
HCT VFR BLD CALC: 31.1 % (ref 35–51)
LYMPHOCYTES # BLD AUTO: 1.5 K/UL (ref 1–4.3)
LYMPHOCYTES NFR BLD AUTO: 10.9 % (ref 20–40)
MAGNESIUM SERPL-MCNC: 1.7 MG/DL (ref 1.6–2.3)
MCH RBC QN AUTO: 34.5 PG (ref 27–31)
MCHC RBC AUTO-ENTMCNC: 33.5 G/DL (ref 33–37)
MCV RBC AUTO: 102.9 FL (ref 80–94)
MONOCYTES # BLD: 1.3 K/UL (ref 0–0.8)
MONOCYTES NFR BLD: 9.2 % (ref 0–10)
NRBC BLD AUTO-RTO: 0.1 % (ref 0–2)
PHOSPHATE SERPL-MCNC: 3.2 MG/DL (ref 2.5–4.5)
PLATELET # BLD: 105 K/UL (ref 130–400)
PMV BLD AUTO: 9 FL (ref 7.2–11.7)
POTASSIUM SERPL-SCNC: 3.2 MMOL/L (ref 3.6–5.2)
PROT SERPL-MCNC: 6.9 G/DL (ref 6.3–8.3)
SODIUM SERPL-SCNC: 141 MMOL/L (ref 132–148)
WBC # BLD AUTO: 13.7 K/UL (ref 4.8–10.8)

## 2017-10-15 RX ADMIN — HYDROMORPHONE HYDROCHLORIDE PRN MG: 1 INJECTION, SOLUTION INTRAMUSCULAR; INTRAVENOUS; SUBCUTANEOUS at 17:01

## 2017-10-15 RX ADMIN — TAZOBACTAM SODIUM AND PIPERACILLIN SODIUM SCH MLS/HR: 375; 3 INJECTION, SOLUTION INTRAVENOUS at 21:29

## 2017-10-15 RX ADMIN — HYDROMORPHONE HYDROCHLORIDE PRN MG: 1 INJECTION, SOLUTION INTRAMUSCULAR; INTRAVENOUS; SUBCUTANEOUS at 08:42

## 2017-10-15 RX ADMIN — TAZOBACTAM SODIUM AND PIPERACILLIN SODIUM SCH MLS/HR: 375; 3 INJECTION, SOLUTION INTRAVENOUS at 05:25

## 2017-10-15 RX ADMIN — ROSUVASTATIN CALCIUM SCH: 5 TABLET, FILM COATED ORAL at 21:29

## 2017-10-15 RX ADMIN — TAZOBACTAM SODIUM AND PIPERACILLIN SODIUM SCH MLS/HR: 375; 3 INJECTION, SOLUTION INTRAVENOUS at 17:01

## 2017-10-15 RX ADMIN — INSULIN ASPART SCH: 100 INJECTION, SOLUTION INTRAVENOUS; SUBCUTANEOUS at 21:30

## 2017-10-15 RX ADMIN — FLUTICASONE PROPIONATE SCH SPRAYS: 50 SPRAY, METERED NASAL at 09:13

## 2017-10-15 RX ADMIN — INSULIN ASPART SCH UNIT: 100 INJECTION, SOLUTION INTRAVENOUS; SUBCUTANEOUS at 07:30

## 2017-10-15 RX ADMIN — HYDROMORPHONE HYDROCHLORIDE PRN MG: 1 INJECTION, SOLUTION INTRAMUSCULAR; INTRAVENOUS; SUBCUTANEOUS at 01:39

## 2017-10-15 RX ADMIN — HYDROMORPHONE HYDROCHLORIDE PRN MG: 1 INJECTION, SOLUTION INTRAMUSCULAR; INTRAVENOUS; SUBCUTANEOUS at 21:40

## 2017-10-15 RX ADMIN — TAZOBACTAM SODIUM AND PIPERACILLIN SODIUM SCH MLS/HR: 375; 3 INJECTION, SOLUTION INTRAVENOUS at 09:15

## 2017-10-15 RX ADMIN — INSULIN ASPART SCH UNIT: 100 INJECTION, SOLUTION INTRAVENOUS; SUBCUTANEOUS at 11:30

## 2017-10-15 RX ADMIN — INSULIN ASPART SCH UNIT: 100 INJECTION, SOLUTION INTRAVENOUS; SUBCUTANEOUS at 18:02

## 2017-10-15 NOTE — CP.PCM.PN
Subjective





- Date & Time of Evaluation


Date of Evaluation: 10/15/17


Time of Evaluation: 21:00





- Subjective


Subjective: 





PT HAS LARGE VOLUME NG TUBE DRAINING, H AND H IS STABLE, S/P OR, DRAINAGE FROM 

WOUND SITE TOO





Objective





- Vital Signs/Intake and Output


Vital Signs (last 24 hours): 


 











Temp Pulse Resp BP Pulse Ox


 


 98.9 F   77   20   146/79   94 L


 


 10/15/17 15:25  10/15/17 15:25  10/15/17 15:25  10/15/17 15:25  10/15/17 15:25








Intake and Output: 


 











 10/15/17 10/16/17





 18:59 06:59


 


Intake Total 910 


 


Output Total 1525 


 


Balance -615 














- Medications


Medications: 


 Current Medications





Benzocaine/Menthol (Cepacol Sore Throat)  1 lexx MT Q2 PRN


   PRN Reason: Sore Throat


   Last Admin: 10/15/17 09:19 Dose:  1 lexx


Clonidine HCl (Catapres Tts1 0.1 Mg/24 Hr)  1 patch TD Q7D@1000 [facility]


   Last Admin: 10/12/17 00:24 Dose:  1 patch


Famotidine (Pepcid)  20 mg IVP Q12 [facility]


   Last Admin: 10/15/17 21:29 Dose:  20 mg


Heparin Sodium (Porcine) (Heparin)  5,000 units SC Q8 [facility]


   Last Admin: 10/15/17 21:29 Dose:  5,000 units


Hydromorphone HCl (Dilaudid)  0.5 mg IVP Q3 PRN


   PRN Reason: Pain, moderate (4-7)


   Last Admin: 10/15/17 21:40 Dose:  0.5 mg


Piperacillin Sod/Tazobactam Sod (Zosyn 3.375 Gm Iv Premix)  3.375 gm in 50 mls 

@ 100 mls/hr IVPB Q6H [facility]


   Last Admin: 10/15/17 21:29 Dose:  100 mls/hr


Potassium Chloride 40 meq/ (Sodium Chloride)  1,020 mls @ 65 mls/hr IV .[id number] 

[facility]


   Last Admin: 10/15/17 17:05 Dose:  65 mls/hr


Chromium/Copper/Manganese/Zinc (1 ml/ Amino Acids)  1,001 mls @ 65 mls/hr IV 

.[id number] [facility]


   Stop: 10/16/17 09:23


   Last Admin: 10/15/17 18:01 Dose:  65 mls/hr


Chromium/Copper/Manganese/Zinc (1 ml/ Amino Acids)  1,001 mls @ 65 mls/hr IV 

.[id number] [facility]


   Stop: 10/16/17 17:59


Insulin Aspart (Novolog)  0 unit SC ACHS [facility]


   PRN Reason: Protocol


   Last Admin: 10/15/17 21:30 Dose:  Not Given


Levothyroxine Sodium (Synthroid)  25 mcg PO 0630 [facility]


   Last Admin: 10/15/17 05:30 Dose:  25 mcg


Ondansetron HCl (Zofran Inj)  4 mg IVP Q4 PRN


   PRN Reason: nausea


Potassium Chloride (K-Dur 20 Meq Er Tab)  20 meq PO BID [facility]


   Last Admin: 10/12/17 19:29 Dose:  Not Given


Rosuvastatin Calcium (Crestor)  2.5 mg PO HS [facility]


   Last Admin: 10/15/17 21:29 Dose:  Not Given











- Labs


Labs: 


 





 10/15/17 08:22 





 10/15/17 08:22 





 











PT  15.5 SECONDS (9.7-12.2)  H  10/09/17  08:47    


 


INR  1.4   10/09/17  08:47    


 


APTT  31 SECONDS (21-34)   10/09/17  08:47    














- Constitutional


Appears: No Acute Distress





- Head Exam


Head Exam: ATRAUMATIC, NORMAL INSPECTION, NORMOCEPHALIC





- Eye Exam


Eye Exam: EOMI, Normal appearance, PERRL


Pupil Exam: NORMAL ACCOMODATION, PERRL





- Respiratory Exam


Respiratory Exam: Decreased Breath Sounds, Rales, Rhonchi





- Cardiovascular Exam


Cardiovascular Exam: REGULAR RHYTHM, +S1, +S2.  absent: Murmur





- GI/Abdominal Exam


GI & Abdominal Exam: Diminished Bowel Sounds


Additional comments: 





S/P OR





Assessment and Plan


(1) Intestinal obstruction


Status: Acute   





(2) Thrombocytopenia


Status: Acute   





(3) Ventral hernia


Status: Acute   





(4) HTN (hypertension)


Status: Acute   





(5) Diabetes mellitus


Assessment & Plan: 


MONITOR BS


ACCUCHECK


Status: Chronic

## 2017-10-15 NOTE — CP.PCM.PN
Subjective





- Date & Time of Evaluation


Date of Evaluation: 10/15/17


Time of Evaluation: 05:00





- Subjective


Subjective: 





Patient seen and examined at bedside. NAEO. Patient denies pain, nausea, 

vomiting, fevers, chills, or drainage from the incision.





Objective





- Vital Signs/Intake and Output


Vital Signs (last 24 hours): 


 











Temp Pulse Resp BP Pulse Ox


 


 98.7 F   82   20   141/77   96 


 


 10/14/17 23:27  10/14/17 23:27  10/14/17 23:27  10/14/17 23:27  10/14/17 23:27








Intake and Output: 


 











 10/14/17 10/15/17





 18:59 06:59


 


Intake Total 1040 1000


 


Output Total 1250 1650


 


Balance -210 -650














- Medications


Medications: 


 Current Medications





Benzocaine/Menthol (Cepacol Sore Throat)  1 lexx MT Q2 PRN


   PRN Reason: Sore Throat


Clonidine HCl (Catapres Tts1 0.1 Mg/24 Hr)  1 patch TD Q7D@1000 [facility]


   Last Admin: 10/12/17 00:24 Dose:  1 patch


Famotidine (Pepcid)  20 mg IVP Q12 [facility]


   Last Admin: 10/14/17 21:50 Dose:  20 mg


Fluticasone Propionate (Flonase)  2 spr FILIPE DAILY [facility]


   Last Admin: 10/14/17 11:00 Dose:  Not Given


Heparin Sodium (Porcine) (Heparin)  5,000 units SC Q8 [facility]


   Last Admin: 10/15/17 05:26 Dose:  5,000 units


Hydromorphone HCl (Dilaudid)  0.5 mg IVP Q3 PRN


   PRN Reason: Pain, moderate (4-7)


   Last Admin: 10/15/17 01:39 Dose:  0.5 mg


Piperacillin Sod/Tazobactam Sod (Zosyn 3.375 Gm Iv Premix)  3.375 gm in 50 mls 

@ 100 mls/hr IVPB Q6H [facility]


   Last Admin: 10/15/17 05:25 Dose:  100 mls/hr


Chromium/Copper/Manganese/Zinc 1 ml/ Multivitamins/Vitamin C 10 ml/ Amino Acids

  1,011 mls @ 65 mls/hr IV .[id number] [facility]


   Stop: 10/15/17 09:33


   Last Admin: 10/14/17 20:26 Dose:  65 mls/hr


Chromium/Copper/Manganese/Zinc (1 ml/ Amino Acids)  1,001 mls @ 65 mls/hr IV 

.[id number] [facility]


   Stop: 10/15/17 17:59


Potassium Chloride 40 meq/ (Sodium Chloride)  1,020 mls @ 65 mls/hr IV .[id number] 

[facility]


   Last Admin: 10/14/17 21:50 Dose:  65 mls/hr


Insulin Aspart (Novolog)  0 unit SC ACHS [facility]


   PRN Reason: Protocol


   Last Admin: 10/14/17 21:49 Dose:  Not Given


Levothyroxine Sodium (Synthroid)  25 mcg PO 0630 [facility]


   Last Admin: 10/14/17 06:29 Dose:  Not Given


Ondansetron HCl (Zofran Inj)  4 mg IVP Q4 PRN


   PRN Reason: nausea


Potassium Chloride (K-Dur 20 Meq Er Tab)  20 meq PO BID [facility]


   Last Admin: 10/12/17 19:29 Dose:  Not Given


Rosuvastatin Calcium (Crestor)  2.5 mg PO HS [facility]


   Last Admin: 10/14/17 21:20 Dose:  Not Given


Tamsulosin HCl (Flomax)  0.4 mg PO DAILY [facility]


   Last Admin: 10/14/17 09:26 Dose:  Not Given











- Labs


Labs: 


 





 10/14/17 07:03 





 10/14/17 17:38 





 











PT  15.5 SECONDS (9.7-12.2)  H  10/09/17  08:47    


 


INR  1.4   10/09/17  08:47    


 


APTT  31 SECONDS (21-34)   10/09/17  08:47    














- Constitutional


Appears: Non-toxic, No Acute Distress





- Head Exam


Head Exam: ATRAUMATIC, NORMOCEPHALIC





- Eye Exam


Eye Exam: Normal appearance.  absent: Conjunctival injection, Scleral icterus





- ENT Exam


ENT Exam: Mucous Membranes Moist, Normal Oropharynx


Additional comments: 





NGT in place





- Respiratory Exam


Respiratory Exam: NORMAL BREATHING PATTERN.  absent: Accessory Muscle Use, 

Respiratory Distress





- Cardiovascular Exam


Cardiovascular Exam: RRR





- GI/Abdominal Exam


GI & Abdominal Exam: Soft.  absent: Distended, Tenderness


Additional comments: 





Midline incision well approximated with staples, inferior pole covered in 

dressing c/d/i





- Extremities Exam


Extremities Exam: absent: Calf Tenderness, Pedal Edema, Tenderness





- Neurological Exam


Neurological Exam: Alert, Awake, Oriented x3





- Psychiatric Exam


Psychiatric exam: Normal Affect, Normal Mood





- Skin


Skin Exam: Dry, Normal Color, Warm





Assessment and Plan





- Assessment and Plan (Free Text)


Assessment: 





65 y/o M w/ recurrent ventral hernia POD#6 s/p ex-lap, extensive BREANNA, repair of 

ventral hernia w/ biologic mesh, and small bowel resection





Plan:


-Follow up AM CBC/BMP. Monitor for leukocytosis


-Monitor and supplement potassium closely--D/C lasix per primary


-700cc's output from NGT overnight. >1300/24. Continue to low continuous suction


-Monitor for bowel function


-OOB to chair/amb/IS use


-Serial abdominal exams


-Pressure dressing in place over inferior incision. Only surgical team to 

manage. 





Will discuss w/ Dr. Selma Hayes, PGY2

## 2017-10-15 NOTE — PN
DATE:



SUBJECTIVE:  The patient still experiencing abdominal distention. 

Nasogastric suction totaled 1 L today.  No chest pain or shortness of

breath.



PHYSICAL EXAMINATION:

VITAL SIGNS:  Blood pressure 154/82, heart rate 83, temperature 98.9,

respirations 20.

HEENT:  Normocephalic.

CHEST:  Absent breath sounds over the bases.

HEART:  S1 and S2 regular.

ABDOMEN:  Absent bowel sounds.

EXTREMITIES:  1+ pitting edema.



LABORATORY DATA:  Hemoglobin and hematocrit 10.4 and _____, white count

16.7, platelet count 105,000.  Today's potassium 3.2, glucose 164.



ASSESSMENT:

1.  Hypertension.

2.  Diastolic left ventricular dysfunction.

3.  Status post surgical repair of anterior abdominal wall hernia with mesh

placement and small bowel resection.

4.  Hypokalemia.



RECOMMENDATIONS:  Continue current transdermal clonidine and KCL total 20

mEq intravenously was given today besides the TPN formula.  Continue Zosyn

3.375 g intravenously q. 6 hours.  The patient is for possible nasogastric

tube removal.





__________________________________________

Cezar Clark MD



DD:  10/15/2017 14:59:55

DT:  10/15/2017 18:42:35

Job # [id number]

## 2017-10-15 NOTE — PN
SUBJECTIVE:  The patient denies any chest pain or shortness of breath.  He

had a bowel movement.



PHYSICAL EXAMINATION:

VITAL SIGNS:  Blood pressure 142/81, heart rate 96, temperature 99.5,

respirations 20.

HEENT:  Normocephalic.

CHEST:  Diminished breath sounds over the bases.

HEART:  S1 and S2 regular.

ABDOMEN:  Absent bowel sounds.

EXTREMITIES:  1+ pitting edema.



LABORATORY DATA:  Hemoglobin and hematocrit of 10.4 and 30.1, white count

12, and platelet count 113,000.  SMA-7; sodium 141, potassium 2.7, chloride

97, CO2 of 33, glucose 153, BUN 11, creatinine 0.7.



ASSESSMENT:

1.  Hypertension.

2.  Diastolic left ventricular dysfunction.

3.  Hypokalemia.

4.  Status post anterior abdominal wall hernia repair with small bowel

resection.

5.  Hypothyroidism.



RECOMMENDATIONS:  Continue current Zosyn 3.375 g intravenously q.6 hours. 

IV potassium replacement totalling 60 mEq is going on now.  Continue Lasix

40 mg intravenously once a day, subcutaneous heparin 5000 units q.8 hours,

and clonidine patch 0.1 mg daily.  Follow up BNP in a.m.





__________________________________________

Cezar Clark MD





DD:  10/14/2017 17:18:43

DT:  10/14/2017 20:14:59

Job # [id number]

## 2017-10-15 NOTE — PN
DATE:



LOCATION:  [facility], bed B.



SUBJECTIVE:  This is 66-year-old male seen and examined on rounds without

significant clinical changes, had been on potassium IV supplement due to

his hypokalemia.  The patient still have intermittent period of

postsurgical abdominal pain, but severe pain, but no reported active

bleeding.  No reported nausea or vomiting.  No chest pain or palpitation. 

NG tube is still in place.



LABORATORY DATA:  Most recent lab results showed leukocytosis of 12.0 with

low hemoglobin and low hematocrit with thrombocytopenia of 113, with

potassium the latest was 2.7 and increased blood glucose level today to 174

with low calcium.  Magnesium level dropped to 1.4.



PHYSICAL EXAMINATION:

GENERAL:  A 66-year-old male.

VITAL SIGNS:  Afebrile with pulse of 80, respiratory rate 20 to 22, with

blood pressure of 148/80.

HEENT:  Showed pale dry oral mucous membrane.  Nonicteric sclerae.  NG tube

is in place.

HEART:  Positive S1 and S2.

ABDOMEN:  Covered with clean dressing.  No bowel sounds could be

appreciated.  No mass or organomegaly.

EXTREMITIES:  Mild lower extremity edematous changes.  No clubbing or

cyanosis.  Peripheral pulses are present, but weak bilaterally.

NEUROLOGIC:  No reported new neurological deficits, sensory or motor.



IMPRESSION:

1.  Incarcerated abdominal wall hernia, with status post surgically repair

with partial small bowel resection.

2.  Re-exacerbation of peptic ulcer disease.

3.  Known history of poorly-controlled diabetes mellitus and hypertension.

4.  Thrombocytopenia of unclear etiology.

5.  Anemia secondary to above.

6.  Elevated CEA level, the possibility of lower gastrointestinal tract

neoplastic changes was raised.



SUGGESTIONS:

1.  Continue current management.

2.  The patient will need central hyperalimentation.

3.  Correct any underlying electrolyte imbalance.

4.  Sectional abdominal and pelvic CAT scan.





__________________________________________

Earle Chilel MD



cc:  Earle Chilel MD



DD:  10/15/2017 8:32:14

DT:  10/15/2017 9:23:37

Job # [id number]

## 2017-10-16 LAB
ALBUMIN/GLOB SERPL: 0.8 {RATIO} (ref 1–2.1)
ALP SERPL-CCNC: 58 U/L (ref 38–126)
ALT SERPL-CCNC: 28 U/L (ref 21–72)
AST SERPL-CCNC: 40 U/L (ref 17–59)
BASOPHILS # BLD AUTO: 0.1 K/UL (ref 0–0.2)
BASOPHILS NFR BLD: 0.6 % (ref 0–2)
BILIRUB SERPL-MCNC: 0.9 MG/DL (ref 0.2–1.3)
BUN SERPL-MCNC: 16 MG/DL (ref 9–20)
CALCIUM SERPL-MCNC: 8.3 MG/DL (ref 8.6–10.4)
CHLORIDE SERPL-SCNC: 99 MMOL/L (ref 98–107)
CHOLEST SERPL-MCNC: 73 MG/DL (ref 0–199)
CO2 SERPL-SCNC: 37 MMOL/L (ref 22–30)
EOSINOPHIL # BLD AUTO: 0 K/UL (ref 0–0.7)
EOSINOPHIL NFR BLD: 0.2 % (ref 0–4)
ERYTHROCYTE [DISTWIDTH] IN BLOOD BY AUTOMATED COUNT: 19.5 % (ref 11.5–14.5)
GLOBULIN SER-MCNC: 3.7 GM/DL (ref 2.2–3.9)
GLUCOSE SERPL-MCNC: 167 MG/DL (ref 75–110)
HCT VFR BLD CALC: 30.6 % (ref 35–51)
LYMPHOCYTES # BLD AUTO: 1.9 K/UL (ref 1–4.3)
LYMPHOCYTES NFR BLD AUTO: 14.6 % (ref 20–40)
MAGNESIUM SERPL-MCNC: 1.8 MG/DL (ref 1.6–2.3)
MCH RBC QN AUTO: 34.8 PG (ref 27–31)
MCHC RBC AUTO-ENTMCNC: 33.7 G/DL (ref 33–37)
MCV RBC AUTO: 103.2 FL (ref 80–94)
MONOCYTES # BLD: 1.3 K/UL (ref 0–0.8)
MONOCYTES NFR BLD: 9.8 % (ref 0–10)
NRBC BLD AUTO-RTO: 0.1 % (ref 0–2)
PHOSPHATE SERPL-MCNC: 3.2 MG/DL (ref 2.5–4.5)
PLATELET # BLD: 97 K/UL (ref 130–400)
PMV BLD AUTO: 8.9 FL (ref 7.2–11.7)
POTASSIUM SERPL-SCNC: 3.3 MMOL/L (ref 3.6–5.2)
PROT SERPL-MCNC: 6.5 G/DL (ref 6.3–8.3)
SODIUM SERPL-SCNC: 142 MMOL/L (ref 132–148)
WBC # BLD AUTO: 12.8 K/UL (ref 4.8–10.8)

## 2017-10-16 PROCEDURE — 02HV33Z INSERTION OF INFUSION DEVICE INTO SUPERIOR VENA CAVA, PERCUTANEOUS APPROACH: ICD-10-PCS | Performed by: INTERNAL MEDICINE

## 2017-10-16 RX ADMIN — INSULIN ASPART SCH UNIT: 100 INJECTION, SOLUTION INTRAVENOUS; SUBCUTANEOUS at 17:30

## 2017-10-16 RX ADMIN — INSULIN ASPART SCH UNIT: 100 INJECTION, SOLUTION INTRAVENOUS; SUBCUTANEOUS at 12:46

## 2017-10-16 RX ADMIN — INSULIN ASPART SCH UNIT: 100 INJECTION, SOLUTION INTRAVENOUS; SUBCUTANEOUS at 08:06

## 2017-10-16 RX ADMIN — TAZOBACTAM SODIUM AND PIPERACILLIN SODIUM SCH MLS/HR: 375; 3 INJECTION, SOLUTION INTRAVENOUS at 09:04

## 2017-10-16 RX ADMIN — INSULIN ASPART SCH: 100 INJECTION, SOLUTION INTRAVENOUS; SUBCUTANEOUS at 22:19

## 2017-10-16 RX ADMIN — TAZOBACTAM SODIUM AND PIPERACILLIN SODIUM SCH MLS/HR: 375; 3 INJECTION, SOLUTION INTRAVENOUS at 22:20

## 2017-10-16 RX ADMIN — TAZOBACTAM SODIUM AND PIPERACILLIN SODIUM SCH MLS/HR: 375; 3 INJECTION, SOLUTION INTRAVENOUS at 04:02

## 2017-10-16 RX ADMIN — HYDROMORPHONE HYDROCHLORIDE PRN MG: 1 INJECTION, SOLUTION INTRAMUSCULAR; INTRAVENOUS; SUBCUTANEOUS at 07:56

## 2017-10-16 RX ADMIN — TAZOBACTAM SODIUM AND PIPERACILLIN SODIUM SCH MLS/HR: 375; 3 INJECTION, SOLUTION INTRAVENOUS at 17:00

## 2017-10-16 NOTE — CP.PCM.PN
Subjective





- Date & Time of Evaluation


Date of Evaluation: 10/16/17


Time of Evaluation: 08:52





- Subjective


Subjective: 





likely to dc ngt today 


had bm





Objective





- Vital Signs/Intake and Output


Vital Signs (last 24 hours): 


 











Temp Pulse Resp BP Pulse Ox


 


 98.2 F   80   20   143/83   95 


 


 10/16/17 07:10  10/16/17 07:55  10/16/17 07:10  10/16/17 07:55  10/16/17 07:10








Intake and Output: 


 











 10/16/17 10/16/17





 06:59 18:59


 


Intake Total 2040 


 


Output Total 1750 


 


Balance 290 














- Medications


Medications: 


 Current Medications





Benzocaine/Menthol (Cepacol Sore Throat)  1 lexx MT Q2 PRN


   PRN Reason: Sore Throat


   Last Admin: 10/15/17 09:19 Dose:  1 lexx


Clonidine HCl (Catapres Tts1 0.1 Mg/24 Hr)  1 patch TD Q7D@1000 [facility]


   Last Admin: 10/12/17 00:24 Dose:  1 patch


Famotidine (Pepcid)  20 mg IVP Q12 [facility]


   Last Admin: 10/15/17 21:29 Dose:  20 mg


Heparin Sodium (Porcine) (Heparin)  5,000 units SC Q8 [facility]


   Last Admin: 10/16/17 05:47 Dose:  5,000 units


Hydromorphone HCl (Dilaudid)  0.5 mg IVP Q3 PRN


   PRN Reason: Pain, moderate (4-7)


   Last Admin: 10/16/17 07:56 Dose:  0.5 mg


Piperacillin Sod/Tazobactam Sod (Zosyn 3.375 Gm Iv Premix)  3.375 gm in 50 mls 

@ 100 mls/hr IVPB Q6H [facility]


   Last Admin: 10/16/17 04:02 Dose:  100 mls/hr


Chromium/Copper/Manganese/Zinc (1 ml/ Amino Acids)  1,001 mls @ 65 mls/hr IV 

.[id number] [facility]


   Stop: 10/16/17 09:23


   Last Admin: 10/15/17 18:01 Dose:  65 mls/hr


Chromium/Copper/Manganese/Zinc (1 ml/ Amino Acids)  1,001 mls @ 65 mls/hr IV 

.[id number] [facility]


   Stop: 10/16/17 17:59


Potassium Chloride (Potassium Chloride 20 Meq/100 Ml)  20 meq in 100 mls @ 50 

mls/hr IVPB ONCE ONE


   Stop: 10/16/17 10:26


   Last Admin: 10/16/17 08:50 Dose:  50 mls/hr


Insulin Aspart (Novolog)  0 unit SC ACHS [facility]


   PRN Reason: Protocol


   Last Admin: 10/16/17 08:06 Dose:  1 unit


Ondansetron HCl (Zofran Inj)  4 mg IVP Q4 PRN


   PRN Reason: nausea


Potassium Chloride (K-Dur 20 Meq Er Tab)  20 meq PO BID [facility]


   Last Admin: 10/12/17 19:29 Dose:  Not Given











- Labs


Labs: 


 





 10/16/17 07:07 





 10/16/17 07:07 





 











PT  15.5 SECONDS (9.7-12.2)  H  10/09/17  08:47    


 


INR  1.4   10/09/17  08:47    


 


APTT  31 SECONDS (21-34)   10/09/17  08:47

## 2017-10-16 NOTE — PN
SUBJECTIVE:  The patient's NG tube was discontinued this morning.  He has

been on clear liquids.  He denies any chest pain or shortness of breath.



PHYSICAL EXAMINATION:

VITAL SIGNS:  Blood pressure 143/83, heart rate 80, temperature 98.2,

respirations 20.

HEENT:  Normocephalic.

CHEST:  Diminished breath sounds over the bases.

HEART:  S1 and S2 regular.

EXTREMITIES:  1+ pitting edema.



LABORATORY DATA:  Hemoglobin and hematocrit 10.3 and 30.6, white count

12.8, platelet count 97,000.  SMA-7:  Sodium 142, potassium 3.3, chloride

99, CO2 of 37, glucose 167, BUN 16, creatinine 0.7.  Chest x-ray done today

revealed right PICC line insertion without evidence of postoperative

pneumothorax.  My own review with x-ray revealed cardiomegaly with mild

central congestion.



ASSESSMENT:

1.  Status post incisional hernia repair and small bowel resection.

2.  Improving ileus.

3.  Hypertension.

4.  Hypertensive heart failure.

5.  Diastolic dysfunction.

6.  Hypokalemia.

7.  Worsening thrombocytopenia.



RECOMMENDATIONS:  Continue clonidine patch, continue PPN, continue

subcutaneous heparin for now and monitor platelet count.  Continue Zosyn

3.375 g intravenously q.6 hours, 20 mEq of IV potassium chloride was

administered today.  Follow up BNP in a.m.







__________________________________________

Cezar Clark MD



DD:  10/16/2017 15:49:27

DT:  10/16/2017 16:08:23

Job # [id number]

## 2017-10-16 NOTE — CP.PCM.PN
Subjective





- Date & Time of Evaluation


Date of Evaluation: 10/16/17


Time of Evaluation: 18:30





- Subjective


Subjective: 





Pt seen & evaluated, is feeling better, NG tube is out, he had a bowel movement 

too, continues to be on post op care





Objective





- Vital Signs/Intake and Output


Vital Signs (last 24 hours): 


 











Temp Pulse Resp BP Pulse Ox


 


 99.6 F   96 H  20   128/84   96 


 


 10/16/17 21:15  10/16/17 21:15  10/16/17 21:15  10/16/17 21:15  10/16/17 21:15








Intake and Output: 


 











 10/16/17 10/17/17





 18:59 06:59


 


Intake Total 935 


 


Balance 935 














- Medications


Medications: 


 Current Medications





Benzocaine/Menthol (Cepacol Sore Throat)  1 lexx MT Q2 PRN


   PRN Reason: Sore Throat


   Last Admin: 10/15/17 09:19 Dose:  1 lexx


Clonidine HCl (Catapres Tts1 0.1 Mg/24 Hr)  1 patch TD Q7D@1000 [facility]


   Last Admin: 10/12/17 00:24 Dose:  1 patch


Famotidine (Pepcid)  20 mg IVP Q12 [facility]


   Last Admin: 10/16/17 09:04 Dose:  20 mg


Heparin Sodium (Porcine) (Heparin)  5,000 units SC Q8 [facility]


   Last Admin: 10/16/17 14:31 Dose:  5,000 units


Hydromorphone HCl (Dilaudid)  0.5 mg IVP Q3 PRN


   PRN Reason: Pain, severe (8-10)


Piperacillin Sod/Tazobactam Sod (Zosyn 3.375 Gm Iv Premix)  3.375 gm in 50 mls 

@ 100 mls/hr IVPB Q6H [facility]


   Last Admin: 10/16/17 17:00 Dose:  100 mls/hr


Chromium/Copper/Manganese/Zinc 1 ml/ Multivitamins/Vitamin C 10 ml/ Amino Acids

  1,011 mls @ 65 mls/hr IV .[id number] [facility]


   Stop: 10/17/17 09:33


   Last Admin: 10/16/17 18:26 Dose:  65 mls/hr


Chromium/Copper/Manganese/Zinc (1 ml/ Amino Acids)  1,001 mls @ 65 mls/hr IV 

.[id number] [facility]


   Stop: 10/17/17 17:59


Insulin Aspart (Novolog)  0 unit SC ACHS [facility]


   PRN Reason: Protocol


   Last Admin: 10/16/17 17:30 Dose:  1 unit


Ondansetron HCl (Zofran Inj)  4 mg IVP Q4 PRN


   PRN Reason: nausea


Oxycodone/Acetaminophen (Percocet 5/325 Mg Tab)  1 tab PO Q4H PRN


   PRN Reason: Pain, moderate (4-7)


   Stop: 10/19/17 15:42


Potassium Chloride (K-Dur 20 Meq Er Tab)  20 meq PO BID [facility]


   Last Admin: 10/12/17 19:29 Dose:  Not Given











- Labs


Labs: 


 





 10/16/17 07:07 





 10/16/17 07:07 





 











PT  15.5 SECONDS (9.7-12.2)  H  10/09/17  08:47    


 


INR  1.4   10/09/17  08:47    


 


APTT  31 SECONDS (21-34)   10/09/17  08:47    














- Constitutional


Appears: No Acute Distress





- Head Exam


Head Exam: ATRAUMATIC, NORMAL INSPECTION, NORMOCEPHALIC





- Eye Exam


Eye Exam: EOMI, Normal appearance, PERRL


Pupil Exam: NORMAL ACCOMODATION, PERRL





- ENT Exam


ENT Exam: Mucous Membranes Moist, Normal Exam





- Respiratory Exam


Respiratory Exam: Clear to Ausculation Bilateral, NORMAL BREATHING PATTERN





Assessment and Plan


(1) Intestinal obstruction


Status: Acute   





(2) Thrombocytopenia


Status: Acute   





(3) Ventral hernia


Status: Acute   





(4) HTN (hypertension)


Status: Acute   





(5) Diabetes mellitus


Status: Chronic

## 2017-10-17 LAB
ALBUMIN/GLOB SERPL: 0.8 {RATIO} (ref 1–2.1)
ALP SERPL-CCNC: 57 U/L (ref 38–126)
ALT SERPL-CCNC: 31 U/L (ref 21–72)
AST SERPL-CCNC: 36 U/L (ref 17–59)
BASOPHILS # BLD AUTO: 0.1 K/UL (ref 0–0.2)
BASOPHILS NFR BLD: 0.4 % (ref 0–2)
BILIRUB SERPL-MCNC: 0.7 MG/DL (ref 0.2–1.3)
BUN SERPL-MCNC: 21 MG/DL (ref 9–20)
CALCIUM SERPL-MCNC: 7.8 MG/DL (ref 8.6–10.4)
CHLORIDE SERPL-SCNC: 94 MMOL/L (ref 98–107)
CHOLEST SERPL-MCNC: 61 MG/DL (ref 0–199)
CO2 SERPL-SCNC: 33 MMOL/L (ref 22–30)
EOSINOPHIL # BLD AUTO: 0 K/UL (ref 0–0.7)
EOSINOPHIL NFR BLD: 0.2 % (ref 0–4)
ERYTHROCYTE [DISTWIDTH] IN BLOOD BY AUTOMATED COUNT: 19.1 % (ref 11.5–14.5)
GLOBULIN SER-MCNC: 3.4 GM/DL (ref 2.2–3.9)
GLUCOSE SERPL-MCNC: 187 MG/DL (ref 75–110)
HCT VFR BLD CALC: 25.8 % (ref 35–51)
HCT VFR BLD CALC: 26.2 % (ref 35–51)
LYMPHOCYTES # BLD AUTO: 2.4 K/UL (ref 1–4.3)
LYMPHOCYTES NFR BLD AUTO: 18.2 % (ref 20–40)
MAGNESIUM SERPL-MCNC: 1.7 MG/DL (ref 1.6–2.3)
MCH RBC QN AUTO: 34.7 PG (ref 27–31)
MCHC RBC AUTO-ENTMCNC: 33.6 G/DL (ref 33–37)
MCV RBC AUTO: 103.4 FL (ref 80–94)
MONOCYTES # BLD: 1.2 K/UL (ref 0–0.8)
MONOCYTES NFR BLD: 9.5 % (ref 0–10)
NRBC BLD AUTO-RTO: 0.1 % (ref 0–2)
PHOSPHATE SERPL-MCNC: 3.2 MG/DL (ref 2.5–4.5)
PLATELET # BLD: 101 K/UL (ref 130–400)
PMV BLD AUTO: 9.5 FL (ref 7.2–11.7)
POTASSIUM SERPL-SCNC: 3.1 MMOL/L (ref 3.6–5.2)
PROT SERPL-MCNC: 6.1 G/DL (ref 6.3–8.3)
SODIUM SERPL-SCNC: 133 MMOL/L (ref 132–148)
WBC # BLD AUTO: 13.1 K/UL (ref 4.8–10.8)

## 2017-10-17 RX ADMIN — TAZOBACTAM SODIUM AND PIPERACILLIN SODIUM SCH MLS/HR: 375; 3 INJECTION, SOLUTION INTRAVENOUS at 04:04

## 2017-10-17 RX ADMIN — INSULIN ASPART SCH: 100 INJECTION, SOLUTION INTRAVENOUS; SUBCUTANEOUS at 21:52

## 2017-10-17 RX ADMIN — TAZOBACTAM SODIUM AND PIPERACILLIN SODIUM SCH MLS/HR: 375; 3 INJECTION, SOLUTION INTRAVENOUS at 21:08

## 2017-10-17 RX ADMIN — HYDROMORPHONE HYDROCHLORIDE PRN MG: 1 INJECTION, SOLUTION INTRAMUSCULAR; INTRAVENOUS; SUBCUTANEOUS at 09:34

## 2017-10-17 RX ADMIN — INSULIN ASPART SCH UNIT: 100 INJECTION, SOLUTION INTRAVENOUS; SUBCUTANEOUS at 12:30

## 2017-10-17 RX ADMIN — HYDROMORPHONE HYDROCHLORIDE PRN MG: 1 INJECTION, SOLUTION INTRAMUSCULAR; INTRAVENOUS; SUBCUTANEOUS at 02:35

## 2017-10-17 RX ADMIN — INSULIN ASPART SCH UNIT: 100 INJECTION, SOLUTION INTRAVENOUS; SUBCUTANEOUS at 17:48

## 2017-10-17 RX ADMIN — HYDROMORPHONE HYDROCHLORIDE PRN MG: 1 INJECTION, SOLUTION INTRAMUSCULAR; INTRAVENOUS; SUBCUTANEOUS at 21:15

## 2017-10-17 RX ADMIN — TAZOBACTAM SODIUM AND PIPERACILLIN SODIUM SCH MLS/HR: 375; 3 INJECTION, SOLUTION INTRAVENOUS at 09:00

## 2017-10-17 RX ADMIN — HYDROMORPHONE HYDROCHLORIDE PRN MG: 1 INJECTION, SOLUTION INTRAMUSCULAR; INTRAVENOUS; SUBCUTANEOUS at 13:27

## 2017-10-17 RX ADMIN — INSULIN ASPART SCH UNIT: 100 INJECTION, SOLUTION INTRAVENOUS; SUBCUTANEOUS at 08:42

## 2017-10-17 RX ADMIN — TAZOBACTAM SODIUM AND PIPERACILLIN SODIUM SCH MLS/HR: 375; 3 INJECTION, SOLUTION INTRAVENOUS at 17:10

## 2017-10-17 NOTE — CP.PCM.PN
Subjective





- Date & Time of Evaluation


Date of Evaluation: 10/17/17


Time of Evaluation: 07:00





- Subjective


Subjective: 


GENERAL SURGERY PROGRESS NOTE FOR DR. DAVE





Patient seen and examined at bedside. NG tube was removed yesterday. Patient 

had 2 maroon BMs last night. Patient reports that he vomited once last night. 

PT came today.





Objective





- Vital Signs/Intake and Output


Vital Signs (last 24 hours): 


 











Temp Pulse Resp BP Pulse Ox


 


 98.2 F   85   20   120/73   97 


 


 10/17/17 07:00  10/17/17 07:00  10/17/17 07:00  10/17/17 07:00  10/17/17 07:00








Intake and Output: 


 











 10/17/17 10/17/17





 06:59 18:59


 


Intake Total 1680 


 


Output Total 700 


 


Balance 980 














- Medications


Medications: 


 Current Medications





Benzocaine/Menthol (Cepacol Sore Throat)  1 lexx MT Q2 PRN


   PRN Reason: Sore Throat


   Last Admin: 10/15/17 09:19 Dose:  1 lexx


Clonidine HCl (Catapres Tts1 0.1 Mg/24 Hr)  1 patch TD Q7D@1000 [facility]


   Last Admin: 10/12/17 00:24 Dose:  1 patch


Famotidine (Pepcid)  20 mg IVP Q12 [facility]


   Last Admin: 10/17/17 09:45 Dose:  20 mg


Heparin Sodium (Porcine) (Heparin)  5,000 units SC Q8 [facility]


   Last Admin: 10/16/17 22:50 Dose:  Not Given


Hydromorphone HCl (Dilaudid)  0.5 mg IVP Q3 PRN


   PRN Reason: Pain, severe (8-10)


   Last Admin: 10/17/17 13:27 Dose:  0.5 mg


Piperacillin Sod/Tazobactam Sod (Zosyn 3.375 Gm Iv Premix)  3.375 gm in 50 mls 

@ 100 mls/hr IVPB Q6H [facility]


   Last Admin: 10/17/17 09:00 Dose:  100 mls/hr


Chromium/Copper/Manganese/Zinc (1 ml/ Amino Acids)  1,001 mls @ 65 mls/hr IV 

.[id number] [facility]


   Stop: 10/17/17 17:59


   Last Admin: 10/17/17 08:42 Dose:  65 mls/hr


Chromium/Copper/Manganese/Zinc 1 ml/ Multivitamins/Vitamin C 10 ml/ Amino Acids

  1,011 mls @ 65 mls/hr IV .[id number] [facility]


   Stop: 10/18/17 09:33


Chromium/Copper/Manganese/Zinc (1 ml/ Amino Acids)  1,001 mls @ 65 mls/hr IV 

.[id number] [facility]


Potassium Chloride (Potassium Chloride 10 Meq/100 Ml)  10 meq in 100 mls @ 100 

mls/hr IVPB Q1 [facility]


   Stop: 10/17/17 15:59


   Last Admin: 10/17/17 14:21 Dose:  100 mls/hr


Insulin Aspart (Novolog)  0 unit SC ACHS [facility]


   PRN Reason: Protocol


   Last Admin: 10/17/17 12:30 Dose:  2 unit


Ondansetron HCl (Zofran Inj)  4 mg IVP Q4 PRN


   PRN Reason: nausea


   Last Admin: 10/17/17 09:39 Dose:  4 mg


Oxycodone/Acetaminophen (Percocet 5/325 Mg Tab)  1 tab PO Q4H [facility]


   PRN Reason: Pain, moderate (4-7)


   Stop: 10/19/17 15:42


Potassium Chloride (K-Dur 20 Meq Er Tab)  20 meq PO BID [facility]


   Last Admin: 10/12/17 19:29 Dose:  Not Given











- Labs


Labs: 


 





 10/17/17 06:50 





 10/17/17 06:50 





 











PT  15.5 SECONDS (9.7-12.2)  H  10/09/17  08:47    


 


INR  1.4   10/09/17  08:47    


 


APTT  31 SECONDS (21-34)   10/09/17  08:47    














- Constitutional


Appears: Non-toxic, No Acute Distress





- Head Exam


Head Exam: ATRAUMATIC, NORMAL INSPECTION





- Respiratory Exam


Respiratory Exam: NORMAL BREATHING PATTERN.  absent: Respiratory Distress





- Cardiovascular Exam


Cardiovascular Exam: +S1, +S2





- GI/Abdominal Exam


GI & Abdominal Exam: Soft.  absent: Distended, Guarding, Tenderness, Rebound


Additional comments: 


Staples in place over midline incision





- Neurological Exam


Neurological Exam: Alert, Awake





- Psychiatric Exam


Psychiatric exam: Normal Affect, Normal Mood





Assessment and Plan





- Assessment and Plan (Free Text)


Assessment: 


65yo M with recurrent ventral hernia and thrombocytopenia s/p exploratory 

laparotomy, extensive BREANNA, repair of recurrent ventral hernia with biologic mesh

, small bowel resection with primary anastomosis POD#8





- Afebrile, VSS


- Leukocytosis WBC 13.1


- Hemoglobin dropped to 8.7 from 10.3 yesterday. Will repeat H&H tonight 


- Hypokalemia K 3.1 - on PPN, fluids with K and given IV KCl as well as 20meq PO


- Continue Physical therapy 


- Encouraged OOB, ambulation, and IS use


- Heparin held due to decreased Hgb and maroon stools


- Advanced to regular diet


- Discussed plan with Dr. Selma Ny PGY-3

## 2017-10-17 NOTE — CP.PCM.PN
Subjective





- Date & Time of Evaluation


Date of Evaluation: 10/17/17


Time of Evaluation: 09:30





- Subjective


Subjective: 





Pt is doing well, s/p OR, Hb dropped,afebrile, will monitor if Hb further 

dropped, consider transfusion, pt is for GABRIEL, he is ambulating with help of PT





Objective





- Vital Signs/Intake and Output


Vital Signs (last 24 hours): 


 











Temp Pulse Resp BP Pulse Ox


 


 98.4 F   88   20   131/80   96 


 


 10/17/17 15:10  10/17/17 15:10  10/17/17 15:10  10/17/17 15:10  10/17/17 15:10








Intake and Output: 


 











 10/17/17 10/18/17





 18:59 06:59


 


Intake Total 970 


 


Output Total 350 


 


Balance 620 














- Medications


Medications: 


 Current Medications





Benzocaine/Menthol (Cepacol Sore Throat)  1 lexx MT Q2 PRN


   PRN Reason: Sore Throat


   Last Admin: 10/15/17 09:19 Dose:  1 lexx


Clonidine HCl (Catapres Tts1 0.1 Mg/24 Hr)  1 patch TD Q7D@1000 [facility]


   Last Admin: 10/12/17 00:24 Dose:  1 patch


Famotidine (Pepcid)  20 mg IVP Q12 [facility]


   Last Admin: 10/17/17 21:08 Dose:  20 mg


Heparin Sodium (Porcine) (Heparin)  5,000 units SC Q8 [facility]


   Last Admin: 10/16/17 22:50 Dose:  Not Given


Hydromorphone HCl (Dilaudid)  0.5 mg IVP Q3 PRN


   PRN Reason: Pain, severe (8-10)


   Last Admin: 10/17/17 21:15 Dose:  0.5 mg


Piperacillin Sod/Tazobactam Sod (Zosyn 3.375 Gm Iv Premix)  3.375 gm in 50 mls 

@ 100 mls/hr IVPB Q6H [facility]


   Last Admin: 10/17/17 21:08 Dose:  100 mls/hr


Chromium/Copper/Manganese/Zinc 1 ml/ Multivitamins/Vitamin C 10 ml/ Amino Acids

  1,011 mls @ 65 mls/hr IV .[id number] [facility]


   Stop: 10/18/17 09:33


   Last Admin: 10/17/17 18:05 Dose:  65 mls/hr


Chromium/Copper/Manganese/Zinc (1 ml/ Amino Acids)  1,001 mls @ 65 mls/hr IV 

.[id number] [facility]


Insulin Aspart (Novolog)  0 unit SC ACHS [facility]


   PRN Reason: Protocol


   Last Admin: 10/17/17 21:52 Dose:  Not Given


Ondansetron HCl (Zofran Inj)  4 mg IVP Q4 PRN


   PRN Reason: nausea


   Last Admin: 10/17/17 14:41 Dose:  4 mg


Oxycodone/Acetaminophen (Percocet 5/325 Mg Tab)  1 tab PO Q4H [facility]


   PRN Reason: Pain, moderate (4-7)


   Stop: 10/19/17 15:42


Potassium Chloride (K-Dur 20 Meq Er Tab)  20 meq PO BID [facility]


   Last Admin: 10/12/17 19:29 Dose:  Not Given











- Labs


Labs: 


 





 10/17/17 21:47 





 10/17/17 06:50 





 











PT  15.5 SECONDS (9.7-12.2)  H  10/09/17  08:47    


 


INR  1.4   10/09/17  08:47    


 


APTT  31 SECONDS (21-34)   10/09/17  08:47    














- Constitutional


Appears: No Acute Distress





- Head Exam


Head Exam: ATRAUMATIC, NORMAL INSPECTION, NORMOCEPHALIC





- Eye Exam


Eye Exam: EOMI, Normal appearance, PERRL


Pupil Exam: NORMAL ACCOMODATION, PERRL





- ENT Exam


ENT Exam: Mucous Membranes Moist, Normal Exam





- Respiratory Exam


Respiratory Exam: Clear to Ausculation Bilateral, NORMAL BREATHING PATTERN





- Cardiovascular Exam


Cardiovascular Exam: REGULAR RHYTHM, +S1, +S2.  absent: Murmur





- GI/Abdominal Exam


GI & Abdominal Exam: Soft, Normal Bowel Sounds.  absent: Tenderness





Assessment and Plan


(1) Intestinal obstruction


Status: Acute   





(2) Thrombocytopenia


Status: Acute   





(3) Ventral hernia


Status: Acute   





(4) HTN (hypertension)


Status: Acute   





(5) Diabetes mellitus


Status: Chronic

## 2017-10-17 NOTE — PN
DATE:



SUBJECTIVE:  The patient did vomit today.  He denied any chest pain or

shortness of breath.



PHYSICAL EXAMINATION:

VITAL SIGNS:  Blood pressure 120/73, heart rate 85, temperature 98.2,

respirations 20.

HEENT:  Pale conjunctivae.

CHEST:  Diminished breath sounds over the bases.

HEART:  Sounds regular.

EXTREMITIES:  Trace leg edema.



LABORATORY DATA:  Hemoglobin and hematocrit 8.7 and 25.8, white count 15.1,

platelet count 101,000, slight improvement compared to yesterday of [id number]. 

Today, his potassium is 3.1, glucose 187.



ASSESSMENT:

1.  Hypertension.

2.  Hypokalemia.

3.  Thrombocytopenia.

4.  Status post incisional hernia repair and small bowel resection.



RECOMMENDATIONS:  Continue current clonidine patch.  Continue PPN. 

Continue IV potassium replacement, which was ordered at 20 mEq orally

today.  Followup PMV as well as CBC is recommended for tomorrow.





__________________________________________

Cezar Clark MD





DD:  10/17/2017 13:41:26

DT:  10/17/2017 14:12:46

Job # [id number]

## 2017-10-17 NOTE — PN
DATE:



LOCATION:  Room 660, bed 309.



SUBJECTIVE:  This is a 66-year-old male seen and examined on rounds without

significant clinical changes, but with intermittent complaints of abdominal

pain ------ on and off and subsequently tolerated clear liquid diet.  No

nausea, vomiting, one time bowel movement reported.



NG tube subsequently was removed.



The entire chart is reviewed including but not limited to most recent lab

and radiology test results, current and previous medication list, current

and previous medical events.  Case was discussed with the staff at length.



LABORATORY DATA:  Today's lab showed leukocytosis of 12.8 with low

hemoglobin 10.3, hematocrit 30.6 with thrombocytopenia of 97, and low

potassium 3.23.  Elevated blood glucose level 196 with increased CO2

content of 37 indicative of respiratory alkalosis with low calcium 10.3 and

low albumin 2.9.



Most recent chest x-ray done today, report and the films are seen.  No

evidence of post procedure right PICC line insertion ------.



PHYSICAL EXAMINATION:

GENERAL:  A 66-year-old male.

VITAL SIGNS:  Afebrile with pulse of 94, respiratory rate of 20 to 22 with

a blood pressure of 136/78.

HEENT:  Shows pale, dry oral mucous membrane.  Nonicteric sclerae.

LUNGS:  Few scattered crepitation.  Decreased air entry at bases.

HEART:  Positive S1 and S2.

ABDOMEN:  Soft.  Bowel sounds are hypoactive with mild abdominal

distention.  No mass or organomegaly.  Clean dressing seen, dry.

EXTREMITIES:  Mild lower extremity edematous changes.  No clubbing.  No

cyanosis.

NEUROLOGIC:  No new reported neurological deficits, sensory, or motor.



IMPRESSION:

1.  Known history of incarcerated abdominal wall ventral hernia, treated

surgically with partial small bowel resection.

2.  Anemia secondary to above.

3.  Thrombocytopenia of unclear etiology.

4.  Re-exacerbation of peptic ulcer disease.

5.  Elevated CEA level, to rule out lower gastrointestinal tract

malignancy.

6.  Poorly controlled diabetes mellitus.

7.  Known history of hypertension.



SUGGESTIONS:

1.  Continue current management.

2.  Due to the patient's malnutrition with hypoalbuminemia, central

hyperalimentation, ------.







__________________________________________

Earle Chilel MD





DD:  10/16/2017 18:30:02

DT:  10/16/2017 20:41:40

Job # [id number]

## 2017-10-17 NOTE — PN
DATE:



LOCATION:  Room 660, bed A.



SUBJECTIVE:  This is a 66-year-old male seen and examined on rounds without

significant clinical changes or reported active bleeding with reported some

liquid black tarry stool.  No reported hematemesis.



The entire chart is reviewed including, but not limited to most recent lab

and radiology study results, current and previous medication list, current

and previous medical events as well as allergic to medication list.  Case

discussed with the admitting medical staff at length.  The patient still

have intermittent period of abdominal pain with abdominal distention.



LABORATORY DATA:  Today's lab showed leukocytosis of 13.1 with drop of

hemoglobin to 8.7, hematocrit 25.8 with thrombocytopenia of 101 with low

potassium 3.1, increased CO2 content of 33 with BUN 21, but normal

creatinine.  Blood glucose level 225 with low albumin 2.7.



PHYSICAL EXAMINATION:

GENERAL:  A 66-year-old male.

VITAL SIGNS:  Afebrile with pulse of 82, respiratory rate of 20 to 22 with

a blood pressure of 130/76.

HEENT:  Shows pale, dry oral mucous membrane.  Nonicteric sclerae.

LUNGS:  Few scattered crepitation.  Decreased air entry at bases.

HEART:  Positive S1 and S2.

ABDOMEN:  Clean dressing and generalized tenderness.  Bowel sounds are

hypoactive.

EXTREMITIES:  Without edema, clubbing or cyanosis.

NEUROLOGIC:  No new neurological deficits, sensory or motor.



IMPRESSION:

1.  Incarcerated abdominal hernia treated surgically with partial small

bowel resection.

2.  Anemia secondary to above.

3.  Peptic ulcer disease.

4.  Melena of unclear etiology with drop of hemoglobin and hematocrit.

5.  Poorly controlled diabetes mellitus.

6.  History of hypertension.



SUGGESTIONS:

1.  Agree with your plan.

2.  The patient may need endoscopic evaluation of the GI tract when he is

more stable clinically.  Further recommendation to follow.











__________________________________________

Earle Chilel MD



cc:  Earle Chilel MD



DD:  10/17/2017 11:16:12

DT:  10/17/2017 13:25:18

Job # [id number]

## 2017-10-18 LAB
ALBUMIN/GLOB SERPL: 0.8 {RATIO} (ref 1–2.1)
ALP SERPL-CCNC: 60 U/L (ref 38–126)
ALT SERPL-CCNC: 47 U/L (ref 21–72)
AST SERPL-CCNC: 56 U/L (ref 17–59)
BASOPHILS # BLD AUTO: 0 K/UL (ref 0–0.2)
BASOPHILS NFR BLD: 0.3 % (ref 0–2)
BILIRUB SERPL-MCNC: 0.8 MG/DL (ref 0.2–1.3)
BUN SERPL-MCNC: 19 MG/DL (ref 9–20)
CALCIUM SERPL-MCNC: 7.7 MG/DL (ref 8.6–10.4)
CHLORIDE SERPL-SCNC: 92 MMOL/L (ref 98–107)
CHOLEST SERPL-MCNC: 62 MG/DL (ref 0–199)
CO2 SERPL-SCNC: 31 MMOL/L (ref 22–30)
EOSINOPHIL # BLD AUTO: 0.1 K/UL (ref 0–0.7)
EOSINOPHIL NFR BLD: 0.4 % (ref 0–4)
ERYTHROCYTE [DISTWIDTH] IN BLOOD BY AUTOMATED COUNT: 18.8 % (ref 11.5–14.5)
GLOBULIN SER-MCNC: 3.4 GM/DL (ref 2.2–3.9)
GLUCOSE SERPL-MCNC: 139 MG/DL (ref 75–110)
HCT VFR BLD CALC: 24.7 % (ref 35–51)
LYMPHOCYTES # BLD AUTO: 2.3 K/UL (ref 1–4.3)
LYMPHOCYTES NFR BLD AUTO: 18.1 % (ref 20–40)
MAGNESIUM SERPL-MCNC: 1.7 MG/DL (ref 1.6–2.3)
MCH RBC QN AUTO: 33.9 PG (ref 27–31)
MCHC RBC AUTO-ENTMCNC: 33.2 G/DL (ref 33–37)
MCV RBC AUTO: 102.1 FL (ref 80–94)
MONOCYTES # BLD: 1.3 K/UL (ref 0–0.8)
MONOCYTES NFR BLD: 9.7 % (ref 0–10)
NRBC BLD AUTO-RTO: 0.1 % (ref 0–2)
PHOSPHATE SERPL-MCNC: 3.1 MG/DL (ref 2.5–4.5)
PLATELET # BLD: 90 K/UL (ref 130–400)
PMV BLD AUTO: 10 FL (ref 7.2–11.7)
POTASSIUM SERPL-SCNC: 3.5 MMOL/L (ref 3.6–5.2)
PROT SERPL-MCNC: 6 G/DL (ref 6.3–8.3)
SODIUM SERPL-SCNC: 129 MMOL/L (ref 132–148)
WBC # BLD AUTO: 13 K/UL (ref 4.8–10.8)

## 2017-10-18 RX ADMIN — TAZOBACTAM SODIUM AND PIPERACILLIN SODIUM SCH MLS/HR: 375; 3 INJECTION, SOLUTION INTRAVENOUS at 03:44

## 2017-10-18 RX ADMIN — HYDROMORPHONE HYDROCHLORIDE PRN MG: 1 INJECTION, SOLUTION INTRAMUSCULAR; INTRAVENOUS; SUBCUTANEOUS at 14:32

## 2017-10-18 RX ADMIN — TAZOBACTAM SODIUM AND PIPERACILLIN SODIUM SCH MLS/HR: 375; 3 INJECTION, SOLUTION INTRAVENOUS at 09:58

## 2017-10-18 RX ADMIN — INSULIN ASPART SCH: 100 INJECTION, SOLUTION INTRAVENOUS; SUBCUTANEOUS at 21:37

## 2017-10-18 RX ADMIN — HYDROMORPHONE HYDROCHLORIDE PRN MG: 1 INJECTION, SOLUTION INTRAMUSCULAR; INTRAVENOUS; SUBCUTANEOUS at 03:43

## 2017-10-18 RX ADMIN — INSULIN ASPART SCH UNIT: 100 INJECTION, SOLUTION INTRAVENOUS; SUBCUTANEOUS at 08:03

## 2017-10-18 RX ADMIN — OXYCODONE HYDROCHLORIDE AND ACETAMINOPHEN PRN TAB: 5; 325 TABLET ORAL at 21:34

## 2017-10-18 RX ADMIN — TAZOBACTAM SODIUM AND PIPERACILLIN SODIUM SCH MLS/HR: 375; 3 INJECTION, SOLUTION INTRAVENOUS at 21:33

## 2017-10-18 RX ADMIN — INSULIN ASPART SCH UNIT: 100 INJECTION, SOLUTION INTRAVENOUS; SUBCUTANEOUS at 12:46

## 2017-10-18 RX ADMIN — HYDROMORPHONE HYDROCHLORIDE PRN MG: 1 INJECTION, SOLUTION INTRAMUSCULAR; INTRAVENOUS; SUBCUTANEOUS at 10:19

## 2017-10-18 RX ADMIN — HYDROMORPHONE HYDROCHLORIDE PRN MG: 1 INJECTION, SOLUTION INTRAMUSCULAR; INTRAVENOUS; SUBCUTANEOUS at 07:02

## 2017-10-18 RX ADMIN — TAZOBACTAM SODIUM AND PIPERACILLIN SODIUM SCH MLS/HR: 375; 3 INJECTION, SOLUTION INTRAVENOUS at 17:00

## 2017-10-18 RX ADMIN — INSULIN ASPART SCH UNIT: 100 INJECTION, SOLUTION INTRAVENOUS; SUBCUTANEOUS at 17:39

## 2017-10-18 RX ADMIN — POTASSIUM CHLORIDE SCH MEQ: 20 TABLET, EXTENDED RELEASE ORAL at 14:46

## 2017-10-18 NOTE — CP.PCM.PN
Subjective





- Date & Time of Evaluation


Date of Evaluation: 10/18/17


Time of Evaluation: 07:00





- Subjective


Subjective: 


GENERAL SURGERY PROGRESS NOTE FOR DR. DAVE





Patient seen and examined at bedside. Patient had a BM last night. He denies 

abdominal pain but did have mild pain after eating yesterday. Pt states he 

wants to continue with the regular diet. Denies vomiting. He ambulated with PT.





Objective





- Vital Signs/Intake and Output


Vital Signs (last 24 hours): 


 











Temp Pulse Resp BP Pulse Ox


 


 98.4 F   83   20   116/74   94 L


 


 10/18/17 07:00  10/18/17 07:00  10/18/17 07:00  10/18/17 07:00  10/18/17 07:00








Intake and Output: 


 











 10/18/17 10/18/17





 06:59 18:59


 


Intake Total 720 


 


Balance 720 














- Medications


Medications: 


 Current Medications





Benzocaine/Menthol (Cepacol Sore Throat)  1 lexx MT Q2 PRN


   PRN Reason: Sore Throat


   Last Admin: 10/15/17 09:19 Dose:  1 lexx


Clonidine HCl (Catapres Tts1 0.1 Mg/24 Hr)  1 patch TD Q7D@1000 [facility]


   Last Admin: 10/18/17 09:58 Dose:  1 patch


Famotidine (Pepcid)  20 mg IVP Q12 [facility]


   Last Admin: 10/18/17 09:57 Dose:  20 mg


Heparin Sodium (Porcine) (Heparin)  5,000 units SC Q8 [facility]


   Last Admin: 10/16/17 22:50 Dose:  Not Given


Hydromorphone HCl (Dilaudid)  0.5 mg IVP Q3 PRN


   PRN Reason: Pain, severe (8-10)


   Last Admin: 10/18/17 10:19 Dose:  0.5 mg


Piperacillin Sod/Tazobactam Sod (Zosyn 3.375 Gm Iv Premix)  3.375 gm in 50 mls 

@ 100 mls/hr IVPB Q6H [facility]


   Last Admin: 10/18/17 09:58 Dose:  100 mls/hr


Chromium/Copper/Manganese/Zinc (1 ml/ Amino Acids)  1,001 mls @ 65 mls/hr IV 

.[id number] [facility]


   Last Admin: 10/18/17 10:00 Dose:  65 mls/hr


Multivitamins/Vitamin C 10 ml/Chromium/Copper/Manganese/Zinc 1 ml/ Amino Acids  

1,011 mls @ 65 mls/hr IV .[id number] [facility]


   Stop: 10/19/17 09:30


Chromium/Copper/Manganese/Zinc (1 ml/ Amino Acids)  1,001 mls @ 65 mls/hr IV 

.[id number] [facility]


   Stop: 10/19/17 17:59


Insulin Aspart (Novolog)  0 unit SC ACHS [facility]


   PRN Reason: Protocol


   Last Admin: 10/18/17 12:46 Dose:  2 unit


Ondansetron HCl (Zofran Inj)  4 mg IVP Q4 PRN


   PRN Reason: nausea


   Last Admin: 10/17/17 14:41 Dose:  4 mg


Oxycodone/Acetaminophen (Percocet 5/325 Mg Tab)  1 tab PO Q4H PRN


   PRN Reason: Pain, moderate (4-7)


   Stop: 10/19/17 15:42


Potassium Chloride (K-Dur 20 Meq Er Tab)  20 meq PO BID [facility]


   Last Admin: 10/12/17 19:29 Dose:  Not Given











- Labs


Labs: 


 





 10/18/17 06:30 





 10/18/17 06:30 





 











PT  15.5 SECONDS (9.7-12.2)  H  10/09/17  08:47    


 


INR  1.4   10/09/17  08:47    


 


APTT  31 SECONDS (21-34)   10/09/17  08:47    














- Constitutional


Appears: Non-toxic, No Acute Distress





- Head Exam


Head Exam: ATRAUMATIC





- Eye Exam


Eye Exam: EOMI, Normal appearance





- Respiratory Exam


Respiratory Exam: NORMAL BREATHING PATTERN.  absent: Respiratory Distress





- Cardiovascular Exam


Cardiovascular Exam: +S1, +S2





- GI/Abdominal Exam


GI & Abdominal Exam: Soft.  absent: Distended, Firm, Guarding, Rigid, Tenderness

, Rebound


Additional comments: 


Staples in place over midline incision, no drainage





- Neurological Exam


Neurological Exam: Alert, Awake, Oriented x3





- Psychiatric Exam


Psychiatric exam: Normal Affect, Normal Mood





- Skin


Skin Exam: Normal Color, Warm





Assessment and Plan





- Assessment and Plan (Free Text)


Assessment: 


67yo M with recurrent ventral hernia and thrombocytopenia s/p exploratory 

laparotomy, extensive BREANNA, repair of recurrent ventral hernia with biologic mesh

, small bowel resection with primary anastomosis POD#8





- Afebrile, VSS


- Leukocytosis WBC 13.0


- Hemoglobin 8.2, stable from yesterday


- Hypokalemia K 3.5 - 20meq PO daily


- Continue Physical therapy 


- On regular diet


- Encouraged OOB, ambulation, and IS use


- Heparin held due to decreased Hgb, will continue to monitor


- Discussed plan with Dr. Selma Ny PGY-3

## 2017-10-18 NOTE — CP.PCM.PN
Subjective





- Date & Time of Evaluation


Date of Evaluation: 10/18/17


Time of Evaluation: 20:30





- Subjective


Subjective: 





Pt seen and examined , is improving, Hb dropped but pt is stable not bleedibg, 

may need blood transfusion if it gets worse, pt is refusing GABRIEL





Objective





- Vital Signs/Intake and Output


Vital Signs (last 24 hours): 


 











Temp Pulse Resp BP Pulse Ox


 


 98.4 F   83   20   116/74   94 L


 


 10/18/17 07:00  10/18/17 07:00  10/18/17 07:00  10/18/17 07:00  10/18/17 07:00








Intake and Output: 


 











 10/18/17 10/18/17





 06:59 18:59


 


Intake Total 720 


 


Balance 720 














- Medications


Medications: 


 Current Medications





Benzocaine/Menthol (Cepacol Sore Throat)  1 lexx MT Q2 PRN


   PRN Reason: Sore Throat


   Last Admin: 10/15/17 09:19 Dose:  1 lexx


Clonidine HCl (Catapres Tts1 0.1 Mg/24 Hr)  1 patch TD Q7D@1000 [facility]


   Last Admin: 10/18/17 09:58 Dose:  1 patch


Famotidine (Pepcid)  20 mg IVP Q12 [facility]


   Last Admin: 10/18/17 09:57 Dose:  20 mg


Heparin Sodium (Porcine) (Heparin)  5,000 units SC Q8 [facility]


   Last Admin: 10/16/17 22:50 Dose:  Not Given


Hydromorphone HCl (Dilaudid)  0.5 mg IVP Q3 PRN


   PRN Reason: Pain, severe (8-10)


   Last Admin: 10/18/17 14:32 Dose:  0.5 mg


Piperacillin Sod/Tazobactam Sod (Zosyn 3.375 Gm Iv Premix)  3.375 gm in 50 mls 

@ 100 mls/hr IVPB Q6H [facility]


   Last Admin: 10/18/17 09:58 Dose:  100 mls/hr


Chromium/Copper/Manganese/Zinc (1 ml/ Amino Acids)  1,001 mls @ 65 mls/hr IV 

.[id number] [facility]


   Last Admin: 10/18/17 10:00 Dose:  65 mls/hr


Multivitamins/Vitamin C 10 ml/Chromium/Copper/Manganese/Zinc 1 ml/ Amino Acids  

1,011 mls @ 65 mls/hr IV .[id number] [facility]


   Stop: 10/19/17 09:30


Chromium/Copper/Manganese/Zinc (1 ml/ Amino Acids)  1,001 mls @ 65 mls/hr IV 

.[id number] [facility]


   Stop: 10/19/17 17:59


Insulin Aspart (Novolog)  0 unit SC ACHS [facility]


   PRN Reason: Protocol


   Last Admin: 10/18/17 12:46 Dose:  2 unit


Ondansetron HCl (Zofran Inj)  4 mg IVP Q4 PRN


   PRN Reason: nausea


   Last Admin: 10/17/17 14:41 Dose:  4 mg


Oxycodone/Acetaminophen (Percocet 5/325 Mg Tab)  1 tab PO Q4H PRN


   PRN Reason: Pain, moderate (4-7)


   Stop: 10/19/17 15:42


Potassium Chloride (K-Dur 20 Meq Er Tab)  20 meq PO BID [facility]


   Last Admin: 10/12/17 19:29 Dose:  Not Given


Potassium Chloride (K-Dur 20 Meq Er Tab)  20 meq PO DAILY [facility]


   Last Admin: 10/18/17 14:46 Dose:  20 meq











- Labs


Labs: 


 





 10/18/17 06:30 





 10/18/17 06:30 





 











PT  15.5 SECONDS (9.7-12.2)  H  10/09/17  08:47    


 


INR  1.4   10/09/17  08:47    


 


APTT  31 SECONDS (21-34)   10/09/17  08:47    














- Constitutional


Appears: No Acute Distress





- Head Exam


Head Exam: ATRAUMATIC, NORMAL INSPECTION, NORMOCEPHALIC





- Eye Exam


Eye Exam: EOMI, Normal appearance, PERRL


Pupil Exam: NORMAL ACCOMODATION, PERRL





- Respiratory Exam


Respiratory Exam: Clear to Ausculation Bilateral, NORMAL BREATHING PATTERN





- Cardiovascular Exam


Cardiovascular Exam: REGULAR RHYTHM, +S1, +S2.  absent: Murmur





- GI/Abdominal Exam


GI & Abdominal Exam: Soft, Normal Bowel Sounds.  absent: Tenderness





Assessment and Plan


(1) Intestinal obstruction


Status: Acute   





(2) Thrombocytopenia


Status: Acute   





(3) Ventral hernia


Status: Acute   





(4) HTN (hypertension)


Status: Acute   





(5) Diabetes mellitus


Status: Chronic

## 2017-10-19 LAB
BASOPHILS # BLD AUTO: 0.1 K/UL (ref 0–0.2)
BASOPHILS NFR BLD: 0.4 % (ref 0–2)
BUN SERPL-MCNC: 19 MG/DL (ref 9–20)
CALCIUM SERPL-MCNC: 7.7 MG/DL (ref 8.6–10.4)
CHLORIDE SERPL-SCNC: 91 MMOL/L (ref 98–107)
CO2 SERPL-SCNC: 30 MMOL/L (ref 22–30)
EOSINOPHIL # BLD AUTO: 0 K/UL (ref 0–0.7)
EOSINOPHIL NFR BLD: 0.1 % (ref 0–4)
ERYTHROCYTE [DISTWIDTH] IN BLOOD BY AUTOMATED COUNT: 19 % (ref 11.5–14.5)
GLUCOSE SERPL-MCNC: 183 MG/DL (ref 75–110)
HCT VFR BLD CALC: 23.3 % (ref 35–51)
LYMPHOCYTES # BLD AUTO: 2.3 K/UL (ref 1–4.3)
LYMPHOCYTES NFR BLD AUTO: 15.6 % (ref 20–40)
MCH RBC QN AUTO: 34.8 PG (ref 27–31)
MCHC RBC AUTO-ENTMCNC: 34.1 G/DL (ref 33–37)
MCV RBC AUTO: 102.2 FL (ref 80–94)
MONOCYTES # BLD: 1.5 K/UL (ref 0–0.8)
MONOCYTES NFR BLD: 10.3 % (ref 0–10)
NRBC BLD AUTO-RTO: 0.1 % (ref 0–2)
PLATELET # BLD: 107 K/UL (ref 130–400)
PMV BLD AUTO: 9.6 FL (ref 7.2–11.7)
POTASSIUM SERPL-SCNC: 3.4 MMOL/L (ref 3.6–5.2)
SODIUM SERPL-SCNC: 128 MMOL/L (ref 132–148)
WBC # BLD AUTO: 14.4 K/UL (ref 4.8–10.8)

## 2017-10-19 RX ADMIN — TAZOBACTAM SODIUM AND PIPERACILLIN SODIUM SCH MLS/HR: 375; 3 INJECTION, SOLUTION INTRAVENOUS at 21:31

## 2017-10-19 RX ADMIN — OXYCODONE HYDROCHLORIDE AND ACETAMINOPHEN PRN TAB: 5; 325 TABLET ORAL at 21:31

## 2017-10-19 RX ADMIN — OXYCODONE HYDROCHLORIDE AND ACETAMINOPHEN PRN TAB: 5; 325 TABLET ORAL at 12:21

## 2017-10-19 RX ADMIN — TAZOBACTAM SODIUM AND PIPERACILLIN SODIUM SCH MLS/HR: 375; 3 INJECTION, SOLUTION INTRAVENOUS at 04:12

## 2017-10-19 RX ADMIN — TAZOBACTAM SODIUM AND PIPERACILLIN SODIUM SCH MLS/HR: 375; 3 INJECTION, SOLUTION INTRAVENOUS at 15:03

## 2017-10-19 RX ADMIN — INSULIN ASPART SCH UNIT: 100 INJECTION, SOLUTION INTRAVENOUS; SUBCUTANEOUS at 17:20

## 2017-10-19 RX ADMIN — INSULIN ASPART SCH UNIT: 100 INJECTION, SOLUTION INTRAVENOUS; SUBCUTANEOUS at 12:22

## 2017-10-19 RX ADMIN — OXYCODONE HYDROCHLORIDE AND ACETAMINOPHEN PRN TAB: 5; 325 TABLET ORAL at 04:18

## 2017-10-19 RX ADMIN — POTASSIUM CHLORIDE SCH MEQ: 20 TABLET, EXTENDED RELEASE ORAL at 10:34

## 2017-10-19 RX ADMIN — TAZOBACTAM SODIUM AND PIPERACILLIN SODIUM SCH MLS/HR: 375; 3 INJECTION, SOLUTION INTRAVENOUS at 10:34

## 2017-10-19 RX ADMIN — INSULIN ASPART SCH UNIT: 100 INJECTION, SOLUTION INTRAVENOUS; SUBCUTANEOUS at 08:38

## 2017-10-19 RX ADMIN — INSULIN ASPART SCH: 100 INJECTION, SOLUTION INTRAVENOUS; SUBCUTANEOUS at 21:22

## 2017-10-19 NOTE — PN
DATE:



LOCATION:  [facility], [facility] A.



SUBJECTIVE:  This is a 66-year-old male seen and examined in rounds without

significant reported clinical changes, with abdominal _____.  Denies any

significant abdominal pain today.



The entire chart is reviewed including, but not limited to the most recent

lab and radiology study results, current and previous medication list, and

current and previous medical events.  The patient tolerated blood

transfusion without any allergic reaction.



LABORATORY DATA:  Today lab showed leukocytosis of 14.4 with hemoglobin

7.9, hematocrit 23.3 with thrombocytopenia of 107 with low sodium 128, low

potassium 3.4 with blood glucose level 201 with low calcium 7.7.



PHYSICAL EXAMINATION:

GENERAL:  A 66-year-old male, awake, alert, oriented.

VITAL SIGNS:  Afebrile with heart rate of 92, respiratory rate of 20 to 22,

blood pressure 136/84.

HEENT:  Showed pale, dry oral mucous membrane.  Nonicteric sclerae.

LUNGS:  Few scattered crepitation.  Decreased air entry at bases.

HEART:  Positive S1 and S2 with increased rate.

ABDOMEN:  Soft with mild distention.  No mass or organomegaly.  No rebound

tenderness or guarding, but slight generalized tenderness.

EXTREMITIES:  Without significant clubbing, cyanosis or edema.

NEUROLOGIC:  No new reported neurological deficits, sensory or motor.



It has to be mentioned that the patient tolerated oral intake and had bowel

movement recently _____.



IMPRESSION:

1.  Incarcerated abdominal wall hernia, treated surgically with partial

small bowel resection.

2.  Re-exacerbation of peptic ulcer disease.

3.  Anemia secondary to above.

4.  Poorly controlled diabetes mellitus.

5.  Hypertension by history.

6.  Status post blood transfusion.



SUGGESTIONS:

1.  Agree with your plan.

2.  Repeat H and H post blood transfusion.





__________________________________________

Earle Chilel MD



cc:  Earle Chilel MD



DD:  10/19/2017 17:43:18

DT:  10/19/2017 19:30:59

Job # [id number]

## 2017-10-19 NOTE — PN
DATE:



LOCATION:  [facility], bed A.



SUBJECTIVE:  This is a 66-year-old male seen and examined early in rounds

without significant clinical changes, but less abdominal pain reported,

without reported active bleeding.  No nausea or vomiting.  Had bowel

movement recently and was passing gas.



The entire chart is reviewed including, but not limited to the most recent

lab and radiology study results, current and previous medication list, and

current and previous medical events.  Case discussed at length with the

staff in the floor.  Most recent lab results showed white blood cell of

13.0 with low hemoglobin 8.2, low hematocrit 24.7, with low sodium 129, low

potassium 3.5 with increased CO2 content of 31 indicative of respiratory

alkalosis.  Blood glucose level is 212 with low albumin 2.6.



PHYSICAL EXAMINATION:

GENERAL:  A 66-year-old male, awake, alert, and oriented.

VITAL SIGNS:  Afebrile with heart rate of 80, respiratory rate of 20 to 22,

and blood pressure 128/78.

HEENT:  Showed pale, dry oral mucous membrane.  Nonicteric sclerae.

LUNGS:  Few scattered crepitation.  Mild decreased air entry.

HEART:  Positive S1 and S2.

ABDOMEN:  Soft with slight distention with clean dressing.

EXTREMITIES:  Without significant edema, clubbing, or cyanosis.

NEUROLOGIC:  No new reported neurological deficits, sensory, or motor.



IMPRESSION:

1.  Recent history of incarcerated abdominal ventral hernia, treated

surgically with partial small bowel resection.

2.  Re-exacerbation of peptic ulcer disease.

3.  Anemia secondary to above.

4.  Reported melena before with subsequent drop of hemoglobin and

hematocrit, none today.

5.  Poorly controlled diabetes mellitus.

6.  Known history of hypertension.



SUGGESTIONS:

1.  Continue current management.

2.  Blood transfusion as needed to keep hemoglobin around _____.













3.  Due to the patient's elevated CEA level, colonoscopy is to be kept in

mind, could be done as an outpatient.





__________________________________________

Earle Chilel MD











DD:  10/18/2017 13:23:19

DT:  10/18/2017 14:23:22

Job # [id number]

## 2017-10-19 NOTE — PN
DATE:



SUBJECTIVE:  The patient is currently undergoing packed RBC transfusion. 

He denies any chest pain or shortness of breath.



PHYSICAL EXAMINATION:

VITAL SIGNS:  Blood pressure 156/83, heart rate 97, temperature 98.6, and

respirations 20.

HEENT:  Pale conjunctivae.

CHEST:  Diminished breath sounds over the bases.

HEART:  S1 and S2 regular.

ABDOMEN:  Positive bowel sounds.

EXTREMITIES:  Trace leg edema.



LABORATORY DATA:  Today's hemoglobin and hematocrit 7.9 and 23.3, white

count 14.4, and platelet count 107,000.  SMA-7; sodium 128, potassium 3.4,

chloride 91, CO2 of 30, glucose 183, BUN 19, and creatinine 0.8.



ASSESSMENT:

1.  Status post anterior _____ abdominal hernia repair and small bowel

resection.

2.  Hyponatremia and hypokalemia.

3.  Hypertension.

4.  Anemia requiring packed red blood cell transfusion.



CONDITIONS:  Continue clonidine patch at 0.1 mg daily.  Continue oral K-Dur

20 mEq daily.  Discontinue dietary sodium restriction.  Administer Lasix 20

mg IV push after the completion of packed RBC transfusion, obtained CBC and

SMA-7 in a.m.





__________________________________________

Cezar Clark MD





DD:  10/19/2017 13:00:49

DT:  10/19/2017 13:25:57

Job # [id number]

## 2017-10-19 NOTE — CP.PCM.PN
Subjective





- Date & Time of Evaluation


Date of Evaluation: 10/19/17


Time of Evaluation: 07:00





- Subjective


Subjective: 


GENERAL SURGERY PROGRESS NOTE FOR DR. DAVE





Patient seen and examined at bedside. Patient had a BM last night and reports 

passing a lot of flatus. He denies abdominal pain but does have it 

occasionally. He states the pain is unrelated to eating. Denies nausea or 

vomiting. He ambulated with PT.





Objective





- Vital Signs/Intake and Output


Vital Signs (last 24 hours): 


 











Temp Pulse Resp BP Pulse Ox


 


 98.8 F   95 H  20   139/82   97 


 


 10/19/17 13:28  10/19/17 13:28  10/19/17 13:28  10/19/17 13:28  10/19/17 08:08








Intake and Output: 


 











 10/19/17 10/19/17





 06:59 18:59


 


Intake Total 520 1570


 


Output Total 350 


 


Balance 170 1570














- Medications


Medications: 


 Current Medications





Benzocaine/Menthol (Cepacol Sore Throat)  1 lexx MT Q2 PRN


   PRN Reason: Sore Throat


   Last Admin: 10/15/17 09:19 Dose:  1 lexx


Clonidine HCl (Catapres Tts1 0.1 Mg/24 Hr)  1 patch TD Q7D@1000 [facility]


   Last Admin: 10/18/17 09:58 Dose:  1 patch


Famotidine (Pepcid)  20 mg IVP Q12 [facility]


   Last Admin: 10/19/17 10:34 Dose:  20 mg


Heparin Sodium (Porcine) (Heparin)  5,000 units SC Q8 [facility]


   Last Admin: 10/16/17 22:50 Dose:  Not Given


Piperacillin Sod/Tazobactam Sod (Zosyn 3.375 Gm Iv Premix)  3.375 gm in 50 mls 

@ 100 mls/hr IVPB Q6H [facility]


   Last Admin: 10/19/17 15:03 Dose:  100 mls/hr


Chromium/Copper/Manganese/Zinc (1 ml/ Amino Acids)  1,001 mls @ 65 mls/hr IV 

.[id number] [facility]


   Stop: 10/19/17 17:59


   Last Admin: 10/19/17 10:37 Dose:  65 mls/hr


Chromium/Copper/Manganese/Zinc 1 ml/ Multivitamins/Vitamin C 10 ml/ Amino Acids

  1,011 mls @ 65 mls/hr IV .[id number] [facility]


   Stop: 10/20/17 09:33


Insulin Aspart (Novolog)  0 unit SC ACHS [facility]


   PRN Reason: Protocol


   Last Admin: 10/19/17 12:22 Dose:  2 unit


Ondansetron HCl (Zofran Inj)  4 mg IVP Q4 PRN


   PRN Reason: nausea


   Last Admin: 10/17/17 14:41 Dose:  4 mg


Potassium Chloride (K-Dur 20 Meq Er Tab)  20 meq PO BID [facility]


   Last Admin: 10/12/17 19:29 Dose:  Not Given


Potassium Chloride (K-Dur 20 Meq Er Tab)  20 meq PO DAILY [facility]


   Last Admin: 10/19/17 10:34 Dose:  20 meq











- Labs


Labs: 


 





 10/19/17 06:45 





 10/19/17 06:45 





 











PT  15.5 SECONDS (9.7-12.2)  H  10/09/17  08:47    


 


INR  1.4   10/09/17  08:47    


 


APTT  31 SECONDS (21-34)   10/09/17  08:47    














- Constitutional


Appears: Non-toxic, No Acute Distress





- Head Exam


Head Exam: ATRAUMATIC, NORMAL INSPECTION





- Eye Exam


Eye Exam: EOMI, Normal appearance





- Respiratory Exam


Respiratory Exam: NORMAL BREATHING PATTERN.  absent: Respiratory Distress





- Cardiovascular Exam


Cardiovascular Exam: +S1, +S2





- GI/Abdominal Exam


GI & Abdominal Exam: Soft.  absent: Distended, Firm, Guarding, Rigid, Tenderness

, Rebound


Additional comments: 


Staples in place, abdominal binder in place





- Neurological Exam


Neurological Exam: Alert, Awake, Oriented x3





- Psychiatric Exam


Psychiatric exam: Normal Affect, Normal Mood





- Skin


Skin Exam: Dry, Normal Color, Warm





Assessment and Plan





- Assessment and Plan (Free Text)


Assessment: 


67yo M with recurrent ventral hernia and thrombocytopenia s/p exploratory 

laparotomy, extensive BREANNA, repair of recurrent ventral hernia with biologic mesh

, small bowel resection with primary anastomosis POD#10





- Afebrile, VSS


- Leukocytosis WBC 14.4 (13 yesterday)


- Hemoglobin 7.9 (was 8.2 yesterday), pt asymptomatic


- Thrombocytopenia


- Hypokalemia K 3.4 - 20meq PO daily


- Continue Physical therapy 


- On regular diet


- Encouraged OOB, ambulation, and IS use


- Heparin held due to decreased Hgb, will continue to monitor


- Discussed plan with Dr. Selma Ny PGY-3

## 2017-10-19 NOTE — CP.PCM.PN
Subjective





- Date & Time of Evaluation


Date of Evaluation: 10/19/17


Time of Evaluation: 21:00





- Subjective


Subjective: 





Pt is doing well, his platelets are up, he is undergoing blood transfusion, he 

is ambulating, moving bowels and preogressively improving clinically





Objective





- Vital Signs/Intake and Output


Vital Signs (last 24 hours): 


 











Temp Pulse Resp BP Pulse Ox


 


 98.9 F   109 H  20   131/79   96 


 


 10/19/17 16:00  10/19/17 16:00  10/19/17 16:00  10/19/17 16:00  10/19/17 16:00








Intake and Output: 


 











 10/19/17 10/20/17





 18:59 06:59


 


Intake Total 1570 


 


Balance 1570 














- Medications


Medications: 


 Current Medications





Benzocaine/Menthol (Cepacol Sore Throat)  1 lexx MT Q2 PRN


   PRN Reason: Sore Throat


   Last Admin: 10/15/17 09:19 Dose:  1 lexx


Clonidine HCl (Catapres Tts1 0.1 Mg/24 Hr)  1 patch TD Q7D@1000 [facility]


   Last Admin: 10/18/17 09:58 Dose:  1 patch


Famotidine (Pepcid)  20 mg IVP Q12 [facility]


   Last Admin: 10/19/17 21:31 Dose:  20 mg


Heparin Sodium (Porcine) (Heparin)  5,000 units SC Q8 [facility]


   Last Admin: 10/16/17 22:50 Dose:  Not Given


Piperacillin Sod/Tazobactam Sod (Zosyn 3.375 Gm Iv Premix)  3.375 gm in 50 mls 

@ 100 mls/hr IVPB Q6H [facility]


   Last Admin: 10/19/17 21:31 Dose:  100 mls/hr


Chromium/Copper/Manganese/Zinc 1 ml/ Multivitamins/Vitamin C 10 ml/ Amino Acids

  1,011 mls @ 65 mls/hr IV .[id number] [facility]


   Stop: 10/20/17 09:33


   Last Admin: 10/19/17 18:50 Dose:  65 mls/hr


Chromium/Copper/Manganese/Zinc (1 ml/ Amino Acids)  1,001 mls @ 65 mls/hr IV 

.[id number] [facility]


   Stop: 10/21/17 00:53


Insulin Aspart (Novolog)  0 unit SC ACHS [facility]


   PRN Reason: Protocol


   Last Admin: 10/19/17 21:22 Dose:  Not Given


Ondansetron HCl (Zofran Inj)  4 mg IVP Q4 PRN


   PRN Reason: nausea


   Last Admin: 10/17/17 14:41 Dose:  4 mg


Oxycodone/Acetaminophen (Percocet 5/325 Mg Tab)  1 tab PO Q4H PRN


   PRN Reason: Pain, moderate (4-7)


   Stop: 10/22/17 20:32


   Last Admin: 10/19/17 21:31 Dose:  1 tab


Potassium Chloride (K-Dur 20 Meq Er Tab)  20 meq PO BID [facility]


   Last Admin: 10/12/17 19:29 Dose:  Not Given


Potassium Chloride (K-Dur 20 Meq Er Tab)  20 meq PO DAILY [facility]


   Last Admin: 10/19/17 10:34 Dose:  20 meq











- Labs


Labs: 


 





 10/19/17 06:45 





 10/19/17 06:45 





 











PT  15.5 SECONDS (9.7-12.2)  H  10/09/17  08:47    


 


INR  1.4   10/09/17  08:47    


 


APTT  31 SECONDS (21-34)   10/09/17  08:47    














- Constitutional


Appears: No Acute Distress





- Head Exam


Head Exam: ATRAUMATIC, NORMAL INSPECTION, NORMOCEPHALIC





- Eye Exam


Eye Exam: EOMI, Normal appearance, PERRL


Pupil Exam: NORMAL ACCOMODATION, PERRL





- Respiratory Exam


Respiratory Exam: Clear to Ausculation Bilateral, NORMAL BREATHING PATTERN





- Cardiovascular Exam


Cardiovascular Exam: REGULAR RHYTHM, +S1, +S2.  absent: Murmur





- GI/Abdominal Exam


GI & Abdominal Exam: Soft, Normal Bowel Sounds.  absent: Tenderness





Assessment and Plan


(1) Intestinal obstruction


Status: Acute   





(2) Thrombocytopenia


Status: Acute   





(3) Ventral hernia


Status: Acute   





(4) HTN (hypertension)


Status: Acute   





(5) Diabetes mellitus


Status: Chronic   





- Assessment and Plan (Free Text)


Plan: 





physical therapy


wound care


out of bed from chair

## 2017-10-20 LAB
BASOPHILS # BLD AUTO: 0.1 K/UL (ref 0–0.2)
BASOPHILS NFR BLD: 0.6 % (ref 0–2)
BUN SERPL-MCNC: 14 MG/DL (ref 9–20)
CALCIUM SERPL-MCNC: 7.7 MG/DL (ref 8.6–10.4)
CHLORIDE SERPL-SCNC: 93 MMOL/L (ref 98–107)
CO2 SERPL-SCNC: 29 MMOL/L (ref 22–30)
EOSINOPHIL # BLD AUTO: 0 K/UL (ref 0–0.7)
EOSINOPHIL NFR BLD: 0.2 % (ref 0–4)
ERYTHROCYTE [DISTWIDTH] IN BLOOD BY AUTOMATED COUNT: 20.5 % (ref 11.5–14.5)
GLUCOSE SERPL-MCNC: 164 MG/DL (ref 75–110)
HCT VFR BLD CALC: 24.5 % (ref 35–51)
LYMPHOCYTES # BLD AUTO: 2.3 K/UL (ref 1–4.3)
LYMPHOCYTES NFR BLD AUTO: 14.6 % (ref 20–40)
MCH RBC QN AUTO: 34 PG (ref 27–31)
MCHC RBC AUTO-ENTMCNC: 34 G/DL (ref 33–37)
MCV RBC AUTO: 100 FL (ref 80–94)
MONOCYTES # BLD: 1.7 K/UL (ref 0–0.8)
MONOCYTES NFR BLD: 10.5 % (ref 0–10)
NRBC BLD AUTO-RTO: 0.1 % (ref 0–2)
PLATELET # BLD: 125 K/UL (ref 130–400)
PMV BLD AUTO: 9.6 FL (ref 7.2–11.7)
POTASSIUM SERPL-SCNC: 3.3 MMOL/L (ref 3.6–5.2)
SODIUM SERPL-SCNC: 130 MMOL/L (ref 132–148)
WBC # BLD AUTO: 15.8 K/UL (ref 4.8–10.8)

## 2017-10-20 RX ADMIN — INSULIN ASPART SCH: 100 INJECTION, SOLUTION INTRAVENOUS; SUBCUTANEOUS at 21:37

## 2017-10-20 RX ADMIN — TAZOBACTAM SODIUM AND PIPERACILLIN SODIUM SCH MLS/HR: 375; 3 INJECTION, SOLUTION INTRAVENOUS at 04:09

## 2017-10-20 RX ADMIN — TAZOBACTAM SODIUM AND PIPERACILLIN SODIUM SCH MLS/HR: 375; 3 INJECTION, SOLUTION INTRAVENOUS at 21:13

## 2017-10-20 RX ADMIN — INSULIN ASPART SCH UNIT: 100 INJECTION, SOLUTION INTRAVENOUS; SUBCUTANEOUS at 17:21

## 2017-10-20 RX ADMIN — POTASSIUM CHLORIDE SCH MEQ: 20 TABLET, EXTENDED RELEASE ORAL at 17:21

## 2017-10-20 RX ADMIN — TAZOBACTAM SODIUM AND PIPERACILLIN SODIUM SCH MLS/HR: 375; 3 INJECTION, SOLUTION INTRAVENOUS at 10:00

## 2017-10-20 RX ADMIN — INSULIN ASPART SCH UNIT: 100 INJECTION, SOLUTION INTRAVENOUS; SUBCUTANEOUS at 07:50

## 2017-10-20 RX ADMIN — OXYCODONE HYDROCHLORIDE AND ACETAMINOPHEN PRN TAB: 5; 325 TABLET ORAL at 16:06

## 2017-10-20 RX ADMIN — POTASSIUM CHLORIDE SCH MEQ: 20 TABLET, EXTENDED RELEASE ORAL at 10:22

## 2017-10-20 RX ADMIN — OXYCODONE HYDROCHLORIDE AND ACETAMINOPHEN PRN TAB: 5; 325 TABLET ORAL at 08:28

## 2017-10-20 RX ADMIN — TAZOBACTAM SODIUM AND PIPERACILLIN SODIUM SCH MLS/HR: 375; 3 INJECTION, SOLUTION INTRAVENOUS at 16:06

## 2017-10-20 RX ADMIN — INSULIN ASPART SCH UNIT: 100 INJECTION, SOLUTION INTRAVENOUS; SUBCUTANEOUS at 12:00

## 2017-10-20 NOTE — PN
DATE:



SUBJECTIVE:  The patient denies chest pain, shortness of breath or

abdominal pain.  He received 1 unit of packed RBC transfusion yesterday.



PHYSICAL EXAMINATION:

VITAL SIGNS:  Blood pressure 120/68, heart rate 90, temperature 98.4,

respirations 18.

HEENT:  Pale conjunctivae.

HEART:  S1 and S2 regular.

CHEST:  Clear.

ABDOMEN:  Soft.

EXTREMITIES:  Trace leg edema.



LABORATORY DATA:  Today's hemoglobin and hematocrit 8.3 and 24.5, white

count 15.8, and platelet count 125,000.  SMA-7:  Sodium 130, potassium 3.3,

chloride 93, CO2 of 29, glucose 164, BUN 14, and creatinine 0.7.  Abdomen

and pelvic CT scan was performed, the report is still pending.



ASSESSMENT:

1.  Hypertension.

2.  Diastolic left ventricular dysfunction.

3.  Anemia.

4.  Status post anterior abdominal wall hernia repair with small bowel

resection.

5.  Hypokalemia.

6.  Mild thrombocytopenia.



RECOMMENDATIONS:  Continue clonidine patch, continue IV Zosyn at 3.375 g q.

6 hours.  K-Dur was increased to 20 mEq oral twice a day, obtain a followup

BNP in a.m.





__________________________________________

Cezar Clark MD



DD:  10/20/2017 16:24:25

DT:  10/20/2017 19:12:41

Job # [id number]

## 2017-10-20 NOTE — CP.PCM.PN
Subjective





- Date & Time of Evaluation


Date of Evaluation: 10/20/17


Time of Evaluation: 19:00





- Subjective


Subjective: 





Pt seen and evalauted by me at bedside





Objective





- Vital Signs/Intake and Output


Vital Signs (last 24 hours): 


 











Temp Pulse Resp BP Pulse Ox


 


 98.4 F   89   20   122/77   100 


 


 10/20/17 16:03  10/20/17 16:03  10/20/17 16:03  10/20/17 16:03  10/20/17 16:03








Intake and Output: 


 











 10/20/17 10/21/17





 18:59 06:59


 


Intake Total  340


 


Balance  340














- Medications


Medications: 


 Current Medications





Benzocaine/Menthol (Cepacol Sore Throat)  1 lexx MT Q2 PRN


   PRN Reason: Sore Throat


   Last Admin: 10/15/17 09:19 Dose:  1 lexx


Clonidine HCl (Catapres Tts1 0.1 Mg/24 Hr)  1 patch TD Q7D@1000 [facility]


   Last Admin: 10/18/17 09:58 Dose:  1 patch


Famotidine (Pepcid)  20 mg IVP Q12 [facility]


   Last Admin: 10/20/17 21:13 Dose:  20 mg


Heparin Sodium (Porcine) (Heparin)  5,000 units SC Q8 [facility]


   Last Admin: 10/16/17 22:50 Dose:  Not Given


Piperacillin Sod/Tazobactam Sod (Zosyn 3.375 Gm Iv Premix)  3.375 gm in 50 mls 

@ 100 mls/hr IVPB Q6H [facility]


   Last Admin: 10/20/17 21:13 Dose:  100 mls/hr


Insulin Aspart (Novolog)  0 unit SC ACHS [facility]


   PRN Reason: Protocol


   Last Admin: 10/20/17 21:37 Dose:  Not Given


Morphine Sulfate (Morphine)  2 mg IVP Q4 PRN


   PRN Reason: Pain, severe (8-10)


   Last Admin: 10/20/17 20:01 Dose:  2 mg


Ondansetron HCl (Zofran Inj)  4 mg IVP Q4 PRN


   PRN Reason: nausea


   Last Admin: 10/17/17 14:41 Dose:  4 mg


Oxycodone/Acetaminophen (Percocet 5/325 Mg Tab)  1 tab PO Q4H PRN


   PRN Reason: Pain, moderate (4-7)


   Stop: 10/22/17 20:32


   Last Admin: 10/20/17 16:06 Dose:  1 tab


Potassium Chloride (K-Dur 20 Meq Er Tab)  20 meq PO BID [facility]


   Stop: 10/24/17 18:01


   Last Admin: 10/20/17 17:21 Dose:  20 meq











- Labs


Labs: 


 





 10/20/17 06:22 





 10/20/17 06:22 





 











PT  15.5 SECONDS (9.7-12.2)  H  10/09/17  08:47    


 


INR  1.4   10/09/17  08:47    


 


APTT  31 SECONDS (21-34)   10/09/17  08:47    














Assessment and Plan


(1) Intestinal obstruction


Status: Acute   





(2) Thrombocytopenia


Status: Acute   





(3) Ventral hernia


Status: Acute   





(4) HTN (hypertension)


Status: Acute   





(5) Diabetes mellitus


Status: Chronic

## 2017-10-20 NOTE — CT
PROCEDURE:  CT Abdomen and Pelvis with contrast



HISTORY:

s/p small resection and hernia repair



COMPARISON:

Abdomen pelvis CT without contrast 09/30/2017.



TECHNIQUE:

Helical CT of the abdomen and pelvis was performed following oral and 

intravenous contrast administration.



Contrast dose: Visipaque 320, 100 cc.



Radiation dose:



Total exam DLP = 1374.67 mGy-cm.



This CT exam was performed using one or more of the following dose 

reduction techniques: Automated exposure control, adjustment of the 

mA and/or kV according to patient size, and/or use of iterative 

reconstruction technique.



FINDINGS:



LOWER THORAX:

Small hiatal hernia identified. 



LIVER:

A cirrhotic liver is identified without focal mass.  No definite 

biliary tree dilatation appreciated. 



GALLBLADDER AND BILE DUCTS:

Gallbladder mural thickening is seen though the gallbladder is 

collapsed and is felt to be a function of hydrops related to ascites. 

 Clinically correlate further.  



PANCREAS:

Trace lesser sac fluid is seen with the pancreas grossly nonfocal 

otherwise. Pancreatitis is not favored however clinically correlate 

nevertheless.



SPLEEN:

Unremarkable. 



ADRENALS:

Unremarkable. No mass. 



KIDNEYS AND URETERS:

Intrarenal calculi are again seen at the lower pole left kidney.  No 

obstructive uropathy bilaterally. No radiodense urolithiasis right 

kidney. 



VASCULATURE:

Unremarkable. No aortic aneurysm. 



BOWEL:

Pain status post ventral abdominal hernia repair the skin staples 

identified anteriorly and a questionable match identified at the 

prior hernia site at the mid ventral abdomen.  There is a fluid 

collection identified at the prior operative site at the anterior 

abdominal wall measuring 12.0 x 3.6 x10.8 cm (transverse by 

anteroposterior by superoinferior dimensions). Trace emphysematous 

changes are seen associated with this collection with limited 

peripheral enhancement suspicious for an abscess. Further clinical 

correlation is advised.  Mild abdominal ascites in the upper abdomen. 

No definite free intraperitoneal gas 



APPENDIX:

Normal appendix. 



PERITONEUM:

Reactive density is suggested throughout the mesentery which may be 

postoperative. Infectious or inflammatory changes are not completely 

excluded. Clinically correlate further. 



LYMPH NODES:

Unremarkable. No enlarged lymph nodes. 



BLADDER:

Unremarkable. 



REPRODUCTIVE:

Unremarkable. 



BONES:

No acute fracture. 



OTHER FINDINGS:

None.



IMPRESSION:

Findings suspicious for an abscess within the operative site of 

ventral abdominal hernia repair measuring 12.0 cm greatest dimension 

as discussed above. Alternately, a seroma with gas is a possibility 

or even chronic hematoma, however, clinical correlation is advised 

further. Postop changes are favored over peritonitis in the perineal 

space although mild abdominal ascites is seen as well as in the 

pelvis. No free intraperitoneal gas.



The lesser additional details are as described above.

## 2017-10-20 NOTE — CP.PCM.PN
Subjective





- Date & Time of Evaluation


Date of Evaluation: 10/20/17


Time of Evaluation: 07:00





- Subjective


Subjective: 


GENERAL SURGERY PROGRESS NOTE FOR DR. DAVE





Patient seen and examined at bedside. Patient had a BM yesterday and reports 

passing flatus. He denies abdominal pain but does have it occasionally. He 

states the pain is unrelated to eating. he is tolerating regular diet, denies 

nausea or vomiting. He ambulated with PT and states that he is now able to walk 

past the nursing station.








Objective





- Vital Signs/Intake and Output


Vital Signs (last 24 hours): 


 











Temp Pulse Resp BP Pulse Ox


 


 98.4 F   90   18   120/68   96 


 


 10/20/17 07:00  10/20/17 07:00  10/20/17 07:00  10/20/17 07:00  10/20/17 07:00








Intake and Output: 


 











 10/20/17 10/20/17





 06:59 18:59


 


Intake Total 1820 


 


Output Total 700 


 


Balance 1120 














- Medications


Medications: 


 Current Medications





Benzocaine/Menthol (Cepacol Sore Throat)  1 lexx MT Q2 PRN


   PRN Reason: Sore Throat


   Last Admin: 10/15/17 09:19 Dose:  1 lexx


Clonidine HCl (Catapres Tts1 0.1 Mg/24 Hr)  1 patch TD Q7D@1000 [facility]


   Last Admin: 10/18/17 09:58 Dose:  1 patch


Famotidine (Pepcid)  20 mg IVP Q12 [facility]


   Last Admin: 10/20/17 10:22 Dose:  20 mg


Heparin Sodium (Porcine) (Heparin)  5,000 units SC Q8 [facility]


   Last Admin: 10/16/17 22:50 Dose:  Not Given


Piperacillin Sod/Tazobactam Sod (Zosyn 3.375 Gm Iv Premix)  3.375 gm in 50 mls 

@ 100 mls/hr IVPB Q6H [facility]


   Last Admin: 10/20/17 10:00 Dose:  100 mls/hr


Insulin Aspart (Novolog)  0 unit SC ACHS [facility]


   PRN Reason: Protocol


   Last Admin: 10/20/17 12:00 Dose:  3 unit


Ondansetron HCl (Zofran Inj)  4 mg IVP Q4 PRN


   PRN Reason: nausea


   Last Admin: 10/17/17 14:41 Dose:  4 mg


Oxycodone/Acetaminophen (Percocet 5/325 Mg Tab)  1 tab PO Q4H PRN


   PRN Reason: Pain, moderate (4-7)


   Stop: 10/22/17 20:32


   Last Admin: 10/20/17 08:28 Dose:  1 tab


Potassium Chloride (K-Dur 20 Meq Er Tab)  20 meq PO BID [facility]


   Stop: 10/24/17 18:01











- Labs


Labs: 


 





 10/20/17 06:22 





 10/20/17 06:22 





 











PT  15.5 SECONDS (9.7-12.2)  H  10/09/17  08:47    


 


INR  1.4   10/09/17  08:47    


 


APTT  31 SECONDS (21-34)   10/09/17  08:47    














- Constitutional


Appears: Non-toxic, No Acute Distress





- Head Exam


Head Exam: ATRAUMATIC, NORMAL INSPECTION





- Eye Exam


Eye Exam: EOMI, Normal appearance





- Respiratory Exam


Respiratory Exam: NORMAL BREATHING PATTERN.  absent: Respiratory Distress





- Cardiovascular Exam


Cardiovascular Exam: +S1, +S2





- GI/Abdominal Exam


GI & Abdominal Exam: Soft.  absent: Distended, Firm, Guarding, Rigid, Tenderness

, Rebound


Additional comments: 


staples in place over midline incision





- Neurological Exam


Neurological Exam: Alert, Awake, Oriented x3





- Psychiatric Exam


Psychiatric exam: Normal Affect, Normal Mood





- Skin


Skin Exam: Dry, Normal Color, Warm





Assessment and Plan





- Assessment and Plan (Free Text)


Assessment: 


65yo M with recurrent ventral hernia and thrombocytopenia s/p exploratory 

laparotomy, extensive BREANNA, repair of recurrent ventral hernia with biologic mesh

, small bowel resection with primary anastomosis POD#11





- Afebrile, VSS


- Leukocytosis trending up, WBC 15.8 (14.4 yesterday)


- Hemoglobin 8.3, pt asymptomatic


- Thrombocytopenia, platelets 125


- Hypokalemia K 3.4 - 20meq PO daily and gave an additional 20meq PO today


- Continue Physical therapy 


- On regular diet


- Encouraged OOB, ambulation, and IS use


- Heparin held due to decreased Hgb, will continue to monitor


- Ordered CT Abd/Pelvis with IV and PO contrast to evaluate increasing 

leukocytosis


- Discussed plan with Dr. Selma Ny PGY-3

## 2017-10-21 LAB
BASOPHILS # BLD AUTO: 0.1 K/UL (ref 0–0.2)
BASOPHILS NFR BLD: 0.4 % (ref 0–2)
BUN SERPL-MCNC: 11 MG/DL (ref 9–20)
CALCIUM SERPL-MCNC: 7.8 MG/DL (ref 8.6–10.4)
CHLORIDE SERPL-SCNC: 94 MMOL/L (ref 98–107)
CO2 SERPL-SCNC: 28 MMOL/L (ref 22–30)
EOSINOPHIL # BLD AUTO: 0 K/UL (ref 0–0.7)
EOSINOPHIL NFR BLD: 0.2 % (ref 0–4)
ERYTHROCYTE [DISTWIDTH] IN BLOOD BY AUTOMATED COUNT: 20.5 % (ref 11.5–14.5)
GLUCOSE SERPL-MCNC: 145 MG/DL (ref 75–110)
HCT VFR BLD CALC: 25.3 % (ref 35–51)
LYMPHOCYTES # BLD AUTO: 2.2 K/UL (ref 1–4.3)
LYMPHOCYTES NFR BLD AUTO: 14.9 % (ref 20–40)
MCH RBC QN AUTO: 34.3 PG (ref 27–31)
MCHC RBC AUTO-ENTMCNC: 34.1 G/DL (ref 33–37)
MCV RBC AUTO: 100.4 FL (ref 80–94)
MONOCYTES # BLD: 1.9 K/UL (ref 0–0.8)
MONOCYTES NFR BLD: 12.6 % (ref 0–10)
NRBC BLD AUTO-RTO: 0.2 % (ref 0–2)
PLATELET # BLD: 121 K/UL (ref 130–400)
PMV BLD AUTO: 9.4 FL (ref 7.2–11.7)
POTASSIUM SERPL-SCNC: 3.9 MMOL/L (ref 3.6–5.2)
SODIUM SERPL-SCNC: 127 MMOL/L (ref 132–148)
WBC # BLD AUTO: 14.6 K/UL (ref 4.8–10.8)

## 2017-10-21 RX ADMIN — TAZOBACTAM SODIUM AND PIPERACILLIN SODIUM SCH MLS/HR: 375; 3 INJECTION, SOLUTION INTRAVENOUS at 17:00

## 2017-10-21 RX ADMIN — TAZOBACTAM SODIUM AND PIPERACILLIN SODIUM SCH MLS/HR: 375; 3 INJECTION, SOLUTION INTRAVENOUS at 03:05

## 2017-10-21 RX ADMIN — INSULIN ASPART SCH UNIT: 100 INJECTION, SOLUTION INTRAVENOUS; SUBCUTANEOUS at 12:00

## 2017-10-21 RX ADMIN — POTASSIUM CHLORIDE SCH MEQ: 20 TABLET, EXTENDED RELEASE ORAL at 17:10

## 2017-10-21 RX ADMIN — TAZOBACTAM SODIUM AND PIPERACILLIN SODIUM SCH MLS/HR: 375; 3 INJECTION, SOLUTION INTRAVENOUS at 10:00

## 2017-10-21 RX ADMIN — INSULIN ASPART SCH UNIT: 100 INJECTION, SOLUTION INTRAVENOUS; SUBCUTANEOUS at 17:10

## 2017-10-21 RX ADMIN — TAZOBACTAM SODIUM AND PIPERACILLIN SODIUM SCH MLS/HR: 375; 3 INJECTION, SOLUTION INTRAVENOUS at 21:14

## 2017-10-21 RX ADMIN — INSULIN ASPART SCH: 100 INJECTION, SOLUTION INTRAVENOUS; SUBCUTANEOUS at 21:50

## 2017-10-21 RX ADMIN — INSULIN ASPART SCH UNIT: 100 INJECTION, SOLUTION INTRAVENOUS; SUBCUTANEOUS at 08:45

## 2017-10-21 RX ADMIN — POTASSIUM CHLORIDE SCH MEQ: 20 TABLET, EXTENDED RELEASE ORAL at 10:20

## 2017-10-21 NOTE — PN
DATE:



LOCATION:  [facility], bed A.



SUBJECTIVE:  This is a 66-year-old male seen and examined in rounds with

intermittent periods of abdominal pain on and off post surgically today.



The entire chart is reviewed including but not limited to the most recent

lab and radiology study results, current and previous medication list, and

current and previous medical events.  Case discussed with the staff on the

floor.



Today's lab showed white blood cells of 14.6 with low hemoglobin again of

8.6, low hematocrit 25.3 with thrombocytopenia of 121.  There was low

sodium of 123 and increased blood glucose level of 206 with low calcium of

7.8.



Review of the abdominal and pelvic scan done yesterday indicative of

possible an abscess formation within the operative site of the ventral

abdominal hernia repair measuring about 12 cm versus chronic hematoma with

mild abdominal ascites.



PHYSICAL EXAMINATION:

GENERAL:  A 66 years old male.

VITAL SIGNS:  Afebrile, with pulse of 84, respiratory rate 20-22, blood

pressure of 120/66.

HEENT:  Show pale, dry oral mucous membrane.  Nonicteric sclerae.

LUNGS:  Few scattered crepitation with decreased air entry at bases.

HEART:  Positive S1 and S2.

ABDOMEN:  With slight distention, covered with clean dressing, with

generalized tenderness.  No mass or organomegaly.  No rebound tenderness or

guarding.

EXTREMITIES:  Without significant clubbing, cyanosis, or edema.

NEUROLOGIC:  No reported new neurological deficits, sensory or motor.



IMPRESSION:

1.  Incarcerated abdominal wall ventral hernia, treated surgically with

partial small bowel obstruction.

2.  Recent CAT scan with abnormalities and questionable possible abscess

formation.

3.  Anemia secondary to above.  The patient may be need more blood

transfusion.

4.  Known history of, but not limited to, hypertension with diastolic left

ventricular dysfunction.

5.  Anemia most likely secondary to above.

6.  Thrombocytopenia of unclear etiology.

7.  Electrolyte imbalance with hyponatremia and hypocalcemia.

8.  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

gastrointestinal tract neoplastic lesion.



SUGGESTION:

1.  Agree with your plan.

2.  Blood transfusion to keep hemoglobin around 10 gram%.

3.  Correct any underlying electrolyte imbalance.

4.  Well control of the patient's poorly-controlled diabetes mellitus.

5.  Case is to be discussed again with the surgical consultant team.

6.  MRI of the abdomen and pelvis if the patient's fever plus leukocytosis

persists.

7.  No aggressive GI workup in the meantime until the patient is more

stable clinically.





__________________________________________

Earle Chilel MD



cc:  Earle Chilel MD



DD:  10/21/2017 12:15:57

DT:  10/21/2017 13:48:33

[facility] # [id number]

## 2017-10-21 NOTE — CP.PCM.PN
Subjective





- Date & Time of Evaluation


Date of Evaluation: 10/21/17


Time of Evaluation: 08:41





- Subjective


Subjective: 





ct noted 





 i doubt abscess





will ask Ir for opinion





Objective





- Vital Signs/Intake and Output


Vital Signs (last 24 hours): 


 











Temp Pulse Resp BP Pulse Ox


 


 98.1 F   81   18   111/64   96 


 


 10/21/17 07:20  10/21/17 07:20  10/21/17 07:20  10/21/17 07:20  10/21/17 07:20








Intake and Output: 


 











 10/21/17 10/21/17





 06:59 18:59


 


Intake Total 590 


 


Output Total 400 


 


Balance 190 














- Medications


Medications: 


 Current Medications





Benzocaine/Menthol (Cepacol Sore Throat)  1 lexx MT Q2 PRN


   PRN Reason: Sore Throat


   Last Admin: 10/15/17 09:19 Dose:  1 lexx


Clonidine HCl (Catapres Tts1 0.1 Mg/24 Hr)  1 patch TD Q7D@1000 [facility]


   Last Admin: 10/18/17 09:58 Dose:  1 patch


Famotidine (Pepcid)  20 mg IVP Q12 [facility]


   Last Admin: 10/20/17 21:13 Dose:  20 mg


Heparin Sodium (Porcine) (Heparin)  5,000 units SC Q8 [facility]


   Last Admin: 10/16/17 22:50 Dose:  Not Given


Piperacillin Sod/Tazobactam Sod (Zosyn 3.375 Gm Iv Premix)  3.375 gm in 50 mls 

@ 100 mls/hr IVPB Q6H [facility]


   Last Admin: 10/21/17 03:05 Dose:  100 mls/hr


Insulin Aspart (Novolog)  0 unit SC ACHS [facility]


   PRN Reason: Protocol


   Last Admin: 10/20/17 21:37 Dose:  Not Given


Morphine Sulfate (Morphine)  2 mg IVP Q4 PRN


   PRN Reason: Pain, severe (8-10)


   Last Admin: 10/20/17 20:01 Dose:  2 mg


Ondansetron HCl (Zofran Inj)  4 mg IVP Q4 PRN


   PRN Reason: nausea


   Last Admin: 10/17/17 14:41 Dose:  4 mg


Oxycodone/Acetaminophen (Percocet 5/325 Mg Tab)  1 tab PO Q4H PRN


   PRN Reason: Pain, moderate (4-7)


   Stop: 10/22/17 20:32


   Last Admin: 10/20/17 16:06 Dose:  1 tab


Potassium Chloride (K-Dur 20 Meq Er Tab)  20 meq PO BID [facility]


   Stop: 10/24/17 18:01


   Last Admin: 10/20/17 17:21 Dose:  20 meq











- Labs


Labs: 


 





 10/21/17 06:44 





 10/21/17 06:44 





 











PT  15.5 SECONDS (9.7-12.2)  H  10/09/17  08:47    


 


INR  1.4   10/09/17  08:47    


 


APTT  31 SECONDS (21-34)   10/09/17  08:47

## 2017-10-21 NOTE — PN
DATE:



LOCATION:  [facility], bed A.



SUBJECTIVE:  This is a 66-year-old male, seen and examined in rounds

without significant clinical changes, tolerating oral intake somewhat,

without nausea or vomiting.



The entire chart is reviewed including, but not limited to the most recent

lab and radiology study results, current and the previous medication list,

and current and the previous medical events.



Today's lab showed white blood cells of 15.8 with low hemoglobin 8.3, low

hematocrit 24.5, with thrombocytopenia of 125 with blood glucose level 204

with low calcium 7.7, with low potassium 3.3, with low sodium 130.



PHYSICAL EXAMINATION:

GENERAL:  A 66-year-old male.

VITAL SIGNS:  Afebrile with pulse of 92, respiratory rate 20-22, blood

pressure 124/66.

HEENT:  Show pale, dry oral mucous membrane.  Nonicteric sclerae.

LUNGS:  Few scattered crepitation.  Decreased air entry at bases.

HEART:  Positive S1 and S2 with increased rate.

ABDOMEN:  Soft.  Bowel sounds are present.  No mass or organomegaly.  No

rebound tenderness or guarding.

NEUROLOGIC:  No new reported neurological deficits, sensory or motor.

EXTREMITIES:  Without significant clubbing, cyanosis or edema.



IMPRESSION:

1.  Status post incarcerated abdominal wall hernia, treated surgically.

2.  Status post partial small bowel resection.

3.  Re-exacerbation of peptic ulcer disease.

4.  Anemia secondary to above.

5.  Known history of hypertension, diabetes mellitus.



SUGGESTION:

1.  Continue current management.

2.  Due to the patient anemia, repeat CBC to be followed up.

3.  Increased CEA level.  We will need colonoscopy that could be done as an

outpatient.  When the patient is more stable clinically.  No aggressive GI

workup in the meantime until the patient is more stable clinically.















__________________________________________

Earle Chilel MD



cc:  Earle Chilel MD



DD:  10/20/2017 11:15:48

DT:  10/20/2017 11:59:34

[facility] # [id number]

## 2017-10-21 NOTE — CP.PCM.PN
Subjective





- Date & Time of Evaluation


Date of Evaluation: 10/21/17


Time of Evaluation: 20:00





- Subjective


Subjective: 


pt seen & evaluated by me today, CT of abdomen shows questionable collection of 

fluid





Objective





- Vital Signs/Intake and Output


Vital Signs (last 24 hours): 


 











Temp Pulse Resp BP Pulse Ox


 


 97.6 F   89   20   129/79   96 


 


 10/21/17 20:00  10/21/17 15:00  10/21/17 15:00  10/21/17 15:00  10/21/17 15:00








Intake and Output: 


 











 10/21/17 10/22/17





 18:59 06:59


 


Intake Total  420


 


Balance  420














- Medications


Medications: 


 Current Medications





Benzocaine/Menthol (Cepacol Sore Throat)  1 lexx MT Q2 PRN


   PRN Reason: Sore Throat


   Last Admin: 10/15/17 09:19 Dose:  1 lexx


Clonidine HCl (Catapres Tts1 0.1 Mg/24 Hr)  1 patch TD Q7D@1000 [facility]


   Last Admin: 10/18/17 09:58 Dose:  1 patch


Famotidine (Pepcid)  20 mg IVP Q12 [facility]


   Last Admin: 10/21/17 21:13 Dose:  20 mg


Heparin Sodium (Porcine) (Heparin)  5,000 units SC Q8 [facility]


   Last Admin: 10/16/17 22:50 Dose:  Not Given


Piperacillin Sod/Tazobactam Sod (Zosyn 3.375 Gm Iv Premix)  3.375 gm in 50 mls 

@ 100 mls/hr IVPB Q6H [facility]


   Last Admin: 10/21/17 21:14 Dose:  100 mls/hr


Insulin Aspart (Novolog)  0 unit SC ACHS [facility]


   PRN Reason: Protocol


   Last Admin: 10/21/17 21:50 Dose:  Not Given


Morphine Sulfate (Morphine)  2 mg IVP Q4 PRN


   PRN Reason: Pain, severe (8-10)


   Last Admin: 10/21/17 19:09 Dose:  2 mg


Ondansetron HCl (Zofran Inj)  4 mg IVP Q4 PRN


   PRN Reason: nausea


   Last Admin: 10/17/17 14:41 Dose:  4 mg


Oxycodone/Acetaminophen (Percocet 5/325 Mg Tab)  1 tab PO Q4H PRN


   PRN Reason: Pain, moderate (4-7)


   Stop: 10/22/17 20:32


   Last Admin: 10/20/17 16:06 Dose:  1 tab


Potassium Chloride (K-Dur 20 Meq Er Tab)  20 meq PO BID [facility]


   Stop: 10/24/17 18:01


   Last Admin: 10/21/17 17:10 Dose:  20 meq











- Labs


Labs: 


 





 10/21/17 06:44 





 10/21/17 06:44 





 











PT  15.5 SECONDS (9.7-12.2)  H  10/09/17  08:47    


 


INR  1.4   10/09/17  08:47    


 


APTT  31 SECONDS (21-34)   10/09/17  08:47    














- Constitutional


Appears: No Acute Distress





- Head Exam


Head Exam: ATRAUMATIC, NORMAL INSPECTION, NORMOCEPHALIC





- Eye Exam


Eye Exam: EOMI, Normal appearance, PERRL


Pupil Exam: NORMAL ACCOMODATION, PERRL





- Cardiovascular Exam


Cardiovascular Exam: REGULAR RHYTHM, +S1, +S2.  absent: Murmur





- GI/Abdominal Exam


GI & Abdominal Exam: Soft, Normal Bowel Sounds, Organomegaly





- Neurological Exam


Neurological Exam: Alert, Awake, CN II-XII Intact, Normal Gait, Oriented x3





Assessment and Plan


(1) Intestinal obstruction


Status: Acute   





(2) Thrombocytopenia


Status: Acute   





(3) Ventral hernia


Status: Acute   





(4) HTN (hypertension)


Status: Acute   





(5) Diabetes mellitus


Status: Chronic

## 2017-10-21 NOTE — PN
DATE:



SUBJECTIVE:  The patient denies chest pain or abdominal pain.



PHYSICAL EXAMINATION

VITAL SIGNS:  Blood pressure 11/64, heart rate 81, temperature 98.1,

respirations 18.

HEENT:  Normocephalic.

CHEST:  Clear.

HEART:  S1 and S2 regular.

ABDOMEN:  Soft.

EXTREMITIES:  Trace pedal edema.



LABORATORY DATA:  Hemoglobin and hematocrit 8.6 and 25.3, white count 14.6,

platelet count 121,000.  SMA-7; sodium 137, potassium 3.9, chloride 94, CO2

of 28, glucose 145, BUN 11, creatinine 0.8.  Abdomen and pelvic CT scan

report has finding suspicious for an abscess within the operative site of a

ventral-abdominal hernia repair measuring 12 cm, greatest dimension. 

Alternatively, a seroma with gas is a possibility or even chronic hematoma;

however, clinical correlation advised.



ASSESSMENT:

1.  Status post ventral-abdominal hernia repair and small bowel resection

with findings suspicious of an abscess, seroma or hematoma, measuring 12 cm

in its greatest dimension.

2.  Hypertension.

3.  Improved hypokalemia.

4.  Hyponatremia.

5.  Uncontrolled diabetes mellitus.

6.  Anemia.



RECOMMENDATIONS:  Continue current clonidine patch.  Continue K-Dur 20 mg

once a day.  Discontinue subcutaneous heparin.  Continue IV Zosyn at 3.375

g q.6 hours





__________________________________________

Cezar Clark MD





DD:  10/21/2017 14:12:44

DT:  10/21/2017 14:40:42

Job # [id number]

## 2017-10-22 LAB
BASOPHILS # BLD AUTO: 0.1 K/UL (ref 0–0.2)
BASOPHILS NFR BLD: 0.5 % (ref 0–2)
BUN SERPL-MCNC: 9 MG/DL (ref 9–20)
CALCIUM SERPL-MCNC: 7.6 MG/DL (ref 8.6–10.4)
CHLORIDE SERPL-SCNC: 95 MMOL/L (ref 98–107)
CO2 SERPL-SCNC: 28 MMOL/L (ref 22–30)
EOSINOPHIL # BLD AUTO: 0 K/UL (ref 0–0.7)
EOSINOPHIL NFR BLD: 0.2 % (ref 0–4)
ERYTHROCYTE [DISTWIDTH] IN BLOOD BY AUTOMATED COUNT: 20.4 % (ref 11.5–14.5)
GLUCOSE SERPL-MCNC: 120 MG/DL (ref 75–110)
HCT VFR BLD CALC: 24.8 % (ref 35–51)
LYMPHOCYTES # BLD AUTO: 2.3 K/UL (ref 1–4.3)
LYMPHOCYTES NFR BLD AUTO: 18.2 % (ref 20–40)
MCH RBC QN AUTO: 34.1 PG (ref 27–31)
MCHC RBC AUTO-ENTMCNC: 33.9 G/DL (ref 33–37)
MCV RBC AUTO: 100.7 FL (ref 80–94)
MONOCYTES # BLD: 1.7 K/UL (ref 0–0.8)
MONOCYTES NFR BLD: 13.4 % (ref 0–10)
NRBC BLD AUTO-RTO: 0 % (ref 0–2)
PLATELET # BLD: 152 K/UL (ref 130–400)
PMV BLD AUTO: 9.8 FL (ref 7.2–11.7)
POTASSIUM SERPL-SCNC: 4 MMOL/L (ref 3.6–5.2)
SODIUM SERPL-SCNC: 127 MMOL/L (ref 132–148)
WBC # BLD AUTO: 12.4 K/UL (ref 4.8–10.8)

## 2017-10-22 RX ADMIN — TAZOBACTAM SODIUM AND PIPERACILLIN SODIUM SCH MLS/HR: 375; 3 INJECTION, SOLUTION INTRAVENOUS at 21:29

## 2017-10-22 RX ADMIN — INSULIN ASPART SCH UNIT: 100 INJECTION, SOLUTION INTRAVENOUS; SUBCUTANEOUS at 12:00

## 2017-10-22 RX ADMIN — TAZOBACTAM SODIUM AND PIPERACILLIN SODIUM SCH MLS/HR: 375; 3 INJECTION, SOLUTION INTRAVENOUS at 17:00

## 2017-10-22 RX ADMIN — POTASSIUM CHLORIDE SCH MEQ: 20 TABLET, EXTENDED RELEASE ORAL at 17:31

## 2017-10-22 RX ADMIN — INSULIN ASPART SCH: 100 INJECTION, SOLUTION INTRAVENOUS; SUBCUTANEOUS at 07:30

## 2017-10-22 RX ADMIN — INSULIN ASPART SCH UNIT: 100 INJECTION, SOLUTION INTRAVENOUS; SUBCUTANEOUS at 17:30

## 2017-10-22 RX ADMIN — OXYCODONE HYDROCHLORIDE AND ACETAMINOPHEN PRN TAB: 5; 325 TABLET ORAL at 09:46

## 2017-10-22 RX ADMIN — TAZOBACTAM SODIUM AND PIPERACILLIN SODIUM SCH MLS/HR: 375; 3 INJECTION, SOLUTION INTRAVENOUS at 03:39

## 2017-10-22 RX ADMIN — INSULIN ASPART SCH: 100 INJECTION, SOLUTION INTRAVENOUS; SUBCUTANEOUS at 21:29

## 2017-10-22 RX ADMIN — POTASSIUM CHLORIDE SCH MEQ: 20 TABLET, EXTENDED RELEASE ORAL at 10:50

## 2017-10-22 RX ADMIN — TAZOBACTAM SODIUM AND PIPERACILLIN SODIUM SCH MLS/HR: 375; 3 INJECTION, SOLUTION INTRAVENOUS at 10:00

## 2017-10-22 NOTE — CP.PCM.PN
Subjective





- Date & Time of Evaluation


Date of Evaluation: 10/22/17


Time of Evaluation: 07:00





- Subjective


Subjective: 


General Surgery


Dr. Hahn





Pt seen and examined @bedside. NAEO. no complaints. pt reports abd pain only w/ 

movement. denies F/C, N/V, D/C. tolerating regular diet. 





Objective





- Vital Signs/Intake and Output


Vital Signs (last 24 hours): 


 











Temp Pulse Resp BP Pulse Ox


 


 98.5 F   84   20   129/68   96 


 


 10/21/17 23:40  10/21/17 23:40  10/21/17 23:40  10/22/17 06:35  10/21/17 23:40








Intake and Output: 


 











 10/22/17 10/22/17





 06:59 18:59


 


Intake Total 670 


 


Output Total 300 


 


Balance 370 














- Medications


Medications: 


 Current Medications





Benzocaine/Menthol (Cepacol Sore Throat)  1 lexx MT Q2 PRN


   PRN Reason: Sore Throat


   Last Admin: 10/15/17 09:19 Dose:  1 lexx


Clonidine HCl (Catapres Tts1 0.1 Mg/24 Hr)  1 patch TD Q7D@1000 [facility]


   Last Admin: 10/18/17 09:58 Dose:  1 patch


Famotidine (Pepcid)  20 mg IVP Q12 [facility]


   Last Admin: 10/21/17 21:13 Dose:  20 mg


Heparin Sodium (Porcine) (Heparin)  5,000 units SC Q8 [facility]


   Last Admin: 10/16/17 22:50 Dose:  Not Given


Piperacillin Sod/Tazobactam Sod (Zosyn 3.375 Gm Iv Premix)  3.375 gm in 50 mls 

@ 100 mls/hr IVPB Q6H [facility]


   Last Admin: 10/22/17 03:39 Dose:  100 mls/hr


Insulin Aspart (Novolog)  0 unit SC ACHS [facility]


   PRN Reason: Protocol


   Last Admin: 10/21/17 21:50 Dose:  Not Given


Morphine Sulfate (Morphine)  2 mg IVP Q4 PRN


   PRN Reason: Pain, severe (8-10)


   Last Admin: 10/22/17 06:35 Dose:  2 mg


Ondansetron HCl (Zofran Inj)  4 mg IVP Q4 PRN


   PRN Reason: nausea


   Last Admin: 10/17/17 14:41 Dose:  4 mg


Oxycodone/Acetaminophen (Percocet 5/325 Mg Tab)  1 tab PO Q4H PRN


   PRN Reason: Pain, moderate (4-7)


   Stop: 10/22/17 20:32


   Last Admin: 10/22/17 09:46 Dose:  1 tab


Potassium Chloride (K-Dur 20 Meq Er Tab)  20 meq PO BID [facility]


   Stop: 10/24/17 18:01


   Last Admin: 10/21/17 17:10 Dose:  20 meq











- Labs


Labs: 


 





 10/22/17 07:12 





 10/22/17 07:12 





 











PT  15.5 SECONDS (9.7-12.2)  H  10/09/17  08:47    


 


INR  1.4   10/09/17  08:47    


 


APTT  31 SECONDS (21-34)   10/09/17  08:47    














- Constitutional


Appears: Non-toxic, No Acute Distress





- Head Exam


Head Exam: NORMAL INSPECTION





- Eye Exam


Eye Exam: Normal appearance





- ENT Exam


ENT Exam: Mucous Membranes Moist





- Respiratory Exam


Respiratory Exam: NORMAL BREATHING PATTERN.  absent: Accessory Muscle Use, 

Respiratory Distress





- Cardiovascular Exam


Cardiovascular Exam: absent: Bradycardia, Tachycardia





- GI/Abdominal Exam


GI & Abdominal Exam: Soft.  absent: Distended, Guarding, Rigid, Tenderness, 

Rebound


Additional comments: 





incision C/D/I


mild erythema around incision. no warmth, induration, fluctuance. no drainage 

noted





- Extremities Exam


Extremities Exam: Normal Inspection





- Neurological Exam


Neurological Exam: Alert, Awake, Oriented x3





- Psychiatric Exam


Psychiatric exam: Normal Affect, Normal Mood





- Skin


Skin Exam: Dry, Intact, Warm





Assessment and Plan





- Assessment and Plan (Free Text)


Assessment: 





67 y/o M POD#13 s/p ventral hernia repair w/ biologic mesh and small bowel 

resection





- leukocytosis trending down - cont to monitor


- hypokalemia - resolved


- repeat CT shows emerson-incisional fluid collection seroma vs abscess - consider 

IR for potential drainage


- cont PT


- encouraged OOB to chair/Amb/IS use





Pt discussed w/ Dr. Selma Garcia DO PGY2

## 2017-10-22 NOTE — CP.PCM.PN
Subjective





- Date & Time of Evaluation


Date of Evaluation: 10/22/17


Time of Evaluation: 20:20





- Subjective


Subjective: 





Pt seen and examined, is feeling better and improving, afebrile, no new 

complains





Objective





- Vital Signs/Intake and Output


Vital Signs (last 24 hours): 


 











Temp Pulse Resp BP Pulse Ox


 


 98.1 F   86   20   147/85   100 


 


 10/22/17 15:22  10/22/17 15:22  10/22/17 15:22  10/22/17 15:22  10/22/17 15:22











- Medications


Medications: 


 Current Medications





Benzocaine/Menthol (Cepacol Sore Throat)  1 lexx MT Q2 PRN


   PRN Reason: Sore Throat


   Last Admin: 10/15/17 09:19 Dose:  1 lexx


Clonidine HCl (Catapres Tts1 0.1 Mg/24 Hr)  1 patch TD Q7D@1000 [facility]


   Last Admin: 10/18/17 09:58 Dose:  1 patch


Famotidine (Pepcid)  20 mg IVP Q12 [facility]


   Last Admin: 10/22/17 10:50 Dose:  20 mg


Heparin Sodium (Porcine) (Heparin)  5,000 units SC Q8 [facility]


   Last Admin: 10/16/17 22:50 Dose:  Not Given


Piperacillin Sod/Tazobactam Sod (Zosyn 3.375 Gm Iv Premix)  3.375 gm in 50 mls 

@ 100 mls/hr IVPB Q6H [facility]


   Last Admin: 10/22/17 17:00 Dose:  100 mls/hr


Insulin Aspart (Novolog)  0 unit SC ACHS [facility]


   PRN Reason: Protocol


   Last Admin: 10/22/17 17:30 Dose:  1 unit


Morphine Sulfate (Morphine)  2 mg IVP Q4 PRN


   PRN Reason: Pain, severe (8-10)


   Last Admin: 10/22/17 18:53 Dose:  2 mg


Ondansetron HCl (Zofran Inj)  4 mg IVP Q4 PRN


   PRN Reason: nausea


   Last Admin: 10/17/17 14:41 Dose:  4 mg


Potassium Chloride (K-Dur 20 Meq Er Tab)  20 meq PO BID [facility]


   Stop: 10/24/17 18:01


   Last Admin: 10/22/17 17:31 Dose:  20 meq











- Labs


Labs: 


 





 10/22/17 07:12 





 10/22/17 07:12 





 











PT  15.5 SECONDS (9.7-12.2)  H  10/09/17  08:47    


 


INR  1.4   10/09/17  08:47    


 


APTT  31 SECONDS (21-34)   10/09/17  08:47    














- Constitutional


Appears: No Acute Distress





- Head Exam


Head Exam: ATRAUMATIC, NORMAL INSPECTION, NORMOCEPHALIC





- Eye Exam


Eye Exam: EOMI, Normal appearance, PERRL


Pupil Exam: NORMAL ACCOMODATION, PERRL





- Respiratory Exam


Respiratory Exam: Decreased Breath Sounds, Clear to Ausculation Bilateral, Rales





- Cardiovascular Exam


Cardiovascular Exam: REGULAR RHYTHM, +S1, +S2.  absent: Murmur





- GI/Abdominal Exam


GI & Abdominal Exam: Soft, Normal Bowel Sounds.  absent: Tenderness





- Neurological Exam


Neurological Exam: Alert, Awake, CN II-XII Intact, Normal Gait, Oriented x3





Assessment and Plan


(1) Intestinal obstruction


Status: Acute   





(2) Thrombocytopenia


Status: Acute   





(3) Ventral hernia


Status: Acute   





(4) HTN (hypertension)


Status: Acute   





(5) Diabetes mellitus


Status: Chronic

## 2017-10-23 LAB
BASOPHILS # BLD AUTO: 0.1 K/UL (ref 0–0.2)
BASOPHILS NFR BLD: 0.5 % (ref 0–2)
BUN SERPL-MCNC: 10 MG/DL (ref 9–20)
CALCIUM SERPL-MCNC: 7.4 MG/DL (ref 8.6–10.4)
CHLORIDE SERPL-SCNC: 95 MMOL/L (ref 98–107)
CO2 SERPL-SCNC: 27 MMOL/L (ref 22–30)
EOSINOPHIL # BLD AUTO: 0 K/UL (ref 0–0.7)
EOSINOPHIL NFR BLD: 0.3 % (ref 0–4)
ERYTHROCYTE [DISTWIDTH] IN BLOOD BY AUTOMATED COUNT: 20.6 % (ref 11.5–14.5)
GLUCOSE SERPL-MCNC: 118 MG/DL (ref 75–110)
HCT VFR BLD CALC: 24.1 % (ref 35–51)
LYMPHOCYTES # BLD AUTO: 2 K/UL (ref 1–4.3)
LYMPHOCYTES NFR BLD AUTO: 18.4 % (ref 20–40)
MCH RBC QN AUTO: 35.1 PG (ref 27–31)
MCHC RBC AUTO-ENTMCNC: 34.6 G/DL (ref 33–37)
MCV RBC AUTO: 101.4 FL (ref 80–94)
MONOCYTES # BLD: 1.6 K/UL (ref 0–0.8)
MONOCYTES NFR BLD: 14.5 % (ref 0–10)
NRBC BLD AUTO-RTO: 0.1 % (ref 0–2)
PLATELET # BLD: 144 K/UL (ref 130–400)
PMV BLD AUTO: 9.8 FL (ref 7.2–11.7)
POTASSIUM SERPL-SCNC: 3.8 MMOL/L (ref 3.6–5.2)
SODIUM SERPL-SCNC: 127 MMOL/L (ref 132–148)
WBC # BLD AUTO: 10.7 K/UL (ref 4.8–10.8)

## 2017-10-23 PROCEDURE — 0W9F30Z DRAINAGE OF ABDOMINAL WALL WITH DRAINAGE DEVICE, PERCUTANEOUS APPROACH: ICD-10-PCS | Performed by: RADIOLOGY

## 2017-10-23 RX ADMIN — POTASSIUM CHLORIDE SCH MEQ: 20 TABLET, EXTENDED RELEASE ORAL at 10:24

## 2017-10-23 RX ADMIN — TAZOBACTAM SODIUM AND PIPERACILLIN SODIUM SCH MLS/HR: 375; 3 INJECTION, SOLUTION INTRAVENOUS at 10:25

## 2017-10-23 RX ADMIN — POTASSIUM CHLORIDE SCH MEQ: 20 TABLET, EXTENDED RELEASE ORAL at 17:49

## 2017-10-23 RX ADMIN — INSULIN ASPART SCH UNIT: 100 INJECTION, SOLUTION INTRAVENOUS; SUBCUTANEOUS at 12:58

## 2017-10-23 RX ADMIN — TAZOBACTAM SODIUM AND PIPERACILLIN SODIUM SCH MLS/HR: 375; 3 INJECTION, SOLUTION INTRAVENOUS at 03:43

## 2017-10-23 RX ADMIN — INSULIN ASPART SCH: 100 INJECTION, SOLUTION INTRAVENOUS; SUBCUTANEOUS at 07:50

## 2017-10-23 RX ADMIN — TAZOBACTAM SODIUM AND PIPERACILLIN SODIUM SCH MLS/HR: 375; 3 INJECTION, SOLUTION INTRAVENOUS at 21:43

## 2017-10-23 RX ADMIN — INSULIN ASPART SCH: 100 INJECTION, SOLUTION INTRAVENOUS; SUBCUTANEOUS at 21:44

## 2017-10-23 RX ADMIN — TAZOBACTAM SODIUM AND PIPERACILLIN SODIUM SCH MLS/HR: 375; 3 INJECTION, SOLUTION INTRAVENOUS at 16:55

## 2017-10-23 RX ADMIN — INSULIN ASPART SCH UNIT: 100 INJECTION, SOLUTION INTRAVENOUS; SUBCUTANEOUS at 17:49

## 2017-10-23 NOTE — CP.PCM.PN
Subjective





- Date & Time of Evaluation


Date of Evaluation: 10/23/17


Time of Evaluation: 20:00





- Subjective


Subjective: 





Pt is improving, he has decraesed abdominal pain, leg edema is improving





Objective





- Vital Signs/Intake and Output


Vital Signs (last 24 hours): 


 











Temp Pulse Resp BP Pulse Ox


 


 99.4 F   89   20   103/63   98 


 


 10/23/17 15:34  10/23/17 16:00  10/23/17 15:34  10/23/17 15:34  10/23/17 15:34








Intake and Output: 


 











 10/23/17 10/24/17





 18:59 06:59


 


Intake Total 550 420


 


Output Total  30


 


Balance 550 390














- Medications


Medications: 


 Current Medications





Benzocaine/Menthol (Cepacol Sore Throat)  1 lexx MT Q2 PRN


   PRN Reason: Sore Throat


   Last Admin: 10/15/17 09:19 Dose:  1 lexx


Clonidine HCl (Catapres Tts1 0.1 Mg/24 Hr)  1 patch TD Q7D@1000 [facility]


   Last Admin: 10/18/17 09:58 Dose:  1 patch


Famotidine (Pepcid)  20 mg PO BID [facility]


   Last Admin: 10/23/17 17:49 Dose:  20 mg


Heparin Sodium (Porcine) (Heparin)  5,000 units SC Q8 [facility]


   Last Admin: 10/16/17 22:50 Dose:  Not Given


Piperacillin Sod/Tazobactam Sod (Zosyn 3.375 Gm Iv Premix)  3.375 gm in 50 mls 

@ 100 mls/hr IVPB Q6H [facility]


   Last Admin: 10/23/17 21:43 Dose:  100 mls/hr


Insulin Aspart (Novolog)  0 unit SC ACHS [facility]


   PRN Reason: Protocol


   Last Admin: 10/23/17 21:44 Dose:  Not Given


Morphine Sulfate (Morphine)  2 mg IVP Q4 PRN


   PRN Reason: Pain, severe (8-10)


   Last Admin: 10/23/17 21:44 Dose:  2 mg


Ondansetron HCl (Zofran Inj)  4 mg IVP Q4 PRN


   PRN Reason: nausea


   Last Admin: 10/17/17 14:41 Dose:  4 mg


Potassium Chloride (K-Dur 20 Meq Er Tab)  20 meq PO BID [facility]


   Stop: 10/24/17 18:01


   Last Admin: 10/23/17 17:49 Dose:  20 meq











- Labs


Labs: 


 





 10/23/17 07:16 





 10/23/17 07:16 





 











PT  15.5 SECONDS (9.7-12.2)  H  10/09/17  08:47    


 


INR  1.4   10/09/17  08:47    


 


APTT  31 SECONDS (21-34)   10/09/17  08:47    














- Constitutional


Appears: No Acute Distress





- Head Exam


Head Exam: ATRAUMATIC, NORMAL INSPECTION, NORMOCEPHALIC





- Eye Exam


Eye Exam: EOMI, Normal appearance, PERRL


Pupil Exam: NORMAL ACCOMODATION, PERRL





- Respiratory Exam


Respiratory Exam: Decreased Breath Sounds, Rales, Wheezes





- Cardiovascular Exam


Cardiovascular Exam: REGULAR RHYTHM, +S1, +S2.  absent: Murmur





- GI/Abdominal Exam


GI & Abdominal Exam: Soft, Normal Bowel Sounds, Organomegaly.  absent: 

Tenderness





- Neurological Exam


Neurological Exam: Alert, Awake, CN II-XII Intact, Normal Gait, Oriented x3





- Psychiatric Exam


Psychiatric exam: Normal Affect, Normal Mood





Assessment and Plan


(1) Intestinal obstruction


Status: Acute   





(2) Thrombocytopenia


Status: Acute   





(3) Ventral hernia


Status: Acute   





(4) HTN (hypertension)


Status: Acute   





(5) Diabetes mellitus


Status: Chronic

## 2017-10-23 NOTE — PN
DATE:



SUBJECTIVE:  The patient underwent aspiration of the anterior abdominal

wall fluid collection by Interventional Radiology.  Official report is

still pending.  The patient seems to be a little upset about the fact that

he had to undergo the procedure.  He denies any chest pain or shortness of

breath.



PHYSICAL EXAMINATION

VITAL SIGNS:  Blood pressure 115/73, heart rate 81, temperature 98.2, and

respirations 20.

HEENT:  Pale conjunctivae.

CHEST:  Minimal basal rhonchi.

HEART:  S1 and S2 regular.

EXTREMITIES:  1+ pitting edema.



LABORATORY DATA:  Hemoglobin and hematocrit of 8.7 and 24.1.  White count

and platelet count are within normal limits.  SMA-7:  Sodium 127, potassium

3.8, chloride 95, CO2 of 27, glucose 118, BUN 10, creatinine 0.8.



ASSESSMENT:

1.  Status post drainage of anterior abdominal wall fluid collection.

2.  Hypertension.

3.  Diastolic heart failure.

4.  Diabetes mellitus.

5.  Anemia.



RECOMMENDATIONS:  Continue current clonidine patch.  Subcutaneous heparin

is on hold.  Continue K-Dur 20 mEq oral twice a day and Zosyn at 3.375 g

intravenously q. 6 hours.  Obtain 12-lead EKG and resume telemetry

monitoring.





_________________________________________

Cezar Clark MD



DD:  10/23/2017 13:29:06

DT:  10/23/2017 16:00:26

Job # [id number]

## 2017-10-23 NOTE — PCM.SURG1
Surgeon's Initial Post Op Note





- Surgeon's Notes


Surgeon: Theodore Mullins MD


Assistant: NONE


Type of Anesthesia: Local


Pre-Operative Diagnosis: Abdominal abscess


Operative Findings: US showed a complex abdominal wall collection at site of 

surgical staples


Post-Operative Diagnosis: Abdominal abscess


Operation Performed: US guided abdominal abscess drainage with placement of an 

8 fr drainage catheter.


Specimen/Specimens Removed: 20 cc of slight serosanguinous fluid


Estimated Blood Loss: EBL {In ML}: 0


Drains Used: No Drains


Post-Op Condition: Fair


Date of Surgery/Procedure: 10/23/17


Time of Surgery/Procedure: 10:55

## 2017-10-23 NOTE — PN
DATE:



SUBJECTIVE:  The patient denies any chest pain or shortness of breath.  No

vomiting or diarrhea.



PHYSICAL EXAMINATION

VITAL SIGNS:  Blood pressure of 147/85, heart rate of 86, temperature of

98.1, and respirations of 20.

HEENT:  Pale conjunctivae.

CHEST:  Diminished breath sounds over the bases.

HEART:  S1 and S2 regular.

ABDOMEN:  Positive bowel sounds.

EXTREMITIES:  He has 1+ pitting edema.



LABORATORY DATA:  Today's hemoglobin and hematocrit of 8.4 and 24.8, white

count of 12.4, and platelet count of 152,000.  SMA-7:  Sodium of 127,

potassium of 4.0, chloride of 95, CO2 of 28, glucose of 120, BUN of 9 and

creatinine of 0.7.



ASSESSMENT:

1.  Hypertension.

2.  Diastolic heart failure.

3.  Anterior abdominal over fluid collection, unlikely abscess as per 

_____ consult Interventional Radiology.



RECOMMENDATIONS:  Continue clonidine patch.  Continue K-Dur 20 mEq twice a

day, Zosyn at 3.375 g _____ q. 6 hours.  Subcutaneous heparin is currently

on hold.





__________________________________________

eCzar Clark MD





DD:  10/22/2017 20:45:54

DT:  10/22/2017 21:24:06

Job # [id number]

## 2017-10-23 NOTE — PN
DATE:



LOCATION:  [facility], bed A.



SUBJECTIVE:  This is a 66-year-old male seen and examined in rounds with

recurrent episodes of abdominal pain again, but no reported belly pain. 

The patient appeared to be awake, alert, and oriented.



The entire chart is reviewed including, but not limited to the most recent

lab and radiology study results, current and previous medication list, and

current and previous medical events.  Case discussed with the staff.



As we mentioned, the patient denied this morning any reported significant

shortness of breath, actual chest pain nor palpitation.  No reported

bleeding.



LABORATORY DATA:  Today's lab showed leukocytosis of 12.4 with low

hemoglobin of 8.4, hematocrit of 24.8, but normal platelet count of 152

with blood glucose level of 135.  The sodium reported to be low yesterday

of 127 with low calcium of 7.8.



PHYSICAL EXAMINATION:

GENERAL:  A 66-year-old male.

VITAL SIGNS:  Afebrile with pulse of 84, respiratory rate 20-22, and blood

pressure of 124/66.

HEENT:  Show pale, dry oral mucous membrane.  Nonicteric sclerae.

LUNGS:  Few scattered crepitation with decreased air entry at bases.

HEART:  Positive S1 and S2.

ABDOMEN:  With slight distention, mild generalized tenderness with slightly

hypoactive bowel sounds.  No mass or organomegaly could be appreciated.  No

rebound tenderness or guarding.

RECTAL:  The patient refused.

EXTREMITIES:  Without significant clubbing, cyanosis, or edema.

NEUROLOGIC:  No reported new neurological deficits, focal sensory or motor.

Peripheral pulses are present bilaterally.



IMPRESSION:

1.  Recent history of incarcerated abdominal anterior wall ventral hernia,

treated surgically.

2.  Status post partial small bowel resection.

3.  Re-exacerbation of peptic ulcer disease.

4.  Anemia with recent blood transfusion.

5.  Recent CAT scan of abdomen and pelvis is indicative of possible fluid

collection, abscess versus hematoma, I believe _____ to the CAT scan

report.

6.  Thrombocytopenia by recent history of unclear etiology.

7.  Electrolyte imbalance with hyponatremia and hypocalcemia.

8.  Malnutrition with hypoalbuminemia.



SUGGESTION:

1.  Continue current management.

2.  The patient may need abdominal and pelvic MRI.

3.  Repeat blood culture x2.

4.  Repeat stool for occult blood.

5.  Further evaluation and recommendation to follow.





__________________________________________

Earle Chilel MD









DD:  10/22/2017 7:53:20

DT:  10/22/2017 8:47:05

Job # [id number]

## 2017-10-23 NOTE — CP.PCM.PN
Subjective





- Date & Time of Evaluation


Date of Evaluation: 10/23/17


Time of Evaluation: 07:00





- Subjective


Subjective: 


GENERAL SURGERY PROGRESS NOTE FOR DR. DAVE





Patient seen and examined at bedside. He reports having a BM last night which 

was normal color. He is passing flatus. He reports generalized pain but not 

specifically abdominal pain. He is tolerating diet and denies nausea or 

vomiting. 


He had IR US guided abdominal abscess drainage with placement of an 8Fr 

drainage catheter by Dr. Mullins today. 20cc slightly serosanguinous fluid was 

drained.





Objective





- Vital Signs/Intake and Output


Vital Signs (last 24 hours): 


 











Temp Pulse Resp BP Pulse Ox


 


 99.4 F   89   20   103/63   98 


 


 10/23/17 15:34  10/23/17 16:00  10/23/17 15:34  10/23/17 15:34  10/23/17 15:34








Intake and Output: 


 











 10/23/17 10/23/17





 06:59 18:59


 


Intake Total 420 550


 


Output Total 150 


 


Balance 270 550














- Medications


Medications: 


 Current Medications





Benzocaine/Menthol (Cepacol Sore Throat)  1 lexx MT Q2 PRN


   PRN Reason: Sore Throat


   Last Admin: 10/15/17 09:19 Dose:  1 lexx


Clonidine HCl (Catapres Tts1 0.1 Mg/24 Hr)  1 patch TD Q7D@1000 [facility]


   Last Admin: 10/18/17 09:58 Dose:  1 patch


Famotidine (Pepcid)  20 mg PO BID [facility]


   Last Admin: 10/23/17 17:49 Dose:  20 mg


Heparin Sodium (Porcine) (Heparin)  5,000 units SC Q8 [facility]


   Last Admin: 10/16/17 22:50 Dose:  Not Given


Piperacillin Sod/Tazobactam Sod (Zosyn 3.375 Gm Iv Premix)  3.375 gm in 50 mls 

@ 100 mls/hr IVPB Q6H [facility]


   Last Admin: 10/23/17 16:55 Dose:  100 mls/hr


Insulin Aspart (Novolog)  0 unit SC ACHS [facility]


   PRN Reason: Protocol


   Last Admin: 10/23/17 17:49 Dose:  4 unit


Morphine Sulfate (Morphine)  2 mg IVP Q4 PRN


   PRN Reason: Pain, severe (8-10)


   Last Admin: 10/23/17 15:49 Dose:  2 mg


Ondansetron HCl (Zofran Inj)  4 mg IVP Q4 PRN


   PRN Reason: nausea


   Last Admin: 10/17/17 14:41 Dose:  4 mg


Potassium Chloride (K-Dur 20 Meq Er Tab)  20 meq PO BID [facility]


   Stop: 10/24/17 18:01


   Last Admin: 10/23/17 17:49 Dose:  20 meq











- Labs


Labs: 


 





 10/23/17 07:16 





 10/23/17 07:16 





 











PT  15.5 SECONDS (9.7-12.2)  H  10/09/17  08:47    


 


INR  1.4   10/09/17  08:47    


 


APTT  31 SECONDS (21-34)   10/09/17  08:47    














- Constitutional


Appears: Non-toxic, No Acute Distress





- Head Exam


Head Exam: ATRAUMATIC, NORMAL INSPECTION





- Eye Exam


Eye Exam: EOMI, Normal appearance





- Respiratory Exam


Respiratory Exam: NORMAL BREATHING PATTERN.  absent: Respiratory Distress





- Cardiovascular Exam


Cardiovascular Exam: +S1, +S2





- GI/Abdominal Exam


GI & Abdominal Exam: Soft.  absent: Distended, Firm, Guarding, Rigid, Tenderness

, Rebound


Additional comments: 


Staples in place





- Neurological Exam


Neurological Exam: Alert, Awake, Oriented x3





- Psychiatric Exam


Psychiatric exam: Normal Affect, Normal Mood





- Skin


Skin Exam: Dry, Normal Color, Warm





Assessment and Plan





- Assessment and Plan (Free Text)


Assessment: 


65yo M with recurrent ventral hernia and thrombocytopenia s/p exploratory 

laparotomy, extensive BREANNA, repair of recurrent ventral hernia with biologic mesh

, small bowel resection with primary anastomosis POD#14, now s/p IR US guided 

abdominal abscess drainage with placement of an 8Fr drainage catheter today.





- Afebrile, VSS


- Leukocytosis resolved


- Hemoglobin 8.3, pt asymptomatic


- Thrombocytopenia resolved


- Hypokalemia resolved


- Continue Physical therapy 


- On regular diet


- Encouraged OOB, ambulation, and IS use


- Discussed plan with Dr. Selma Ny PGY-3

## 2017-10-23 NOTE — PN
DATE:



LOCATION:  [facility], bed 8



SUBJECTIVE:  This 66-year-old male seen and examined in rounds today,

post-invasive radiology procedure with drainage and placement of a drainage

_____ reported as abdominal abscess formation.



The patient has less abdominal pain with less abdominal distention.  No

reported active bleeding.  No hematemesis.



The entire chart is reviewed including but not limited to most recent labs

and radiology study results, current and previous medication list, current

and previous medical events as well as IR staff procedure note was

reviewed.  Case discussed with staff at length.



Today's lab showed hemoglobin of 8.3 with hematocrit 24.1 with sodium 127

and increased blood glucose level 211 with low calcium 7.4.



PHYSICAL EXAMINATION:

GENERAL:  The patient is around 66-year-old male, awake, alert, oriented,

afebrile with a pulse of 84, respiratory rate 20/22 with a blood pressure

of 120/76.

HEENT:  Showed pale, dry oral mucous membranes.  Nonicteric sclerae.

LUNGS:  A few scattered crepitations with decreased air entry at bases.

HEART:  Positive S1 and S2.

ABDOMEN:  Soft with mild distention with mild generalized tenderness.  No

mass or organomegaly.  No rebound tenderness or guarding.

EXTREMITIES:  Without significant edema, clubbing, or cyanosis, but _____

pulses are present bilaterally.

NEUROLOGIC:  No reported new neurological deficit, sensory or motor.



IMPRESSION:

1.  Post-surgical abdominal abscess formation diagnosed by radiology study

results with status post drainage by the IR staff.

2.  Recent history of incarcerated abdominal ventral hernia, treated

surgically with partial small bowel resection.

3.  Peptic ulcer disease.

4.  Anemia secondary to above.

5.  Thrombocytopenia of unclear etiology.

6.  Electrolyte imbalance with hyponatremia and hypocalcemia.

7.  Malnutrition with hypoalbuminemia.



SUGGESTION:

1.  Continue current management and also blood transfusion to keep

hemoglobin around 10 gram _____.

2.  Further recommendations to follow.







__________________________________________

Earle Chilel MD



cc:  Earle Chilel MD



DD:  10/23/2017 12:48:56

DT:  10/23/2017 13:19:47

Job # [id number]

## 2017-10-24 LAB
BASOPHILS # BLD AUTO: 0 K/UL (ref 0–0.2)
BASOPHILS NFR BLD: 0.6 % (ref 0–2)
BUN SERPL-MCNC: 8 MG/DL (ref 9–20)
CALCIUM SERPL-MCNC: 7.8 MG/DL (ref 8.6–10.4)
CHLORIDE SERPL-SCNC: 97 MMOL/L (ref 98–107)
CO2 SERPL-SCNC: 26 MMOL/L (ref 22–30)
EOSINOPHIL # BLD AUTO: 0 K/UL (ref 0–0.7)
EOSINOPHIL NFR BLD: 0.3 % (ref 0–4)
ERYTHROCYTE [DISTWIDTH] IN BLOOD BY AUTOMATED COUNT: 20 % (ref 11.5–14.5)
GLUCOSE SERPL-MCNC: 187 MG/DL (ref 75–110)
HCT VFR BLD CALC: 24.2 % (ref 35–51)
LYMPHOCYTES # BLD AUTO: 1.4 K/UL (ref 1–4.3)
LYMPHOCYTES NFR BLD AUTO: 18.3 % (ref 20–40)
MCH RBC QN AUTO: 34.2 PG (ref 27–31)
MCHC RBC AUTO-ENTMCNC: 33.9 G/DL (ref 33–37)
MCV RBC AUTO: 100.9 FL (ref 80–94)
MONOCYTES # BLD: 1 K/UL (ref 0–0.8)
MONOCYTES NFR BLD: 12.8 % (ref 0–10)
NRBC BLD AUTO-RTO: 0 % (ref 0–2)
PLATELET # BLD: 131 K/UL (ref 130–400)
PMV BLD AUTO: 9.2 FL (ref 7.2–11.7)
POTASSIUM SERPL-SCNC: 3.7 MMOL/L (ref 3.6–5.2)
SODIUM SERPL-SCNC: 130 MMOL/L (ref 132–148)
WBC # BLD AUTO: 7.8 K/UL (ref 4.8–10.8)

## 2017-10-24 RX ADMIN — INSULIN ASPART SCH UNIT: 100 INJECTION, SOLUTION INTRAVENOUS; SUBCUTANEOUS at 17:23

## 2017-10-24 RX ADMIN — TAZOBACTAM SODIUM AND PIPERACILLIN SODIUM SCH MLS/HR: 375; 3 INJECTION, SOLUTION INTRAVENOUS at 17:02

## 2017-10-24 RX ADMIN — TAZOBACTAM SODIUM AND PIPERACILLIN SODIUM SCH MLS/HR: 375; 3 INJECTION, SOLUTION INTRAVENOUS at 10:34

## 2017-10-24 RX ADMIN — TAZOBACTAM SODIUM AND PIPERACILLIN SODIUM SCH MLS/HR: 375; 3 INJECTION, SOLUTION INTRAVENOUS at 21:00

## 2017-10-24 RX ADMIN — INSULIN ASPART SCH UNIT: 100 INJECTION, SOLUTION INTRAVENOUS; SUBCUTANEOUS at 12:22

## 2017-10-24 RX ADMIN — POTASSIUM CHLORIDE SCH MEQ: 20 TABLET, EXTENDED RELEASE ORAL at 17:23

## 2017-10-24 RX ADMIN — INSULIN ASPART SCH: 100 INJECTION, SOLUTION INTRAVENOUS; SUBCUTANEOUS at 22:04

## 2017-10-24 RX ADMIN — POTASSIUM CHLORIDE SCH MEQ: 20 TABLET, EXTENDED RELEASE ORAL at 10:33

## 2017-10-24 RX ADMIN — INSULIN ASPART SCH UNIT: 100 INJECTION, SOLUTION INTRAVENOUS; SUBCUTANEOUS at 08:30

## 2017-10-24 NOTE — CP.PCM.PN
Subjective





- Date & Time of Evaluation


Date of Evaluation: 10/24/17


Time of Evaluation: 19:35





- Subjective


Subjective: 





Feeling better





Objective





- Vital Signs/Intake and Output


Vital Signs (last 24 hours): 


 











Temp Pulse Resp BP Pulse Ox


 


 98.4 F   83   20   126/77   99 


 


 10/24/17 15:12  10/24/17 15:12  10/24/17 15:12  10/24/17 15:12  10/24/17 15:12








Intake and Output: 


 











 10/24/17 10/25/17





 18:59 06:59


 


Intake Total 450 


 


Output Total 415 


 


Balance 35 














- Medications


Medications: 


 Current Medications





Clonidine HCl (Catapres Tts1 0.1 Mg/24 Hr)  1 patch TD Q7D@1000 [facility]


   Last Admin: 10/18/17 09:58 Dose:  1 patch


Famotidine (Pepcid)  20 mg PO BID [facility]


   Last Admin: 10/24/17 17:23 Dose:  20 mg


Heparin Sodium (Porcine) (Heparin)  5,000 units SC Q8 [facility]


   Last Admin: 10/24/17 15:00 Dose:  5,000 units


Piperacillin Sod/Tazobactam Sod (Zosyn 3.375 Gm Iv Premix)  3.375 gm in 50 mls 

@ 100 mls/hr IVPB Q6H [facility]


   Last Admin: 10/24/17 17:02 Dose:  100 mls/hr


Insulin Aspart (Novolog)  0 unit SC ACHS [facility]


   PRN Reason: Protocol


   Last Admin: 10/24/17 17:23 Dose:  1 unit


Ondansetron HCl (Zofran Inj)  4 mg IVP Q4 PRN


   PRN Reason: nausea


   Last Admin: 10/17/17 14:41 Dose:  4 mg


Tramadol HCl (Ultram)  50 mg PO TID [facility]


   Last Admin: 10/24/17 17:28 Dose:  50 mg











- Labs


Labs: 


 





 10/24/17 12:21 





 10/24/17 12:21 





 











PT  15.5 SECONDS (9.7-12.2)  H  10/09/17  08:47    


 


INR  1.4   10/09/17  08:47    


 


APTT  31 SECONDS (21-34)   10/09/17  08:47    














Assessment and Plan


(1) Anemia


Assessment & Plan: 


chronic disease, PUD per GI


transfusion support PRN


Status: Acute   





(2) Elevated CEA


Assessment & Plan: 


outpatient colonoscopy with GI


Status: Acute

## 2017-10-24 NOTE — PN
DATE:



SUBJECTIVE:  The patient denies any chest pain.  An EKG done yesterday

revealed normal sinus rhythm.  He is currently in sinus rhythm on the

monitor.



PHYSICAL EXAMINATION

VITAL SIGNS:  Blood pressure 108/64, heart rate 77, temperature 98.2,

respiration 20.

HEENT:  Pale conjunctivae.

CHEST:  Bibasilar rhonchi.

HEART:  S1, S2 regular.

EXTREMITIES:  1+ pitting edema.



LABORATORY DATA:  SMA-7:  Sodium of 130, potassium of 3.7, chloride 97, CO2

of 26, glucose 187, BUN 8, creatinine 0.7.  The Interventional Radiology

report concluded ultrasound-guided drainage of abdominal wall abscess with

placement of 8-Faroese drainage catheter.  4 mL of slight serosanguineous

fluid was removed and sent to the culture and sensitivity.  The culture

results are still pending.



ASSESSMENT:

1.  Hypertension.

2.  Uncontrolled diabetes mellitus.

3.  Diastolic heart failure.

4.  Anemia.

5.  Status post drainage of anterior abdominal wall abscess.



RECOMMENDATIONS:  Continue clonidine patch 0.1 mg daily; subcutaneous

heparin 5000 units q. 8 hours; K-Dur 20 mEq twice a day; Zofran 4 mg

intravenously q. 4 hours; Zosyn 3.375 g intravenously q. 6 hours.  I will

follow the abdominal fluid bacteriology results.





__________________________________________

Cezar Clark MD





DD:  10/24/2017 17:22:06

DT:  10/24/2017 18:16:53

Job # [id number]

## 2017-10-24 NOTE — US
PROCEDURE:  Date of procedure: 10/23/2017



Procedure: 



1. Abdominal wall abscess drainage with ultrasound guidance, CPT 12860



Medications: 



8 cubic centimeters lidocaine 1 percent.



HISTORY:

 Abdominal wall abscess, ventral hernia repair. 



TECHNIQUE:

 Following informed consent procedure time-out, limited ultrasound of 

patient's abdomen confirm the presence of a complex abdominal 

collection seen on previous CT scan. 



After the patient abdomen was prepped and draped in the usual sterile 

fashion, the skin was anesthetized with 1 % lidocaine. A 5 Syrian 

Yueh catheter was advanced under ultrasound guidance into the 

collection. Upon return of serosanguineous fluid, the Yueh catheter 

was exchanged over a guidewire for an 8 Syrian all-purpose drainage 

catheter which was formed within the collection. 40 cubic centimeters 

of slight serosanguineous drainage was removed and sent for culture. 



IMPRESSION:





Ultrasound-guided drainage of abdominal wall abscess with placement 

of an 8 Syrian drainage catheter. 40 cubic centimeters of slight 

serosanguineous fluid was removed and sent for culture and 

sensitivity.

## 2017-10-24 NOTE — PN
DATE:



SUBJECTIVE:  This is a 66-year-old male, seen and examined around without

significant clinical changes or reported active bleeding.  The entire chart

is reviewed including but not limited to most recent lab and radiology

study result, current and previous medication list, current and the

previous medical events.  Complaining of some abdominal pain.



ASSESSMENT:

1.  The patient is status partial thyroid resection.

2.  Status post anterior abdominal wall incarcerated hernia repair.

3.  Abdominal abscess formation, drained.

4.  Anemia secondary to above.

5.  Re-exacerbation of peptic ulcer disease.



SUGGESTION:

1.  Continue current management.

2.  MRI of the abdomen to be considered to carry out.  Further

recommendation to follow.





__________________________________________

Earle Chilel MD



cc:  Earle Chilel MD



DD:  10/24/2017 12:18:54

DT:  10/24/2017 12:41:14

Job # [id number]

## 2017-10-24 NOTE — CP.PCM.PN
Subjective





- Date & Time of Evaluation


Date of Evaluation: 10/24/17


Time of Evaluation: 07:00





- Subjective


Subjective: 


GENERAL SURGERY PROGRESS NOTE FOR DR. DAVE





Patient seen and examined at bedside. He reports having 2-3 BMs last night. He 

is passing flatus. He reports occasional abdominal pain in the lower abdomen. 

He is tolerating diet and denies nausea or vomiting. He is using his IS and 

ambulating with PT and on his own.





Objective





- Vital Signs/Intake and Output


Vital Signs (last 24 hours): 


 











Temp Pulse Resp BP Pulse Ox


 


 98.2 F   77   20   108/64   100 


 


 10/24/17 08:25  10/24/17 08:25  10/24/17 08:25  10/24/17 08:25  10/24/17 08:25








Intake and Output: 


 











 10/24/17 10/24/17





 06:59 18:59


 


Intake Total 710 


 


Output Total 300 


 


Balance 410 














- Medications


Medications: 


 Current Medications





Clonidine HCl (Catapres Tts1 0.1 Mg/24 Hr)  1 patch TD Q7D@1000 Count includes the [facility]


   Last Admin: 10/18/17 09:58 Dose:  1 patch


Famotidine (Pepcid)  20 mg PO BID Count includes the [facility]


   Last Admin: 10/24/17 10:33 Dose:  20 mg


Heparin Sodium (Porcine) (Heparin)  5,000 units SC Q8 Count includes the [facility]


   Last Admin: 10/24/17 15:00 Dose:  5,000 units


Piperacillin Sod/Tazobactam Sod (Zosyn 3.375 Gm Iv Premix)  3.375 gm in 50 mls 

@ 100 mls/hr IVPB Q6H Count includes the [facility]


   Last Admin: 10/24/17 10:34 Dose:  100 mls/hr


Insulin Aspart (Novolog)  0 unit SC ACHS Count includes the [facility]


   PRN Reason: Protocol


   Last Admin: 10/24/17 12:22 Dose:  3 unit


Ondansetron HCl (Zofran Inj)  4 mg IVP Q4 PRN


   PRN Reason: nausea


   Last Admin: 10/17/17 14:41 Dose:  4 mg


Potassium Chloride (K-Dur 20 Meq Er Tab)  20 meq PO BID [facility]


   Stop: 10/24/17 18:01


   Last Admin: 10/24/17 10:33 Dose:  20 meq


Tramadol HCl (Ultram)  50 mg PO TID Count includes the [facility]


   Last Admin: 10/24/17 15:00 Dose:  50 mg











- Labs


Labs: 


 





 10/24/17 12:21 





 10/24/17 12:21 





 











PT  15.5 SECONDS (9.7-12.2)  H  10/09/17  08:47    


 


INR  1.4   10/09/17  08:47    


 


APTT  31 SECONDS (21-34)   10/09/17  08:47    














- Constitutional


Appears: Non-toxic, No Acute Distress





- Respiratory Exam


Respiratory Exam: NORMAL BREATHING PATTERN.  absent: Respiratory Distress





- Cardiovascular Exam


Cardiovascular Exam: +S1, +S2





- GI/Abdominal Exam


GI & Abdominal Exam: Soft.  absent: Distended, Firm, Guarding, Rigid, Tenderness


Additional comments: 


Staples in place


IR drain in place: 50cc





- Neurological Exam


Neurological Exam: Alert, Awake, Oriented x3





- Psychiatric Exam


Psychiatric exam: Normal Affect, Normal Mood





- Skin


Skin Exam: Dry, Warm





Assessment and Plan





- Assessment and Plan (Free Text)


Assessment: 


67yo M with recurrent ventral hernia and thrombocytopenia s/p exploratory 

laparotomy, extensive BREANNA, repair of recurrent ventral hernia with biologic mesh

, small bowel resection with primary anastomosis POD#15, now s/p IR US guided 

abdominal abscess drainage with placement of an 8Fr drainage catheter yesterday.





- Afebrile, VSS


- Leukocytosis resolved


- Hemoglobin 8.2, stable, pt asymptomatic, will resume heparin


- Thrombocytopenia resolved


- Hypokalemia resolved


- Continue Physical therapy 


- On regular diet


- Encouraged OOB, ambulation, and IS use


- Removed every other staple 


- Will switch morphine to percocet


- Discussed plan with Dr. Selma Ny PGY-3

## 2017-10-25 LAB
BASOPHILS # BLD AUTO: 0.1 K/UL (ref 0–0.2)
BASOPHILS NFR BLD: 0.8 % (ref 0–2)
BUN SERPL-MCNC: 9 MG/DL (ref 9–20)
CALCIUM SERPL-MCNC: 7.5 MG/DL (ref 8.6–10.4)
CHLORIDE SERPL-SCNC: 98 MMOL/L (ref 98–107)
CO2 SERPL-SCNC: 25 MMOL/L (ref 22–30)
EOSINOPHIL # BLD AUTO: 0 K/UL (ref 0–0.7)
EOSINOPHIL NFR BLD: 0.3 % (ref 0–4)
ERYTHROCYTE [DISTWIDTH] IN BLOOD BY AUTOMATED COUNT: 19.7 % (ref 11.5–14.5)
GLUCOSE SERPL-MCNC: 100 MG/DL (ref 75–110)
HCT VFR BLD CALC: 24.8 % (ref 35–51)
LYMPHOCYTES # BLD AUTO: 1.8 K/UL (ref 1–4.3)
LYMPHOCYTES NFR BLD AUTO: 22.5 % (ref 20–40)
MCH RBC QN AUTO: 34.4 PG (ref 27–31)
MCHC RBC AUTO-ENTMCNC: 34 G/DL (ref 33–37)
MCV RBC AUTO: 101.3 FL (ref 80–94)
MONOCYTES # BLD: 0.9 K/UL (ref 0–0.8)
MONOCYTES NFR BLD: 11.4 % (ref 0–10)
NRBC BLD AUTO-RTO: 0.1 % (ref 0–2)
PLATELET # BLD: 118 K/UL (ref 130–400)
PMV BLD AUTO: 9 FL (ref 7.2–11.7)
POTASSIUM SERPL-SCNC: 4.1 MMOL/L (ref 3.6–5.2)
SODIUM SERPL-SCNC: 129 MMOL/L (ref 132–148)
WBC # BLD AUTO: 8.1 K/UL (ref 4.8–10.8)

## 2017-10-25 RX ADMIN — INSULIN ASPART SCH: 100 INJECTION, SOLUTION INTRAVENOUS; SUBCUTANEOUS at 18:37

## 2017-10-25 RX ADMIN — TAZOBACTAM SODIUM AND PIPERACILLIN SODIUM SCH MLS/HR: 375; 3 INJECTION, SOLUTION INTRAVENOUS at 17:00

## 2017-10-25 RX ADMIN — TAZOBACTAM SODIUM AND PIPERACILLIN SODIUM SCH MLS/HR: 375; 3 INJECTION, SOLUTION INTRAVENOUS at 03:44

## 2017-10-25 RX ADMIN — INSULIN ASPART SCH UNIT: 100 INJECTION, SOLUTION INTRAVENOUS; SUBCUTANEOUS at 12:29

## 2017-10-25 RX ADMIN — TAZOBACTAM SODIUM AND PIPERACILLIN SODIUM SCH MLS/HR: 375; 3 INJECTION, SOLUTION INTRAVENOUS at 10:43

## 2017-10-25 RX ADMIN — INSULIN ASPART SCH: 100 INJECTION, SOLUTION INTRAVENOUS; SUBCUTANEOUS at 21:36

## 2017-10-25 RX ADMIN — TAZOBACTAM SODIUM AND PIPERACILLIN SODIUM SCH MLS/HR: 375; 3 INJECTION, SOLUTION INTRAVENOUS at 21:31

## 2017-10-25 RX ADMIN — INSULIN ASPART SCH: 100 INJECTION, SOLUTION INTRAVENOUS; SUBCUTANEOUS at 08:08

## 2017-10-25 RX ADMIN — VANCOMYCIN HYDROCHLORIDE SCH MLS/HR: 1 INJECTION, POWDER, LYOPHILIZED, FOR SOLUTION INTRAVENOUS at 18:00

## 2017-10-25 NOTE — CP.PCM.PN
Subjective





- Date & Time of Evaluation


Date of Evaluation: 10/25/17


Time of Evaluation: 07:00





- Subjective


Subjective: 


GENERAL SURGERY PROGRESS NOTE FOR DR. DAVE





Patient seen and examined at bedside. He is having normal bowel movements. He 

is passing flatus. He reports occasional abdominal pain in the lower abdomen. 

He is tolerating diet and denies nausea or vomiting. He is using his IS and 

ambulating with PT and on his own.





Objective





- Vital Signs/Intake and Output


Vital Signs (last 24 hours): 


 











Temp Pulse Resp BP Pulse Ox


 


 98.5 F   77   18   115/75   97 


 


 10/25/17 07:40  10/25/17 07:40  10/25/17 07:40  10/25/17 07:40  10/25/17 07:40








Intake and Output: 


 











 10/25/17 10/25/17





 06:59 18:59


 


Intake Total 990 390


 


Output Total 670 


 


Balance 320 390














- Medications


Medications: 


 Current Medications





Clonidine HCl (Catapres Tts1 0.1 Mg/24 Hr)  1 patch TD Q7D@1000 [facility]


   Last Admin: 10/25/17 10:52 Dose:  1 patch


Famotidine (Pepcid)  20 mg PO BID [facility]


   Last Admin: 10/25/17 10:41 Dose:  20 mg


Heparin Sodium (Porcine) (Heparin)  5,000 units SC Q8 [facility]


   Last Admin: 10/25/17 14:14 Dose:  5,000 units


Piperacillin Sod/Tazobactam Sod (Zosyn 3.375 Gm Iv Premix)  3.375 gm in 50 mls 

@ 100 mls/hr IVPB Q6H [facility]


   Last Admin: 10/25/17 10:43 Dose:  100 mls/hr


Vancomycin/Sodium Chloride (Vancomycin 1 Gm/Ns 200 Ml)  1 gm in 200 mls @ 133 

mls/hr IVPB Q12H [facility]


   Stop: 10/30/17 16:01


Insulin Aspart (Novolog)  0 unit SC ACHS [facility]


   PRN Reason: Protocol


   Last Admin: 10/25/17 12:29 Dose:  1 unit


Ondansetron HCl (Zofran Inj)  4 mg IVP Q4 PRN


   PRN Reason: nausea


   Last Admin: 10/17/17 14:41 Dose:  4 mg


Tramadol HCl (Ultram)  50 mg PO TID [facility]


   Last Admin: 10/25/17 14:13 Dose:  50 mg











- Labs


Labs: 


 





 10/25/17 07:06 





 10/25/17 07:06 





 











PT  15.5 SECONDS (9.7-12.2)  H  10/09/17  08:47    


 


INR  1.4   10/09/17  08:47    


 


APTT  31 SECONDS (21-34)   10/09/17  08:47    














- Constitutional


Appears: Non-toxic, No Acute Distress





- Eye Exam


Eye Exam: Normal appearance





- Respiratory Exam


Respiratory Exam: NORMAL BREATHING PATTERN.  absent: Respiratory Distress





- Cardiovascular Exam


Cardiovascular Exam: +S1, +S2





- GI/Abdominal Exam


GI & Abdominal Exam: Soft.  absent: Distended, Firm, Guarding, Rigid, Tenderness


Additional comments: 


staples in place





- Neurological Exam


Neurological Exam: Alert, Awake





Assessment and Plan





- Assessment and Plan (Free Text)


Assessment: 


65yo M with recurrent ventral hernia and thrombocytopenia s/p exploratory 

laparotomy, extensive BREANNA, repair of recurrent ventral hernia with biologic mesh

, small bowel resection with primary anastomosis POD#16, now s/p IR US guided 

abdominal abscess drainage with placement of an 8Fr drainage catheter 2 days 

ago.





- Afebrile, VSS


- Hemoglobin 8.4, stable, gave Venofer today


- Continue Physical therapy 


- Tolerating regular diet, having regular bowel movements


- Encouraged OOB, ambulation, and IS use


- Culture from IR drainage: gram + cocci, patient started on Vancomycin


- Hepatitis C antibody is reactive


- Discussed plan with Dr. Selma Ny PGY-3

## 2017-10-25 NOTE — PN
DATE:



LOCATION:  [facility], bed 8.



SUBJECTIVE:  This is a 66-year-old male seen and examined in rounds,

without significant clinical changes but with persistent abdominal pain. 

The entire chart is reviewed including but not limited to the most recent

lab and radiology study results, current and previous medication list,

current and previous medical events.  Today's lab showed again low

hemoglobin of 8.4, hematocrit 24.8, platelet count of 118, low sodium of

129, blood glucose level 189 and low calcium of 7.5.



PHYSICAL EXAMINATION:

GENERAL:  A 66-year-old male.

VITAL SIGNS:  Afebrile with pulse of 74, respiratory rate of 18 to 20 and

blood pressure of 122/72.

HEENT:  Show pale, dry oral mucous membrane.  Nonicteric sclerae.

LUNGS:  Few scattered crepitations, decreased air entry at bases.

HEART:  Positive S1 and S2.

ABDOMEN:  Soft.  Bowel sounds are present.  No mass or organomegaly.  No

rebound tenderness or guarding but generalized tenderness with clean

dressing.

EXTREMITIES:  No significant edema, clubbing or cyanosis.

NEUROLOGIC:  No reported neurological deficits, sensory or motor.



IMPRESSION:

1.  Incarcerated abdominal hernia, treated surgically.

2.  Status post partial small bowel resection.

3.  Postoperative intra-abdominal abscess formation, drained surgically.

4.  Anemia secondary to above.

5.  Peptic ulcer disease by history.

6.  Known history of thrombocytopenia, electrolyte imbalance with

malnutrition and hypoalbuminemia.



SUGGESTION:

1.  Continue current management.

2.  The patient may repeat CAT scan of the abdomen and the pelvis.

3.  Cancer markers.

4.  Further evaluation to follow.





__________________________________________

Earle Chilel MD



cc:  Earle Chilel MD



DD:  10/25/2017 18:01:14

DT:  10/25/2017 19:58:43

Job # [id number]

## 2017-10-25 NOTE — CP.PCM.PN
Subjective





- Date & Time of Evaluation


Date of Evaluation: 10/25/17


Time of Evaluation: 13:24





- Subjective


Subjective: 





report showed gm + cocci awaiting final ID





has biologic mesh  no plan to remove 





Objective





- Vital Signs/Intake and Output


Vital Signs (last 24 hours): 


 











Temp Pulse Resp BP Pulse Ox


 


 98.5 F   77   18   115/75   97 


 


 10/25/17 07:40  10/25/17 07:40  10/25/17 07:40  10/25/17 07:40  10/25/17 07:40








Intake and Output: 


 











 10/25/17 10/25/17





 06:59 18:59


 


Intake Total 990 


 


Output Total 670 


 


Balance 320 














- Medications


Medications: 


 Current Medications





Clonidine HCl (Catapres Tts1 0.1 Mg/24 Hr)  1 patch TD Q7D@1000 [facility]


   Last Admin: 10/25/17 10:52 Dose:  1 patch


Famotidine (Pepcid)  20 mg PO BID [facility]


   Last Admin: 10/25/17 10:41 Dose:  20 mg


Heparin Sodium (Porcine) (Heparin)  5,000 units SC Q8 [facility]


   Last Admin: 10/25/17 05:29 Dose:  5,000 units


Piperacillin Sod/Tazobactam Sod (Zosyn 3.375 Gm Iv Premix)  3.375 gm in 50 mls 

@ 100 mls/hr IVPB Q6H [facility]


   Last Admin: 10/25/17 10:43 Dose:  100 mls/hr


Insulin Aspart (Novolog)  0 unit SC ACHS [facility]


   PRN Reason: Protocol


   Last Admin: 10/25/17 12:29 Dose:  1 unit


Ondansetron HCl (Zofran Inj)  4 mg IVP Q4 PRN


   PRN Reason: nausea


   Last Admin: 10/17/17 14:41 Dose:  4 mg


Tramadol HCl (Ultram)  50 mg PO TID [facility]


   Last Admin: 10/25/17 10:41 Dose:  50 mg











- Labs


Labs: 


 





 10/25/17 07:06 





 10/25/17 07:06 





 











PT  15.5 SECONDS (9.7-12.2)  H  10/09/17  08:47    


 


INR  1.4   10/09/17  08:47    


 


APTT  31 SECONDS (21-34)   10/09/17  08:47

## 2017-10-25 NOTE — PN
DATE:



SUBJECTIVE:  The patient denies chest pain.  He complains of abdominal

discomfort.



PHYSICAL EXAMINATION

VITAL SIGNS:  Blood pressure 116/65, heart rate 77, temperature 98.5,

respiration 18.

HEENT:  Pale conjunctivae.

CHEST:  Diminished breath sounds over the bases.

HEART:  S1 and S2 regular.

ABDOMEN:  Soft.

EXTREMITIES:  1+ pitting edema.



LABORATORY DATA:  Today's hemoglobin and hematocrit 8.4 and 24.8, white

count 8.1, and platelet count 118,000.  SMA-7 is within normal limits

except for sodium of 129.  Calcium is within normal limits at 7.5.



ASSESSMENT:

1.  Status post anterior abdominal wall hernia repair and small bowel

resection, status post drainage of an anterior abdominal wall fluid

collection and preliminary blood culture result with Gram-positive cocci.

2.  Hypertension.

3.  Diastolic heart failure.

4.  Anemia.

5.  Mild thrombocytopenia.



RECOMMENDATIONS:  Continue clonidine patch at 0.5 mg daily.  Subcutaneous

heparin was resumed at 5000 units q. 8 hours.  Continue IV vancomycin at 1

g q. 12 hours, IV Zosyn at 3.375 g intravenously q. 6 hours.  Discontinue

telemetry.





__________________________________________

Cezar Clark MD





DD:  10/25/2017 16:44:07

DT:  10/25/2017 17:06:34

Job # [id number]

## 2017-10-25 NOTE — CARD
--------------- APPROVED REPORT --------------





EKG Measurement

Heart [id number]

OR 184P2

[id number]

[id number]

[id number]



<Conclusion>

Normal sinus rhythm

Normal ECG

## 2017-10-25 NOTE — CP.PCM.CON
History of Present Illness





- History of Present Illness


History of Present Illness: 


INFECTIOUS DISEASE CONSULTATION;


PATIENT SEEN, CHART REVIEWED, CONSULTANTS NOTED, DISCUSSION WITH STAFF AND 

REVIEW OF MEDICAL RECORDS.


HPI


66-year-old -American male with past medical history of hypertension, 

hyperlipidemia, IDDM,HEPATITIS C, ventral hernia repair with mesh who presented 

to [facility] on 


9/30/17 with abdominal pain associated with generalized weakness tiredness and 

fatigue.


Patient had a prolonged hospitalization course and was found to have small 

bowel obstruction with incarcerated hernia.  Patient underwent exploratory lap 

with open ventral hernia repair/, small bowel resection, lysis of adhesions and 

repair of  incarcerated hernia with mesh ON 10/9/17.


Postoperatively patient developed increasing abdominal pain and CT of the 

abdomen and pelvis with by mouth and IV contrast on 10/20/17 showed developing 

abscess with in postoperative site of ventral hernia repair measuring 12 cm.  

Also seroma with gas is a possibility versus chronic hematoma.  Mild abdominal 

ascites also seen in the pelvis.  No free intraperitoneal gas seen.





Patient underwent ultrasound-guided drainage by IR and placement of 8 [place]ian 

drainage catheter draining seropurulent fluid on 10/23/17.


  Gram stain culture showing gram-positive cocci.


Patient has been placed on IV Zosyn postoperatively SINCE 10/9/17 





 Infectious disease consultation requested by PMD for abdominal wall abscess/

and possible infected mesh.





Allergy; ASPIRIN


PMH: DM, HTN, HLD, Hep C


PSH: Hernia repair w/ Mesh


ALL; NKDA


SocialHx: independent in ADL ETOH,Tobacco use, denies drug use  


MEDS; REVIEWED.





Review of Systems





- Constitutional


Constitutional: absent: Chills, Fever





- EENT


Nose/Mouth/Throat: absent: Dysphagia, Mouth Lesions





- Cardiovascular


Cardiovascular: absent: Chest Pain, Dyspnea





- Respiratory


Respiratory: absent: Cough





- Gastrointestinal


Gastrointestinal: Abdominal Pain, Bloating.  absent: Constipation, Diarrhea, 

Melena, Nausea, Vomiting





- Genitourinary


Genitourinary: absent: Dysuria, Hematuria, Bladder Distension





- Neurological


Neurological: absent: Headaches





- Hematologic/Lymphatic


Hematologic: As Per HPI.  absent: Easy Bleeding, Easy Bruising, Lymphadenopathy





Past Patient History





- Past Medical History & Family History


Past Medical History?: Yes





- Past Social History


Smoking Status: Light Smoker < 10 Cigarettes Daily





- CARDIAC


Hx Hypertension: Yes





- PULMONARY


Hx Respiratory Disorders: No





- NEUROLOGICAL


Hx Neurological Disorder: No





- HEENT


Hx HEENT Problems: No





- RENAL


Other/Comment: pt claims he has kidney problem





- ENDOCRINE/METABOLIC


Hx Diabetes Mellitus Type 2: Yes





- HEMATOLOGICAL/ONCOLOGICAL


Hx Anemia: Yes





- INTEGUMENTARY


Hx Dermatological Problems: No





- MUSCULOSKELETAL/RHEUMATOLOGICAL


Hx Falls: Yes





- GASTROINTESTINAL


Hx Gastritis: Yes





- GENITOURINARY/GYNECOLOGICAL


Hx Genitourinary Disorders: Yes


Hx Urinary Tract Infection: Yes





- PSYCHIATRIC


Hx Substance Use: No





- SURGICAL HISTORY


Hx Surgeries: Yes


Hx Herniorrhaphy: Yes (2012)





- ANESTHESIA


Hx Anesthesia: Yes


Hx Anesthesia Reactions: No


Hx Malignant Hyperthermia: No





Meds


Allergies/Adverse Reactions: 


 Allergies











Allergy/AdvReac Type Severity Reaction Status Date / Time


 


aspirin Allergy   Verified 09/30/17 05:14














- Medications


Medications: 


 Current Medications





Clonidine HCl (Catapres Tts1 0.1 Mg/24 Hr)  1 patch TD Q7D@1000 [facility]


   Last Admin: 10/25/17 10:52 Dose:  1 patch


Famotidine (Pepcid)  20 mg PO BID [facility]


   Last Admin: 10/25/17 10:41 Dose:  20 mg


Heparin Sodium (Porcine) (Heparin)  5,000 units SC Q8 [facility]


   Last Admin: 10/25/17 14:14 Dose:  5,000 units


Piperacillin Sod/Tazobactam Sod (Zosyn 3.375 Gm Iv Premix)  3.375 gm in 50 mls 

@ 100 mls/hr IVPB Q6H [facility]


   Last Admin: 10/25/17 10:43 Dose:  100 mls/hr


Vancomycin/Sodium Chloride (Vancomycin 1 Gm/Ns 200 Ml)  1 gm in 200 mls @ 133 

mls/hr IVPB Q12H [facility]


   Stop: 10/30/17 16:01


Insulin Aspart (Novolog)  0 unit SC ACHS [facility]


   PRN Reason: Protocol


   Last Admin: 10/25/17 12:29 Dose:  1 unit


Ondansetron HCl (Zofran Inj)  4 mg IVP Q4 PRN


   PRN Reason: nausea


   Last Admin: 10/17/17 14:41 Dose:  4 mg


Tramadol HCl (Ultram)  50 mg PO TID [facility]


   Last Admin: 10/25/17 14:13 Dose:  50 mg











Physical Exam





- Constitutional


Appears: No Acute Distress





- Head Exam


Head Exam: NORMAL INSPECTION





- Eye Exam


Eye Exam: EOMI, PERRL.  absent: Scleral icterus





- ENT Exam


ENT Exam: Normal Oropharynx





- Neck Exam


Neck exam: Positive for: Normal Inspection.  Negative for: Lymphadenopathy





- Respiratory Exam


Respiratory Exam: Decreased Breath Sounds





- Cardiovascular Exam


Cardiovascular Exam: Tachycardia, REGULAR RHYTHM, +S1, +S2





- GI/Abdominal Exam


GI & Abdominal Exam: Distended, Hypoactive Bowel Sounds, Soft (MIDLINE INCISION 

WITH STAPLES IN PLACE.  SOME BRUISING ANTERIOR ABDOMINAL WALL.  ALSO DRAINAGE 

WITH 8 Samoan CATHETER NOTED IN [place] AND THE COLLECTING BAG.)





- Extremities Exam


Extremities exam: Positive for: pedal edema (2+ PEDAL EDEMA.), pedal pulses 

present (DISTANT.).  Negative for: calf tenderness





- Neurological Exam


Neurological exam: Alert, CN II-XII Intact, Oriented x3, Reflexes Normal





- Psychiatric Exam


Psychiatric exam: Normal Mood





- Skin


Skin Exam: Dry, Warm





Results





- Vital Signs


Recent Vital Signs: 


 Last Vital Signs











Temp  98.5 F   10/25/17 07:40


 


Pulse  77   10/25/17 07:40


 


Resp  18   10/25/17 07:40


 


BP  115/75   10/25/17 07:40


 


Pulse Ox  97   10/25/17 07:40














- Labs


Result Diagrams: 


 10/25/17 07:06





 10/25/17 07:06


Labs: 


 Laboratory Results - last 24 hr











  10/24/17 10/24/17 10/25/17





  16:39 22:02 06:45


 


WBC   


 


RBC   


 


Hgb   


 


Hct   


 


MCV   


 


MCH   


 


MCHC   


 


RDW   


 


Plt Count   


 


MPV   


 


Neut % (Auto)   


 


Lymph % (Auto)   


 


Mono % (Auto)   


 


Eos % (Auto)   


 


Baso % (Auto)   


 


Neut #   


 


Lymph #   


 


Mono #   


 


Eos #   


 


Baso #   


 


Differential Comment   


 


Sodium   


 


Potassium   


 


Chloride   


 


Carbon Dioxide   


 


Anion Gap   


 


BUN   


 


Creatinine   


 


Est GFR ( Amer)   


 


Est GFR (Non-Af Amer)   


 


POC Glucose (mg/dL)  198 H  176 H  125 H


 


Random Glucose   


 


Calcium   


 


Hepatitis A IgM Ab   


 


Hep Bs Antigen   


 


Hep B Core IgM Ab   


 


Hepatitis C Antibody   














  10/25/17 10/25/17 10/25/17





  07:06 07:06 07:06


 


WBC  8.1  


 


RBC  2.45 L  


 


Hgb  8.4 L  


 


Hct  24.8 L  


 


MCV  101.3 H  


 


MCH  34.4 H  


 


MCHC  34.0  


 


RDW  19.7 H  


 


Plt Count  118 L  


 


MPV  9.0  


 


Neut % (Auto)  65.0  


 


Lymph % (Auto)  22.5  


 


Mono % (Auto)  11.4 H  


 


Eos % (Auto)  0.3  


 


Baso % (Auto)  0.8  


 


Neut #  5.2  


 


Lymph #  1.8  


 


Mono #  0.9 H  


 


Eos #  0.0  


 


Baso #  0.1  


 


Differential Comment    


 


Sodium   129 L 


 


Potassium   4.1 


 


Chloride   98 


 


Carbon Dioxide   25 


 


Anion Gap   10 


 


BUN   9 


 


Creatinine   0.9 


 


Est GFR ( Amer)   > 60 


 


Est GFR (Non-Af Amer)   > 60 


 


POC Glucose (mg/dL)   


 


Random Glucose   100 


 


Calcium   7.5 L 


 


Hepatitis A IgM Ab    Negative


 


Hep Bs Antigen    Negative


 


Hep B Core IgM Ab    Negative


 


Hepatitis C Antibody    Reactive














  10/25/17





  11:42


 


WBC 


 


RBC 


 


Hgb 


 


Hct 


 


MCV 


 


MCH 


 


MCHC 


 


RDW 


 


Plt Count 


 


MPV 


 


Neut % (Auto) 


 


Lymph % (Auto) 


 


Mono % (Auto) 


 


Eos % (Auto) 


 


Baso % (Auto) 


 


Neut # 


 


Lymph # 


 


Mono # 


 


Eos # 


 


Baso # 


 


Differential Comment 


 


Sodium 


 


Potassium 


 


Chloride 


 


Carbon Dioxide 


 


Anion Gap 


 


BUN 


 


Creatinine 


 


Est GFR ( Amer) 


 


Est GFR (Non-Af Amer) 


 


POC Glucose (mg/dL)  189 H


 


Random Glucose 


 


Calcium 


 


Hepatitis A IgM Ab 


 


Hep Bs Antigen 


 


Hep B Core IgM Ab 


 


Hepatitis C Antibody 














- Imaging and Cardiology


  ** CT scan - abdomen


Status: Report reviewed by me (see full report 10/20/17.)





Assessment & Plan


(1) S/P repair of recurrent ventral hernia


Assessment and Plan: 


S/P  OPEN VENTRAL HERNIA REPAIR WITH BIOLOGICAL MESH/SMALL BOWEL RESECTION/


lYSIS OF ADHESION AND REPAIR OF RECURRENT INCISIONAL HERNIA WITH MESH ON 10/9/

17.


BY DR DAVE.





S/P IR ,ULTRASOUND GUIDED DRAINAGE OF ABDOMINAL WALL COLLECTION AT SITE OF 

SURGICAL STAPLES AND PLACEMENT OFF 8 Samoan DRAINAGE CATHETER 10/23/17.


 DRAINAGE FLUID +VE GRAM-POSITIVE COCCI





PANCULTURES


UA/URINE CULTURES,


CONTINUE iv ZOSYN 3.375  q  8 HOURLY 10/9/17.


ADD iv VANCOMYCIN 1 G EVERY 12 HOURLY 10/25/17 FOR GRAM-POSITIVE MRSA 

COVERAGE.10/25/17.


VANCO TROUGH LEVELS PRIOR TO THE FOURTH DOSE AND KEEP IT BETWEEN 10 AND 20.


fOLLOW-UP CULTURES AND ADJUST ANTIBIOTICS.


fOLLOW-UP RENAL FUNCTIONS CLOSELY.


MRSA SCREEN IF NOT DONE.


ADEQUATE CONTROL OF HIS BLOOD SUGAR.





WILL DISCUSS WITH MEDICAL TEAM/ AND SURGERY.





 IF MESH INFECTED WILL NEED SURGICAL INTERVENTION.











Status: Acute   





(2) Status post exploratory laparotomy


Status: Acute   





(3) Small bowel obstruction


Status: Acute   





(4) Diabetes mellitus


Status: Chronic   





(5) Hepatitis C infection


Status: Chronic   





(6) Hypertension


Status: Chronic

## 2017-10-25 NOTE — CP.PCM.PN
Subjective





- Date & Time of Evaluation


Date of Evaluation: 10/25/17


Time of Evaluation: 19:00





- Subjective


Subjective: 





PT SEEN AND EVALUATED AT BEDSIDE, FEELING BETTER, incisonal hernia, s/p open 

repair w. biologic mesh, small bowel resection POD#16, w. development of seroma

, s/p IR drainage


-abx per ID


-encourage ambulation and IS use





Objective





- Vital Signs/Intake and Output


Vital Signs (last 24 hours): 


 











Temp Pulse Resp BP Pulse Ox


 


 98.1 F   77   20   119/76   99 


 


 10/25/17 16:00  10/25/17 16:00  10/25/17 16:00  10/25/17 16:00  10/25/17 16:00








Intake and Output: 


 











 10/25/17 10/26/17





 18:59 06:59


 


Intake Total 390 900


 


Output Total 15 815


 


Balance 375 85














- Medications


Medications: 


 Current Medications





Clonidine HCl (Catapres Tts1 0.1 Mg/24 Hr)  1 patch TD Q7D@1000 [facility]


   Last Admin: 10/25/17 10:52 Dose:  1 patch


Famotidine (Pepcid)  20 mg PO BID [facility]


   Last Admin: 10/25/17 18:24 Dose:  20 mg


Heparin Sodium (Porcine) (Heparin)  5,000 units SC Q8 [facility]


   Last Admin: 10/25/17 21:35 Dose:  5,000 units


Piperacillin Sod/Tazobactam Sod (Zosyn 3.375 Gm Iv Premix)  3.375 gm in 50 mls 

@ 100 mls/hr IVPB Q6H [facility]


   Last Admin: 10/25/17 21:31 Dose:  100 mls/hr


Vancomycin/Sodium Chloride (Vancomycin 1 Gm/Ns 200 Ml)  1 gm in 200 mls @ 133 

mls/hr IVPB Q12H [facility]


   Stop: 10/30/17 16:01


   Last Admin: 10/25/17 18:00 Dose:  133 mls/hr


Insulin Aspart (Novolog)  0 unit SC ACHS [facility]


   PRN Reason: Protocol


   Last Admin: 10/25/17 21:36 Dose:  Not Given


Ondansetron HCl (Zofran Inj)  4 mg IVP Q4 PRN


   PRN Reason: nausea


   Last Admin: 10/17/17 14:41 Dose:  4 mg


Tramadol HCl (Ultram)  50 mg PO TID [facility]


   Last Admin: 10/25/17 18:24 Dose:  50 mg











- Labs


Labs: 


 





 10/25/17 07:06 





 10/25/17 07:06 





 











PT  15.5 SECONDS (9.7-12.2)  H  10/09/17  08:47    


 


INR  1.4   10/09/17  08:47    


 


APTT  31 SECONDS (21-34)   10/09/17  08:47    














Assessment and Plan


(1) Intestinal obstruction


Status: Acute   





(2) Thrombocytopenia


Status: Acute   





(3) Ventral hernia


Status: Acute   





(4) HTN (hypertension)


Status: Acute   





(5) Diabetes mellitus


Status: Chronic

## 2017-10-26 LAB
AMYLASE SERPL-CCNC: 38 U/L (ref 30–110)
BASOPHILS # BLD AUTO: 0.1 K/UL (ref 0–0.2)
BASOPHILS NFR BLD: 0.7 % (ref 0–2)
BUN SERPL-MCNC: 8 MG/DL (ref 9–20)
CALCIUM SERPL-MCNC: 7.9 MG/DL (ref 8.6–10.4)
CHLORIDE SERPL-SCNC: 99 MMOL/L (ref 98–107)
CO2 SERPL-SCNC: 24 MMOL/L (ref 22–30)
EOSINOPHIL # BLD AUTO: 0 K/UL (ref 0–0.7)
EOSINOPHIL NFR BLD: 0.5 % (ref 0–4)
ERYTHROCYTE [DISTWIDTH] IN BLOOD BY AUTOMATED COUNT: 20.2 % (ref 11.5–14.5)
GLUCOSE SERPL-MCNC: 109 MG/DL (ref 75–110)
HCT VFR BLD CALC: 24.8 % (ref 35–51)
LYMPHOCYTES # BLD AUTO: 2.1 K/UL (ref 1–4.3)
LYMPHOCYTES NFR BLD AUTO: 22.3 % (ref 20–40)
MAGNESIUM SERPL-MCNC: 1.5 MG/DL (ref 1.6–2.3)
MCH RBC QN AUTO: 35 PG (ref 27–31)
MCHC RBC AUTO-ENTMCNC: 35 G/DL (ref 33–37)
MCV RBC AUTO: 100.2 FL (ref 80–94)
MONOCYTES # BLD: 1 K/UL (ref 0–0.8)
MONOCYTES NFR BLD: 10.5 % (ref 0–10)
NRBC BLD AUTO-RTO: 0 % (ref 0–2)
PLATELET # BLD: 119 K/UL (ref 130–400)
PMV BLD AUTO: 8.8 FL (ref 7.2–11.7)
POTASSIUM SERPL-SCNC: 3.9 MMOL/L (ref 3.6–5.2)
SODIUM SERPL-SCNC: 130 MMOL/L (ref 132–148)
WBC # BLD AUTO: 9.3 K/UL (ref 4.8–10.8)

## 2017-10-26 RX ADMIN — INSULIN ASPART SCH: 100 INJECTION, SOLUTION INTRAVENOUS; SUBCUTANEOUS at 22:26

## 2017-10-26 RX ADMIN — INSULIN ASPART SCH UNIT: 100 INJECTION, SOLUTION INTRAVENOUS; SUBCUTANEOUS at 12:12

## 2017-10-26 RX ADMIN — VANCOMYCIN HYDROCHLORIDE SCH MLS/HR: 1 INJECTION, POWDER, LYOPHILIZED, FOR SOLUTION INTRAVENOUS at 17:09

## 2017-10-26 RX ADMIN — INSULIN ASPART SCH: 100 INJECTION, SOLUTION INTRAVENOUS; SUBCUTANEOUS at 07:58

## 2017-10-26 RX ADMIN — TAZOBACTAM SODIUM AND PIPERACILLIN SODIUM SCH MLS/HR: 375; 3 INJECTION, SOLUTION INTRAVENOUS at 04:15

## 2017-10-26 RX ADMIN — VANCOMYCIN HYDROCHLORIDE SCH MLS/HR: 1 INJECTION, POWDER, LYOPHILIZED, FOR SOLUTION INTRAVENOUS at 05:09

## 2017-10-26 RX ADMIN — INSULIN ASPART SCH: 100 INJECTION, SOLUTION INTRAVENOUS; SUBCUTANEOUS at 17:46

## 2017-10-26 NOTE — CP.PCM.PN
Subjective





- Date & Time of Evaluation


Date of Evaluation: 10/26/17


Time of Evaluation: 23:34





- Subjective


Subjective: 





CHIEF COMPLAINTS  TODAY :


afebrile,


offers no new complaints,


IR Drained right lower abdomen drainage serous


50ml fluid per shift











ROS.


HEENT :  N.


Resp :       No  SOB wheezing, cough 


Cardio :     No CP, PND orthopnea 


GI :           No abd. Pain, n/v +VE RIGHT ANTERIOR ABDOMEN DRAIN,


MIDLINE INCISION WITH STAPLES IN PLACE.  WOUND C/D/I


CNS : No headache , focal deficit. 


Musculoskel :  N


Ext. : Pedal pulses intact, no edema or calf pain 


Derm :        N


Psych :     N. 











PE.


Pt. is alert awake in no distress.


V.S  As noted in the chart 


Head ,ear nose,throat and eyes : Normal.


Neck : Supple with normal carotids.


Lungs: Clear air entry.


Heart : S1 & S2 normal . . No murmur. S4 + 


Abd : Hypoactive Bowel Sounds, Soft (MIDLINE INCISION WITH STAPLES IN PLACE.  

SOME BRUISING ANTERIOR ABDOMINAL WALL.  ALSO DRAINAGE WITH 8 [place] CATHETER 

NOTED IN [place] AND THE COLLECTING BAG.)


Neuro : Moves all ext. with no localized deficit.


Ext : No edema with intact pulses. Neg. calf tenderness 


Derm : No rashes or decubitus ulcer.





Radiology/Labs .


ABDOMINAL FLUID CULTURE-enterococcus faecium.


 s-  vancomycin RICKI <0.5, linizolid RICKI 2


BLOOD CULTURES 10/25/17  -24 HOURS.


cREATININE 0.7/bun 8


wbc 9.3, h&h 8.7 PLATELETS 119








Asssessment : 


66 yr  old M w. incisonal hernia, s/p open repair w. biologic mesh, small bowel 

resection ON 10/9/17- POD#17, w. development of seroma/abscess 


S/P IR ,ULTRASOUND GUIDED DRAINAGE OF ABDOMINAL WALL COLLECTION AT SITE OF 

SURGICAL STAPLES AND PLACEMENT OFF 8 [place] DRAINAGE CATHETER 10/23/17.


 DRAINAGE FLUID +VE Enterococcus faecium.





Plan :


SURGERY FOLLOW-UP NOTED.  NO PLANS OF SURGICAL INTERVENTION AS PER SURGERY NOW.





WILL GIVE A TRIAL OF IV  ANTIBIOTICS  FOR  4 WEEKS PRESENTLY.


CONTINUE IV VANCOMYCIN 1 G EVERY 12 HOURLY  x 4WEEKS.


ADD IV INVANZ 1GM IVPB  Q 24HRLY FOR GM-VE. X 4WEEKS


F/U VANCO TROUGH LEVELS AND KEEP BETWEEN 10 AND 20..


MONITOR RENAL FUNCTIONS CLOSELY.


MONITOR THROMBOCYTOPENIA AS PATIENT HAS HEP C.


OFF IV ZOSYN


F/U DRAIN CARE/AND DRAINAGE AS PER SURGERY.


GI ALSO ON BOARD.


PT WILL NEED TO F/U WITH [facility] FOR HEPATITIS C/ OR AS PER GI 

AFTER STABILIZATION OF HIS INFECTION.


WILL FOLLOW PATIENT WHILE IN THE HOSPITAL.





-











Objective





- Vital Signs/Intake and Output


Vital Signs (last 24 hours): 


 











Temp Pulse Resp BP Pulse Ox


 


 98.1 F   71   20   125/71   99 


 


 10/26/17 15:09  10/26/17 15:09  10/26/17 15:09  10/26/17 15:09  10/26/17 15:09








Intake and Output: 


 











 10/26/17 10/27/17





 18:59 06:59


 


Intake Total 280 700


 


Output Total 15 615


 


Balance 265 85














- Medications


Medications: 


 Current Medications





Clonidine HCl (Catapres Tts1 0.1 Mg/24 Hr)  1 patch TD Q7D@1000 [facility]


   Last Admin: 10/25/17 10:52 Dose:  1 patch


Famotidine (Pepcid)  [facility]ID [facility]


   Last Admin: 10/26/17 17:46 Dose:  20 mg


Heparin Sodium (Porcine) (Heparin)  5,000 units SC Q8 [facility]


   Last Admin: 10/26/17 22:26 Dose:  5,000 units


Vancomycin/Sodium Chloride (Vancomycin 1 Gm/Ns 200 Ml)  1 gm in 200 mls @ 133 

mls/hr IVPB Q12H [facility]


   Stop: 10/30/17 16:01


   Last Admin: 10/26/17 17:09 Dose:  133 mls/hr


Insulin Aspart (Novolog)  0 unit SC ACHS [facility]


   PRN Reason: Protocol


   Last Admin: 10/26/17 22:26 Dose:  Not Given


Ondansetron HCl (Zofran Inj)  4 mg IVP Q4 PRN


   PRN Reason: nausea


   Last Admin: 10/17/17 14:41 Dose:  4 mg


Tramadol HCl (Ultram)  50 mg PO TID [facility]


   Last Admin: 10/26/17 17:46 Dose:  50 mg











- Labs


Labs: 


 





 10/26/17 06:19 





 10/26/17 06:19 





 











PT  15.5 SECONDS (9.7-12.2)  H  10/09/17  08:47    


 


INR  1.4   10/09/17  08:47    


 


APTT  31 SECONDS (21-34)   10/09/17  08:47    














Assessment and Plan


(1) S/P repair of recurrent ventral hernia


Status: Acute   





(2) Status post exploratory laparotomy


Status: Acute   





(3) Small bowel obstruction


Status: Acute   





(4) Diabetes mellitus


Status: Chronic   





(5) Hepatitis C infection


Status: Chronic   





(6) Hypertension


Status: Chronic

## 2017-10-26 NOTE — PN
FOLLOWUP



SUBJECTIVE:  The patient denies any chest pain or shortness of breath.  No

reported arrhythmia.



PHYSICAL EXAMINATION

VITAL SIGNS:  Blood pressure 121/78, heart rate 69, temperature 98.2,

respirations 19.

HEENT:  Pale conjunctivae.

CHEST:  Diminished breath sounds over the bases.

HEART:  S1 and S2 regular.

EXTREMITIES:  1+ pitting edema.



LABORATORY DATA:  Today's hemoglobin and hematocrit 8.7 and 24.8, platelet

count 198,000.  White count is within normal limits.  Today's SMA-7: 

Sodium _____, potassium 3.9, chloride 99, CO2 of 24, glucose 109, BUN 8,

creatinine 0.7.  Microbiology report of the anterior abdominal wall fluid

was positive for Enterococcus faecium.



ASSESSMENT:

1.  Hypertension and hypertensive heart disease.

2.  Status post repair of anterior abdominal wall hernia and small bowel

resection.  Currently, having drainage for an infected anterior abdominal

wall fluid collection.

3.  Uncontrolled diabetes mellitus.

4.  Mild thrombocytopenia.

5.  Mild hyponatremia.

6.  Anemia.



RECOMMENDATIONS:  Continue current clonidine patch, subcutaneous heparin. 

Continue IV vancomycin at 1 g q. 12 hours.  I discontinued telemetry

monitoring today.



__________________________________________

Cezar Clark MD



DD:  10/26/2017 15:56:32

DT:  10/26/2017 16:35:42

Job # [id number]

## 2017-10-26 NOTE — CP.PCM.PN
Subjective





- Date & Time of Evaluation


Date of Evaluation: 10/26/17


Time of Evaluation: 16:41





- Subjective


Subjective: 





Surgery: Dr. Hahn





Pt seen and examined. Resting comfortably in bed. No complaints. Pt is eager to 

go home.





Objective





- Vital Signs/Intake and Output


Vital Signs (last 24 hours): 


 











Temp Pulse Resp BP Pulse Ox


 


 98.2 F   69   19   121/78   100 


 


 10/26/17 07:10  10/26/17 07:10  10/26/17 07:10  10/26/17 07:10  10/26/17 07:10








Intake and Output: 


 











 10/26/17 10/26/17





 06:59 18:59


 


Intake Total 1300 280


 


Output Total 977 15


 


Balance 323 265














- Medications


Medications: 


 Current Medications





Clonidine HCl (Catapres Tts1 0.1 Mg/24 Hr)  1 patch TD Q7D@1000 [facility]


   Last Admin: 10/25/17 10:52 Dose:  1 patch


Famotidine (Pepcid)  20 mg PO BID [facility]


   Last Admin: 10/26/17 09:18 Dose:  20 mg


Heparin Sodium (Porcine) (Heparin)  5,000 units SC Q8 [facility]


   Last Admin: 10/26/17 13:36 Dose:  5,000 units


Vancomycin/Sodium Chloride (Vancomycin 1 Gm/Ns 200 Ml)  1 gm in 200 mls @ 133 

mls/hr IVPB Q12H [facility]


   Stop: 10/30/17 16:01


   Last Admin: 10/26/17 05:09 Dose:  133 mls/hr


Insulin Aspart (Novolog)  0 unit SC ACHS [facility]


   PRN Reason: Protocol


   Last Admin: 10/26/17 12:12 Dose:  1 unit


Ondansetron HCl (Zofran Inj)  4 mg IVP Q4 PRN


   PRN Reason: nausea


   Last Admin: 10/17/17 14:41 Dose:  4 mg


Tramadol HCl (Ultram)  50 mg PO TID [facility]


   Last Admin: 10/26/17 13:38 Dose:  50 mg











- Labs


Labs: 


 





 10/26/17 06:19 





 10/26/17 06:19 





 











PT  15.5 SECONDS (9.7-12.2)  H  10/09/17  08:47    


 


INR  1.4   10/09/17  08:47    


 


APTT  31 SECONDS (21-34)   10/09/17  08:47    














- Constitutional


Appears: Non-toxic, No Acute Distress





- Head Exam


Head Exam: ATRAUMATIC, NORMOCEPHALIC





- Eye Exam


Eye Exam: EOMI





- ENT Exam


ENT Exam: Mucous Membranes Moist





- Respiratory Exam


Respiratory Exam: NORMAL BREATHING PATTERN.  absent: Accessory Muscle Use, 

Respiratory Distress





- GI/Abdominal Exam


GI & Abdominal Exam: Soft.  absent: Distended, Firm, Guarding, Rigid


Additional comments: 





incision C/D/I


IR drain in place, serous output





- Neurological Exam


Neurological Exam: Alert, Awake, Oriented x3





Assessment and Plan





- Assessment and Plan (Free Text)


Assessment: 





66M w. incisonal hernia, s/p open repair w. biologic mesh, small bowel 

resection POD#17, w. development of seroma, s/p IR drainage


-abx per ID


-encourage ambulation and IS use


-no plans for further surgical intervention at this time


-d/w attending





Isaiah PGY3

## 2017-10-26 NOTE — PN
DATE:



LOCATION:  [facility], bed A.



SUBJECTIVE:  This 66-year-old male seen and examined in rounds, appear to

be awake, alert with intermittent period of complaint of severe abdominal

pain.  The entire chart is reviewed including, but not limited to the most

recent lab and radiology study results, current and the previous medication

list, current and the previous medical events.  Case discussed at length

with staff.  Today, his lab showed hemoglobin 8.7, low with low hematocrit

24.8, with platelet count of 119.  Low sodium 130, with increase blood

glucose level 165, but low calcium 7.9 and low magnesium 1.5.  Hepatitis

profile was reported to be negative.



Stool for occult blood also was reported to be previously negative.



PHYSICAL EXAMINATION:

GENERAL:  A 66-year-old male.

VITAL SIGNS:  Afebrile with pulse of 72, respiratory rate 18 to 20, blood

pressure 126/76.

HEENT:  Showed pale, dry oral mucous membrane.  Nonicteric sclerae.

LUNGS:  Few scattered crepitation.  Decreased air entry at bases.

HEART:  Positive S1 and S2.

ABDOMEN:  Soft.  Bowel sounds are present.  No mass or organomegaly.  No

rebound tenderness or guarding, but generalized tenderness with clean

dressing.

EXTREMITIES:  Without significant clubbing, cyanosis or edema.

NEUROLOGIC:  No reported new neurological deficits, sensory or motor.



IMPRESSION:

1.  Incarcerated intraabdominal wall ventral hernia treated surgically with

partial small bowel resection.

2.  Intraabdominal abscess formation, drained, postsurgical.

3.  Re-exacerbation of peptic ulcer disease.

4.  Known history, but not limited to thrombocytopenia as well as recent

history of electrolyte imbalance.

5.  Anemia secondary to above most likely.



SUGGESTIONS:

1.  Continue current management.

2.  Again repeat stool for occult blood.

3.  Serum lipase and amylase level to be ordered.

4.  Blood transfusion to keep hemoglobin 10% g as needed.

5.  No need for aggressive GI workup in the meantime until the patient is

more stable clinically.

6.  Further recommendation to follow.





__________________________________________

Earle Chilel MD



cc:  Earle Chilel MD



DD:  10/26/2017 13:29:45

DT:  10/26/2017 14:02:32

Job # [id number]

## 2017-10-26 NOTE — CP.PCM.PN
Subjective





- Date & Time of Evaluation


Date of Evaluation: 10/26/17


Time of Evaluation: 18:05





- Subjective


Subjective: 





Pt seen and evaluated at bedside, c/o post op pain, has intraabdominal infection

, on antibiotics and ID eval





Objective





- Vital Signs/Intake and Output


Vital Signs (last 24 hours): 


 











Temp Pulse Resp BP Pulse Ox


 


 98.1 F   71   20   125/71   99 


 


 10/26/17 15:09  10/26/17 15:09  10/26/17 15:09  10/26/17 15:09  10/26/17 15:09








Intake and Output: 


 











 10/26/17 10/27/17





 18:59 06:59


 


Intake Total 280 


 


Output Total 15 


 


Balance 265 














- Medications


Medications: 


 Current Medications





Clonidine HCl (Catapres Tts1 0.1 Mg/24 Hr)  1 patch TD Q7D@1000 [facility]


   Last Admin: 10/25/17 10:52 Dose:  1 patch


Famotidine (Pepcid)  20 mg PO BID [facility]


   Last Admin: 10/26/17 17:46 Dose:  20 mg


Heparin Sodium (Porcine) (Heparin)  5,000 units SC Q8 [facility]


   Last Admin: 10/26/17 13:36 Dose:  5,000 units


Vancomycin/Sodium Chloride (Vancomycin 1 Gm/Ns 200 Ml)  1 gm in 200 mls @ 133 

mls/hr IVPB Q12H [facility]


   Stop: 10/30/17 16:01


   Last Admin: 10/26/17 17:09 Dose:  133 mls/hr


Insulin Aspart (Novolog)  0 unit SC ACHS [facility]


   PRN Reason: Protocol


   Last Admin: 10/26/17 17:46 Dose:  Not Given


Ondansetron HCl (Zofran Inj)  4 mg IVP Q4 PRN


   PRN Reason: nausea


   Last Admin: 10/17/17 14:41 Dose:  4 mg


Tramadol HCl (Ultram)  50 mg PO TID [facility]


   Last Admin: 10/26/17 17:46 Dose:  50 mg











- Labs


Labs: 


 





 10/26/17 06:19 





 10/26/17 06:19 





 











PT  15.5 SECONDS (9.7-12.2)  H  10/09/17  08:47    


 


INR  1.4   10/09/17  08:47    


 


APTT  31 SECONDS (21-34)   10/09/17  08:47    














- Constitutional


Appears: No Acute Distress





- Head Exam


Head Exam: ATRAUMATIC, NORMAL INSPECTION, NORMOCEPHALIC





- Eye Exam


Eye Exam: EOMI, Normal appearance, PERRL


Pupil Exam: NORMAL ACCOMODATION, PERRL





- Respiratory Exam


Respiratory Exam: Clear to Ausculation Bilateral, NORMAL BREATHING PATTERN





- Cardiovascular Exam


Cardiovascular Exam: REGULAR RHYTHM, +S1, +S2.  absent: Murmur





- GI/Abdominal Exam


GI & Abdominal Exam: Soft, Normal Bowel Sounds.  absent: Tenderness





- Rectal Exam


Rectal Exam: Deferred





Assessment and Plan


(1) Intestinal obstruction


Status: Acute   





(2) Thrombocytopenia


Status: Acute   





(3) Ventral hernia


Assessment & Plan: 


66M w. incisonal hernia, s/p open repair w. biologic mesh, small bowel 

resection POD#17, w. development of seroma, s/p IR drainage


-abx per ID


-encourage ambulation and IS use


-no plans for further surgical intervention at this time


Status: Acute   





(4) HTN (hypertension)


Status: Acute   





(5) Diabetes mellitus


Status: Chronic

## 2017-10-27 RX ADMIN — VANCOMYCIN HYDROCHLORIDE SCH MLS/HR: 1 INJECTION, POWDER, LYOPHILIZED, FOR SOLUTION INTRAVENOUS at 16:34

## 2017-10-27 RX ADMIN — INSULIN ASPART SCH: 100 INJECTION, SOLUTION INTRAVENOUS; SUBCUTANEOUS at 16:33

## 2017-10-27 RX ADMIN — VANCOMYCIN HYDROCHLORIDE SCH MLS/HR: 1 INJECTION, POWDER, LYOPHILIZED, FOR SOLUTION INTRAVENOUS at 05:07

## 2017-10-27 RX ADMIN — INSULIN ASPART SCH: 100 INJECTION, SOLUTION INTRAVENOUS; SUBCUTANEOUS at 08:13

## 2017-10-27 RX ADMIN — MAGNESIUM SULFATE IN DEXTROSE SCH MLS/HR: 10 INJECTION, SOLUTION INTRAVENOUS at 09:01

## 2017-10-27 RX ADMIN — INSULIN ASPART SCH: 100 INJECTION, SOLUTION INTRAVENOUS; SUBCUTANEOUS at 21:27

## 2017-10-27 RX ADMIN — INSULIN ASPART SCH: 100 INJECTION, SOLUTION INTRAVENOUS; SUBCUTANEOUS at 12:30

## 2017-10-27 RX ADMIN — MAGNESIUM SULFATE IN DEXTROSE SCH MLS/HR: 10 INJECTION, SOLUTION INTRAVENOUS at 09:04

## 2017-10-27 NOTE — CP.PCM.PN
Subjective





- Date & Time of Evaluation


Date of Evaluation: 10/27/17


Time of Evaluation: 18:00





- Subjective


Subjective: 





Has intermittent lower abdominal pain





Objective





- Vital Signs/Intake and Output


Vital Signs (last 24 hours): 


 











Temp Pulse Resp BP Pulse Ox


 


 97.9 F   84   20   145/83   100 


 


 10/27/17 15:35  10/27/17 15:35  10/27/17 15:35  10/27/17 15:35  10/27/17 15:35











- Medications


Medications: 


 Current Medications





Clonidine HCl (Catapres Tts1 0.1 Mg/24 Hr)  1 patch TD Q7D@1000 [facility]


   Last Admin: 10/25/17 10:52 Dose:  1 patch


Famotidine (Pepcid)  20 mg PO BID [facility]


   Last Admin: 10/27/17 17:38 Dose:  20 mg


Heparin Sodium (Porcine) (Heparin)  5,000 units SC Q8 [facility]


   Last Admin: 10/27/17 13:41 Dose:  5,000 units


Vancomycin/Sodium Chloride (Vancomycin 1 Gm/Ns 200 Ml)  1 gm in 200 mls @ 133 

mls/hr IVPB Q12H [facility]


   Stop: 10/30/17 16:01


   Last Admin: 10/27/17 16:34 Dose:  133 mls/hr


Meropenem 1 gm/ Sodium (Chloride)  100 mls @ 100 mls/hr IVPB Q8 [facility]


   Last Admin: 10/27/17 16:33 Dose:  Not Given


Insulin Aspart (Novolog)  0 unit SC ACHS [facility]


   PRN Reason: Protocol


   Last Admin: 10/27/17 16:33 Dose:  Not Given


Ondansetron HCl (Zofran Inj)  4 mg IVP Q4 PRN


   PRN Reason: nausea


   Last Admin: 10/17/17 14:41 Dose:  4 mg


Oxycodone/Acetaminophen (Percocet 5/325 Mg Tab)  1 tab PO Q4H PRN


   PRN Reason: breakthrough pain


   Stop: 10/30/17 06:18


Tramadol HCl (Ultram)  50 mg PO TID [facility]


   Last Admin: 10/27/17 17:41 Dose:  50 mg











- Labs


Labs: 


 





 10/26/17 06:19 





 10/26/17 06:19 





 











PT  15.5 SECONDS (9.7-12.2)  H  10/09/17  08:47    


 


INR  1.4   10/09/17  08:47    


 


APTT  31 SECONDS (21-34)   10/09/17  08:47    














- Head Exam


Head Exam: ATRAUMATIC





- Eye Exam


Eye Exam: Normal appearance





- Respiratory Exam


Respiratory Exam: NORMAL BREATHING PATTERN





- Cardiovascular Exam


Cardiovascular Exam: +S1, +S2





- GI/Abdominal Exam


GI & Abdominal Exam: Normal Bowel Sounds





Assessment and Plan


(1) Anemia


Assessment & Plan: 


chronic disease


H/H stable


Status: Acute   





(2) Thrombocytopenia


Assessment & Plan: 


hep C and liver disease


Status: Acute   





(3) Elevated CEA


Assessment & Plan: 


GI w/u


Status: Acute

## 2017-10-27 NOTE — PN
LOCATION:  [facility], bed A.



SUBJECTIVE:  This is a 66-year-old male seen and examined early in rounds

today without reported significant clinical changes with intermittent

period, again with sharp abdominal pain, with reported normal bowel

movement, experienced passing gas, tolerating oral intake adequately, but

no reported nausea or vomiting, but dyspepsia.



The entire chart is reviewed including but not limited to the most recent

lab and radiology study results, current and the previous medication list,

current and the previous medical events and today's lab showed blood

glucose level of 124.



PHYSICAL EXAMINATION:

GENERAL:  A 66-year-old male.

VITAL SIGNS:  Afebrile with pulse of 70, respiratory rate 18 to 20, blood

pressure of 130/78.

HEENT:  Showed pale, dry oral mucous membrane.  Nonicteric sclerae.

LUNGS:  Few scattered crepitation with decreased air entry at bases.

HEART:  Positive S1 and S2.

ABDOMEN:  Mild to moderate generalized tenderness with clean dressing, but

bowel sounds are present.  No mass or organomegaly.  No rebound tenderness

or guarding.

EXTREMITIES:  Without significant clubbing, cyanosis or edema.



IMPRESSION:

1.  Incarcerated anterior abdominal wall ventral hernia treated surgically.

2.  Partial small bowel resection.

3.  Intraabdominal postsurgical abscess formation, aspirated by IR.

4.  Re-exacerbation of peptic ulcer disease.

5.  Anemia secondary to above.

6.  Known history of thrombocytopenia.

7.  Electrolyte imbalance, recent history.



SUGGESTIONS:

1.  Continue current management.

2.  Blood transfusion as needed to keep hemoglobin around 10 gm%.

3.  Again, the patient would need endoscopic evaluation of the entire GI

tract when he is more stable clinically.







__________________________________________

Earle Chilel MD



cc:  Earle Chilel MD



DD:  10/27/2017 11:18:24

DT:  10/27/2017 11:37:02

Job # [id number]

## 2017-10-27 NOTE — CP.PCM.PN
Subjective





- Date & Time of Evaluation


Date of Evaluation: 10/27/17


Time of Evaluation: 07:00





- Subjective


Subjective: 


GENERAL SURGERY PROGRESS NOTE FOR DR. DAVE





Patient seen and examined at bedside. He is having normal bowel movements, last 

BM yesterday. He is passing flatus. He had pain last night and was given a dose 

of Percocet. He is tolerating diet and denies nausea or vomiting. He is using 

his IS and ambulating.








Objective





- Vital Signs/Intake and Output


Vital Signs (last 24 hours): 


 











Temp Pulse Resp BP Pulse Ox


 


 97.9 F   72   18   143/80   99 


 


 10/27/17 07:15  10/27/17 07:15  10/27/17 07:15  10/27/17 07:15  10/27/17 07:15








Intake and Output: 


 











 10/27/17 10/27/17





 06:59 18:59


 


Intake Total 700 


 


Output Total 625 


 


Balance 75 














- Medications


Medications: 


 Current Medications





Clonidine HCl (Catapres Tts1 0.1 Mg/24 Hr)  1 patch TD Q7D@1000 [facility]


   Last Admin: 10/25/17 10:52 Dose:  1 patch


Famotidine (Pepcid)  20 mg PO BID [facility]


   Last Admin: 10/27/17 09:00 Dose:  20 mg


Heparin Sodium (Porcine) (Heparin)  5,000 units SC Q8 [facility]


Vancomycin/Sodium Chloride (Vancomycin 1 Gm/Ns 200 Ml)  1 gm in 200 mls @ 133 

mls/hr IVPB Q12H [facility]


   Stop: 10/30/17 16:01


   Last Admin: 10/27/17 05:07 Dose:  133 mls/hr


Ertapenem 1 gm/ Sodium (Chloride)  50 mls @ 100 mls/hr IV Q24H [facility]


Insulin Aspart (Novolog)  0 unit SC ACHS [facility]


   PRN Reason: Protocol


   Last Admin: 10/27/17 08:13 Dose:  Not Given


Ondansetron HCl (Zofran Inj)  4 mg IVP Q4 PRN


   PRN Reason: nausea


   Last Admin: 10/17/17 14:41 Dose:  4 mg


Oxycodone/Acetaminophen (Percocet 5/325 Mg Tab)  1 tab PO Q4H PRN


   PRN Reason: breakthrough pain


   Stop: 10/30/17 06:18


Tramadol HCl (Ultram)  50 mg PO TID [facility]


   Last Admin: 10/27/17 09:00 Dose:  50 mg











- Labs


Labs: 


 





 10/26/17 06:19 





 10/26/17 06:19 





 











PT  15.5 SECONDS (9.7-12.2)  H  10/09/17  08:47    


 


INR  1.4   10/09/17  08:47    


 


APTT  31 SECONDS (21-34)   10/09/17  08:47    














- Constitutional


Appears: Non-toxic, No Acute Distress





- Eye Exam


Eye Exam: EOMI, Normal appearance





- Respiratory Exam


Respiratory Exam: NORMAL BREATHING PATTERN.  absent: Respiratory Distress





- Cardiovascular Exam


Cardiovascular Exam: +S1, +S2





- GI/Abdominal Exam


GI & Abdominal Exam: Soft.  absent: Distended, Firm, Guarding, Rigid, Tenderness

, Rebound


Additional comments: 


staples in place


IR drain in place, 40cc output over past 24 hours





- Neurological Exam


Neurological Exam: Alert, Awake





- Psychiatric Exam


Psychiatric exam: Normal Affect, Normal Mood





- Skin


Skin Exam: Normal Color





Assessment and Plan





- Assessment and Plan (Free Text)


Assessment: 


67yo M with recurrent ventral hernia and thrombocytopenia s/p exploratory 

laparotomy, extensive BREANNA, repair of recurrent ventral hernia with biologic mesh

, small bowel resection with primary anastomosis POD#18, now s/p IR US guided 

abdominal abscess drainage with placement of an 8Fr drainage catheter 4 days 

ago.





- Afebrile, VSS


- Continue Physical therapy 


- Tolerating regular diet, having regular bowel movements


- Encouraged OOB, ambulation, and IS use


- Culture from IR drainage: enterococcus facecium, on Vancomycin x4 weeks and 

Invanz x4 weeks per ID


- Hepatitis C antibody is reactive, ordered Hep C viral RNA


- Discussed plan with Dr. Selma Ny PGY-3

## 2017-10-27 NOTE — CP.PCM.PN
Subjective





- Date & Time of Evaluation


Date of Evaluation: 10/27/17


Time of Evaluation: 23:44





- Subjective


Subjective: 





CHIEF COMPLAINTS  TODAY :


afebrile,


C/O INTERMITTENT ABDOMINAL PAIN ANTERIORLY


IR Drained right lower abdomen drainage serous





ROS.


HEENT :  N.


Resp :       No  SOB wheezing, cough 


Cardio :     No CP, PND orthopnea 


GI :           No abd. Pain, n/v +VE RIGHT ANTERIOR ABDOMEN DRAIN,


MIDLINE INCISION WITH STAPLES IN PLACE.  WOUND C/D/I


CNS : No headache , focal deficit. 


Musculoskel :  N


Ext. : Pedal pulses intact, no edema or calf pain 


Derm :        N


Psych :     N. 











PE.


Pt. is alert awake in no distress.


V.S  As noted in the chart 


Head ,ear nose,throat and eyes : Normal.


Neck : Supple with normal carotids.


Lungs: Clear air entry.


Heart : S1 & S2 normal . . No murmur. S4 + 


Abd : Hypoactive Bowel Sounds, Soft (MIDLINE INCISION WITH STAPLES IN PLACE.  

SOME BRUISING ANTERIOR ABDOMINAL WALL.  ALSO DRAINAGE WITH 8 [place] CATHETER 

NOTED IN [place] AND THE COLLECTING BAG.)


Neuro : Moves all ext. with no localized deficit.


Ext : No edema with intact pulses. Neg. calf tenderness 


Derm : No rashes or decubitus ulcer.





Radiology/Labs .


ABDOMINAL FLUID CULTURE-  ENTEROCOCCUS FAECIUM


 s-  vancomycin RICKI <0.5, linizolid RICKI 2


BLOOD CULTURES 10/25/17  -24 HOURS.


cREATININE 0.7/bun 8


wbc 9.3, h&h 8.7 PLATELETS 119








Asssessment : 


66 yr  old M w. incisonal hernia, s/p open repair w. biologic mesh, small bowel 

resection ON 10/9/17- POD#17, w. development of seroma/abscess 


S/P IR ,ULTRASOUND GUIDED DRAINAGE OF ABDOMINAL WALL COLLECTION AT SITE OF 

SURGICAL STAPLES AND PLACEMENT OFF 8 [place] DRAINAGE CATHETER 10/23/17.


 DRAINAGE FLUID +VE Enterococcus faecium.





Plan :


SURGERY FOLLOW-UP NOTED.  





WILL GIVE A TRIAL OF IV  ANTIBIOTICS  FOR  4 WEEKS PRESENTLY.


CONTINUE IV VANCOMYCIN 1 G EVERY 12 HOURLY  x 4WEEKS.


ADD IV INVANZ 1GM IVPB  Q 24HRLY FOR GM-VE. X 4WEEKS   (""PHARMACY DOES NOT 

CARRY INVANZ-WILL GIVE mERREM 1 G EVERY 8 HOURLY WHILE PATIENT IN THE HOSPITAL. 

)





F/U VANCO TROUGH LEVELS AND KEEP BETWEEN 10 AND 20..


MONITOR RENAL FUNCTIONS CLOSELY.





MONITOR THROMBOCYTOPENIA AS PATIENT HAS HEP C.





F/U DRAIN CARE/AND DRAINAGE AS PER SURGERY.





Objective





- Vital Signs/Intake and Output


Vital Signs (last 24 hours): 


 











Temp Pulse Resp BP Pulse Ox


 


 97.9 F   84   20   145/83   100 


 


 10/27/17 15:35  10/27/17 15:35  10/27/17 15:35  10/27/17 15:35  10/27/17 15:35











- Medications


Medications: 


 Current Medications





Clonidine HCl (Catapres Tts1 0.1 Mg/24 Hr)  1 patch TD Q7D@1000 [facility]


   Last Admin: 10/25/17 10:52 Dose:  1 patch


Famotidine (Pepcid)  20 mg PO BID [facility]


   Last Admin: 10/27/17 17:38 Dose:  20 mg


Heparin Sodium (Porcine) (Heparin)  5,000 units SC Q8 [facility]


   Last Admin: 10/27/17 21:26 Dose:  5,000 units


Vancomycin/Sodium Chloride (Vancomycin 1 Gm/Ns 200 Ml)  1 gm in 200 mls @ 133 

mls/hr IVPB Q12H [facility]


   Stop: 10/30/17 16:01


   Last Admin: 10/27/17 16:34 Dose:  133 mls/hr


Meropenem 1 gm/ Sodium (Chloride)  100 mls @ 100 mls/hr IVPB Q8 [facility]


   Last Admin: 10/27/17 21:26 Dose:  100 mls/hr


Insulin Aspart (Novolog)  0 unit SC ACHS [facility]


   PRN Reason: Protocol


   Last Admin: 10/27/17 21:27 Dose:  Not Given


Ondansetron HCl (Zofran Inj)  4 mg IVP Q4 PRN


   PRN Reason: nausea


   Last Admin: 10/17/17 14:41 Dose:  4 mg


Oxycodone/Acetaminophen (Percocet 5/325 Mg Tab)  1 tab PO Q4H PRN


   PRN Reason: breakthrough pain


   Stop: 10/30/17 06:18


Tramadol HCl (Ultram)  50 mg PO TID [facility]


   Last Admin: 10/27/17 17:41 Dose:  50 mg











- Labs


Labs: 


 





 10/26/17 06:19 





 10/26/17 06:19 





 











PT  15.5 SECONDS (9.7-12.2)  H  10/09/17  08:47    


 


INR  1.4   10/09/17  08:47    


 


APTT  31 SECONDS (21-34)   10/09/17  08:47    














Assessment and Plan


(1) S/P repair of recurrent ventral hernia


Status: Acute   





(2) Status post exploratory laparotomy


Status: Acute   





(3) Small bowel obstruction


Status: Acute   





(4) Diabetes mellitus


Status: Chronic   





(5) Hepatitis C infection


Status: Chronic   





(6) Hypertension


Status: Chronic

## 2017-10-27 NOTE — CP.PCM.PN
Subjective





- Date & Time of Evaluation


Date of Evaluation: 10/27/17


Time of Evaluation: 17:00





- Subjective


Subjective: 





PT SEEN AND  EXAMINED





Objective





- Vital Signs/Intake and Output


Vital Signs (last 24 hours): 


 











Temp Pulse Resp BP Pulse Ox


 


 97.9 F   84   20   145/83   100 


 


 10/27/17 15:35  10/27/17 15:35  10/27/17 15:35  10/27/17 15:35  10/27/17 15:35











- Medications


Medications: 


 Current Medications





Clonidine HCl (Catapres Tts1 0.1 Mg/24 Hr)  1 patch TD Q7D@1000 [facility]


   Last Admin: 10/25/17 10:52 Dose:  1 patch


Famotidine (Pepcid)  20 mg PO BID [facility]


   Last Admin: 10/27/17 17:38 Dose:  20 mg


Heparin Sodium (Porcine) (Heparin)  5,000 units SC Q8 [facility]


   Last Admin: 10/27/17 21:26 Dose:  5,000 units


Vancomycin/Sodium Chloride (Vancomycin 1 Gm/Ns 200 Ml)  1 gm in 200 mls @ 133 

mls/hr IVPB Q12H [facility]


   Stop: 10/30/17 16:01


   Last Admin: 10/27/17 16:34 Dose:  133 mls/hr


Meropenem 1 gm/ Sodium (Chloride)  100 mls @ 100 mls/hr IVPB Q8 [facility]


   Last Admin: 10/27/17 21:26 Dose:  100 mls/hr


Insulin Aspart (Novolog)  0 unit SC ACHS [facility]


   PRN Reason: Protocol


   Last Admin: 10/27/17 21:27 Dose:  Not Given


Ondansetron HCl (Zofran Inj)  4 mg IVP Q4 PRN


   PRN Reason: nausea


   Last Admin: 10/17/17 14:41 Dose:  4 mg


Oxycodone/Acetaminophen (Percocet 5/325 Mg Tab)  1 tab PO Q4H PRN


   PRN Reason: breakthrough pain


   Stop: 10/30/17 06:18


Tramadol HCl (Ultram)  50 mg PO TID [facility]


   Last Admin: 10/27/17 17:41 Dose:  50 mg











- Labs


Labs: 


 





 10/26/17 06:19 





 10/26/17 06:19 





 











PT  15.5 SECONDS (9.7-12.2)  H  10/09/17  08:47    


 


INR  1.4   10/09/17  08:47    


 


APTT  31 SECONDS (21-34)   10/09/17  08:47    














Assessment and Plan


(1) Intestinal obstruction


Status: Acute   





(2) Thrombocytopenia


Status: Acute   





(3) Ventral hernia


Status: Acute   





(4) HTN (hypertension)


Status: Acute   





(5) Diabetes mellitus


Status: Chronic

## 2017-10-28 LAB
BASOPHILS # BLD AUTO: 0 K/UL (ref 0–0.2)
BASOPHILS NFR BLD: 0.5 % (ref 0–2)
BUN SERPL-MCNC: 6 MG/DL (ref 9–20)
CALCIUM SERPL-MCNC: 7.7 MG/DL (ref 8.6–10.4)
CHLORIDE SERPL-SCNC: 100 MMOL/L (ref 98–107)
CO2 SERPL-SCNC: 24 MMOL/L (ref 22–30)
EOSINOPHIL # BLD AUTO: 0 K/UL (ref 0–0.7)
EOSINOPHIL NFR BLD: 0.3 % (ref 0–4)
ERYTHROCYTE [DISTWIDTH] IN BLOOD BY AUTOMATED COUNT: 20.3 % (ref 11.5–14.5)
GLUCOSE SERPL-MCNC: 90 MG/DL (ref 75–110)
HCT VFR BLD CALC: 27.2 % (ref 35–51)
LYMPHOCYTES # BLD AUTO: 2.6 K/UL (ref 1–4.3)
LYMPHOCYTES NFR BLD AUTO: 29.3 % (ref 20–40)
MCH RBC QN AUTO: 33.7 PG (ref 27–31)
MCHC RBC AUTO-ENTMCNC: 33.6 G/DL (ref 33–37)
MCV RBC AUTO: 100.4 FL (ref 80–94)
MONOCYTES # BLD: 0.9 K/UL (ref 0–0.8)
MONOCYTES NFR BLD: 9.9 % (ref 0–10)
NRBC BLD AUTO-RTO: 0.1 % (ref 0–2)
PLATELET # BLD: 140 K/UL (ref 130–400)
PMV BLD AUTO: 8.8 FL (ref 7.2–11.7)
POTASSIUM SERPL-SCNC: 3.6 MMOL/L (ref 3.6–5.2)
SODIUM SERPL-SCNC: 130 MMOL/L (ref 132–148)
WBC # BLD AUTO: 8.9 K/UL (ref 4.8–10.8)

## 2017-10-28 RX ADMIN — INSULIN ASPART SCH UNIT: 100 INJECTION, SOLUTION INTRAVENOUS; SUBCUTANEOUS at 08:32

## 2017-10-28 RX ADMIN — OXYCODONE HYDROCHLORIDE AND ACETAMINOPHEN PRN TAB: 5; 325 TABLET ORAL at 04:25

## 2017-10-28 RX ADMIN — INSULIN ASPART SCH UNIT: 100 INJECTION, SOLUTION INTRAVENOUS; SUBCUTANEOUS at 13:01

## 2017-10-28 RX ADMIN — INSULIN ASPART SCH: 100 INJECTION, SOLUTION INTRAVENOUS; SUBCUTANEOUS at 21:21

## 2017-10-28 RX ADMIN — VANCOMYCIN HYDROCHLORIDE SCH MLS/HR: 1 INJECTION, POWDER, LYOPHILIZED, FOR SOLUTION INTRAVENOUS at 04:18

## 2017-10-28 RX ADMIN — INSULIN ASPART SCH: 100 INJECTION, SOLUTION INTRAVENOUS; SUBCUTANEOUS at 20:55

## 2017-10-28 RX ADMIN — VANCOMYCIN HYDROCHLORIDE SCH MLS/HR: 1 INJECTION, POWDER, LYOPHILIZED, FOR SOLUTION INTRAVENOUS at 18:47

## 2017-10-28 NOTE — CP.PCM.PN
Subjective





- Date & Time of Evaluation


Date of Evaluation: 10/28/17


Time of Evaluation: 20:00





- Subjective


Subjective: 





PT SEEN AND EXAMINED BY ME TODAY, PT C/O LEG AND FOOT PAIN B/L, REMAINS ON 

ANTIBIOTICS, NO NAUSEA, VOMITTING, FEELING BETTER





Objective





- Vital Signs/Intake and Output


Vital Signs (last 24 hours): 


 











Temp Pulse Resp BP Pulse Ox


 


 98.2 F   81   20   135/84   99 


 


 10/28/17 15:31  10/28/17 15:31  10/28/17 15:31  10/28/17 15:31  10/28/17 15:31











- Medications


Medications: 


 Current Medications





Clonidine HCl (Catapres Tts1 0.1 Mg/24 Hr)  1 patch TD Q7D@1000 [facility]


   Last Admin: 10/25/17 10:52 Dose:  1 patch


Famotidine (Pepcid)  20 mg PO BID [facility]


   Last Admin: 10/28/17 18:47 Dose:  20 mg


Heparin Sodium (Porcine) (Heparin)  5,000 units SC Q8 [facility]


   Last Admin: 10/28/17 21:20 Dose:  5,000 units


Vancomycin/Sodium Chloride (Vancomycin 1 Gm/Ns 200 Ml)  1 gm in 200 mls @ 133 

mls/hr IVPB Q12H [facility]


   Stop: 10/30/17 16:01


   Last Admin: 10/28/17 18:47 Dose:  133 mls/hr


Meropenem 1 gm/ Sodium (Chloride)  100 mls @ 100 mls/hr IVPB Q8 [facility]


   Last Admin: 10/28/17 21:20 Dose:  100 mls/hr


Insulin Aspart (Novolog)  0 unit SC ACHS [facility]


   PRN Reason: Protocol


   Last Admin: 10/28/17 21:21 Dose:  Not Given


Ondansetron HCl (Zofran Inj)  4 mg IVP Q4 PRN


   PRN Reason: nausea


   Last Admin: 10/17/17 14:41 Dose:  4 mg


Oxycodone/Acetaminophen (Percocet 5/325 Mg Tab)  1 tab PO Q4H PRN


   PRN Reason: breakthrough pain


   Stop: 10/30/17 06:18


   Last Admin: 10/28/17 04:25 Dose:  1 tab


Tramadol HCl (Ultram)  50 mg PO TID [facility]


   Last Admin: 10/28/17 18:47 Dose:  50 mg











- Labs


Labs: 


 





 10/28/17 07:19 





 10/28/17 07:19 





 











PT  15.5 SECONDS (9.7-12.2)  H  10/09/17  08:47    


 


INR  1.4   10/09/17  08:47    


 


APTT  31 SECONDS (21-34)   10/09/17  08:47    














- Constitutional


Appears: No Acute Distress





- Head Exam


Head Exam: ATRAUMATIC, NORMAL INSPECTION, NORMOCEPHALIC





- Eye Exam


Eye Exam: EOMI, Normal appearance, PERRL


Pupil Exam: NORMAL ACCOMODATION, PERRL





- Respiratory Exam


Respiratory Exam: Clear to Ausculation Bilateral, NORMAL BREATHING PATTERN





- Cardiovascular Exam


Cardiovascular Exam: REGULAR RHYTHM, +S1, +S2.  absent: Murmur





- GI/Abdominal Exam


GI & Abdominal Exam: Soft, Normal Bowel Sounds.  absent: Tenderness





Assessment and Plan


(1) Intestinal obstruction


Status: Acute   





(2) Thrombocytopenia


Status: Acute   





(3) Ventral hernia


Status: Acute   





(4) HTN (hypertension)


Status: Acute   





(5) Diabetes mellitus


Status: Chronic

## 2017-10-28 NOTE — CP.PCM.PN
Subjective





- Date & Time of Evaluation


Date of Evaluation: 10/28/17


Time of Evaluation: 10:42





- Subjective


Subjective: 





Surgery: Dr. Hahn 





Patient doing well. Pain controlled. Tolerating diet. Ambulating frequently. 

Afebrile. Per nursing no acute events overnight. 





Objective





- Vital Signs/Intake and Output


Vital Signs (last 24 hours): 


 











Temp Pulse Resp BP Pulse Ox


 


 98 F   70   20   123/99 H  99 


 


 10/28/17 07:00  10/28/17 07:00  10/28/17 07:00  10/28/17 07:00  10/28/17 07:00








Intake and Output: 


 











 10/28/17 10/28/17





 06:59 18:59


 


Intake Total 300 


 


Output Total 10 


 


Balance 290 














- Medications


Medications: 


 Current Medications





Clonidine HCl (Catapres Tts1 0.1 Mg/24 Hr)  1 patch TD Q7D@1000 [facility]


   Last Admin: 10/25/17 10:52 Dose:  1 patch


Famotidine (Pepcid)  20 mg PO BID [facility]


   Last Admin: 10/28/17 10:16 Dose:  20 mg


Heparin Sodium (Porcine) (Heparin)  5,000 units SC Q8 [facility]


   Last Admin: 10/28/17 06:15 Dose:  5,000 units


Vancomycin/Sodium Chloride (Vancomycin 1 Gm/Ns 200 Ml)  1 gm in 200 mls @ 133 

mls/hr IVPB Q12H [facility]


   Stop: 10/30/17 16:01


   Last Admin: 10/28/17 04:18 Dose:  133 mls/hr


Meropenem 1 gm/ Sodium (Chloride)  100 mls @ 100 mls/hr IVPB Q8 [facility]


   Last Admin: 10/28/17 06:14 Dose:  100 mls/hr


Insulin Aspart (Novolog)  0 unit SC ACHS [facility]


   PRN Reason: Protocol


   Last Admin: 10/28/17 08:32 Dose:  1 unit


Ondansetron HCl (Zofran Inj)  4 mg IVP Q4 PRN


   PRN Reason: nausea


   Last Admin: 10/17/17 14:41 Dose:  4 mg


Oxycodone/Acetaminophen (Percocet 5/325 Mg Tab)  1 tab PO Q4H PRN


   PRN Reason: breakthrough pain


   Stop: 10/30/17 06:18


   Last Admin: 10/28/17 04:25 Dose:  1 tab


Tramadol HCl (Ultram)  50 mg PO TID [facility]


   Last Admin: 10/28/17 10:16 Dose:  50 mg











- Labs


Labs: 


 





 10/28/17 07:19 





 10/28/17 07:19 





 











PT  15.5 SECONDS (9.7-12.2)  H  10/09/17  08:47    


 


INR  1.4   10/09/17  08:47    


 


APTT  31 SECONDS (21-34)   10/09/17  08:47    














- Constitutional


Appears: Non-toxic, No Acute Distress





- Head Exam


Head Exam: ATRAUMATIC, NORMOCEPHALIC





- Eye Exam


Eye Exam: EOMI, Normal appearance





- ENT Exam


ENT Exam: Mucous Membranes Moist





- Respiratory Exam


Respiratory Exam: NORMAL BREATHING PATTERN.  absent: Respiratory Distress





- Cardiovascular Exam


Cardiovascular Exam: REGULAR RHYTHM.  absent: Tachycardia





- GI/Abdominal Exam


GI & Abdominal Exam: Soft.  absent: Distended, Tenderness


Additional comments: 





RLQ IR drain w/ seroupurulent fluid output 





- Neurological Exam


Neurological Exam: Alert, Awake





- Psychiatric Exam


Psychiatric exam: Normal Affect, Normal Mood





Assessment and Plan





- Assessment and Plan (Free Text)


Assessment: 





65 y/o male s/p ventral hernia repair w/ biologic mesh placement, SB resection 

POD 19 w/ intra-abdominal abscess s/p IR drainage 


Plan: 





-cont abx per ID


-PT 


-reg diet 


-pain control 


-daily labs 


-OOB and IS use 


-d/w Dr. Selma Dill PGY3

## 2017-10-28 NOTE — PN
LOCATION:  [facility], bed A.



SUBJECTIVE:  This is a 66 years old male seen and examined in rounds with

intermittent period of abdominal pain on and off, on Percocet p.o., without

reported active bleeding, passing gas as well as having bowel movement.



The entire chart is reviewed including but not limited to the most recent

lab and radiology study results, current and the previous medication list,

current and the previous medical events, and today's lab showed blood

glucose level of 169.



No reported chest pain, significant shortness of breath, active bleeding,

nausea, or vomiting.



PHYSICAL EXAMINATION:

GENERAL:  A 66 years old male, awake, alert, and oriented.

VITAL SIGNS:  Afebrile with pulse of 76, respiratory rate 20 to 22, blood

pressure of 124/72.

HEENT:  Showed pale, dry oral mucous membrane.  Nonicteric sclerae.

LUNGS:  A few scattered crepitation; decreased air entry at bases.

HEART:  Positive S1 and S2.

ABDOMEN:  Soft.  Bowel sounds are present.  No mass or organomegaly.  No

rebound tenderness or guarding.  Generalized abdominal pain mainly at the

site of the surgery.  Abdominal tenderness at the site of the surgery is

positive, mild.  Abdominal dressing appeared to be intact on the right.

EXTREMITIES:  Without edema, clubbing, or cyanosis.



It has to be mentioned that reported positive hepatitis C infection,

chronic was mentioned, and the patient has to be treated by the ID

consultant as outpatient.



IMPRESSION:

1.  Incarcerated anterior abdominal wall ventral hernia, treated

surgically.

2.  Status post partial small bowel resection.

3.  Re-exacerbation of peptic ulcer disease.

4.  Anemia secondary to above.

5.  Postoperative intraabdominal abscess formation, treated by the

Interventional Radiology team.

6.  Thrombocytopenia by history of unclear etiology.

7.  Electrolyte imbalance, improved.

8.  Known history of hypertension with history of diabetes mellitus.



SUGGESTIONS:

1.  Continue current management.

2.  Follow up cancer markers again.

3.  The patient again would need endoscopic evaluation of the GI tract when

he is more stable clinically that could be done, however, as an outpatient.







__________________________________________

Earle Chilel MD



cc:  Earle Chilel MD

      Patient chart.



DD:  10/28/2017 7:35:23

DT:  10/28/2017 9:07:53

Job # [id number]

## 2017-10-29 LAB
BASOPHILS # BLD AUTO: 0 K/UL (ref 0–0.2)
BASOPHILS NFR BLD: 0.6 % (ref 0–2)
BUN SERPL-MCNC: 6 MG/DL (ref 9–20)
CALCIUM SERPL-MCNC: 7 MG/DL (ref 8.6–10.4)
CHLORIDE SERPL-SCNC: 102 MMOL/L (ref 98–107)
CO2 SERPL-SCNC: 24 MMOL/L (ref 22–30)
EOSINOPHIL # BLD AUTO: 0 K/UL (ref 0–0.7)
EOSINOPHIL NFR BLD: 0.2 % (ref 0–4)
ERYTHROCYTE [DISTWIDTH] IN BLOOD BY AUTOMATED COUNT: 20.7 % (ref 11.5–14.5)
GLUCOSE SERPL-MCNC: 128 MG/DL (ref 75–110)
HCT VFR BLD CALC: 24.7 % (ref 35–51)
LYMPHOCYTES # BLD AUTO: 1.8 K/UL (ref 1–4.3)
LYMPHOCYTES NFR BLD AUTO: 23 % (ref 20–40)
MCH RBC QN AUTO: 34.1 PG (ref 27–31)
MCHC RBC AUTO-ENTMCNC: 33.7 G/DL (ref 33–37)
MCV RBC AUTO: 101.3 FL (ref 80–94)
MONOCYTES # BLD: 0.9 K/UL (ref 0–0.8)
MONOCYTES NFR BLD: 11.8 % (ref 0–10)
NRBC BLD AUTO-RTO: 0 % (ref 0–2)
PLATELET # BLD: 119 K/UL (ref 130–400)
PMV BLD AUTO: 8.6 FL (ref 7.2–11.7)
POTASSIUM SERPL-SCNC: 4.2 MMOL/L (ref 3.6–5.2)
SODIUM SERPL-SCNC: 132 MMOL/L (ref 132–148)
WBC # BLD AUTO: 7.8 K/UL (ref 4.8–10.8)

## 2017-10-29 RX ADMIN — INSULIN ASPART SCH: 100 INJECTION, SOLUTION INTRAVENOUS; SUBCUTANEOUS at 08:30

## 2017-10-29 RX ADMIN — OXYCODONE HYDROCHLORIDE AND ACETAMINOPHEN PRN TAB: 5; 325 TABLET ORAL at 09:18

## 2017-10-29 RX ADMIN — VANCOMYCIN HYDROCHLORIDE SCH MLS/HR: 1 INJECTION, POWDER, LYOPHILIZED, FOR SOLUTION INTRAVENOUS at 16:15

## 2017-10-29 RX ADMIN — INSULIN ASPART SCH UNIT: 100 INJECTION, SOLUTION INTRAVENOUS; SUBCUTANEOUS at 17:57

## 2017-10-29 RX ADMIN — INSULIN ASPART SCH: 100 INJECTION, SOLUTION INTRAVENOUS; SUBCUTANEOUS at 22:14

## 2017-10-29 RX ADMIN — VANCOMYCIN HYDROCHLORIDE SCH MLS/HR: 1 INJECTION, POWDER, LYOPHILIZED, FOR SOLUTION INTRAVENOUS at 04:11

## 2017-10-29 RX ADMIN — INSULIN ASPART SCH: 100 INJECTION, SOLUTION INTRAVENOUS; SUBCUTANEOUS at 12:00

## 2017-10-29 NOTE — PN
DATE:



LOCATION:  [facility], bed A.



SUBJECTIVE:  This 66-year-old male seen and examined in rounds with

intermittent period of abdominal pain, but less than before, tolerating

oral intake.  The patient still have his TANO drainage intact with

tea-colored discharge.  No reported nausea or vomiting.



The entire chart is reviewed including, but not limited to the most recent

lab and radiology study results, current and the previous medication list,

and current and the previous medical events.



LABORATORY DATA:  Today's lab showed hemoglobin dropped to 8.3 and

hematocrit of 24.7 with low platelets of 119 with normal sodium, but low

BUN and creatinine with blood glucose level of 147 and calcium of 7.



PHYSICAL EXAMINATION

GENERAL:  A 66-year-old male.

VITAL SIGNS:  Afebrile with pulse of 86, respiratory rate of 20 to 22, and

blood pressure of 128/72.

HEENT:  Showed pale, dry, oral mucous membranes.  Nonicteric sclera.

LUNGS:  Few scattered crepitation.  Decreased air entry at bases.

EXTREMITIES:  Shows mild lower extremity edematous changes.  No clubbing or

cyanosis.

NEUROLOGIC:  No reported new neurological deficits, sensory, or motor.



IMPRESSION:

1.  Status post repair incarcerated abdominal hernia with partial small

bowel resection.

2.  Intraabdominal abscess formation post surgery, aspiration took place.

3.  Known history of diabetes mellitus and hypertension.

4.  Reported history of viral hepatitis C infection.

5.  Anemia most likely secondary to above.

6.  Thrombocytopenia of unclear etiology.



SUGGESTIONS:

1.  Agree with your plan.

2.  Blood transfusion to keep hemoglobin around 10 gram percent as needed.

3.  Antireflux measures.

4.  Again the patient will need endoscopic evaluation of the

gastrointestinal tract when he is more stable clinically due to this

subsequent drop of hemoglobin and hematocrit that to be discussed with the

admitting MD.







__________________________________________

Earle Chilel MD







DD:  10/29/2017 7:50:11

DT:  10/29/2017 8:42:11

Job # [id number]

## 2017-10-29 NOTE — CP.PCM.PN
Subjective





- Date & Time of Evaluation


Date of Evaluation: 10/29/17


Time of Evaluation: 20:00





- Subjective


Subjective: 





PT SEEN AND EXAMINED BY ME TODAY





Objective





- Vital Signs/Intake and Output


Vital Signs (last 24 hours): 


 











Temp Pulse Resp BP Pulse Ox


 


 98.4 F   81   22   126/80   100 


 


 10/29/17 15:00  10/29/17 15:00  10/29/17 15:00  10/29/17 15:00  10/29/17 15:00











- Medications


Medications: 


 Current Medications





Clonidine HCl (Catapres Tts1 0.1 Mg/24 Hr)  1 patch TD Q7D@1000 [facility]


   Last Admin: 10/25/17 10:52 Dose:  1 patch


Famotidine (Pepcid)  20 mg PO BID [facility]


   Last Admin: 10/29/17 17:36 Dose:  20 mg


Heparin Sodium (Porcine) (Heparin)  5,000 units SC Q8 [facility]


   Last Admin: 10/29/17 21:22 Dose:  5,000 units


Vancomycin/Sodium Chloride (Vancomycin 1 Gm/Ns 200 Ml)  1 gm in 200 mls @ 133 

mls/hr IVPB Q12H [facility]


   Stop: 10/30/17 16:01


   Last Admin: 10/29/17 16:15 Dose:  133 mls/hr


Meropenem 1 gm/ Sodium (Chloride)  100 mls @ 100 mls/hr IVPB Q8 [facility]


   Last Admin: 10/29/17 21:22 Dose:  100 mls/hr


Insulin Aspart (Novolog)  0 unit SC ACHS [facility]


   PRN Reason: Protocol


   Last Admin: 10/29/17 22:14 Dose:  Not Given


Ondansetron HCl (Zofran Inj)  4 mg IVP Q4 PRN


   PRN Reason: nausea


   Last Admin: 10/17/17 14:41 Dose:  4 mg


Oxycodone/Acetaminophen (Percocet 5/325 Mg Tab)  1 tab PO Q4H PR[facility]


   PRN Reason: breakthrough pain


   Stop: 10/30/17 06:18


   Last Admin: 10/29/17 09:18 Dose:  1 tab


Tramadol HCl (Ultram)  50 mg PO TID [facility]


   Last Admin: 10/29/17 17:36 Dose:  50 mg











- Labs


Labs: 


 





 10/29/17 04:59 





 10/29/17 04:59 





 











PT  15.5 SECONDS (9.7-12.2)  H  10/09/17  08:47    


 


INR  1.4   10/09/17  08:47    


 


APTT  31 SECONDS (21-34)   10/09/17  08:47    














Assessment and Plan


(1) Intestinal obstruction


Status: Acute   





(2) Thrombocytopenia


Status: Acute   





(3) Ventral hernia


Status: Acute   





(4) HTN (hypertension)


Status: Acute   





(5) Diabetes mellitus


Status: Chronic

## 2017-10-29 NOTE — CP.PCM.PN
Subjective





- Date & Time of Evaluation


Date of Evaluation: 10/29/17


Time of Evaluation: 07:00





- Subjective


Subjective: 


GENERAL SURGERY PROGRESS NOTE FOR DR. DAVE





Patient seen and examined at bedside. He is having normal bowel movements. He 

is tolerating diet and denies nausea or vomiting. He has some suprapubic pain. 

He is using his IS and ambulating.








Objective





- Vital Signs/Intake and Output


Vital Signs (last 24 hours): 


 











Temp Pulse Resp BP Pulse Ox


 


 98.0 F   78   20   118/76   98 


 


 10/29/17 08:20  10/29/17 08:20  10/29/17 08:20  10/29/17 08:20  10/29/17 08:20








Intake and Output: 


 











 10/29/17 10/29/17





 06:59 18:59


 


Intake Total 300 


 


Output Total 10 


 


Balance 290 














- Medications


Medications: 


 Current Medications





Clonidine HCl (Catapres Tts1 0.1 Mg/24 Hr)  1 patch TD Q7D@1000 [facility]


   Last Admin: 10/25/17 10:52 Dose:  1 patch


Famotidine (Pepcid)  20 mg PO BID [facility]


   Last Admin: 10/28/17 18:47 Dose:  20 mg


Heparin Sodium (Porcine) (Heparin)  5,000 units SC Q8 [facility]


   Last Admin: 10/29/17 05:12 Dose:  5,000 units


Vancomycin/Sodium Chloride (Vancomycin 1 Gm/Ns 200 Ml)  1 gm in 200 mls @ 133 

mls/hr IVPB Q12H [facility]


   Stop: 10/30/17 16:01


   Last Admin: 10/29/17 04:11 Dose:  133 mls/hr


Meropenem 1 gm/ Sodium (Chloride)  100 mls @ 100 mls/hr IVPB Q8 [facility]


   Last Admin: 10/29/17 05:13 Dose:  100 mls/hr


Insulin Aspart (Novolog)  0 unit SC ACHS [facility]


   PRN Reason: Protocol


   Last Admin: 10/29/17 08:30 Dose:  Not Given


Ondansetron HCl (Zofran Inj)  4 mg IVP Q4 PRN


   PRN Reason: nausea


   Last Admin: 10/17/17 14:41 Dose:  4 mg


Oxycodone/Acetaminophen (Percocet 5/325 Mg Tab)  1 tab PO Q4H [facility]


   PRN Reason: breakthrough pain


   Stop: 10/30/17 06:18


   Last Admin: 10/29/17 09:18 Dose:  1 tab


Tramadol HCl (Ultram)  50 mg PO TID [facility]


   Last Admin: 10/28/17 18:47 Dose:  50 mg











- Labs


Labs: 


 





 10/29/17 04:59 





 10/29/17 04:59 





 











PT  15.5 SECONDS (9.7-12.2)  H  10/09/17  08:47    


 


INR  1.4   10/09/17  08:47    


 


APTT  31 SECONDS (21-34)   10/09/17  08:47    














- Constitutional


Appears: Non-toxic, No Acute Distress





- Head Exam


Head Exam: ATRAUMATIC, NORMAL INSPECTION





- Eye Exam


Eye Exam: EOMI, Normal appearance





- Respiratory Exam


Respiratory Exam: NORMAL BREATHING PATTERN.  absent: Respiratory Distress





- Cardiovascular Exam


Cardiovascular Exam: +S1, +S2





- GI/Abdominal Exam


GI & Abdominal Exam: Soft.  absent: Distended, Firm, Guarding, Rigid, Tenderness

, Rebound


Additional comments: 


Staples in place


IR drain in place with serous drainage, 10cc over past 24 hours





- Neurological Exam


Neurological Exam: Alert, Awake, Oriented x3





- Psychiatric Exam


Psychiatric exam: Normal Affect, Normal Mood





- Skin


Skin Exam: Dry, Normal Color, Warm





Assessment and Plan





- Assessment and Plan (Free Text)


Assessment: 


67yo M with recurrent ventral hernia and thrombocytopenia s/p exploratory 

laparotomy, extensive BREANNA, repair of recurrent ventral hernia with biologic mesh

, small bowel resection with primary anastomosis POD#20, now s/p IR US guided 

abdominal abscess drainage with placement of an 8Fr drainage catheter 4 days 

ago.





- Afebrile, VSS


- No leukocytosis


- Anemia Hgb 8.3


- Continue Physical therapy 


- Tolerating regular diet, having regular bowel movements


- Encouraged OOB, ambulation, and IS use


- Culture from IR drainage: enterococcus facecium, on Vancomycin x4 weeks and 

Invanz x4 weeks per ID (currently on Merrem instead since pharmacy does not 

have Invanz)


- Hepatitis C antibody is reactive, ordered Hep C viral RNA


- Possible removal of IR drain tomorrow due to low output


- Discussed plan with Dr. Selma Ny PGY-3

## 2017-10-30 LAB
BASOPHILS # BLD AUTO: 0 K/UL (ref 0–0.2)
BASOPHILS NFR BLD: 0.5 % (ref 0–2)
BUN SERPL-MCNC: 6 MG/DL (ref 9–20)
CALCIUM SERPL-MCNC: 7.8 MG/DL (ref 8.6–10.4)
CHLORIDE SERPL-SCNC: 104 MMOL/L (ref 98–107)
CO2 SERPL-SCNC: 23 MMOL/L (ref 22–30)
EOSINOPHIL # BLD AUTO: 0 K/UL (ref 0–0.7)
EOSINOPHIL NFR BLD: 0.2 % (ref 0–4)
ERYTHROCYTE [DISTWIDTH] IN BLOOD BY AUTOMATED COUNT: 20.7 % (ref 11.5–14.5)
GLUCOSE SERPL-MCNC: 115 MG/DL (ref 75–110)
HCT VFR BLD CALC: 24.2 % (ref 35–51)
LYMPHOCYTES # BLD AUTO: 1.9 K/UL (ref 1–4.3)
LYMPHOCYTES NFR BLD AUTO: 25.1 % (ref 20–40)
MCH RBC QN AUTO: 34.8 PG (ref 27–31)
MCHC RBC AUTO-ENTMCNC: 33.9 G/DL (ref 33–37)
MCV RBC AUTO: 102.6 FL (ref 80–94)
MONOCYTES # BLD: 0.8 K/UL (ref 0–0.8)
MONOCYTES NFR BLD: 10.2 % (ref 0–10)
NRBC BLD AUTO-RTO: 0.1 % (ref 0–2)
PLATELET # BLD: 111 K/UL (ref 130–400)
PMV BLD AUTO: 8.5 FL (ref 7.2–11.7)
POTASSIUM SERPL-SCNC: 3.7 MMOL/L (ref 3.6–5.2)
SODIUM SERPL-SCNC: 133 MMOL/L (ref 132–148)
WBC # BLD AUTO: 7.6 K/UL (ref 4.8–10.8)

## 2017-10-30 RX ADMIN — INSULIN ASPART SCH: 100 INJECTION, SOLUTION INTRAVENOUS; SUBCUTANEOUS at 21:57

## 2017-10-30 RX ADMIN — INSULIN ASPART SCH UNIT: 100 INJECTION, SOLUTION INTRAVENOUS; SUBCUTANEOUS at 12:36

## 2017-10-30 RX ADMIN — MEROPENEM SCH MLS/HR: 1 INJECTION INTRAVENOUS at 21:56

## 2017-10-30 RX ADMIN — OXYCODONE HYDROCHLORIDE AND ACETAMINOPHEN PRN TAB: 5; 325 TABLET ORAL at 03:34

## 2017-10-30 RX ADMIN — VANCOMYCIN HYDROCHLORIDE SCH MLS/HR: 1 INJECTION, POWDER, LYOPHILIZED, FOR SOLUTION INTRAVENOUS at 17:22

## 2017-10-30 RX ADMIN — INSULIN ASPART SCH: 100 INJECTION, SOLUTION INTRAVENOUS; SUBCUTANEOUS at 07:38

## 2017-10-30 RX ADMIN — MEROPENEM SCH MLS/HR: 1 INJECTION INTRAVENOUS at 13:52

## 2017-10-30 RX ADMIN — INSULIN ASPART SCH UNIT: 100 INJECTION, SOLUTION INTRAVENOUS; SUBCUTANEOUS at 17:22

## 2017-10-30 RX ADMIN — VANCOMYCIN HYDROCHLORIDE SCH MLS/HR: 1 INJECTION, POWDER, LYOPHILIZED, FOR SOLUTION INTRAVENOUS at 03:30

## 2017-10-30 NOTE — CP.PCM.PN
Subjective





- Date & Time of Evaluation


Date of Evaluation: 10/30/17


Time of Evaluation: 20:40





- Subjective


Subjective: 





PT SEEN AND EXAMINED BY ME TODAY, PT C/O LEG AND FOOT PAIN B/L, REMAINS ON 

ANTIBIOTICS, NO NAUSEA, VOMITTING, FEELING BETTER





Objective





- Vital Signs/Intake and Output


Vital Signs (last 24 hours): 


 











Temp Pulse Resp BP Pulse Ox


 


 98.1 F   71   20   125/75   100 


 


 10/30/17 16:51  10/30/17 16:51  10/30/17 16:51  10/30/17 16:51  10/30/17 16:51








Intake and Output: 


 











 10/30/17 10/31/17





 18:59 06:59


 


Intake Total 640 


 


Output Total 330 


 


Balance 310 














- Medications


Medications: 


 Current Medications





Clonidine HCl (Catapres Tts1 0.1 Mg/24 Hr)  1 patch TD Q7D@1000 [facility]


   Last Admin: 10/25/17 10:52 Dose:  1 patch


Famotidine (Pepcid)  20 mg PO BID [facility]


   Last Admin: 10/30/17 17:22 Dose:  20 mg


Meropenem 1 gm/ Dextrose  100 mls @ 100 mls/hr IVPB Q8 [facility]


   Last Admin: 10/30/17 21:56 Dose:  100 mls/hr


Insulin Aspart (Novolog)  0 unit SC ACHS [facility]


   PRN Reason: Protocol


   Last Admin: 10/30/17 21:57 Dose:  Not Given


Ondansetron HCl (Zofran Inj)  4 mg IVP Q4 PRN


   PRN Reason: nausea


   Last Admin: 10/17/17 14:41 Dose:  4 mg


Tramadol HCl (Ultram)  50 mg PO TID [facility]


   Last Admin: 10/30/17 17:22 Dose:  50 mg











- Labs


Labs: 


 





 10/30/17 06:45 





 10/30/17 06:45 





 











PT  15.5 SECONDS (9.7-12.2)  H  10/09/17  08:47    


 


INR  1.4   10/09/17  08:47    


 


APTT  31 SECONDS (21-34)   10/09/17  08:47    














- Constitutional


Appears: No Acute Distress





- Head Exam


Head Exam: ATRAUMATIC, NORMAL INSPECTION, NORMOCEPHALIC





- Eye Exam


Eye Exam: EOMI, Normal appearance, PERRL


Pupil Exam: NORMAL ACCOMODATION, PERRL





- Respiratory Exam


Respiratory Exam: Clear to Ausculation Bilateral, NORMAL BREATHING PATTERN





- Cardiovascular Exam


Cardiovascular Exam: REGULAR RHYTHM, +S1, +S2.  absent: Murmur





- GI/Abdominal Exam


GI & Abdominal Exam: Soft, Normal Bowel Sounds.  absent: Tenderness





- Rectal Exam


Rectal Exam: Deferred





Assessment and Plan


(1) Intestinal obstruction


Assessment & Plan: 


67 y/o M w/ POD#21 s/o exploratory laparotomy, extensive BREANNA, repair of 

recurrent ventral hernia with biologic mesh, small bowel resection w/ primary 

anastomosis, now POD#5 s/p IR US guided placement of an 8Fr drainage catheter .





- Continue Physical therapy 


- give Dulcolax [place] for constipation


- Encouraged OOB, ambulation, and IS use


- cont IV Abx per ID


- remove IR drain today


- pt cleared for discharge w/ IV Abx from surgical standpoint





Status: Acute   





(2) Thrombocytopenia


Status: Acute   





(3) Ventral hernia


Status: Acute   





(4) HTN (hypertension)


Status: Acute   





(5) Diabetes mellitus


Status: Chronic

## 2017-10-30 NOTE — CP.PCM.PN
Subjective





- Date & Time of Evaluation


Date of Evaluation: 10/30/17


Time of Evaluation: 06:45





- Subjective


Subjective: 


General Surgery


Dr. Hahn





Pt S&E @bedside. NAEO. pt has no complaints. pt reports no BM x2days. Pt denies 

F/C, N/V, D/C. Pt tolerating regular diet. 





Objective





- Vital Signs/Intake and Output


Vital Signs (last 24 hours): 


 











Temp Pulse Resp BP Pulse Ox


 


 98 F   90   20   130/76   100 


 


 10/30/17 09:05  10/30/17 09:05  10/30/17 09:05  10/30/17 09:05  10/30/17 09:05








Intake and Output: 


 











 10/30/17 10/30/17





 06:59 18:59


 


Intake Total 700 540


 


Output Total 300 310


 


Balance 400 230








 Selected Entries











  10/30/17





  07:00


 


Output, 10





Drainage Amount 





[Right Lower 





Abdomen] 














- Medications


Medications: 


 Current Medications





Clonidine HCl (Catapres Tts1 0.1 Mg/24 Hr)  1 patch TD Q7D@1000 [facility]


   Last Admin: 10/25/17 10:52 Dose:  1 patch


Famotidine (Pepcid)  20 mg PO BID [facility]


   Last Admin: 10/29/17 17:36 Dose:  20 mg


Heparin Sodium (Porcine) (Heparin)  5,000 units SC Q8 [facility]


   Last Admin: 10/30/17 05:27 Dose:  5,000 units


Vancomycin/Sodium Chloride (Vancomycin 1 Gm/Ns 200 Ml)  1 gm in 200 mls @ 133 

mls/hr IVPB Q12H [facility]


   Stop: 10/30/17 16:01


   Last Admin: 10/30/17 03:30 Dose:  133 mls/hr


Meropenem 1 gm/ Sodium (Chloride)  100 mls @ 100 mls/hr IVPB Q8 [facility]


   Last Admin: 10/30/17 05:27 Dose:  100 mls/hr


Insulin Aspart (Novolog)  0 unit SC ACHS [facility]


   PRN Reason: Protocol


   Last Admin: 10/30/17 07:38 Dose:  Not Given


Ondansetron HCl (Zofran Inj)  4 mg IVP Q4 PRN


   PRN Reason: nausea


   Last Admin: 10/17/17 14:41 Dose:  4 mg


Tramadol HCl (Ultram)  50 mg PO TID [facility]


   Last Admin: 10/29/17 17:36 Dose:  50 mg











- Labs


Labs: 


 





 10/30/17 06:45 





 10/30/17 06:45 





 











PT  15.5 SECONDS (9.7-12.2)  H  10/09/17  08:47    


 


INR  1.4   10/09/17  08:47    


 


APTT  31 SECONDS (21-34)   10/09/17  08:47    














- Constitutional


Appears: Non-toxic, No Acute Distress





- Head Exam


Head Exam: NORMAL INSPECTION





- Eye Exam


Eye Exam: Normal appearance





- ENT Exam


ENT Exam: Mucous Membranes Moist





- Respiratory Exam


Respiratory Exam: NORMAL BREATHING PATTERN.  absent: Accessory Muscle Use, 

Respiratory Distress





- Cardiovascular Exam


Cardiovascular Exam: absent: Bradycardia, Tachycardia





- GI/Abdominal Exam


GI & Abdominal Exam: Soft.  absent: Distended, Firm, Guarding, Rigid, Tenderness

, Rebound


Additional comments: 





incision c/d/i. well approximated


IR drain w/ scant output





- Extremities Exam


Extremities Exam: Normal Inspection





- Neurological Exam


Neurological Exam: Alert, Awake, Oriented x3





- Psychiatric Exam


Psychiatric exam: Normal Affect, Normal Mood





- Skin


Skin Exam: Dry, Intact, Normal Color, Warm





Assessment and Plan





- Assessment and Plan (Free Text)


Assessment: 





65 y/o M w/ POD#21 s/o exploratory laparotomy, extensive BREANNA, repair of 

recurrent ventral hernia with biologic mesh, small bowel resection w/ primary 

anastomosis, now POD#5 s/p IR US guided placement of an 8Fr drainage catheter .





- Continue Physical therapy 


- give Dulcolax [place] for constipation


- Encouraged OOB, ambulation, and IS use


- cont IV Abx per ID


- remove IR drain today


- pt cleared for discharge w/ IV Abx from surgical standpoint








Pt discussed w/ Dr. Selma Garcia DO PGY2

## 2017-10-30 NOTE — PN
DATE:



LOCATION:  [facility], bed 8.



SUBJECTIVE:  This is a 66-year-old male seen and examined in rounds today

without reported active bleeding, appeared to be awake, alert and oriented

with no reported bowel movement for the last 48 hours but no nausea or

vomiting, tolerating regular diet so far.



The entire chart is reviewed including but not limited to the most recent

lab and radiology study results, current and previous medication list,

current and previous medical events.  Case discussed with the staff at

length as well as the surgical team.



Today's lab showed hemoglobin dropped to 8.2 with hematocrit 24.2 with

platelet count 111, with blood glucose level of 184, but low calcium 7.8

with recently reported low magnesium and low calcium.



PHYSICAL EXAMINATION

GENERAL:  The patient is a 66-year-old male, awake, alert and oriented.

VITAL SIGNS:  Afebrile, heart rate of 82, respiratory rate 20 to 22, blood

pressure 136/78.

HEENT:  Showed pale, dry oral mucous membranes.  Nonicteric sclerae.

LUNGS:  Few scattered crepitation.  Decreased air entry at bases.

HEART:  Positive S1 and S2.

ABDOMEN:  Soft with slight distention, mild generalized tenderness.  No

mass or organomegaly.  No rebound tenderness or guarding.  Bowel sounds are

hypoactive.  Covered with clean dressing.  No discharge.

EXTREMITIES:   Without significant clubbing, cyanosis or significant edema.

NEUROLOGIC:  No reported new neurological deficits, sensory or motor.  No

focal deficits.  Peripheral pulses are present bilaterally.

 

IMPRESSION:

1.  Incarcerated ventral anterior abdominal wall hernia, treated surgically

with partial small bowel resection.

2.  Anemia, hypochromic microcystic, most likely secondary to above versus

upper and/or lower gastrointestinal blood loss.

3.  Elevated CEA level, to rule out occult gastrointestinal malignancy.

4.  Reported history of hepatitis C viral infection.

5.  Thrombocytopenia of unclear etiology.

6.  Postoperative intraabdominal abscess formation, drained.















SUGGESTIONS:

1.  Continue current management.

2.  Colace 1 tablet 3 times a day.

3.  The patient may need to repeat abdominal CAT scan before discharge

home.

4.  Due to the patient's hypochromic microcytic anemia, endoscopic

evaluation of the gastrointestinal tract should be kept in mind.





__________________________________________

Earle Chilel MD



cc:  Earle Chilel MD.



DD:  10/30/2017 12:26:16

DT:  10/30/2017 13:27:06

[facility] # [id number]

## 2017-10-30 NOTE — CP.PCM.PN
Subjective





- Date & Time of Evaluation


Date of Evaluation: 10/30/17


Time of Evaluation: 22:48





- Subjective


Subjective: 





CHIEF COMPLAINTS  TODAY :


afebrile,


FEELING BETTER


c/o cconstipation.  States no bowel movement for 2 days.





IR Drain right lower abdomen +VE DRAINAGE





ROS.


HEENT :  N.


Resp :       No  SOB wheezing, cough 


Cardio :     No CP, PND orthopnea 


GI :           No abd. Pain, n/v +VE RIGHT ANTERIOR ABDOMEN DRAIN,


MIDLINE INCISION WITH STAPLES IN PLACE.  WOUND C/D/I


CNS : No headache , focal deficit. 


Musculoskel :  N


Ext. : Pedal pulses intact, no edema or calf pain 


Derm :        N


Psych :     N. 











PE.


Pt. is alert awake in no distress.


V.S  As noted in the chart 


Head ,ear nose,throat and eyes : Normal.


Neck : Supple with normal carotids.


Lungs: Clear air entry.


Heart : S1 & S2 normal . . No murmur. S4 + 


Abd  Soft (MIDLINE INCISION WITH STAPLES IN PLACE.  ALSO DRAINAGE WITH 8 [place] 

CATHETER NOTED IN [place] AND THE COLLECTING BAG.)


Neuro : Moves all ext. with no localized deficit.


Ext : No edema with intact pulses. Neg. calf tenderness 


Derm : No rashes or decubitus ulcer.





Radiology/Labs .


ABDOMINAL FLUID CULTURE-  ENTEROCOCCUS FAECIUM


 s-  vancomycin RICKI <0.5, linizolid RICKI 2


BLOOD CULTURES 10/25/17  -24 HOURS.


VANCO TROUGH 10.7 10/27/17 OKAY.


wbc 7.6 HEMOGLOBIN 8.2


pLATELETS INCREASED .


rENAL FUNCTIONS STABLE








Asssessment : 


66 yr  old M w. incisonal hernia, s/p open repair w. biologic mesh, small bowel 

resection ON 10/9/17- POD#17, w. development of seroma/abscess 


S/P IR ,ULTRASOUND GUIDED DRAINAGE OF ABDOMINAL WALL COLLECTION AT SITE OF 

SURGICAL STAPLES AND PLACEMENT OFF 8 [place] DRAINAGE CATHETER 10/23/17.


 DRAINAGE FLUID +VE Enterococcus faecium.





Plan :


SURGERY FOLLOW-UP NOTED.  





CONTINUE  IV  ANTIBIOTICS  FOR  4 WEEKS PRESENTLY.


CONTINUE IV VANCOMYCIN 1 G EVERY 12 HOURLY  x 4WEEKS.


ADD IV INVANZ 1GM IVPB  Q 24HRLY FOR GM-VE. X 4WEEKS   (""PHARMACY DOES NOT 

CARRY INVANZ-WILL GIVE MERREM 1 G EVERY 8 HOURLY WHILE PATIENT IN THE HOSPITAL. 

)





F/U VANCO TROUGH LEVELS  WEEKLY AND KEEP BETWEEN 10 AND 20..


MONITOR RENAL FUNCTIONS CLOSELY.





MONITOR THROMBOCYTOPENIA AS PATIENT HAS HEP C.





F/U DRAIN CARE/AND DRAINAGE AS PER SURGERY.











Objective





- Vital Signs/Intake and Output


Vital Signs (last 24 hours): 


 











Temp Pulse Resp BP Pulse Ox


 


 98.1 F   71   20   125/75   100 


 


 10/30/17 16:51  10/30/17 16:51  10/30/17 16:51  10/30/17 16:51  10/30/17 16:51








Intake and Output: 


 











 10/30/17 10/31/17





 18:59 06:59


 


Intake Total 640 700


 


Output Total 330 


 


Balance 310 700














- Medications


Medications: 


 Current Medications





Clonidine HCl (Catapres Tts1 0.1 Mg/24 Hr)  1 patch TD Q7D@1000 [facility]


   Last Admin: 10/25/17 10:52 Dose:  1 patch


Famotidine (Pepcid)  20 mg PO BID [facility]


   Last Admin: 10/30/17 17:22 Dose:  20 mg


Meropenem 1 gm/ Dextrose  100 mls @ 100 mls/hr IVPB Q8 [facility]


   Last Admin: 10/30/17 21:56 Dose:  100 mls/hr


Insulin Aspart (Novolog)  0 unit SC ACHS [facility]


   PRN Reason: Protocol


   Last Admin: 10/30/17 21:57 Dose:  Not Given


Ondansetron HCl (Zofran Inj)  4 mg IVP Q4 PRN


   PRN Reason: nausea


   Last Admin: 10/17/17 14:41 Dose:  4 mg


Tramadol HCl (Ultram)  50 mg PO TID [facility]


   Last Admin: 10/30/17 17:22 Dose:  50 mg











- Labs


Labs: 


 





 10/30/17 06:45 





 10/30/17 06:45 





 











PT  15.5 SECONDS (9.7-12.2)  H  10/09/17  08:47    


 


INR  1.4   10/09/17  08:47    


 


APTT  31 SECONDS (21-34)   10/09/17  08:47    














Assessment and Plan


(1) S/P repair of recurrent ventral hernia


Status: Acute   





(2) Status post exploratory laparotomy


Status: Acute   





(3) Small bowel obstruction


Status: Acute   





(4) Diabetes mellitus


Status: Chronic   





(5) Hepatitis C infection


Status: Chronic   





(6) Hypertension


Status: Chronic

## 2017-10-31 RX ADMIN — MEROPENEM SCH MLS/HR: 1 INJECTION INTRAVENOUS at 06:10

## 2017-10-31 RX ADMIN — MEROPENEM SCH MLS/HR: 1 INJECTION INTRAVENOUS at 13:49

## 2017-10-31 RX ADMIN — INSULIN ASPART SCH UNIT: 100 INJECTION, SOLUTION INTRAVENOUS; SUBCUTANEOUS at 12:34

## 2017-10-31 RX ADMIN — INSULIN ASPART SCH: 100 INJECTION, SOLUTION INTRAVENOUS; SUBCUTANEOUS at 21:56

## 2017-10-31 RX ADMIN — MEROPENEM SCH MLS/HR: 1 INJECTION INTRAVENOUS at 21:55

## 2017-10-31 RX ADMIN — OXYCODONE HYDROCHLORIDE AND ACETAMINOPHEN PRN TAB: 5; 325 TABLET ORAL at 18:28

## 2017-10-31 RX ADMIN — VANCOMYCIN HYDROCHLORIDE SCH MLS/HR: 1 INJECTION, POWDER, LYOPHILIZED, FOR SOLUTION INTRAVENOUS at 16:41

## 2017-10-31 RX ADMIN — INSULIN ASPART SCH UNIT: 100 INJECTION, SOLUTION INTRAVENOUS; SUBCUTANEOUS at 08:47

## 2017-10-31 RX ADMIN — VANCOMYCIN HYDROCHLORIDE SCH MLS/HR: 1 INJECTION, POWDER, LYOPHILIZED, FOR SOLUTION INTRAVENOUS at 04:30

## 2017-10-31 RX ADMIN — INSULIN ASPART SCH: 100 INJECTION, SOLUTION INTRAVENOUS; SUBCUTANEOUS at 18:34

## 2017-10-31 NOTE — CP.PCM.PN
Subjective





- Date & Time of Evaluation


Date of Evaluation: 10/31/17


Time of Evaluation: 19:00





- Subjective


Subjective: 





No complaints.





Objective





- Vital Signs/Intake and Output


Vital Signs (last 24 hours): 


 











Temp Pulse Resp BP Pulse Ox


 


 98.1 F   81   20   148/87   100 


 


 10/31/17 16:45  10/31/17 16:45  10/31/17 16:45  10/31/17 16:45  10/31/17 16:45











- Medications


Medications: 


 Current Medications





Clonidine HCl (Catapres Tts1 0.1 Mg/24 Hr)  1 patch TD Q7D@1000 [facility]


   Last Admin: 10/25/17 10:52 Dose:  1 patch


Docusate Sodium (Colace)  100 mg PO BID [facility]


   Last Admin: 10/31/17 18:28 Dose:  100 mg


Famotidine (Pepcid)  20 mg PO BID [facility]


   Last Admin: 10/31/17 18:28 Dose:  20 mg


Meropenem 1 gm/ Dextrose  100 mls @ 100 mls/hr IVPB Q8 [facility]


   Last Admin: 10/31/17 13:49 Dose:  100 mls/hr


Vancomycin/Sodium Chloride (Vancomycin 1 Gm/Ns 200 Ml)  1 gm in 200 mls @ 166.6 

mls/hr IVPB Q12H [facility]


   Stop: 11/05/17 03:01


   Last Admin: 10/31/17 16:41 Dose:  166.6 mls/hr


Insulin Aspart (Novolog)  0 unit SC ACHS [facility]


   PRN Reason: Protocol


   Last Admin: 10/31/17 18:34 Dose:  Not Given


Ondansetron HCl (Zofran Inj)  4 mg IVP Q4 PRN


   PRN Reason: nausea


   Last Admin: 10/17/17 14:41 Dose:  4 mg


Oxycodone/Acetaminophen (Percocet 5/325 Mg Tab)  1 tab PO Q4H PRN


   PRN Reason: breakthrough pain


   Stop: 11/03/17 08:29


   Last Admin: 10/31/17 18:28 Dose:  1 tab


Tramadol HCl (Ultram)  50 mg PO TID [facility]


   Last Admin: 10/31/17 18:32 Dose:  Not Given











- Labs


Labs: 


 





 10/30/17 06:45 





 10/30/17 06:45 





 











PT  15.5 SECONDS (9.7-12.2)  H  10/09/17  08:47    


 


INR  1.4   10/09/17  08:47    


 


APTT  31 SECONDS (21-34)   10/09/17  08:47    














- Head Exam


Head Exam: ATRAUMATIC





- Eye Exam


Eye Exam: Normal appearance





- ENT Exam


ENT Exam: Mucous Membranes Dry





- Respiratory Exam


Respiratory Exam: NORMAL BREATHING PATTERN





- Cardiovascular Exam


Cardiovascular Exam: +S1, +S2





- GI/Abdominal Exam


GI & Abdominal Exam: Normal Bowel Sounds





Assessment and Plan


(1) Anemia


Assessment & Plan: 


H/H fairly stable


chronic disease


elevated CEA noted; endoscopy per GI


Status: Acute   





(2) Thrombocytopenia


Assessment & Plan: 


mild


hep C, liver disease


Status: Acute   





(3) Elevated CEA


Assessment & Plan: 


GI w/u


Status: Acute

## 2017-10-31 NOTE — CP.PCM.PN
Subjective





- Date & Time of Evaluation


Date of Evaluation: 10/28/17


Time of Evaluation: 17:00





- Subjective


Subjective: 





Has mild lower abdominal pain.





Objective





- Vital Signs/Intake and Output


Vital Signs (last 24 hours): 


 











Temp Pulse Resp BP Pulse Ox


 


 98.1 F   81   20   148/87   100 


 


 10/31/17 16:45  10/31/17 16:45  10/31/17 16:45  10/31/17 16:45  10/31/17 16:45











- Medications


Medications: 


 Current Medications





Clonidine HCl (Catapres Tts1 0.1 Mg/24 Hr)  1 patch TD Q7D@1000 [facility]


   Last Admin: 10/25/17 10:52 Dose:  1 patch


Docusate Sodium (Colace)  100 mg PO BID [facility]


   Last Admin: 10/31/17 18:28 Dose:  100 mg


Famotidine (Pepcid)  20 mg PO BID [facility]


   Last Admin: 10/31/17 18:28 Dose:  20 mg


Meropenem 1 gm/ Dextrose  100 mls @ 100 mls/hr IVPB Q8 [facility]


   Last Admin: 10/31/17 13:49 Dose:  100 mls/hr


Vancomycin/Sodium Chloride (Vancomycin 1 Gm/Ns 200 Ml)  1 gm in 200 mls @ 166.6 

mls/hr IVPB Q12H [facility]


   Stop: 11/05/17 03:01


   Last Admin: 10/31/17 16:41 Dose:  166.6 mls/hr


Insulin Aspart (Novolog)  0 unit SC ACHS [facility]


   PRN Reason: Protocol


   Last Admin: 10/31/17 18:34 Dose:  Not Given


Ondansetron HCl (Zofran Inj)  4 mg IVP Q4 PRN


   PRN Reason: nausea


   Last Admin: 10/17/17 14:41 Dose:  4 mg


Oxycodone/Acetaminophen (Percocet 5/325 Mg Tab)  1 tab PO Q4H PRN


   PRN Reason: breakthrough pain


   Stop: 11/03/17 08:29


   Last Admin: 10/31/17 18:28 Dose:  1 tab


Tramadol HCl (Ultram)  50 mg PO TID [facility]


   Last Admin: 10/31/17 18:32 Dose:  Not Given











- Labs


Labs: 


 





 10/30/17 06:45 





 10/30/17 06:45 





 











PT  15.5 SECONDS (9.7-12.2)  H  10/09/17  08:47    


 


INR  1.4   10/09/17  08:47    


 


APTT  31 SECONDS (21-34)   10/09/17  08:47    














- Head Exam


Head Exam: ATRAUMATIC





- Eye Exam


Eye Exam: Normal appearance





- ENT Exam


ENT Exam: Mucous Membranes Dry





- Respiratory Exam


Respiratory Exam: NORMAL BREATHING PATTERN





- Cardiovascular Exam


Cardiovascular Exam: +S1, +S2





- GI/Abdominal Exam


GI & Abdominal Exam: Normal Bowel Sounds





Assessment and Plan


(1) Anemia


Assessment & Plan: 


H/H fairly stable


chronic disease


elevated CEA noted; endoscopy per GI


Status: Acute   





(2) Thrombocytopenia


Assessment & Plan: 


liver disease, hep c


Status: Acute   





(3) Elevated CEA


Assessment & Plan: 


endoscopy per GI


Status: Acute

## 2017-10-31 NOTE — CP.PCM.PN
Subjective





- Date & Time of Evaluation


Date of Evaluation: 10/31/17


Time of Evaluation: 13:11





- Subjective


Subjective: 





CHIEF COMPLAINTS  TODAY :


afebrile,


FEELING BETTER


AMBULATING





IR Drain right lower abdomen /AND STAPLES REMOVED.


aNXIOUS TO GO HOME.





ROS.


HEENT :  N.


Resp :       No  SOB wheezing, cough 


Cardio :     No CP, PND orthopnea 


GI :           No abd. Pain, n/v ,STAPLES REMOVED.  mIDLINE INCISION CLEAN AND 

DRY.


CNS : No headache , focal deficit. 


Musculoskel :  N


Ext. : Pedal pulses intact, no edema or calf pain 


Derm :        N


Psych :     N. 











PE.


Pt. is alert awake in no distress.


V.S  As noted in the chart 


Head ,ear nose,throat and eyes : Normal.


Neck : Supple with normal carotids.


Lungs: Clear air entry.


Heart : S1 & S2 normal . . No murmur. S4 + 


Abd  Soft , REJI REMOVED.  mIDLINE INCISION CLEAN AND DRY.  nO TENDERNESS 

ELICITED.


Neuro : Moves all ext. with no localized deficit.


Ext : No edema with intact pulses. Neg. calf tenderness 


Derm : No rashes or decubitus ulcer.





Radiology/Labs .


ABDOMINAL FLUID CULTURE-  ENTEROCOCCUS FAECIUM


 s-  vancomycin RICKI <0.5, linizolid RICKI 2


BLOOD CULTURES 10/25/17  -24 HOURS.


vANCO TROUGH 10.7 10/27/17 OKAY.


wbc 8.9 HEMOGLOBIN 9.1.


pLATELETS INCREASED .


rENAL FUNCTIONS STABLE








Asssessment : 


66 yr  old M w. incisonal hernia, s/p open repair w. biologic mesh, small bowel 

resection ON 10/9/17- POD#17, w. development of seroma/abscess 


S/P IR ,ULTRASOUND GUIDED DRAINAGE OF ABDOMINAL WALL COLLECTION AT SITE OF 

SURGICAL STAPLES AND PLACEMENT OFF 8 [place] DRAINAGE CATHETER 10/23/17.


 DRAINAGE FLUID +VE Enterococcus faecium.





Plan :


SURGERY FOLLOW-UP NOTED.  





CONTINUE  IV  ANTIBIOTICS  FOR  4 WEEKS PRESENTLY.


CONTINUE IV VANCOMYCIN 1 G EVERY 12 HOURLY  x 4WEEKS.


ADD IV INVANZ 1GM IVPB  Q 24HRLY FOR GM-VE. X 4WEEKS  


F/U VANCO TROUGH LEVELS  WEEKLY AND KEEP BETWEEN 10 AND 20..


MONITOR RENAL FUNCTIONS CLOSELY.





MONITOR THROMBOCYTOPENIA AS PATIENT HAS HEP C.


wILL FOLLOW THE PATIENT WHILE IN HOSPITAL.


cASE DISCUSSED WITH STAFF /np MS. ZENG.





Objective





- Vital Signs/Intake and Output


Vital Signs (last 24 hours): 


 











Temp Pulse Resp BP Pulse Ox


 


 98.8 F   59 L  17   105/68   95 


 


 10/31/17 07:25  10/31/17 07:25  10/31/17 07:25  10/31/17 07:25  10/31/17 07:25








Intake and Output: 


 











 10/31/17 10/31/17





 06:59 18:59


 


Intake Total 700 


 


Output Total 400 


 


Balance 300 














- Medications


Medications: 


 Current Medications





Clonidine HCl (Catapres Tts1 0.1 Mg/24 Hr)  1 patch TD Q7D@1000 Counts include 234 beds at the [facility]


   Last Admin: 10/25/17 10:52 Dose:  1 patch


Docusate Sodium (Colace)  100 mg PO BID Counts include 234 beds at the [facility]


   Last Admin: 10/31/17 09:24 Dose:  100 mg


Famotidine (Pepcid)  20 mg PO BID Counts include 234 beds at the [facility]


   Last Admin: 10/31/17 09:24 Dose:  20 mg


Meropenem 1 gm/ Dextrose  100 mls @ 100 mls/hr IVPB Q8 Counts include 234 beds at the [facility]


   Last Admin: 10/31/17 06:10 Dose:  100 mls/hr


Vancomycin/Sodium Chloride (Vancomycin 1 Gm/Ns 200 Ml)  1 gm in 200 mls @ 166.6 

mls/hr IVPB Q12H Counts include 234 beds at the [facility]


   Stop: 11/05/17 03:01


   Last Admin: 10/31/17 04:30 Dose:  166.6 mls/hr


Insulin Aspart (Novolog)  0 unit SC ACHS [facility]


   PRN Reason: Protocol


   Last Admin: 10/31/17 12:34 Dose:  2 unit


Ondansetron HCl (Zofran Inj)  4 mg IVP Q4 PRN


   PRN Reason: nausea


   Last Admin: 10/17/17 14:41 Dose:  4 mg


Oxycodone/Acetaminophen (Percocet 5/325 Mg Tab)  1 tab PO Q4H PRN


   PRN Reason: breakthrough pain


   Stop: 11/03/17 08:29


Tramadol HCl (Ultram)  50 mg PO TID Counts include 234 beds at the [facility]


   Last Admin: 10/30/17 17:22 Dose:  50 mg











- Labs


Labs: 


 





 10/30/17 06:45 





 10/30/17 06:45 





 











PT  15.5 SECONDS (9.7-12.2)  H  10/09/17  08:47    


 


INR  1.4   10/09/17  08:47    


 


APTT  31 SECONDS (21-34)   10/09/17  08:47    














Assessment and Plan


(1) S/P repair of recurrent ventral hernia


Status: Acute   





(2) Status post exploratory laparotomy


Status: Acute   





(3) Small bowel obstruction


Status: Acute   





(4) Diabetes mellitus


Status: Chronic   





(5) Hepatitis C infection


Status: Chronic   





(6) Hypertension


Status: Chronic

## 2017-10-31 NOTE — CP.PCM.PN
Subjective





- Date & Time of Evaluation


Date of Evaluation: 10/31/17


Time of Evaluation: 07:00





- Subjective


Subjective: 


GENERAL SURGERY PROGRESS NOTE FOR DR. DAVE





Patient seen and examined at bedside. He states that he has not had a BM for 3 

days. He is passing flatus. He is tolerating diet and denies nausea or 

vomiting.  He is using his IS and ambulating. IR drain was removed yesterday.








Objective





- Vital Signs/Intake and Output


Vital Signs (last 24 hours): 


 











Temp Pulse Resp BP Pulse Ox


 


 98.8 F   59 L  17   105/68   95 


 


 10/31/17 07:25  10/31/17 07:25  10/31/17 07:25  10/31/17 07:25  10/31/17 07:25








Intake and Output: 


 











 10/31/17 10/31/17





 06:59 18:59


 


Intake Total 700 


 


Output Total 400 


 


Balance 300 














- Medications


Medications: 


 Current Medications





Clonidine HCl (Catapres Tts1 0.1 Mg/24 Hr)  1 patch TD Q7D@1000 [facility]


   Last Admin: 10/25/17 10:52 Dose:  1 patch


Docusate Sodium (Colace)  100 mg PO BID [facility]


Famotidine (Pepcid)  20 mg PO BID [facility]


   Last Admin: 10/30/17 17:22 Dose:  20 mg


Meropenem 1 gm/ Dextrose  100 mls @ 100 mls/hr IVPB Q8 [facility]


   Last Admin: 10/31/17 06:10 Dose:  100 mls/hr


Vancomycin/Sodium Chloride (Vancomycin 1 Gm/Ns 200 Ml)  1 gm in 200 mls @ 166.6 

mls/hr IVPB Q12H [facility]


   Stop: 11/05/17 03:01


   Last Admin: 10/31/17 04:30 Dose:  166.6 mls/hr


Insulin Aspart (Novolog)  0 unit SC ACHS [facility]


   PRN Reason: Protocol


   Last Admin: 10/30/17 21:57 Dose:  Not Given


Ondansetron HCl (Zofran Inj)  4 mg IVP Q4 PRN


   PRN Reason: nausea


   Last Admin: 10/17/17 14:41 Dose:  4 mg


Tramadol HCl (Ultram)  50 mg PO TID [facility]


   Last Admin: 10/30/17 17:22 Dose:  50 mg











- Labs


Labs: 


 





 10/30/17 06:45 





 10/30/17 06:45 





 











PT  15.5 SECONDS (9.7-12.2)  H  10/09/17  08:47    


 


INR  1.4   10/09/17  08:47    


 


APTT  31 SECONDS (21-34)   10/09/17  08:47    














- Constitutional


Appears: Non-toxic, No Acute Distress





- Head Exam


Head Exam: ATRAUMATIC, NORMAL INSPECTION





- Eye Exam


Eye Exam: EOMI, Normal appearance





- Respiratory Exam


Respiratory Exam: NORMAL BREATHING PATTERN.  absent: Respiratory Distress





- Cardiovascular Exam


Cardiovascular Exam: +S1, +S2





- GI/Abdominal Exam


GI & Abdominal Exam: Soft.  absent: Distended, Firm, Guarding, Rigid, Tenderness

, Rebound


Additional comments: 


Staples in place over midline incision





- Neurological Exam


Neurological Exam: Alert, Awake





- Psychiatric Exam


Psychiatric exam: Normal Affect, Normal Mood





- Skin


Skin Exam: Dry, Normal Color, Warm





Assessment and Plan





- Assessment and Plan (Free Text)


Assessment: 


67yo M with recurrent ventral hernia and thrombocytopenia s/p exploratory 

laparotomy, extensive BREANNA, repair of recurrent ventral hernia with biologic mesh

, small bowel resection with primary anastomosis POD#22, now s/p IR US guided 

abdominal abscess drainage with placement of an 8Fr drainage catheter 6 days 

ago.





- Afebrile, VSS


- Continue Physical therapy 


- Tolerating regular diet


- No BM for 3 days, added Colace BID


- Encouraged OOB, ambulation, and IS use


- Culture from IR drainage: enterococcus facecium, on Vancomycin x4 weeks and 

Invanz x4 weeks per ID (currently on Merrem instead since pharmacy does not 

have Invanz)


- Per Dr. Umanzor: may need repeat CT before discharge and may need endoscopic 

evaluation of GI tract due to anemia


- Hepatitis C antibody is reactive, Hep C viral RNA still pending


- IR drain removed yesterday due to low output


- Will remove remaining staples today


- Discussed plan with Dr. Selma Ny PGY-3

## 2017-10-31 NOTE — CP.PCM.PN
Subjective





- Date & Time of Evaluation


Date of Evaluation: 10/30/17


Time of Evaluation: 18:00





- Subjective


Subjective: 





No complaints.





Objective





- Vital Signs/Intake and Output


Vital Signs (last 24 hours): 


 











Temp Pulse Resp BP Pulse Ox


 


 98.1 F   81   20   148/87   100 


 


 10/31/17 16:45  10/31/17 16:45  10/31/17 16:45  10/31/17 16:45  10/31/17 16:45











- Medications


Medications: 


 Current Medications





Clonidine HCl (Catapres Tts1 0.1 Mg/24 Hr)  1 patch TD Q7D@1000 [facility]


   Last Admin: 10/25/17 10:52 Dose:  1 patch


Docusate Sodium (Colace)  100 mg PO BID [facility]


   Last Admin: 10/31/17 18:28 Dose:  100 mg


Famotidine (Pepcid)  20 mg PO BID [facility]


   Last Admin: 10/31/17 18:28 Dose:  20 mg


Meropenem 1 gm/ Dextrose  100 mls @ 100 mls/hr IVPB Q8 [facility]


   Last Admin: 10/31/17 13:49 Dose:  100 mls/hr


Vancomycin/Sodium Chloride (Vancomycin 1 Gm/Ns 200 Ml)  1 gm in 200 mls @ 166.6 

mls/hr IVPB Q12H [facility]


   Stop: 11/05/17 03:01


   Last Admin: 10/31/17 16:41 Dose:  166.6 mls/hr


Insulin Aspart (Novolog)  0 unit SC ACHS [facility]


   PRN Reason: Protocol


   Last Admin: 10/31/17 18:34 Dose:  Not Given


Ondansetron HCl (Zofran Inj)  4 mg IVP Q4 PRN


   PRN Reason: nausea


   Last Admin: 10/17/17 14:41 Dose:  4 mg


Oxycodone/Acetaminophen (Percocet 5/325 Mg Tab)  1 tab PO Q4H PRN


   PRN Reason: breakthrough pain


   Stop: 11/03/17 08:29


   Last Admin: 10/31/17 18:28 Dose:  1 tab


Tramadol HCl (Ultram)  50 mg PO TID [facility]


   Last Admin: 10/31/17 18:32 Dose:  Not Given











- Labs


Labs: 


 





 10/30/17 06:45 





 10/30/17 06:45 





 











PT  15.5 SECONDS (9.7-12.2)  H  10/09/17  08:47    


 


INR  1.4   10/09/17  08:47    


 


APTT  31 SECONDS (21-34)   10/09/17  08:47    














- Head Exam


Head Exam: ATRAUMATIC





- Eye Exam


Eye Exam: Normal appearance





- ENT Exam


ENT Exam: Mucous Membranes Dry





- Respiratory Exam


Respiratory Exam: NORMAL BREATHING PATTERN





- Cardiovascular Exam


Cardiovascular Exam: +S1, +S2





- GI/Abdominal Exam


GI & Abdominal Exam: Normal Bowel Sounds





Assessment and Plan


(1) Anemia


Assessment & Plan: 


H/H fairly stable


chronic disease


elevated CEA noted; endoscopy per GI


Status: Acute   





(2) Thrombocytopenia


Assessment & Plan: 


mild


hep C and liver disease


Status: Acute   





(3) Elevated CEA


Assessment & Plan: 


endoscopy per GI


Status: Acute

## 2017-10-31 NOTE — CP.PCM.PN
Subjective





- Date & Time of Evaluation


Date of Evaluation: 10/31/17


Time of Evaluation: 19:00





- Subjective


Subjective: 





Pt is feeling better, s/p incarcerated hernia surgery and post op infection, 

cleared by Id for discharge





Objective





- Vital Signs/Intake and Output


Vital Signs (last 24 hours): 


 











Temp Pulse Resp BP Pulse Ox


 


 98.1 F   81   20   148/87   100 


 


 10/31/17 16:45  10/31/17 16:45  10/31/17 16:45  10/31/17 16:45  10/31/17 16:45











- Medications


Medications: 


 Current Medications





Clonidine HCl (Catapres Tts1 0.1 Mg/24 Hr)  1 patch TD Q7D@1000 [facility]


   Last Admin: 10/25/17 10:52 Dose:  1 patch


Docusate Sodium (Colace)  100 mg PO BID [facility]


   Last Admin: 10/31/17 18:28 Dose:  100 mg


Famotidine (Pepcid)  20 mg PO BID [facility]


   Last Admin: 10/31/17 18:28 Dose:  20 mg


Meropenem 1 gm/ Dextrose  100 mls @ 100 mls/hr IVPB Q8 [facility]


   Last Admin: 10/31/17 13:49 Dose:  100 mls/hr


Vancomycin/Sodium Chloride (Vancomycin 1 Gm/Ns 200 Ml)  1 gm in 200 mls @ 166.6 

mls/hr IVPB Q12H [facility]


   Stop: 11/05/17 03:01


   Last Admin: 10/31/17 16:41 Dose:  166.6 mls/hr


Insulin Aspart (Novolog)  0 unit SC ACHS [facility]


   PRN Reason: Protocol


   Last Admin: 10/31/17 18:34 Dose:  Not Given


Ondansetron HCl (Zofran Inj)  4 mg IVP Q4 PRN


   PRN Reason: nausea


   Last Admin: 10/17/17 14:41 Dose:  4 mg


Oxycodone/Acetaminophen (Percocet 5/325 Mg Tab)  1 tab PO Q4H PRN


   PRN Reason: breakthrough pain


   Stop: 11/03/17 08:29


   Last Admin: 10/31/17 18:28 Dose:  1 tab


Tramadol HCl (Ultram)  50 mg PO TID [facility]


   Last Admin: 10/31/17 18:32 Dose:  Not Given











- Labs


Labs: 


 





 10/30/17 06:45 





 10/30/17 06:45 





 











PT  15.5 SECONDS (9.7-12.2)  H  10/09/17  08:47    


 


INR  1.4   10/09/17  08:47    


 


APTT  31 SECONDS (21-34)   10/09/17  08:47    














- Constitutional


Appears: No Acute Distress





- Head Exam


Head Exam: ATRAUMATIC, NORMAL INSPECTION, NORMOCEPHALIC





- Eye Exam


Eye Exam: EOMI, Normal appearance, PERRL


Pupil Exam: NORMAL ACCOMODATION, PERRL





- Respiratory Exam


Respiratory Exam: Decreased Breath Sounds, Rales, Rhonchi





- Cardiovascular Exam


Cardiovascular Exam: REGULAR RHYTHM, +S1, +S2.  absent: Murmur





- GI/Abdominal Exam


GI & Abdominal Exam: Soft, Normal Bowel Sounds.  absent: Tenderness





- Rectal Exam


Rectal Exam: Deferred





Assessment and Plan


(1) Intestinal obstruction


Status: Acute   





(2) Thrombocytopenia


Status: Acute   





(3) Ventral hernia


Status: Acute   





(4) HTN (hypertension)


Status: Acute   





(5) Diabetes mellitus


Status: Chronic

## 2017-11-01 VITALS
HEART RATE: 71 BPM | DIASTOLIC BLOOD PRESSURE: 84 MMHG | RESPIRATION RATE: 20 BRPM | SYSTOLIC BLOOD PRESSURE: 150 MMHG | TEMPERATURE: 98.2 F

## 2017-11-01 VITALS — OXYGEN SATURATION: 100 %

## 2017-11-01 LAB
BASOPHILS # BLD AUTO: 0 K/UL (ref 0–0.2)
BASOPHILS NFR BLD: 0.4 % (ref 0–2)
BUN SERPL-MCNC: 7 MG/DL (ref 9–20)
CALCIUM SERPL-MCNC: 8 MG/DL (ref 8.6–10.4)
CHLORIDE SERPL-SCNC: 103 MMOL/L (ref 98–107)
CO2 SERPL-SCNC: 23 MMOL/L (ref 22–30)
EOSINOPHIL # BLD AUTO: 0 K/UL (ref 0–0.7)
EOSINOPHIL NFR BLD: 0.3 % (ref 0–4)
ERYTHROCYTE [DISTWIDTH] IN BLOOD BY AUTOMATED COUNT: 21 % (ref 11.5–14.5)
GLUCOSE SERPL-MCNC: 102 MG/DL (ref 75–110)
HCT VFR BLD CALC: 25 % (ref 35–51)
LYMPHOCYTES # BLD AUTO: 1.8 K/UL (ref 1–4.3)
LYMPHOCYTES NFR BLD AUTO: 23.2 % (ref 20–40)
MCH RBC QN AUTO: 34.3 PG (ref 27–31)
MCHC RBC AUTO-ENTMCNC: 33.7 G/DL (ref 33–37)
MCV RBC AUTO: 101.7 FL (ref 80–94)
MONOCYTES # BLD: 0.9 K/UL (ref 0–0.8)
MONOCYTES NFR BLD: 11.2 % (ref 0–10)
NRBC BLD AUTO-RTO: 0.2 % (ref 0–2)
PLATELET # BLD: 110 K/UL (ref 130–400)
PMV BLD AUTO: 8.2 FL (ref 7.2–11.7)
POTASSIUM SERPL-SCNC: 3.7 MMOL/L (ref 3.6–5.2)
SODIUM SERPL-SCNC: 131 MMOL/L (ref 132–148)
WBC # BLD AUTO: 7.7 K/UL (ref 4.8–10.8)

## 2017-11-01 RX ADMIN — INSULIN ASPART SCH: 100 INJECTION, SOLUTION INTRAVENOUS; SUBCUTANEOUS at 12:28

## 2017-11-01 RX ADMIN — VANCOMYCIN HYDROCHLORIDE SCH MLS/HR: 1 INJECTION, POWDER, LYOPHILIZED, FOR SOLUTION INTRAVENOUS at 03:41

## 2017-11-01 RX ADMIN — VANCOMYCIN HYDROCHLORIDE SCH MLS/HR: 1 INJECTION, POWDER, LYOPHILIZED, FOR SOLUTION INTRAVENOUS at 14:35

## 2017-11-01 RX ADMIN — INSULIN ASPART SCH: 100 INJECTION, SOLUTION INTRAVENOUS; SUBCUTANEOUS at 18:09

## 2017-11-01 RX ADMIN — OXYCODONE HYDROCHLORIDE AND ACETAMINOPHEN PRN TAB: 5; 325 TABLET ORAL at 05:33

## 2017-11-01 RX ADMIN — INSULIN ASPART SCH: 100 INJECTION, SOLUTION INTRAVENOUS; SUBCUTANEOUS at 07:46

## 2017-11-01 RX ADMIN — MEROPENEM SCH MLS/HR: 1 INJECTION INTRAVENOUS at 05:23

## 2017-11-01 NOTE — CP.PCM.PN
Subjective





- Date & Time of Evaluation


Date of Evaluation: 11/01/17


Time of Evaluation: 12:30





Objective





- Vital Signs/Intake and Output


Vital Signs (last 24 hours): 


 











Temp Pulse Resp BP Pulse Ox


 


 97.9 F   69   18   126/68   100 


 


 11/01/17 07:20  11/01/17 07:20  11/01/17 07:20  11/01/17 07:20  11/01/17 07:20








Intake and Output: 


 











 11/01/17 11/01/17





 06:59 18:59


 


Intake Total 408 


 


Output Total 600 


 


Balance -192 














- Medications


Medications: 


 Current Medications





Clonidine HCl (Catapres Tts1 0.1 Mg/24 Hr)  1 patch TD Q7D@1000 [facility]


   Last Admin: 11/01/17 10:08 Dose:  1 patch


Docusate Sodium (Colace)  100 mg PO BID [facility]


   Last Admin: 11/01/17 10:08 Dose:  100 mg


Famotidine (Pepcid)  20 mg PO BID [facility]


   Last Admin: 11/01/17 10:08 Dose:  20 mg


Vancomycin/Sodium Chloride (Vancomycin 1 Gm/Ns 200 Ml)  1 gm in 200 mls @ 166.6 

mls/hr IVPB Q12H [facility]


   Stop: 11/05/17 03:01


   Last Admin: 11/01/17 03:41 Dose:  166.6 mls/hr


Insulin Aspart (Novolog)  0 unit SC ACHS [facility]


   PRN Reason: Protocol


   Last Admin: 11/01/17 12:28 Dose:  Not Given


Ondansetron HCl (Zofran Inj)  4 mg IVP Q4 PRN


   PRN Reason: nausea


   Last Admin: 10/17/17 14:41 Dose:  4 mg


Oxycodone/Acetaminophen (Percocet 5/325 Mg Tab)  1 tab PO Q4H PRN


   PRN Reason: breakthrough pain


   Stop: 11/03/17 08:29


   Last Admin: 11/01/17 05:33 Dose:  1 tab


Tramadol HCl (Ultram)  50 mg PO TID [facility]


   Last Admin: 11/01/17 10:07 Dose:  50 mg











- Labs


Labs: 


 





 11/01/17 07:33 





 11/01/17 07:33 





 











PT  15.5 SECONDS (9.7-12.2)  H  10/09/17  08:47    


 


INR  1.4   10/09/17  08:47    


 


APTT  31 SECONDS (21-34)   10/09/17  08:47

## 2017-11-01 NOTE — CP.PCM.DIS
Provider





- Provider


Date of Admission: 


09/30/17 04:47





Attending physician: 


Harrison Mckeon MD





Time Spent in preparation of Discharge (in minutes): 56





Diagnosis





- Discharge Diagnosis


(1) Intestinal obstruction


Status: Acute   





(2) Thrombocytopenia


Status: Acute   





(3) Ventral hernia


Status: Acute   





(4) HTN (hypertension)


Status: Acute   





(5) Diabetes mellitus


Status: Chronic   





Hospital Course





- Lab Results


Lab Results: 


 Micro Results





10/25/17 18:30   Blood   Blood Culture - Final


                            NO GROWTH AFTER 5 DAYS


10/25/17 18:30   Blood   Gram Stain - Final


                            TEST NOT PERFORMED


10/25/17 18:00   Blood   Blood Culture - Final


                            NO GROWTH AFTER 5 DAYS


10/25/17 18:00   Blood   Gram Stain - Final


                            TEST NOT PERFORMED


10/23/17 Unknown   Abdominal Fluid   Gram Stain - Final


10/23/17 Unknown   Abdominal Fluid   Body Fluid Culture - Final


                                Enterococcus Faecium


10/09/17 22:29   Naris   MRSA Culture (Admit) - Final


                            MRSA NOT DETECTED





 Most Recent Lab Values











WBC  7.7 K/uL (4.8-10.8)   11/01/17  07:33    


 


RBC  2.45 Mil/uL (4.40-5.90)  L  11/01/17  07:33    


 


Hgb  8.4 g/dL (12.0-18.0)  L  11/01/17  07:33    


 


Hct  25.0 % (35.0-51.0)  L  11/01/17  07:33    


 


MCV  101.7 fL (80.0-94.0)  H  11/01/17  07:33    


 


MCH  34.3 pg (27.0-31.0)  H  11/01/17  07:33    


 


MCHC  33.7 g/dL (33.0-37.0)   11/01/17  07:33    


 


RDW  21.0 % (11.5-14.5)  H  11/01/17  07:33    


 


Plt Count  110 K/uL (130-400)  L  11/01/17  07:33    


 


MPV  8.2 fL (7.2-11.7)   11/01/17  07:33    


 


Neut % (Auto)  64.9 % (50.0-75.0)   11/01/17  07:33    


 


Lymph % (Auto)  23.2 % (20.0-40.0)   11/01/17  07:33    


 


Mono % (Auto)  11.2 % (0.0-10.0)  H  11/01/17  07:33    


 


Eos % (Auto)  0.3 % (0.0-4.0)   11/01/17  07:33    


 


Baso % (Auto)  0.4 % (0.0-2.0)   11/01/17  07:33    


 


Neut #  5.0 K/uL (1.8-7.0)   11/01/17  07:33    


 


Lymph #  1.8 K/uL (1.0-4.3)   11/01/17  07:33    


 


Mono #  0.9 K/uL (0.0-0.8)  H  11/01/17  07:33    


 


Eos #  0.0 K/uL (0.0-0.7)   11/01/17  07:33    


 


Baso #  0.0 K/uL (0.0-0.2)   11/01/17  07:33    


 


Neutrophils % (Manual)  59 % (50-75)   10/07/17  06:25    


 


Lymphocytes % (Manual)  27 % (20-40)   10/07/17  06:25    


 


Monocytes % (Manual)  14 % (0-10)  H  10/07/17  06:25    


 


Differential Comment     10/25/17  07:06    


 


Platelet Estimate  Decreased  (NORMAL)  L  10/07/17  06:25    


 


Polychromasia  Slight   10/07/17  06:25    


 


Hypochromasia (manual)  Slight   10/07/17  06:25    


 


Poikilocytosis (manual  Slight   10/07/17  06:25    


 


Anisocytosis (manual)  Slight   10/07/17  06:25    


 


Microcytosis (manual)  Slight   10/07/17  06:25    


 


Macrocytosis (manual)  Slight   10/07/17  06:25    


 


Ovalocytes  Slight   10/07/17  06:25    


 


Retic Count  1.5 % (0.5-1.5)   10/04/17  07:34    


 


PT  15.5 SECONDS (9.7-12.2)  H  10/09/17  08:47    


 


INR  1.4   10/09/17  08:47    


 


APTT  31 SECONDS (21-34)   10/09/17  08:47    


 


Sodium  131 mmol/L (132-148)  L  11/01/17  07:33    


 


Potassium  3.7 mmol/L (3.6-5.2)   11/01/17  07:33    


 


Chloride  103 mmol/L ()   11/01/17  07:33    


 


Carbon Dioxide  23 mmol/L (22-30)   11/01/17  07:33    


 


Anion Gap  10  (10-20)   11/01/17  07:33    


 


BUN  7 mg/dL (9-20)  L  11/01/17  07:33    


 


Creatinine  0.6 mg/dL (0.8-1.5)  L  11/01/17  07:33    


 


Est GFR ( Amer)  > 60   11/01/17  07:33    


 


Est GFR (Non-Af Amer)  > 60   11/01/17  07:33    


 


POC Glucose (mg/dL)  146 mg/dL ()  H  11/01/17  16:29    


 


Random Glucose  102 mg/dL ()   11/01/17  07:33    


 


Calcium  8.0 mg/dl (8.6-10.4)  L  11/01/17  07:33    


 


Phosphorus  3.1 mg/dL (2.5-4.5)   10/18/17  06:30    


 


Magnesium  1.5 mg/dL (1.6-2.3)  L  10/26/17  06:19    


 


Ferritin  214.0 ng/mL  10/04/17  07:34    


 


Total Bilirubin  0.8 mg/dL (0.2-1.3)   10/18/17  06:30    


 


AST  56 U/L (17-59)   10/18/17  06:30    


 


ALT  47 U/L (21-72)   10/18/17  06:30    


 


Alkaline Phosphatase  60 U/L ()   10/18/17  06:30    


 


Troponin I  < 0.0120 ng/mL (0.00-0.120)   09/30/17  02:05    


 


Total Protein  6.0 g/dL (6.3-8.3)  L  10/18/17  06:30    


 


Albumin  2.6 g/dL (3.5-5.0)  L  10/18/17  06:30    


 


Globulin  3.4 gm/dL (2.2-3.9)   10/18/17  06:30    


 


Albumin/Globulin Ratio  0.8  (1.0-2.1)  L  10/18/17  06:30    


 


Triglycerides  99 mg/dL (0-149)   10/18/17  06:30    


 


Cholesterol  62 mg/dL (0-199)   10/18/17  06:30    


 


LDL Cholesterol Direct  35 mg/dL (0-129)   10/18/17  06:30    


 


HDL Cholesterol  15 mg/dL (30-70)  L  10/18/17  06:30    


 


Amylase  38 U/L ()   10/26/17  15:15    


 


Lipase  143 U/L ()   10/26/17  15:15    


 


Carcinoembryonic Ag  4.9 ng/mL (0-3.0)  H  09/30/17  11:29    


 


Vitamin B12  878 pg/mL (239-931)   10/04/17  07:34    


 


Folate  15.8 ng/mL  10/04/17  07:34    


 


Stool Occult Blood  Negative  (NEGATIVE)   10/05/17  17:15    


 


Vancomycin Trough  12.2 ug/mL (5.0-10.0)  H  11/01/17  13:52    


 


Hepatitis A IgM Ab  Negative  (NEGATIVE)   10/25/17  07:06    


 


Hep Bs Antigen  Negative  (NEGATIVE)   10/25/17  07:06    


 


Hep B Core IgM Ab  Negative  (NEGATIVE)   10/25/17  07:06    


 


Hepatitis C Antibody  Reactive  (NEGATIVE)   10/25/17  07:06    


 


Blood Type  O POSITIVE   10/18/17  06:30    


 


Antibody Screen  Negative   10/18/17  06:30    


 


Crossmatch  See Detail   10/09/17  14:42    














- Hospital Course


Hospital Course: 





A/P


7yo Male admitted  with recurrent ventral hernia 


s/p exploratory laparotomy, extensive BREANNA, repair of recurrent ventral hernia 

with biologic mesh, small bowel resection with primary anastomosis POD#25,


s/p IR US guided abdominal abscess drainage with placement of an 8Fr drainage 

catheter  for abcess/ hematoma 


hgb stable 


abdominal fluid -+ for enterococus and started on vanco and merum ,  and needs 

4 weeks as per Dr. SIMONE onofre -ID 


Patient refused the idea of GABRIEL for IV antibiotics  and opted  to go home and 

do IV home infusion ( patients states he will be able to do it 4 weeks)


D/w Dr. Barrera, recommends to continue vanco and Invanz for 4 weeks  and weekly 

vanco trough level between 10-20 and weekly cbc, bmp 


D/w Dr. Hahn cleared for discharge home from surgery standpoint and 

continue with ID recommendations for antibiotics


 stable for discharge home today and f/u with ME IN office in 1 week 


Discharge  plan discussed with patient , who understands and agrees with plan 


CM arranged home infusion for 4 weeks and lab work 


RX AND INFUSION INSTRUCTIONS GIVEN TO HOME INFUSION COMPANY  AND F/U  LAB 

RESULT 





Discharge Exam





- Head Exam


Head Exam: ATRAUMATIC





- Eye Exam


Eye Exam: EOMI, Normal appearance, PERRL


Pupil Exam: NORMAL ACCOMODATION, PERRL





- ENT Exam


ENT Exam: Mucous Membranes Moist





- Respiratory Exam


Respiratory Exam: Clear to PA & Lateral, NORMAL BREATHING PATTERN





- Cardiovascular Exam


Cardiovascular Exam: REGULAR RHYTHM, +S1, +S2





- GI/Abdominal Exam


GI & Abdominal Exam: Normal Bowel Sounds





Discharge Plan





- Discharge Medications


Prescriptions: 


cloNIDine 0.1 mg/24 hr [catapres TTS1 0.1 mg/24 hr] 1 patch TD Q7D@1000 #10 

patch


Docusate [Colace] 100 mg PO BID #60 cap


Ferrous Sulfate 325 mg PO BID #60 tablet


Ertapenem [Invanz] 1 gm IVPB ONCE 28 Days  vial


Polyethylene Glycol 3350 [Miralax] 17 gm PO DAILY 20 Days  powd.pack


Famotidine [Pepcid] 20 mg PO BID #60 tab


traMADol [Ultram] 50 mg PO Q8 PRN #30 tab


 PRN Reason: Pain, Moderate (4-7)


Vancomycin/0.9 % Sod Chloride [Vanco 1.5 gm/250 ml-0.9% NaCl] 1.5 gm IV DAILY 

28 Days  plast..bag





- Follow Up Plan


Condition: STABLE


Disposition: HOME/ ROUTINE


Instructions:  Exploratory Laparotomy (DC), Bowel Obstruction (DC), Ventral 

Hernia (DC), Ventral Hernia Repair (DC)


Additional Instructions: 


Please f/u with Dr. Mckeon office in 1 week 


Please f/u with DR. Ni office in 2 weeks - call and make appointment ( f/

u visit) 


Continue antibiotics for 4 weeks ( all arranged by  for home infusion ) 


Home  care service will do your blood work weekly and contact your MD's  


Continue all other  medication as per Med. Rec. 





PLEASE GIVE VANCOMYCIN SLOWLY - OVER 90 MINUTES 





Referrals: 


Harrison Mckeon MD [Staff Provider] - 


Kevin Hahn Jr., MD [Staff Provider] -

## 2017-11-01 NOTE — PN
DATE:



LOCATION:  [facility], bed 8.



SUBJECTIVE:  This is 66 years old male, tolerating oral intake well with

intermittent period of abdominal pain, but less abdominal distention.



No reported chest pain or palpitation, but was reporting constipation,

passing gas well.



The entire chart is reviewed including but not limited to the most recent

lab and radiology study results, current and previous medication list,

current and previous medical events.  Case discussed with the staff at

length and the abdominal, abscess drainage was removed by the IR yesterday.



LABORATORY DATA:  Today his lab showed blood glucose level of 224.



PHYSICAL EXAMINATION:

GENERAL:  A 66 years old male.

VITAL SIGNS:  Afebrile with pulse of 62, respiratory rate of 20 to 22, and

blood pressure 110/64.

HEENT:  Show pale, dry oral mucoid membranes, nonicteric sclerae.

LUNGS:  Few scattered crepitation.  Decreased air entry at bases.

HEART:  Positive S1 and S2.

ABDOMEN:  Soft with mild distention, mild generalized tenderness.  No mass

or organomegaly.  No rebound tenderness or guarding.

SKIN:  Clean dressing seen.

EXTREMITIES:  Lower extremity mild edematous changes.  No clubbing or

cyanosis.

NEUROLOGIC:  No reported new focal neurological deficits, sensory or motor.

Peripheral pulses are present bilaterally, but hypoactive, but weak.



IMPRESSION:

1.  Postoperative anterior abdominal wall, incarcerated hernia repair.

2.  Partial small bowel resection.

3.  Postoperative abscess formation, drained successfully.

4.  Anemia secondary to above.

5.  Increase CEA level.

6.  Rule out occult gastrointestinal malignancy.

7.  Thrombocytopenia of unclear etiology.

8.  Reported history of hepatitis C viral infection.



SUGGESTIONS:

1.  Continue current management.

2.  Again repeat scan of abdomen and pelvis before discharge home.

3.  Endoscopic evaluation of the gastrointestinal tract that could be done

as an outpatient while the patient is more stable clinically.  Further

recommendation to follow.





__________________________________________

Earle Chilel MD







DD:  10/31/2017 12:25:04

DT:  10/31/2017 12:46:16

Job # [id number]

## 2017-11-01 NOTE — CP.PCM.PN
Subjective





- Date & Time of Evaluation


Date of Evaluation: 11/01/17


Time of Evaluation: 12:00





- Subjective


Subjective: 





No complaints.





Objective





- Vital Signs/Intake and Output


Vital Signs (last 24 hours): 


 











Temp Pulse Resp BP Pulse Ox


 


 97.9 F   69   18   126/68   100 


 


 11/01/17 07:20  11/01/17 07:20  11/01/17 07:20  11/01/17 07:20  11/01/17 07:20








Intake and Output: 


 











 11/01/17 11/01/17





 06:59 18:59


 


Intake Total 408 


 


Output Total 600 


 


Balance -192 














- Medications


Medications: 


 Current Medications





Clonidine HCl (Catapres Tts1 0.1 Mg/24 Hr)  1 patch TD Q7D@1000 [facility]


   Last Admin: 11/01/17 10:08 Dose:  1 patch


Docusate Sodium (Colace)  100 mg PO BID [facility]


   Last Admin: 11/01/17 10:08 Dose:  100 mg


Famotidine (Pepcid)  20 mg PO BID [facility]


   Last Admin: 11/01/17 10:08 Dose:  20 mg


Vancomycin/Sodium Chloride (Vancomycin 1 Gm/Ns 200 Ml)  1 gm in 200 mls @ 166.6 

mls/hr IVPB Q12H [facility]


   Stop: 11/05/17 03:01


   Last Admin: 11/01/17 03:41 Dose:  166.6 mls/hr


Insulin Aspart (Novolog)  0 unit SC ACHS [facility]


   PRN Reason: Protocol


   Last Admin: 11/01/17 12:28 Dose:  Not Given


Ondansetron HCl (Zofran Inj)  4 mg IVP Q4 PRN


   PRN Reason: nausea


   Last Admin: 10/17/17 14:41 Dose:  4 mg


Oxycodone/Acetaminophen (Percocet 5/325 Mg Tab)  1 tab PO Q4H PRN


   PRN Reason: breakthrough pain


   Stop: 11/03/17 08:29


   Last Admin: 11/01/17 05:33 Dose:  1 tab


Tramadol HCl (Ultram)  50 mg PO TID [facility]


   Last Admin: 11/01/17 10:07 Dose:  50 mg











- Labs


Labs: 


 





 11/01/17 07:33 





 11/01/17 07:33 





 











PT  15.5 SECONDS (9.7-12.2)  H  10/09/17  08:47    


 


INR  1.4   10/09/17  08:47    


 


APTT  31 SECONDS (21-34)   10/09/17  08:47    














- Head Exam


Head Exam: ATRAUMATIC





- Eye Exam


Eye Exam: Normal appearance





- ENT Exam


ENT Exam: Mucous Membranes Dry





- Respiratory Exam


Respiratory Exam: NORMAL BREATHING PATTERN





- Cardiovascular Exam


Cardiovascular Exam: +S1, +S2





- GI/Abdominal Exam


GI & Abdominal Exam: Normal Bowel Sounds





Assessment and Plan


(1) Anemia


Assessment & Plan: 


H/H fairly stable


chronic disease


elevated CEA noted; endoscopy per GI


Status: Acute   





(2) Thrombocytopenia


Assessment & Plan: 


mild


hep c and liver disease


Status: Acute   





(3) Elevated CEA


Assessment & Plan: 


GI w/u


Status: Acute

## 2017-11-01 NOTE — PN
DATE:



LOCATION:  [facility], bed A.



SUBJECTIVE:  This 66-year-old male was seen and examined in rounds today

without any new complaint.  Denied any nausea, vomiting or abdominal pain. 

Denied any significant shortness of breath or chest pain, tolerating oral

intake well with positive bowel movement and bowel sounds.  Passing gas.



The entire chart is reviewed including, but not limited to the most recent

lab and radiology study results, current and previous medication list,

current and the previous medical events.



LABORATORY DATA:  Today's lab showed hemoglobin of 8.4, hematocrit 25.0

with thrombocytopenia of 110 with low sodium 131 and low BUN and creatinine

with blood glucose level 136 and low calcium of 8.



PHYSICAL EXAMINATION:

GENERAL:  A 66-year-old male.

VITAL SIGNS:  Afebrile with pulse of 72, respiratory rate 20 to 22 with

blood pressure of 130/66.

HEENT:  Showed pale, dry mucoid membrane.  Nonicteric sclerae.

LUNGS:  Mild scattered crepitation with few rhonchi bilaterally and slight

decrease of air entry at bases.

HEART:  Positive S1 and S2.

ABDOMEN:  Soft.  Bowel sounds are present.  Mildly distended.  No mass or

organomegaly.  No rebound tenderness or guarding.  The site of previous

surgery appeared to be without any evidence of anterior abdominal wall

cellulitis or discharge.

RECTAL EXAMINATION:  The patient refused.

EXTREMITIES:  With mild lower extremities edematous changes.

NEUROLOGIC:  No reported new neurological deficits, sensory or focal.  No

focal deficits.



It has to be mentioned clearly that the patient indicated that he is going

home today and he refused any endoscopic evaluation for now; however, that

could be scheduled as an outpatient.



IMPRESSION:

1.  Incarcerated ventral anterior wall hernia, treated surgically.

2.  Partial small bowel resection.

3.  Postoperative abscess formation, drained by Interventional Radiology,

the patient is still on antibiotic.

4.  Electrolyte imbalance with hyponatremia and hypocalcemia.

5.  Thrombocytopenia of unclear etiology.

6.  Hypochromic microcystic anemia, again that could be secondary to

chronic disease versus possible gastrointestinal blood loss, the patient

has elevated carcinoembryonic antigen level and lower gastrointestinal

tract neoplastic lesion should be investigated, to be ruled in or out.

7.  Reported history of hepatitis C viral infection that could be treated

as an outpatient by Infectious Disease consultant or myself.



SUGGESTIONS:

1.  Agree with your plan.

2.  Endoscopic evaluation of the gastrointestinal tract, again that could

be done as an outpatient as the patient refused any aggressive GI workup in

the meantime while he is in the hospital.

3.  Further recommendation to follow.







__________________________________________

Earle Chilel MD



cc:  Earle Chilel MD



DD:  11/01/2017 10:58:37

DT:  11/01/2017 11:50:46

Job # [id number]

## 2017-11-01 NOTE — CP.PCM.PN
Subjective





- Date & Time of Evaluation


Date of Evaluation: 11/01/17


Time of Evaluation: 11:10





- Subjective


Subjective: 





Patient seen today , states feels well, wants to go home , denies  any 

abdominal pain, N/V/, tolerating diet 


s/p exploratory laparotomy, extensive BREANNA, repair of recurrent ventral hernia 

with biologic mesh, small bowel resection with primary anastomosis POD#22,


a febrile 


Hgb - stable -8.4-8.3-8.2-9.1








Objective





- Vital Signs/Intake and Output


Vital Signs (last 24 hours): 


 











Temp Pulse Resp BP Pulse Ox


 


 97.9 F   69   18   126/68   100 


 


 11/01/17 07:20  11/01/17 07:20  11/01/17 07:20  11/01/17 07:20  11/01/17 07:20








Intake and Output: 


 











 11/01/17 11/01/17





 06:59 18:59


 


Intake Total 408 


 


Output Total 600 


 


Balance -192 














- Medications


Medications: 


 Current Medications





Clonidine HCl (Catapres Tts1 0.1 Mg/24 Hr)  1 patch TD Q7D@1000 [facility]


   Last Admin: 11/01/17 10:08 Dose:  1 patch


Docusate Sodium (Colace)  100 mg PO BID [facility]


   Last Admin: 11/01/17 10:08 Dose:  100 mg


Famotidine (Pepcid)  20 mg PO BID [facility]


   Last Admin: 11/01/17 10:08 Dose:  20 mg


Vancomycin/Sodium Chloride (Vancomycin 1 Gm/Ns 200 Ml)  1 gm in 200 mls @ 166.6 

mls/hr IVPB Q12H ALLIE


   Stop: 11/05/17 03:01


   Last Admin: 11/01/17 03:41 Dose:  166.6 mls/hr


Insulin Aspart (Novolog)  0 unit SC ACHS [facility]IE


   PRN Reason: Protocol


   Last Admin: 11/01/17 12:28 Dose:  Not Given


Ondansetron HCl (Zofran Inj)  4 mg IVP Q4 PRN


   PRN Reason: nausea


   Last Admin: 10/17/17 14:41 Dose:  4 mg


Oxycodone/Acetaminophen (Percocet 5/325 Mg Tab)  1 tab PO Q4H PRN


   PRN Reason: breakthrough pain


   Stop: 11/03/17 08:29


   Last Admin: 11/01/17 05:33 Dose:  1 tab


Tramadol HCl (Ultram)  50 mg PO TID [facility]


   Last Admin: 11/01/17 10:07 Dose:  50 mg











- Labs


Labs: 


 





 11/01/17 07:33 





 11/01/17 07:33 





 











PT  15.5 SECONDS (9.7-12.2)  H  10/09/17  08:47    


 


INR  1.4   10/09/17  08:47    


 


APTT  31 SECONDS (21-34)   10/09/17  08:47    














- Constitutional


Appears: Well, Non-toxic, No Acute Distress





- Respiratory Exam


Respiratory Exam: Clear to Ausculation Bilateral, NORMAL BREATHING PATTERN





- GI/Abdominal Exam


GI & Abdominal Exam: Soft, Normal Bowel Sounds (surgical site well healed ( 

staples and drain out) )





- Neurological Exam


Neurological Exam: Alert, Awake, Oriented x3





Assessment and Plan





- Assessment and Plan (Free Text)


Assessment: 





A/P


7yo Male admitted  with recurrent ventral hernia 


s/p exploratory laparotomy, extensive BREANNA, repair of recurrent ventral hernia 

with biologic mesh, small bowel resection with primary anastomosis POD#25,


s/p IR US guided abdominal abscess drainage with placement of an 8Fr drainage 

catheter  for abcess/ hematoma 


hgb stable 


abdominal fluid -+ for enterococus and started on vanco and merum ,  and needs 

4 weeks as per Dr. SIMONE onofre -ID 


Patient refused the idea of GABRIEL for IV antibiotics  and opted  to go home and 

do IV home infusion ( patients states he will be able to do it 4 weeks)


D/w Dr. Barrera, recommends to continue vanco and Invanz for 4 weeks  and weekly 

vanco trough level between 10-20 and weekly cbc, bmp 


D/w Dr. Hahn cleared for discharge home from surgery standpoint and 

continue with ID recommendations for antibiotics


D/W Dr. Mckeon, stable for discharge home today and f/u with Dr. Mckeon 

office in 1 week 


Discharge  plan discussed with patient , who understands and agrees with plan 


CM arranged home infusion for 4 weeks and lab work 


RX AND INFUSION INSTRUCTIONS GIVEN TO HOME INFUSION COMPANY  AND F/U  LAB 

RESULT WITH DR. MCKEON AND DR. SIMONE ONOFRE  AND FOR  FURTHER ORDERS

## 2018-07-02 ENCOUNTER — HOSPITAL ENCOUNTER (EMERGENCY)
Dept: HOSPITAL 31 - C.ER | Age: 68
Discharge: HOME | End: 2018-07-02
Payer: COMMERCIAL

## 2018-07-02 VITALS
TEMPERATURE: 98.6 F | DIASTOLIC BLOOD PRESSURE: 97 MMHG | HEART RATE: 84 BPM | RESPIRATION RATE: 20 BRPM | SYSTOLIC BLOOD PRESSURE: 154 MMHG

## 2018-07-02 VITALS — BODY MASS INDEX: 34.9 KG/M2

## 2018-07-02 DIAGNOSIS — R10.31: ICD-10-CM

## 2018-07-02 DIAGNOSIS — R18.8: Primary | ICD-10-CM

## 2018-07-02 DIAGNOSIS — I10: ICD-10-CM

## 2018-07-02 DIAGNOSIS — Z72.0: ICD-10-CM

## 2018-07-02 DIAGNOSIS — E11.9: ICD-10-CM

## 2018-07-02 DIAGNOSIS — E78.5: ICD-10-CM

## 2018-07-02 LAB
ALBUMIN SERPL-MCNC: 4 G/DL (ref 3.5–5)
ALBUMIN/GLOB SERPL: 1 {RATIO} (ref 1–2.1)
ALT SERPL-CCNC: 23 U/L (ref 21–72)
AST SERPL-CCNC: 34 U/L (ref 17–59)
BACTERIA #/AREA URNS HPF: (no result) /[HPF]
BASOPHILS # BLD AUTO: 0 K/UL (ref 0–0.2)
BASOPHILS NFR BLD: 0.5 % (ref 0–2)
BILIRUB UR-MCNC: NEGATIVE MG/DL
BUN SERPL-MCNC: 7 MG/DL (ref 9–20)
CALCIUM SERPL-MCNC: 9.7 MG/DL (ref 8.6–10.4)
EOSINOPHIL # BLD AUTO: 0 K/UL (ref 0–0.7)
EOSINOPHIL NFR BLD: 0.5 % (ref 0–4)
ERYTHROCYTE [DISTWIDTH] IN BLOOD BY AUTOMATED COUNT: 16.3 % (ref 11.5–14.5)
GFR NON-AFRICAN AMERICAN: > 60
GLUCOSE UR STRIP-MCNC: NORMAL MG/DL
HGB BLD-MCNC: 11.5 G/DL (ref 12–18)
LEUKOCYTE ESTERASE UR-ACNC: (no result) LEU/UL
LIPASE: 23 U/L (ref 23–300)
LYMPHOCYTES # BLD AUTO: 1.9 K/UL (ref 1–4.3)
LYMPHOCYTES NFR BLD AUTO: 27.2 % (ref 20–40)
MCH RBC QN AUTO: 36.5 PG (ref 27–31)
MCHC RBC AUTO-ENTMCNC: 35.6 G/DL (ref 33–37)
MCV RBC AUTO: 102.7 FL (ref 80–94)
MONOCYTES # BLD: 0.9 K/UL (ref 0–0.8)
MONOCYTES NFR BLD: 12.6 % (ref 0–10)
NEUTROPHILS # BLD: 4.2 K/UL (ref 1.8–7)
NEUTROPHILS NFR BLD AUTO: 59.2 % (ref 50–75)
NRBC BLD AUTO-RTO: 0 % (ref 0–2)
PH UR STRIP: 7 [PH] (ref 5–8)
PLATELET # BLD: 114 K/UL (ref 130–400)
PMV BLD AUTO: 8.7 FL (ref 7.2–11.7)
PROT UR STRIP-MCNC: (no result) MG/DL
RBC # BLD AUTO: 3.16 MIL/UL (ref 4.4–5.9)
RBC # UR STRIP: NEGATIVE /UL
SP GR UR STRIP: > 1.06 (ref 1–1.03)
SQUAMOUS EPITHIAL: 1 /HPF (ref 0–5)
UROBILINOGEN UR-MCNC: NORMAL MG/DL (ref 0.2–1)
WBC # BLD AUTO: 7.1 K/UL (ref 4.8–10.8)

## 2018-07-02 PROCEDURE — 74177 CT ABD & PELVIS W/CONTRAST: CPT

## 2018-07-02 PROCEDURE — 80053 COMPREHEN METABOLIC PANEL: CPT

## 2018-07-02 PROCEDURE — 96361 HYDRATE IV INFUSION ADD-ON: CPT

## 2018-07-02 PROCEDURE — 85025 COMPLETE CBC W/AUTO DIFF WBC: CPT

## 2018-07-02 PROCEDURE — 96374 THER/PROPH/DIAG INJ IV PUSH: CPT

## 2018-07-02 PROCEDURE — 81001 URINALYSIS AUTO W/SCOPE: CPT

## 2018-07-02 PROCEDURE — 96375 TX/PRO/DX INJ NEW DRUG ADDON: CPT

## 2018-07-02 PROCEDURE — 99285 EMERGENCY DEPT VISIT HI MDM: CPT

## 2018-07-02 PROCEDURE — 83690 ASSAY OF LIPASE: CPT

## 2018-07-02 NOTE — C.PDOC
History Of Present Illness


Patient presents to ED with c/o right sided low abdominal pain with occasional 

radiation to left side associated with nausea for 4 days. Patient came to ED 

today secondary to pain more persistent and denies sob, gi bleed, dysuria, 

constipation, vomiting or any other complaints at this time. 


Time Seen by Provider: 07/02/18 12:44


Chief Complaint (Nursing): Abdominal Pain


History Per: Patient


History/Exam Limitations: no limitations


Onset/Duration Of Symptoms: Days


Current Symptoms Are (Timing): Still Present





Past Medical History


Reviewed: Historical Data, Nursing Documentation, Vital Signs


Vital Signs: 


 Last Vital Signs











Temp  98.6 F   07/02/18 17:47


 


Pulse  84   07/02/18 17:47


 


Resp  20   07/02/18 17:47


 


BP  154/97 H  07/02/18 17:47


 


Pulse Ox  98   07/02/18 17:47














- Medical History


PMH: Anemia, Diabetes, Gastritis, HTN, Hyperlipidemia


Surgical History: No Surg Hx





- [facility] Procedures








DRAINAGE OF ABDOMINAL WALL WITH DRAIN DEV, PERC APPROACH (09/30/17)


ESOPHAGOGASTRODUODENOSCOPY [EGD] W/CLOSED BIOPSY (04/25/15)


EXCISION OF SMALL INTESTINE, OPEN APPROACH (09/30/17)


INSERT GASTRIC TUBE NEC (04/25/15)


INSERTION OF INFUSION DEV INTO SUP VENA CAVA, PERC APPROACH (09/30/17)


RELEASE SMALL INTESTINE, OPEN APPROACH (09/30/17)


SUPPLEMENT ABDOMINAL WALL WITH SYNTH SUB, OPEN APPROACH (09/30/17)


TRANSFUSE NONAUT PLATELETS IN PERIPH VEIN, PERC (09/30/17)


TRANSFUSE NONAUT RED BLOOD CELLS IN PERIPH VEIN, PERC (09/30/17)








Family History: States: No Known Family Hx





- Social History


Hx Tobacco Use: Yes


Hx Alcohol Use: Yes


Hx Substance Use: No





- Immunization History


Hx Tetanus Toxoid Vaccination: No


Hx Influenza Vaccination: Yes (2015)


Hx Pneumococcal Vaccination: No





Review Of Systems


Constitutional: Negative for: Fever, Chills


Respiratory: Negative for: Shortness of Breath


Gastrointestinal: Positive for: Nausea, Abdominal Pain.  Negative for: Vomiting

, Diarrhea, Constipation


Genitourinary: Negative for: Dysuria, Hematuria


Musculoskeletal: Negative for: Back Pain


Skin: Negative for: Rash





Physical Exam





- Physical Exam


Appears: Non-toxic, No Acute Distress


Skin: Warm, Dry, No Rash


Head: Atraumatic, Normacephalic


Eye(s): bilateral: Normal Inspection, PERRL, EOMI


Oral Mucosa: Moist


Throat: No Erythema, No Exudate


Neck: Normal ROM, Supple


Chest: Symmetrical, No Tenderness


Cardiovascular: Rhythm Regular, No Friction Rub, No Murmur


Respiratory: Normal Breath Sounds, No Rales, No Rhonchi, No Wheezing


Gastrointestinal/Abdominal: Bowel Sounds, Soft, Tenderness (mild to low abdomen 

R>L), No Guarding, No Rebound


Back: No CVA Tenderness, No Paraspinal Tenderness


Extremity: Normal ROM, No Swelling


Neurological/Psych: Oriented x3, Normal Speech, Normal Cognition, Normal Motor


Gait: Steady





ED Course And Treatment





- Laboratory Results


Result Diagrams: 


 07/02/18 13:20





 07/02/18 13:20


O2 Sat by Pulse Oximetry: 99 (RA)


Pulse Ox Interpretation: Normal





- CT Scan/US


  ** CT- Abd & Pelv.


Other Rad Studies (CT/US): Read By Radiologist, Radiology Report Reviewed


CT/US Interpretation: PROCEDURE:  CT Abdomen and Pelvis with contrast.  HISTORY

:  abd pain.  COMPARISON:  Chest CT with contrast 10/20/2017.  TECHNIQUE:  

Following oral and intravenous contrast administration, a CT examination of the 

abdomen and pelvis performed from the domes of the diaphragms to the symphysis 

pubis with reformatted datasets provided not only axial but also sagittal and 

coronal series.  Contrast dose: Visipaque 320, 100 cc.  Radiation dose:  Total 

exam DLP = 1127.69 mGy-cm.  This CT exam was performed using one or more of the 

following dose reduction techniques: Automated exposure control, adjustment of 

the mA and/or kV according to patient size, and/or use of iterative 

reconstruction technique.  FINDINGS:  LOWER THORAX:  Small hiatal hernia is 

reiterated.  There is a mild left pleural effusion identified from an 

indeterminate etiology. Compression atelectasis in the left lower lobe.  No 

definite pneumonia bilaterally.  LIVER:  Cirrhotic liver pattern is reiterated, 

however, there is an interval lucency identified in the inferior portion right 

lobe liver measuring 4.4 x 2.8 cm suspicious for interval mass. Enhancement 

immediately cephalad to this area is inhomogeneous as well potentially 

representing infiltration of the mass cephalad. .  GALLBLADDER AND BILE DUCTS:  

Stable appearance of the gallbladder is appreciated without prominent 

distention or radiodense cholelithiasis.  PANCREAS:  Unremarkable. No gross 

lesion or ductal dilatation.  SPLEEN:  Unremarkable.  ADRENALS:  Unremarkable. 

No mass.  KIDNEYS AND URETERS:  Unremarkable. No hydronephrosis. No solid mass.

  VASCULATURE:  Unremarkable. No aortic aneurysm.  BOWEL:  Stomach is mildly 

distended with retained oral contrast material.  Evaluation of the bowel 

reveals no obstructive bowel gas pattern throughout the abdomen or pelvis. 

Sigmoid diverticular changes are reiterated without definitive acute 

diverticulitis pattern although mild ascites is scattered throughout the 

abdomen and pelvis, of an indeterminate etiology. Laxity or diastasis of the 

midline aponeurosis of the rectus abdominus musculature is appreciated with 

prior postoperative fluid collection related to herniorrhaphy resolved. Limited 

proximal small bowel enteritis is not excluded there is limited mural 

thickening of left daily abdominal small bowel loops is questioned, see images 98

-111 series 3. No definite colitis pattern appreciable. Limited ascites is 

trapped in a small left inguinal hernia containing only fat and fluid. No right 

inguinal hernia. No free intra peritoneal gas.  APPENDIX:  Normal appendix.  

PERITONEUM:  Collection.  See bowel section above.  LYMPH NODES:  Unremarkable. 

No enlarged lymph nodes.  BLADDER:  Bladder is collapsed however submucosal 

fatty changes may reflect chronic cystitis from infectious or inflammatory 

cause.  Clinically correlate further. Process.  Clinically.  REPRODUCTIVE:  

Unremarkable.  BONES:  No acute fracture.  OTHER FINDINGS:  None.  IMPRESSION:  

1. Mild abdominal ascites appreciated with no free air or definitive etiology. 

No abscess identified. Limited segmental enteritis is questioned at the left 

daily abdomen. Extensive sigmoid diverticulosis changes are stable.  2. Prior 

postop fluid collection in the anterior abdominal wall appears to have resolved 

status post prior hernia repair and apparent small bowel resection. 

Questionable dehiscence of rectus abdominus musculature anteriorly currently. 

No overt ventral abdominal wall herniation has occurred at this time.  3. 

Findings suspicious for interval mass right lobe liver inferiorly. Cirrhotic 

liver reiterated.  Follow-up MRI of the liver is advised without contrast.  4. 

Additional lesser findings as discussed above.





Medical Decision Making


Medical Decision Making: 


On re-exam, the patient reports improvement of symptoms. Lungs are CTA, heart 

is RRR, abdomen is soft, non-tender and the patient is tolerating PO well. 

Follow up with the medical doctor within 1-2 days. Return if worsened.





Disposition





- Disposition


Referrals: 


Harrison Mckeon MD [Staff Provider] - 


Disposition: HOME/ ROUTINE


Disposition Time: 17:00


Condition: STABLE


Additional Instructions: 


 Follow up with the medical doctor/clinic within 1-2 days without fail. Return 

if worsened.


Prescriptions: 


Famotidine [Pepcid] 20 mg PO BID #20 tab


traMADol [Ultram] 50 mg PO Q6 PRN #15 tab


 PRN Reason: Pain


Instructions:  Fluid in the Belly (Ascites) (DC)


Forms:  [facility] (English)





- Clinical Impression


Clinical Impression: 


 Abdominal discomfort, Ascites








- PA / NP / Resident Statement


MD/DO has reviewed & agrees with the documentation as recorded.





- Scribe Statement


The provider has reviewed the documentation as recorded by the Kimberlyibbarry Deluna





All medical record entries made by the Willis were at my direction and 

personally dictated by me. I have reviewed the chart and agree that the record 

accurately reflects my personal performance of the history, physical exam, 

medical decision making, and the department course for this patient. I have 

also personally directed, reviewed, and agree with the discharge instructions 

and disposition.

## 2018-07-02 NOTE — CT
PROCEDURE:  CT Abdomen and Pelvis with contrast



HISTORY:

abd pain



COMPARISON:

Chest CT with contrast 10/20/2017



TECHNIQUE:

Following oral and intravenous contrast administration, a CT 

examination of the abdomen and pelvis performed from the domes of the 

diaphragms to the symphysis pubis with reformatted datasets provided 

not only axial but also sagittal and coronal series.



Contrast dose: Visipaque 320, 100 cc



Radiation dose:



Total exam DLP = 1127.69 mGy-cm.



This CT exam was performed using one or more of the following dose 

reduction techniques: Automated exposure control, adjustment of the 

mA and/or kV according to patient size, and/or use of iterative 

reconstruction technique.



FINDINGS:



LOWER THORAX:

Small hiatal hernia is reiterated.  There is a mild left pleural 

effusion identified from an indeterminate etiology. Compression 

atelectasis in the left lower lobe.  No definite pneumonia 

bilaterally. 



LIVER:

Cirrhotic liver pattern is reiterated, however, there is an interval 

lucency identified in the inferior portion right lobe liver measuring 

4.4 x 2.8 cm suspicious for interval mass. Enhancement immediately 

cephalad to this area is inhomogeneous as well potentially 

representing infiltration of the mass cephalad. . 



GALLBLADDER AND BILE DUCTS:

Stable appearance of the gallbladder is appreciated without prominent 

distention or radiodense cholelithiasis. 



PANCREAS:

Unremarkable. No gross lesion or ductal dilatation.



SPLEEN:

Unremarkable. 



ADRENALS:

Unremarkable. No mass. 



KIDNEYS AND URETERS:

Unremarkable. No hydronephrosis. No solid mass. 



VASCULATURE:

Unremarkable. No aortic aneurysm. 



BOWEL:

Stomach is mildly distended with retained oral contrast material.  

Evaluation of the bowel reveals no obstructive bowel gas pattern 

throughout the abdomen or pelvis. Sigmoid diverticular changes are 

reiterated without definitive acute diverticulitis pattern although 

mild ascites is scattered throughout the abdomen and pelvis, of an 

indeterminate etiology. Laxity or diastasis of the midline 

aponeurosis of the rectus abdominus musculature is appreciated with 

prior postoperative fluid collection related to herniorrhaphy 

resolved. Limited proximal small bowel enteritis is not excluded 

there is limited mural thickening of left daily abdominal small bowel 

loops is questioned, see images  series 3. No definite colitis 

pattern appreciable. Limited ascites is trapped in a small left 

inguinal hernia containing only fat and fluid. No right inguinal 

hernia. No free intra peritoneal gas 



APPENDIX:

Normal appendix. 



PERITONEUM:

Collection. 



See bowel section above. 



LYMPH NODES:

Unremarkable. No enlarged lymph nodes. 



BLADDER:

Bladder is collapsed however submucosal fatty changes may reflect 

chronic cystitis from infectious or inflammatory cause.  Clinically 

correlate further. Process.  Clinically 



REPRODUCTIVE:

Unremarkable. 



BONES:

No acute fracture. 



OTHER FINDINGS:

None.



IMPRESSION:

1. Mild abdominal ascites appreciated with no free air or definitive 

etiology. No abscess identified. Limited segmental enteritis is 

questioned at the left daily abdomen. Extensive sigmoid diverticulosis 

changes are stable. 



2. Prior postop fluid collection in the anterior abdominal wall 

appears to have resolved status post prior hernia repair and apparent 

small bowel resection. Questionable dehiscence of rectus abdominus 

musculature anteriorly currently. No overt ventral abdominal wall 

herniation has occurred at this time. 



3. Findings suspicious for interval mass right lobe liver inferiorly. 

Cirrhotic liver reiterated.  Follow-up MRI of the liver is advised 

without contrast. 



4. Additional lesser findings as discussed above.

## 2018-07-03 VITALS — OXYGEN SATURATION: 99 %

## 2018-08-01 ENCOUNTER — HOSPITAL ENCOUNTER (OUTPATIENT)
Dept: HOSPITAL 31 - C.SPRAD | Age: 68
Discharge: HOME | End: 2018-08-01
Attending: RADIOLOGY
Payer: COMMERCIAL

## 2018-08-01 VITALS — BODY MASS INDEX: 34.9 KG/M2

## 2018-08-01 DIAGNOSIS — R16.0: ICD-10-CM

## 2018-08-01 DIAGNOSIS — C22.9: Primary | ICD-10-CM

## 2018-08-01 PROCEDURE — 88342 IMHCHEM/IMCYTCHM 1ST ANTB: CPT

## 2018-08-01 PROCEDURE — 88307 TISSUE EXAM BY PATHOLOGIST: CPT

## 2018-08-01 PROCEDURE — 10022: CPT

## 2018-08-01 PROCEDURE — 88313 SPECIAL STAINS GROUP 2: CPT

## 2018-08-01 NOTE — PCM.SURG1
Surgeon's Initial Post Op Note





- Surgeon's Notes


Surgeon: Theodore Mullins MD


Assistant: NONE


Type of Anesthesia: IV Sedation


Pre-Operative Diagnosis: Liver mass


Operative Findings: Heterogenous hypoechoic mass right lower liver.


Post-Operative Diagnosis: Liver mass


Operation Performed: US guided core biopsy. Three 18 g  core specimen obtained. 

Biopsy tract embolized with gelfoam.


Specimen/Specimens Removed: 18 g core x 4


Estimated Blood Loss: EBL {In ML}: 2


Blood Products Given: N/A


Post-Op Condition: Good


Date of Surgery/Procedure: 08/01/18


Time of Surgery/Procedure: 11:15

## 2018-08-01 NOTE — US
PROCEDURE:  Date of procedure: 08/01/2018



Procedure: 



Ultrasound-guided percutaneous core biopsy of liver mass, CPT 66750







Ultrasound guidance for biopsy, CPT 01679



Medications: The patient was sedated by the anesthesiologist with IV 

sedation and monitoring. 8 cubic centimeters 1 percent lidocaine 







HISTORY:

 Liver Mass



TECHNIQUE:

 Following informed consent and procedure time-out, the patient was 

placed supine on bed and limited ultrasound showed liver mass within 

the right hepatic lobe. After the patient abdomen was prepped and 

draped in the usual sterile fashion and the skin anesthetized with 

lidocaine, an 18 gauge core needle was advanced percutaneously under 

direct ultrasound guidance into the mass.  



Upon confirmation of needle position, three core specimens were 

obtained and sent for routine pathology. The biopsy track was then 

embolized with Gelfoam. A post biopsy ultrasound performed showed no 

hematoma. A dressing was applied. 



IMPRESSION:

 Ultrasound-guided core biopsy of liver mass.

## 2018-08-01 NOTE — CP.SDSHP
Same Day Surgery H & P





- History


Proposed Procedure: US guided liver mass biopsy


Pre-Op Diagnosis: liver mass biopsy





- Allergies


Allergies: 


Allergies





aspirin Allergy (Unknown, Verified 07/30/18 10:47)


 " MY MOTHER TOLD ME I WAS ALLERGIC AND NOT TO TAKE"











- Impression


Impression: Pt with ill defined right hepatic mass seen on recent CT. Plan US 

guided liver mass biopsy.


Pt. Evaluated Today:Candidate for Anesthesia & Procedure: Yes (ASA 3  Malampati 

3)





- Date & Time


Date: 08/01/18


Time: 10:45





Short Stay Discharge





- Short Stay Discharge


Admitting Diagnosis/Reason for Visit: MASS


Disposition: HOME/ ROUTINE

## 2018-08-24 NOTE — CP.PCM.PN
Please forward to Natalie Wong, did his well visit earlier this month.   Subjective





- Date & Time of Evaluation


Date of Evaluation: 10/28/17


Time of Evaluation: 22:57





- Subjective


Subjective: 





CHIEF COMPLAINTS  TODAY :


afebrile,


FEELING BETTER


AMBULATING





IR Drain right lower abdomen +VE DRAINAGE





ROS.


HEENT :  N.


Resp :       No  SOB wheezing, cough 


Cardio :     No CP, PND orthopnea 


GI :           No abd. Pain, n/v +VE RIGHT ANTERIOR ABDOMEN DRAIN,


MIDLINE INCISION WITH STAPLES IN PLACE.  WOUND C/D/I


CNS : No headache , focal deficit. 


Musculoskel :  N


Ext. : Pedal pulses intact, no edema or calf pain 


Derm :        N


Psych :     N. 











PE.


Pt. is alert awake in no distress.


V.S  As noted in the chart 


Head ,ear nose,throat and eyes : Normal.


Neck : Supple with normal carotids.


Lungs: Clear air entry.


Heart : S1 & S2 normal . . No murmur. S4 + 


Abd  Soft (MIDLINE INCISION WITH STAPLES IN PLACE.  ALSO DRAINAGE WITH 8 [place] 

CATHETER NOTED IN [place] AND THE COLLECTING BAG.)


Neuro : Moves all ext. with no localized deficit.


Ext : No edema with intact pulses. Neg. calf tenderness 


Derm : No rashes or decubitus ulcer.





Radiology/Labs .


ABDOMINAL FLUID CULTURE-  ENTEROCOCCUS FAECIUM


 s-  vancomycin RIKCI <0.5, linizolid RICKI 2


BLOOD CULTURES 10/25/17  -24 HOURS.


vANCO TROUGH 10.7 10/27/17 OKAY.


wbc 8.9 HEMOGLOBIN 9.1.


pLATELETS INCREASED .


rENAL FUNCTIONS STABLE








Asssessment : 


66 yr  old M w. incisonal hernia, s/p open repair w. biologic mesh, small bowel 

resection ON 10/9/17- POD#17, w. development of seroma/abscess 


S/P IR ,ULTRASOUND GUIDED DRAINAGE OF ABDOMINAL WALL COLLECTION AT SITE OF 

SURGICAL STAPLES AND PLACEMENT OFF 8 [place] DRAINAGE CATHETER 10/23/17.


 DRAINAGE FLUID +VE Enterococcus faecium.





Plan :


SURGERY FOLLOW-UP NOTED.  





CONTINUE  IV  ANTIBIOTICS  FOR  4 WEEKS PRESENTLY.


CONTINUE IV VANCOMYCIN 1 G EVERY 12 HOURLY  x 4WEEKS.


ADD IV INVANZ 1GM IVPB  Q 24HRLY FOR GM-VE. X 4WEEKS   (""PHARMACY DOES NOT 

CARRY INVANZ-WILL GIVE mERREM 1 G EVERY 8 HOURLY WHILE PATIENT IN THE HOSPITAL. 

)





F/U VANCO TROUGH LEVELS  WEEKLY AND KEEP BETWEEN 10 AND 20..


MONITOR RENAL FUNCTIONS CLOSELY.





MONITOR THROMBOCYTOPENIA AS PATIENT HAS HEP C.





F/U DRAIN CARE/AND DRAINAGE AS PER SURGERY.








Objective





- Vital Signs/Intake and Output


Vital Signs (last 24 hours): 


 











Temp Pulse Resp BP Pulse Ox


 


 98.2 F   81   20   135/84   99 


 


 10/28/17 15:31  10/28/17 15:31  10/28/17 15:31  10/28/17 15:31  10/28/17 15:31











- Medications


Medications: 


 Current Medications





Clonidine HCl (Catapres Tts1 0.1 Mg/24 Hr)  1 patch TD Q7D@1000 [facility]


   Last Admin: 10/25/17 10:52 Dose:  1 patch


Famotidine (Pepcid)  20 mg PO BID [facility]


   Last Admin: 10/28/17 18:47 Dose:  20 mg


Heparin Sodium (Porcine) (Heparin)  5,000 units SC Q8 [facility]


   Last Admin: 10/28/17 21:20 Dose:  5,000 units


Vancomycin/Sodium Chloride (Vancomycin 1 Gm/Ns 200 Ml)  1 gm in 200 mls @ 133 

mls/hr IVPB Q12H [facility]


   Stop: 10/30/17 16:01


   Last Admin: 10/28/17 18:47 Dose:  133 mls/hr


Meropenem 1 gm/ Sodium (Chloride)  100 mls @ 100 mls/hr IVPB Q8 [facility]


   Last Admin: 10/28/17 21:20 Dose:  100 mls/hr


Insulin Aspart (Novolog)  0 unit SC ACHS [facility]


   PRN Reason: Protocol


   Last Admin: 10/28/17 21:21 Dose:  Not Given


Ondansetron HCl (Zofran Inj)  4 mg IVP Q4 PRN


   PRN Reason: nausea


   Last Admin: 10/17/17 14:41 Dose:  4 mg


Oxycodone/Acetaminophen (Percocet 5/325 Mg Tab)  1 tab PO Q4H PRN


   PRN Reason: breakthrough pain


   Stop: 10/30/17 06:18


   Last Admin: 10/28/17 04:25 Dose:  1 tab


Tramadol HCl (Ultram)  50 mg PO TID [facility]


   Last Admin: 10/28/17 18:47 Dose:  50 mg











- Labs


Labs: 


 





 10/28/17 07:19 





 10/28/17 07:19 





 











PT  15.5 SECONDS (9.7-12.2)  H  10/09/17  08:47    


 


INR  1.4   10/09/17  08:47    


 


APTT  31 SECONDS (21-34)   10/09/17  08:47    














Assessment and Plan


(1) S/P repair of recurrent ventral hernia


Status: Acute   





(2) Status post exploratory laparotomy


Status: Acute   





(3) Small bowel obstruction


Status: Acute   





(4) Diabetes mellitus


Status: Chronic   





(5) Hepatitis C infection


Status: Chronic   





(6) Hypertension


Status: Chronic

## 2018-09-13 ENCOUNTER — HOSPITAL ENCOUNTER (INPATIENT)
Dept: HOSPITAL 31 - C.ER | Age: 68
LOS: 3 days | DRG: 871 | End: 2018-09-16
Attending: INTERNAL MEDICINE | Admitting: INTERNAL MEDICINE
Payer: COMMERCIAL

## 2018-09-13 VITALS — BODY MASS INDEX: 34.9 KG/M2

## 2018-09-13 DIAGNOSIS — Z66: ICD-10-CM

## 2018-09-13 DIAGNOSIS — E86.0: ICD-10-CM

## 2018-09-13 DIAGNOSIS — R65.21: ICD-10-CM

## 2018-09-13 DIAGNOSIS — R18.8: ICD-10-CM

## 2018-09-13 DIAGNOSIS — Z79.4: ICD-10-CM

## 2018-09-13 DIAGNOSIS — E03.9: ICD-10-CM

## 2018-09-13 DIAGNOSIS — C79.51: ICD-10-CM

## 2018-09-13 DIAGNOSIS — C22.0: ICD-10-CM

## 2018-09-13 DIAGNOSIS — E78.5: ICD-10-CM

## 2018-09-13 DIAGNOSIS — A41.9: Primary | ICD-10-CM

## 2018-09-13 DIAGNOSIS — N40.0: ICD-10-CM

## 2018-09-13 DIAGNOSIS — E11.22: ICD-10-CM

## 2018-09-13 DIAGNOSIS — E11.65: ICD-10-CM

## 2018-09-13 DIAGNOSIS — N18.9: ICD-10-CM

## 2018-09-13 DIAGNOSIS — G93.41: ICD-10-CM

## 2018-09-13 DIAGNOSIS — I12.9: ICD-10-CM

## 2018-09-13 DIAGNOSIS — K74.60: ICD-10-CM

## 2018-09-13 DIAGNOSIS — B19.20: ICD-10-CM

## 2018-09-13 LAB
ALBUMIN SERPL-MCNC: 3.3 G/DL (ref 3.5–5)
ALBUMIN/GLOB SERPL: 0.8 {RATIO} (ref 1–2.1)
ALT SERPL-CCNC: 54 U/L (ref 21–72)
ANISOCYTOSIS BLD QL SMEAR: SLIGHT
AST SERPL-CCNC: 162 U/L (ref 17–59)
BACTERIA #/AREA URNS HPF: (no result) /[HPF]
BASE EXCESS BLDV CALC-SCNC: -6 MMOL/L (ref 0–2)
BASE EXCESS BLDV CALC-SCNC: -7 MMOL/L (ref 0–2)
BASE EXCESS BLDV CALC-SCNC: -7.9 MMOL/L (ref 0–2)
BASOPHILS # BLD AUTO: 0.1 K/UL (ref 0–0.2)
BASOPHILS NFR BLD: 0.4 % (ref 0–2)
BILIRUB UR-MCNC: NEGATIVE MG/DL
BUN SERPL-MCNC: 22 MG/DL (ref 9–20)
BURR CELLS BLD QL SMEAR: SLIGHT
CALCIUM SERPL-MCNC: 11.6 MG/DL (ref 8.6–10.4)
CK MB SERPL-MCNC: 0.86 NG/ML (ref 0–3.38)
EOSINOPHIL # BLD AUTO: 0.1 K/UL (ref 0–0.7)
EOSINOPHIL NFR BLD: 0.3 % (ref 0–4)
ERYTHROCYTE [DISTWIDTH] IN BLOOD BY AUTOMATED COUNT: 15.9 % (ref 11.5–14.5)
GFR NON-AFRICAN AMERICAN: > 60
GLUCOSE UR STRIP-MCNC: NORMAL MG/DL
HGB BLD-MCNC: 9.5 G/DL (ref 12–18)
HYALINE CASTS #/AREA URNS LPF: >20 /LPF (ref 0–2)
LEUKOCYTE ESTERASE UR-ACNC: (no result) LEU/UL
LYMPHOCYTE: 5 % (ref 20–40)
LYMPHOCYTES # BLD AUTO: 0.9 K/UL (ref 1–4.3)
LYMPHOCYTES NFR BLD AUTO: 3.9 % (ref 20–40)
MACROCYTES BLD QL SMEAR: SLIGHT
MCH RBC QN AUTO: 34.9 PG (ref 27–31)
MCHC RBC AUTO-ENTMCNC: 34.3 G/DL (ref 33–37)
MCV RBC AUTO: 101.9 FL (ref 80–94)
MONOCYTE: 8 % (ref 0–10)
MONOCYTES # BLD: 1.5 K/UL (ref 0–0.8)
MONOCYTES NFR BLD: 6.5 % (ref 0–10)
NEUTROPHILS # BLD: 20.7 K/UL (ref 1.8–7)
NEUTROPHILS NFR BLD AUTO: 87 % (ref 50–75)
NEUTROPHILS NFR BLD AUTO: 88.9 % (ref 50–75)
NRBC BLD AUTO-RTO: 0 % (ref 0–2)
OVALOCYTES BLD QL SMEAR: SLIGHT
PCO2 BLDV: 37 MMHG (ref 40–60)
PCO2 BLDV: 38 MMHG (ref 40–60)
PCO2 BLDV: 39 MMHG (ref 40–60)
PH BLDV: 7.28 [PH] (ref 7.32–7.43)
PH BLDV: 7.31 [PH] (ref 7.32–7.43)
PH BLDV: 7.32 [PH] (ref 7.32–7.43)
PH UR STRIP: 5 [PH] (ref 5–8)
PLATELET # BLD EST: (no result) 10*3/UL
PLATELET # BLD: 96 K/UL (ref 130–400)
PMV BLD AUTO: 9.2 FL (ref 7.2–11.7)
POIKILOCYTOSIS BLD QL SMEAR: SLIGHT
PROT UR STRIP-MCNC: NEGATIVE MG/DL
RBC # BLD AUTO: 2.73 MIL/UL (ref 4.4–5.9)
RBC # UR STRIP: NEGATIVE /UL
SP GR UR STRIP: 1.02 (ref 1–1.03)
SQUAMOUS EPITHIAL: 5 /HPF (ref 0–5)
TOTAL CELLS COUNTED BLD: 100
UROBILINOGEN UR-MCNC: 4 MG/DL (ref 0.2–1)
VENOUS BLOOD GAS PO2: 25 MM/HG (ref 30–55)
VENOUS BLOOD GAS PO2: 27 MM/HG (ref 30–55)
VENOUS BLOOD GAS PO2: 27 MM/HG (ref 30–55)
WBC # BLD AUTO: 23.3 K/UL (ref 4.8–10.8)

## 2018-09-13 RX ADMIN — HUMAN INSULIN SCH: 100 INJECTION, SOLUTION SUBCUTANEOUS at 21:53

## 2018-09-13 RX ADMIN — SODIUM CHLORIDE SCH MLS/HR: 9 INJECTION, SOLUTION INTRAVENOUS at 21:19

## 2018-09-13 RX ADMIN — ROSUVASTATIN CALCIUM SCH MG: 5 TABLET, FILM COATED ORAL at 21:18

## 2018-09-13 NOTE — CP.PCM.HP
Past Patient History





- Past Medical History & Family History


Past Medical History?: Yes





- Past Social History


Smoking Status: Former Smoker





- CARDIAC


Hx Cardiac Disorders: Yes


Hx Hypercholesterolemia: Yes


Hx Hypertension: Yes





- PULMONARY


Hx Respiratory Disorders: No





- NEUROLOGICAL


Hx Neurological Disorder: No





- HEENT


Hx HEENT Problems: No





- RENAL


Hx Chronic Kidney Disease: Yes


Hx Kidney Stones: Yes





- ENDOCRINE/METABOLIC


Hx Endocrine Disorders: Yes


Hx Hypothyroidism: Yes





- HEMATOLOGICAL/ONCOLOGICAL


Hx Blood Disorders: Yes


Hx Anemia: Yes





- INTEGUMENTARY


Hx Dermatological Problems: No





- MUSCULOSKELETAL/RHEUMATOLOGICAL


Hx Falls: No





- GASTROINTESTINAL


Hx Gastrointestinal Disorders: Yes


Hx Gastritis: Yes





- GENITOURINARY/GYNECOLOGICAL


Hx Genitourinary Disorders: Yes


Hx Prostate Problems: Yes (ENLARGED PROSTATE)


Hx Urinary Tract Infection: Yes





- PSYCHIATRIC


Hx Substance Use: No





- SURGICAL HISTORY


Hx Surgeries: Yes (SEE COMMENT)


Hx Herniorrhaphy: Yes (UMBILICAL HERNIA REPAIR 2012)


Other/Comment: HX" SURGERY FOR KIDNEY STONES"





- ANESTHESIA


Hx Anesthesia: Yes


Hx Anesthesia Reactions: No


Hx Malignant Hyperthermia: No





Meds


Allergies/Adverse Reactions: 


 Allergies











Allergy/AdvReac Type Severity Reaction Status Date / Time


 


aspirin Allergy Unknown " MY Verified 07/30/18 10:47





   MOTHER  





   TOLD ME I  





   WAS  





   ALLERGIC  





   AND NOT TO  





   TAKE"  














Results





- Vital Signs


Recent Vital Signs: 





 Last Vital Signs











Temp  97.9 F   09/13/18 19:43


 


Pulse  105 H  09/13/18 19:43


 


Resp  20   09/13/18 19:43


 


BP  134/81   09/13/18 19:43


 


Pulse Ox  97   09/13/18 19:43














- Labs


Result Diagrams: 


 09/13/18 13:25





 09/13/18 13:25


Labs: 





 Laboratory Results - last 24 hr











  09/13/18 09/13/18 09/13/18





  12:45 13:25 13:25


 


WBC   23.3 H D 


 


RBC   2.73 L 


 


Hgb   9.5 L D 


 


Hct   27.8 L 


 


MCV   101.9 H 


 


MCH   34.9 H 


 


MCHC   34.3 


 


RDW   15.9 H 


 


Plt Count   96 L 


 


MPV   9.2 


 


Neut % (Auto)   88.9 H 


 


Lymph % (Auto)   3.9 L 


 


Mono % (Auto)   6.5 


 


Eos % (Auto)   0.3 


 


Baso % (Auto)   0.4 


 


Neut # (Auto)   20.7 H 


 


Lymph # (Auto)   0.9 L 


 


Mono # (Auto)   1.5 H 


 


Eos # (Auto)   0.1 


 


Baso # (Auto)   0.1 


 


Neutrophils % (Manual)   87 H 


 


Lymphocytes % (Manual)   5 L 


 


Monocytes % (Manual)   8 


 


Platelet Estimate   Decreased L 


 


Poikilocytosis (manual   Slight 


 


Anisocytosis (manual)   Slight 


 


Macrocytosis (manual)   Slight 


 


Ovalocytes   Slight 


 


Geismar Cells   Slight 


 


pO2   


 


VBG pH   


 


VBG pCO2   


 


VBG HCO3   


 


VBG Total CO2   


 


VBG O2 Sat (Calc)   


 


VBG Base Excess   


 


VBG Potassium   


 


Glucose   


 


Lactate   


 


Crit Value Called To   


 


Crit Value Called By   


 


Crit Value Read Back   


 


Blood Gas Notified Time   


 


Sodium    139


 


Potassium    4.9


 


Chloride    106


 


Carbon Dioxide    19 L


 


Anion Gap    20


 


BUN    22 H


 


Creatinine    0.8


 


Est GFR ( Amer)    > 60


 


Est GFR (Non-Af Amer)    > 60


 


POC Glucose (mg/dL)  146 H  


 


Random Glucose    151 H


 


Calcium    11.6 H


 


Total Bilirubin    2.2 H


 


AST    162 H D


 


ALT    54


 


Alkaline Phosphatase    95


 


Ammonia   


 


Total Creatine Kinase    103


 


CK-MB (Mass)    0.86


 


Troponin I    < 0.0120


 


Total Protein    7.3


 


Albumin    3.3 L


 


Globulin    4.1 H


 


Albumin/Globulin Ratio    0.8 L


 


Venous Blood Potassium   


 


Urine Color   


 


Urine Clarity   


 


Urine pH   


 


Ur Specific Gravity   


 


Urine Protein   


 


Urine Glucose (UA)   


 


Urine Ketones   


 


Urine Blood   


 


Urine Nitrate   


 


Urine Bilirubin   


 


Urine Urobilinogen   


 


Ur Leukocyte Esterase   


 


Urine WBC (Auto)   


 


Urine RBC (Auto)   


 


Ur Squamous Epith Cells   


 


Urine Bacteria   


 


Hyaline Casts   














  09/13/18 09/13/18 09/13/18





  13:25 15:11 15:28


 


WBC   


 


RBC   


 


Hgb   


 


Hct   


 


MCV   


 


MCH   


 


MCHC   


 


RDW   


 


Plt Count   


 


MPV   


 


Neut % (Auto)   


 


Lymph % (Auto)   


 


Mono % (Auto)   


 


Eos % (Auto)   


 


Baso % (Auto)   


 


Neut # (Auto)   


 


Lymph # (Auto)   


 


Mono # (Auto)   


 


Eos # (Auto)   


 


Baso # (Auto)   


 


Neutrophils % (Manual)   


 


Lymphocytes % (Manual)   


 


Monocytes % (Manual)   


 


Platelet Estimate   


 


Poikilocytosis (manual   


 


Anisocytosis (manual)   


 


Macrocytosis (manual)   


 


Ovalocytes   


 


Agapito Cells   


 


pO2    25 L


 


VBG pH    7.32


 


VBG pCO2    38 L


 


VBG HCO3    18.7


 


VBG Total CO2    20.8 L


 


VBG O2 Sat (Calc)    38.3 L


 


VBG Base Excess    -6.0 L


 


VBG Potassium    4.5


 


Glucose    126 H


 


Lactate    4.2 H*


 


Crit Value Called To    Er nurse steve pirro


 


Crit Value Called By    Godfrscar rt


 


Crit Value Read Back    Y


 


Blood Gas Notified Time    1532


 


Sodium    139.0


 


Potassium   


 


Chloride    109.0 H


 


Carbon Dioxide   


 


Anion Gap   


 


BUN   


 


Creatinine   


 


Est GFR ( Amer)   


 


Est GFR (Non-Af Amer)   


 


POC Glucose (mg/dL)   


 


Random Glucose   


 


Calcium   


 


Total Bilirubin   


 


AST   


 


ALT   


 


Alkaline Phosphatase   


 


Ammonia  51 H D  


 


Total Creatine Kinase   


 


CK-MB (Mass)   


 


Troponin I   


 


Total Protein   


 


Albumin   


 


Globulin   


 


Albumin/Globulin Ratio   


 


Venous Blood Potassium    4.5


 


Urine Color   [place] 


 


Urine Clarity   Hazy 


 


Urine pH   5.0 


 


Ur Specific Gravity   1.019 


 


Urine Protein   Negative 


 


Urine Glucose (UA)   Normal 


 


Urine Ketones   Trace 


 


Urine Blood   Negative 


 


Urine Nitrate   Negative 


 


Urine Bilirubin   Negative 


 


Urine Urobilinogen   4.0 


 


Ur Leukocyte Esterase   Trace 


 


Urine WBC (Auto)   12 H 


 


Urine RBC (Auto)   2 


 


Ur Squamous Epith Cells   5 


 


Urine Bacteria   Occ H 


 


Hyaline Casts   >20 H 














  09/13/18 09/13/18 09/13/18





  17:15 17:32 21:30


 


WBC   


 


RBC   


 


Hgb   


 


Hct   


 


MCV   


 


MCH   


 


MCHC   


 


RDW   


 


Plt Count   


 


MPV   


 


Neut % (Auto)   


 


Lymph % (Auto)   


 


Mono % (Auto)   


 


Eos % (Auto)   


 


Baso % (Auto)   


 


Neut # (Auto)   


 


Lymph # (Auto)   


 


Mono # (Auto)   


 


Eos # (Auto)   


 


Baso # (Auto)   


 


Neutrophils % (Manual)   


 


Lymphocytes % (Manual)   


 


Monocytes % (Manual)   


 


Platelet Estimate   


 


Poikilocytosis (manual   


 


Anisocytosis (manual)   


 


Macrocytosis (manual)   


 


Ovalocytes   


 


Agapito Cells   


 


pO2  27 L  27 L 


 


VBG pH  7.31 L  7.28 L 


 


VBG pCO2  37 L  39 L 


 


VBG HCO3  18.0  17.2 


 


VBG Total CO2  19.7 L  19.5 L 


 


VBG O2 Sat (Calc)  44.7  42.6 


 


VBG Base Excess  -7.0 L  -7.9 L 


 


VBG Potassium  4.6  4.0 


 


Glucose  112 H  107 


 


Lactate  5.2 H*  4.8 H* 


 


Crit Value Called To  Er nurse jenise manuel  Er nurse jenise 


 


Crit Value Called By  Rufus rt  Rufus rt 


 


Crit Value Read Back  Y  Y 


 


Blood Gas Notified Time  1722  1735 


 


Sodium  142.0  142.0 


 


Potassium   


 


Chloride  113.0 H  113.0 H 


 


Carbon Dioxide   


 


Anion Gap   


 


BUN   


 


Creatinine   


 


Est GFR ( Amer)   


 


Est GFR (Non-Af Amer)   


 


POC Glucose (mg/dL)    134 H


 


Random Glucose   


 


Calcium   


 


Total Bilirubin   


 


AST   


 


ALT   


 


Alkaline Phosphatase   


 


Ammonia   


 


Total Creatine Kinase   


 


CK-MB (Mass)   


 


Troponin I   


 


Total Protein   


 


Albumin   


 


Globulin   


 


Albumin/Globulin Ratio   


 


Venous Blood Potassium  4.6  4.0 


 


Urine Color   


 


Urine Clarity   


 


Urine pH   


 


Ur Specific Gravity   


 


Urine Protein   


 


Urine Glucose (UA)   


 


Urine Ketones   


 


Urine Blood   


 


Urine Nitrate   


 


Urine Bilirubin   


 


Urine Urobilinogen   


 


Ur Leukocyte Esterase   


 


Urine WBC (Auto)   


 


Urine RBC (Auto)   


 


Ur Squamous Epith Cells   


 


Urine Bacteria   


 


Hyaline Casts

## 2018-09-13 NOTE — C.PDOC
History Of Present Illness


66 y/o male with history of Liver CA with Metastasis brought to ED by EMS sent 

by Oncologist  for evaluation of decreased po intake for 1 week and 

weakness as per [id number]. Patient c/o worsening chronic abdominal pain and 

denies chest pain, sob, vomiting, diarrhea, dysuria or any other complaints at 

this time.  


Time Seen by Provider: 09/13/18 12:57


Chief Complaint (Nursing): Weakness/Neurological Deficit


History Per: Patient, Family


History/Exam Limitations: no limitations


Onset/Duration Of Symptoms: Days


Current Symptoms Are (Timing): Still Present





Past Medical History


Reviewed: Historical Data, Nursing Documentation, Vital Signs


Vital Signs: 


 Last Vital Signs











Temp  98.6 F   09/13/18 12:33


 


Pulse  121 H  09/13/18 12:33


 


Resp  16   09/13/18 12:33


 


BP  121/79   09/13/18 12:33


 


Pulse Ox  100   09/13/18 14:28














- Medical History


PMH: Anemia, Diabetes, Gastritis, HTN, Hypercholesterolemia, Hyperlipidemia, 

Hypothyroidism, Kidney Stones, Chronic Kidney Disease


Surgical History: No Surg Hx





- [facility] Procedures








DRAINAGE OF ABDOMINAL WALL WITH DRAIN DEV, PERC APPROACH (09/30/17)


ESOPHAGOGASTRODUODENOSCOPY [EGD] W/CLOSED BIOPSY (04/25/15)


EXCISION OF SMALL INTESTINE, OPEN APPROACH (09/30/17)


INSERT GASTRIC TUBE NEC (04/25/15)


INSERTION OF INFUSION DEV INTO SUP VENA CAVA, PERC APPROACH (09/30/17)


RELEASE SMALL INTESTINE, OPEN APPROACH (09/30/17)


SUPPLEMENT ABDOMINAL WALL WITH SYNTH SUB, OPEN APPROACH (09/30/17)


TRANSFUSE NONAUT PLATELETS IN PERIPH VEIN, PERC (09/30/17)


TRANSFUSE NONAUT RED BLOOD CELLS IN PERIPH VEIN, PERC (09/30/17)








Family History: States: No Known Family Hx





- Social History


Hx Tobacco Use: Yes


Hx Alcohol Use: No


Hx Substance Use: No





- Immunization History


Hx Tetanus Toxoid Vaccination: No


Hx Influenza Vaccination: Yes (2015)


Hx Pneumococcal Vaccination: No





Review Of Systems


Constitutional: Negative for: Fever, Chills


Cardiovascular: Negative for: Chest Pain


Gastrointestinal: Positive for: Abdominal Pain.  Negative for: Nausea, Vomiting

, Diarrhea


Skin: Negative for: Rash


Neurological: Positive for: Weakness.  Negative for: Numbness





Physical Exam





- Physical Exam


Appears: Non-toxic, Chronically Ill, Other (In mild discomfort)


Skin: Warm, Dry, No Rash


Head: Atraumatic, Normacephalic


Eye(s): bilateral: Normal Inspection


Oral Mucosa: Moist


Cardiovascular: Rhythm Regular, Other (Tachycardic)


Respiratory: Normal Breath Sounds, No Rales, No Rhonchi, No Wheezing


Gastrointestinal/Abdominal: Soft, Tenderness (Diffuse greater on epigastric and 

RUQ), No Guarding, No Rebound, Other (large healed midline surgical scar)


Extremity: Normal ROM, No Pedal Edema, Capillary Refill (<2 seconds)


Neurological/Psych: Oriented x3, Normal Speech, Normal Cognition





ED Course And Treatment





- Laboratory Results


Result Diagrams: 


 09/13/18 13:25





 09/13/18 13:25


O2 Sat by Pulse Oximetry: 100 (RA)


Pulse Ox Interpretation: Normal


Progress Note: Blood work ordered. IV fluids and Morphine administered





Disposition





- Disposition


Forms:  [facility] (English)





- Scribe Statement


The provider has reviewed the documentation as recorded by the Kimberlyibbarry Deluna





All medical record entries made by the Kimberlyibbarry were at my direction and 

personally dictated by me. I have reviewed the chart and agree that the record 

accurately reflects my personal performance of the history, physical exam, 

medical decision making, and the department course for this patient. I have 

also personally directed, reviewed, and agree with the discharge instructions 

and disposition.

## 2018-09-14 LAB
ALBUMIN SERPL-MCNC: 2.6 G/DL (ref 3.5–5)
ALBUMIN/GLOB SERPL: 0.7 {RATIO} (ref 1–2.1)
ALT SERPL-CCNC: 63 U/L (ref 21–72)
ANISOCYTOSIS BLD QL SMEAR: SLIGHT
AST SERPL-CCNC: 214 U/L (ref 17–59)
BASOPHILS # BLD AUTO: 0 K/UL (ref 0–0.2)
BASOPHILS NFR BLD: 0.1 % (ref 0–2)
BUN SERPL-MCNC: 20 MG/DL (ref 9–20)
BURR CELLS BLD QL SMEAR: SLIGHT
CALCIUM SERPL-MCNC: 10.9 MG/DL (ref 8.6–10.4)
EOSINOPHIL # BLD AUTO: 0 K/UL (ref 0–0.7)
EOSINOPHIL NFR BLD: 0 % (ref 0–4)
ERYTHROCYTE [DISTWIDTH] IN BLOOD BY AUTOMATED COUNT: 16.6 % (ref 11.5–14.5)
GFR NON-AFRICAN AMERICAN: > 60
HGB BLD-MCNC: 9.4 G/DL (ref 12–18)
HYPOCHROMIC: SLIGHT
LYMPHOCYTE: 4 % (ref 20–40)
LYMPHOCYTES # BLD AUTO: 0.9 K/UL (ref 1–4.3)
LYMPHOCYTES NFR BLD AUTO: 3.7 % (ref 20–40)
MCH RBC QN AUTO: 34.3 PG (ref 27–31)
MCHC RBC AUTO-ENTMCNC: 33.7 G/DL (ref 33–37)
MCV RBC AUTO: 101.9 FL (ref 80–94)
MONOCYTE: 4 % (ref 0–10)
MONOCYTES # BLD: 1.4 K/UL (ref 0–0.8)
MONOCYTES NFR BLD: 5.9 % (ref 0–10)
NEUTROPHILS # BLD: 22 K/UL (ref 1.8–7)
NEUTROPHILS NFR BLD AUTO: 90.3 % (ref 50–75)
NEUTROPHILS NFR BLD AUTO: 91 % (ref 50–75)
NEUTS BAND NFR BLD: 1 % (ref 0–2)
NRBC BLD AUTO-RTO: 0.3 % (ref 0–2)
OVALOCYTES BLD QL SMEAR: SLIGHT
PLATELET # BLD EST: (no result) 10*3/UL
PLATELET # BLD: 72 K/UL (ref 130–400)
PMV BLD AUTO: 9.4 FL (ref 7.2–11.7)
POIKILOCYTOSIS BLD QL SMEAR: SLIGHT
RBC # BLD AUTO: 2.74 MIL/UL (ref 4.4–5.9)
TEARDROP CELLS: SLIGHT
TOTAL CELLS COUNTED BLD: 100
TOXIC GRANULES BLD QL SMEAR: PRESENT
WBC # BLD AUTO: 24.3 K/UL (ref 4.8–10.8)

## 2018-09-14 RX ADMIN — SODIUM CHLORIDE SCH MLS/HR: 9 INJECTION, SOLUTION INTRAVENOUS at 20:28

## 2018-09-14 RX ADMIN — HUMAN INSULIN SCH: 100 INJECTION, SOLUTION SUBCUTANEOUS at 08:00

## 2018-09-14 RX ADMIN — ROSUVASTATIN CALCIUM SCH MG: 5 TABLET, FILM COATED ORAL at 22:21

## 2018-09-14 RX ADMIN — HUMAN INSULIN SCH: 100 INJECTION, SOLUTION SUBCUTANEOUS at 11:46

## 2018-09-14 RX ADMIN — SODIUM CHLORIDE SCH MLS/HR: 9 INJECTION, SOLUTION INTRAVENOUS at 06:11

## 2018-09-14 RX ADMIN — SODIUM CHLORIDE SCH MLS/HR: 9 INJECTION, SOLUTION INTRAVENOUS at 14:00

## 2018-09-14 RX ADMIN — POLYETHYLENE GLYCOL 3350 SCH GM: 17 POWDER, FOR SOLUTION ORAL at 10:05

## 2018-09-14 RX ADMIN — SODIUM CHLORIDE SCH MLS/HR: 9 INJECTION, SOLUTION INTRAVENOUS at 00:55

## 2018-09-14 RX ADMIN — HUMAN INSULIN SCH: 100 INJECTION, SOLUTION SUBCUTANEOUS at 18:47

## 2018-09-14 RX ADMIN — FLUTICASONE PROPIONATE SCH SPR: 50 SPRAY, METERED NASAL at 10:05

## 2018-09-14 RX ADMIN — HUMAN INSULIN SCH: 100 INJECTION, SOLUTION SUBCUTANEOUS at 22:22

## 2018-09-14 RX ADMIN — METOPROLOL SUCCINATE SCH MG: 50 TABLET, EXTENDED RELEASE ORAL at 10:06

## 2018-09-14 RX ADMIN — ENOXAPARIN SODIUM SCH MG: 30 INJECTION SUBCUTANEOUS at 10:05

## 2018-09-14 NOTE — CT
Date of service: 



09/13/2018



PROCEDURE:  CT Abdomen and Pelvis without intravenous contrast



HISTORY:

abd pain, hepatic CA, leukocytosis



COMPARISON:

7/2/18



TECHNIQUE:

Technique. 



Contrast dose: 



Radiation dose:



Total exam DLP =  mGy-cm.



This CT exam was performed using one or more of the following dose 

reduction techniques: Automated exposure control, adjustment of the 

mA and/or kV according to patient size, and/or use of iterative 

reconstruction technique.



FINDINGS:



LOWER THORAX:

Unremarkable. 



LIVER:

Marked worsening of hepatic disease with a new 10 cm necrotic mass in 

the right hepatic lobe with additional right hepatic masses 

consistent with multicentric hepatoma. Redemonstration of cirrhotic 

liver with perihepatic ascites extending down the right paracolic 

gutter.



GALLBLADDER AND BILE DUCTS:

Unremarkable. 



PANCREAS:

Unremarkable. No gross lesion or ductal dilatation.



SPLEEN:

Unremarkable. 



ADRENALS:

Unremarkable. No mass. 



KIDNEYS AND URETERS:

Unremarkable. No hydronephrosis. No solid mass. 



VASCULATURE:

Unremarkable. No aortic aneurysm. 



BOWEL:

Unremarkable. No obstruction. No gross mural thickening. 



APPENDIX:

Unremarkable. Normal appendix. 



PERITONEUM:

Pelvic ascites. 



LYMPH NODES:

Unremarkable. No enlarged lymph nodes. 



BLADDER:

Unremarkable. 



REPRODUCTIVE:

Unremarkable. 



BONES:

No acute fracture. 



OTHER FINDINGS:

Large ventral hernia containing bowel loops.



IMPRESSION:

Marked worsening of hepatic disease with a new 10 cm necrotic mass in 

the right hepatic lobe with additional right hepatic masses 

consistent with multicentric hepatoma. Redemonstration of cirrhotic 

liver with perihepatic ascites extending down the right paracolic 

gutter

## 2018-09-14 NOTE — HP
CHIEF COMPLAINT:  Weakness, tachycardia.



HISTORY OF PRESENT ILLNESS:  This is a 67-year-old -American male,

well known to me with history of liver cancer metastatic.  He was seen by

oncologist today and while he was being seen by an oncologist, the patient

was found to be weak, tachycardic.  According the patient and the family,

the patient has not been eating well for last one week, and the patient

also was having abdominal pain, abdominal distention worsening, generalized

weakness, tiredness, anorexia, malaise, and fatigue.  He denied any

polyuria or polydipsia.  He denied any hematuria or pyuria.  He has nasal

congestion, sneezing, generalized weakness, tiredness; and the patient was

evaluated and admitted to the floor.  The patient has nasal congestion.  He

has nausea, vomiting, generalized weakness.  He has cough.  He denies any

hematuria or pyuria.  He has abdominal pain, abdominal distension.  There

is no history of head injury, trauma, fall, loss of consciousness.  There

is no seizure like activity.  He has tingling and numbness in the feet.



PAST MEDICAL HISTORY:  Type 2 diabetes, hypertension, liver cancer,

hyperlipidemia, allergic rhinitis.



SOCIAL HISTORY:   Ex-smoker, Ex-ETOH user.



CURRENT MEDICATIONS AT HOME:  He is on tramadol, metformin, Flomax,

Pravachol, MiraLax, Toprol XL, Cozaar, Synthroid, Flonase, and Pepcid.



PHYSICAL EXAMINATION:

GENERAL:  An elderly male, in distress with weakness.

VITAL SIGNS:  Blood pressure 134/81, pulse 105, respiratory rate 20,

temperature 97.9.

SKIN:  Dry.  Poor turgor.  No bruises.  No purpura.

HEENT:  Atraumatic, normocephalic.  Positive pallor.  Negative jaundice. 

Extraocular movements are intact.

NECK:  Supple, no JVD, no lymph node.  No thyromegaly.  No carotid bruits.

CHEST:  Chest wall bilateral symmetrical expansion.  No masses.  Positive

gynecomastia.

LUNGS:  Bilateral scattered rhonchi with decreased air entry.

CVS:  S1 and S2 regular.

ABDOMEN:  Soft, nontender.  Bowel sounds are positive.  The patient has

enlarged liver.

RECTAL:  Enlarged prostate.

GENITAL:  Normal.

EXTREMITIES:  No clubbing, cyanosis.  +2 edema.

CNS:  The patient is awake, alert, oriented x3.  Cranial nerves II through

XII are normal.  Power 5/5 x4.  Plantars are downgoing.



ASSESSMENT:

1.  Septicemia with shock with low blood pressure which likely could be

respiratory versus blood borne versus abdominal.

2.  Liver cancer, metastatic.

3.  Dehydration.

4.  Type 2 diabetes.

5.  _____.



PLAN:  Admit.  Detailed orders written.  Seen and examined.





__________________________________________

Harrison Mckeon MD



DD:  09/13/2018 22:51:24

DT:  09/14/2018 1:56:59

Job # [id number]

## 2018-09-14 NOTE — CP.PCM.CON
History of Present Illness





- History of Present Illness


History of Present Illness: 


INFECTIOUS DISEASE CONSULT;


HPI. 


67-year-old male with history of liver CA with metastasis, history of hepatitis 

C, diabetes mellitus, hypertension, hyperlipidemia, hypothyroidism, chronic 

kidney disease, kidney stones who is brought to the ER by EMS, sent by 

oncologist Dr. Ortiz for evaluation off decreased by mouth intake for about one 

week and weakness as per family.


Patient complains of worsening abdominal pain but denies any nausea vomiting, 

diarrhea or dysuria.


Patient was found to have increased leukocytosis of 23.3 and increased lactic 

acid of 4.3.  Patient status post code sepsis and received dose of cefepime and 

Avelox, after appropriate cultures.


CT of the abdomen and pelvis with IV contrast on admission 9/13/18 showed 

marked worsening of hepatic disease and a new 10 cm necrotic mass in the right 

hepatic lobe with additional right hepatic metastasis consistent with 

multicentric hepatoma.  Patient also was noted to have perihepatic ascites, 

extending to the right pericolic gutter.





Patient also has history off ventral hernia repair with mesh and history off 

abdominal abscess status post drainage in October 2017 with positive cultures 

of enterococcus faecium.


Patient was started on IV Zosyn and IV vancomycin by PMD.


Infectious disease consult requested by pmd for sepsis increasing leukocytosis 

and chronic abdominal pain.








Allergy; ASPIRIN


PMH: DM, HTN, HLD, Hep C


PSH: Hernia repair w/ Mesh 10/25/17


ALL; NKDA


SocialHx: ETOH,Tobacco use, denies drug use  


MEDS; REVIEWED.





Family History: States: No Known Family Hx





- Immunization History


Hx Tetanus Toxoid Vaccination: No


Hx Influenza Vaccination: Yes (2015)


Hx Pneumococcal Vaccination: No








Review of Systems





- Constitutional


Constitutional: Fatigue, Lethargy, Malaise, Weakness.  absent: Fever





- EENT


Eyes: absent: Change in Vision


Nose/Mouth/Throat: Nasal Congestion, Dry Mouth.  absent: Mouth Lesions, 

Odynophagia





- Cardiovascular


Cardiovascular: Dyspnea on Exertion, Edema.  absent: Chest Pain, Dyspnea





- Respiratory


Respiratory: absent: Cough





- Gastrointestinal


Gastrointestinal: Abdominal Pain.  absent: Constipation, Diarrhea, Nausea, 

Odynophagia





- Genitourinary


Genitourinary: Hx Renal/Bladder Calculi.  absent: Dysuria, Hematuria





- Reproductive: Male


Reproductive:Male: Pelvic Pain





- Neurological


Neurological: absent: Headaches





- Hematologic/Lymphatic


Hematologic: As Per HPI.  absent: Lymphadenopathy





Past Patient History





- Past Medical History & Family History


Past Medical History?: Yes





- Past Social History


Smoking Status: Former Smoker





- CARDIAC


Hx Cardiac Disorders: Yes


Hx Hypercholesterolemia: Yes


Hx Hypertension: Yes





- PULMONARY


Hx Respiratory Disorders: No





- NEUROLOGICAL


Hx Neurological Disorder: No





- HEENT


Hx HEENT Problems: No





- RENAL


Hx Chronic Kidney Disease: Yes


Hx Kidney Stones: Yes





- ENDOCRINE/METABOLIC


Hx Hypothyroidism: Yes





- HEMATOLOGICAL/ONCOLOGICAL


Hx Blood Disorders: Yes


Hx Anemia: Yes





- INTEGUMENTARY


Hx Dermatological Problems: No





- MUSCULOSKELETAL/RHEUMATOLOGICAL


Hx Falls: No





- GASTROINTESTINAL


Hx Gastrointestinal Disorders: Yes


Hx Gastritis: Yes





- GENITOURINARY/GYNECOLOGICAL


Hx Genitourinary Disorders: Yes


Hx Prostate Problems: Yes (ENLARGED PROSTATE)


Hx Urinary Tract Infection: Yes





- PSYCHIATRIC


Hx Substance Use: No





- SURGICAL HISTORY


Hx Surgeries: Yes (SEE COMMENT)


Hx Herniorrhaphy: Yes (UMBILICAL HERNIA REPAIR 2012)


Other/Comment: HX" SURGERY FOR KIDNEY STONES"





- ANESTHESIA


Hx Anesthesia: Yes


Hx Anesthesia Reactions: No


Hx Malignant Hyperthermia: No





Meds


Allergies/Adverse Reactions: 


 Allergies











Allergy/AdvReac Type Severity Reaction Status Date / Time


 


aspirin Allergy Unknown " MY Verified 07/30/18 10:47





   MOTHER  





   TOLD ME I  





   WAS  





   ALLERGIC  





   AND NOT TO  





   TAKE"  














- Medications


Medications: 


 Current Medications





Enoxaparin Sodium (Lovenox)  30 mg SC DAILY [facility]


   Last Admin: 09/14/18 10:05 Dose:  30 mg


Famotidine (Pepcid)  20 mg PO BID [facility]


   Last Admin: 09/14/18 10:06 Dose:  20 mg


Fluticasone Propionate (Flonase)  2 spr FILIPE DAILY [facility]


   Last Admin: 09/14/18 10:05 Dose:  2 spr


Sodium Chloride (Sodium Chloride 0.9%)  1,000 mls @ 100 mls/hr IV .Q10H [facility]


   Last Admin: 09/14/18 12:25 Dose:  100 mls/hr


Vancomycin HCl 1,000 mg/ (Sodium Chloride)  200 mls @ 133.333 mls/hr IVPB Q12H 

ALLIE


   PRN Reason: Protocol


   Last Admin: 09/14/18 04:36 Dose:  133.333 mls/hr


Piperacillin Sod/Tazobactam (Sod 3.375 gm/ Sodium Chloride)  100 mls @ 200 mls/

hr IVPB Q6H ALLIE


   PRN Reason: Protocol


   Last Admin: 09/14/18 06:11 Dose:  200 mls/hr


Insulin Human Regular (Novolin R)  0 unit SC ACHS [facility]


   PRN Reason: Protocol


   Last Admin: 09/14/18 11:46 Dose:  Not Given


Levothyroxine Sodium (Synthroid)  25 mcg PO DAILY@0630 [facility]


   Last Admin: 09/14/18 06:11 Dose:  25 mcg


Losartan Potassium (Cozaar)  50 mg PO DAILY [facility]


   Last Admin: 09/14/18 10:06 Dose:  50 mg


Metformin HCl (Glucophage)  500 mg PO BID [facility]


   Last Admin: 09/14/18 10:06 Dose:  500 mg


Metoprolol Succinate (Toprol Xl)  50 mg PO DAILY [facility]


   Last Admin: 09/14/18 10:06 Dose:  50 mg


Pneumococcal Polyvalent Vaccine (Pneumovax 23 Vaccine)  0.5 ml IM .ONCE ONE


   Stop: 09/16/18 12:01


Polyethylene Glycol (Miralax)  17 gm PO DAILY [facility]


   Last Admin: 09/14/18 10:05 Dose:  17 gm


Rosuvastatin Calcium (Crestor)  2.5 mg PO HS [facility]


   Last Admin: 09/13/18 21:18 Dose:  2.5 mg


Tamsulosin HCl (Flomax)  0.4 mg PO DAILY [facility]


   Last Admin: 09/14/18 10:06 Dose:  0.4 mg


Tramadol HCl (Ultram)  50 mg PO Q8 PRN


   PRN Reason: Pain, moderate (4-7)











Physical Exam





- Head Exam


Head Exam: NORMAL INSPECTION





- Eye Exam


Eye Exam: EOMI, PERRL





- ENT Exam


ENT Exam: Mucous Membranes Dry, Normal Oropharynx





- Neck Exam


Neck exam: Positive for: Normal Inspection.  Negative for: Lymphadenopathy





- Respiratory Exam


Respiratory Exam: Decreased Breath Sounds, NORMAL BREATHING PATTERN





- Cardiovascular Exam


Cardiovascular Exam: REGULAR RHYTHM, +S1, +S2





- GI/Abdominal Exam


GI & Abdominal Exam: Hypoactive Bowel Sounds, Soft, Tenderness (generalized 

mainly right upper quadrant and mid abdomen.)





- Extremities Exam


Extremities exam: Positive for: pedal edema, pedal pulses present.  Negative for

: calf tenderness





- Neurological Exam


Neurological exam: Alert, CN II-XII Intact, Reflexes Normal





- Psychiatric Exam


Psychiatric exam: Depressed





- Skin


Skin Exam: Normal Color, Warm





Results





- Vital Signs


Recent Vital Signs: 


 Last Vital Signs











Temp  98.0 F   09/14/18 07:22


 


Pulse  86   09/14/18 10:07


 


Resp  20   09/14/18 07:22


 


BP  128/81   09/14/18 10:07


 


Pulse Ox  99   09/14/18 07:22














- Labs


Result Diagrams: 


 09/14/18 06:15





 09/14/18 06:15


Labs: 


 Laboratory Results - last 24 hr











  09/13/18 09/13/18 09/13/18





  13:25 13:25 15:11


 


WBC   


 


RBC   


 


Hgb   


 


Hct   


 


MCV   


 


MCH   


 


MCHC   


 


RDW   


 


Plt Count   


 


MPV   


 


Neut % (Auto)   


 


Lymph % (Auto)   


 


Mono % (Auto)   


 


Eos % (Auto)   


 


Baso % (Auto)   


 


Neut # (Auto)   


 


Lymph # (Auto)   


 


Mono # (Auto)   


 


Eos # (Auto)   


 


Baso # (Auto)   


 


Neutrophils % (Manual)  87 H  


 


Band Neutrophils %   


 


Lymphocytes % (Manual)  5 L  


 


Monocytes % (Manual)  8  


 


Toxic Granulation   


 


Platelet Estimate  Decreased L  


 


Hypochromasia (manual)   


 


Poikilocytosis (manual  Slight  


 


Anisocytosis (manual)  Slight  


 


Macrocytosis (manual)  Slight  


 


Tear Drop Cells   


 


Ovalocytes  Slight  


 


[place] Cells  Slight  


 


pO2   


 


VBG pH   


 


VBG pCO2   


 


VBG HCO3   


 


VBG Total CO2   


 


VBG O2 Sat (Calc)   


 


VBG Base Excess   


 


VBG Potassium   


 


Sodium   


 


Chloride   


 


Glucose   


 


Lactate   


 


Crit Value Called To   


 


Crit Value Called By   


 


Crit Value Read Back   


 


Blood Gas Notified Time   


 


Potassium   


 


Carbon Dioxide   


 


Anion Gap   


 


BUN   


 


Creatinine   


 


Est GFR ( Amer)   


 


Est GFR (Non-Af Amer)   


 


POC Glucose (mg/dL)   


 


Random Glucose   


 


Lactic Acid   


 


Calcium   


 


Total Bilirubin   


 


AST   


 


ALT   


 


Alkaline Phosphatase   


 


Troponin I   < 0.0120 


 


Total Protein   


 


Albumin   


 


Globulin   


 


Albumin/Globulin Ratio   


 


Venous Blood Potassium   


 


Urine Color    [place]


 


Urine Clarity    Hazy


 


Urine pH    5.0


 


Ur Specific Gravity    1.019


 


Urine Protein    Negative


 


Urine Glucose (UA)    Normal


 


Urine Ketones    Trace


 


Urine Blood    Negative


 


Urine Nitrate    Negative


 


Urine Bilirubin    Negative


 


Urine Urobilinogen    4.0


 


Ur Leukocyte Esterase    Trace


 


Urine WBC (Auto)    12 H


 


Urine RBC (Auto)    2


 


Ur Squamous Epith Cells    5


 


Urine Bacteria    Occ H


 


Hyaline Casts    >20 H














  09/13/18 09/13/18 09/13/18





  15:28 17:15 17:32


 


WBC   


 


RBC   


 


Hgb   


 


Hct   


 


MCV   


 


MCH   


 


MCHC   


 


RDW   


 


Plt Count   


 


MPV   


 


Neut % (Auto)   


 


Lymph % (Auto)   


 


Mono % (Auto)   


 


Eos % (Auto)   


 


Baso % (Auto)   


 


Neut # (Auto)   


 


Lymph # (Auto)   


 


Mono # (Auto)   


 


Eos # (Auto)   


 


Baso # (Auto)   


 


Neutrophils % (Manual)   


 


Band Neutrophils %   


 


Lymphocytes % (Manual)   


 


Monocytes % (Manual)   


 


Toxic Granulation   


 


Platelet Estimate   


 


Hypochromasia (manual)   


 


Poikilocytosis (manual   


 


Anisocytosis (manual)   


 


Macrocytosis (manual)   


 


Tear Drop Cells   


 


Ovalocytes   


 


[place] Cells   


 


pO2  25 L  27 L  27 L


 


VBG pH  7.32  7.31 L  7.28 L


 


VBG pCO2  38 L  37 L  39 L


 


VBG HCO3  18.7  18.0  17.2


 


VBG Total CO2  20.8 L  19.7 L  19.5 L


 


VBG O2 Sat (Calc)  38.3 L  44.7  42.6


 


VBG Base Excess  -6.0 L  -7.0 L  -7.9 L


 


VBG Potassium  4.5  4.6  4.0


 


Sodium  139.0  142.0  142.0


 


Chloride  109.0 H  113.0 H  113.0 H


 


Glucose  126 H  112 H  107


 


Lactate  4.2 H*  5.2 H*  4.8 H*


 


Crit Value Called To  Er nurse steve manuel  Er nurse jenise manuel  Er nurse jenise


 


Crit Value Called By  Rufus rt  Rufus rt  Rufus rt


 


Crit Value Read Back  Y  Y  Y


 


Blood Gas Notified Time  1532  1722  1735


 


Potassium   


 


Carbon Dioxide   


 


Anion Gap   


 


BUN   


 


Creatinine   


 


Est GFR ( Amer)   


 


Est GFR (Non-Af Amer)   


 


POC Glucose (mg/dL)   


 


Random Glucose   


 


Lactic Acid   


 


Calcium   


 


Total Bilirubin   


 


AST   


 


ALT   


 


Alkaline Phosphatase   


 


Troponin I   


 


Total Protein   


 


Albumin   


 


Globulin   


 


Albumin/Globulin Ratio   


 


Venous Blood Potassium  4.5  4.6  4.0


 


Urine Color   


 


Urine Clarity   


 


Urine pH   


 


Ur Specific Gravity   


 


Urine Protein   


 


Urine Glucose (UA)   


 


Urine Ketones   


 


Urine Blood   


 


Urine Nitrate   


 


Urine Bilirubin   


 


Urine Urobilinogen   


 


Ur Leukocyte Esterase   


 


Urine WBC (Auto)   


 


Urine RBC (Auto)   


 


Ur Squamous Epith Cells   


 


Urine Bacteria   


 


Hyaline Casts   














  09/13/18 09/14/18 09/14/18





  21:30 06:15 06:15


 


WBC   24.3 H 


 


RBC   2.74 L 


 


Hgb   9.4 L 


 


Hct   27.9 L 


 


MCV   101.9 H 


 


MCH   34.3 H 


 


MCHC   33.7 


 


RDW   16.6 H 


 


Plt Count   72 L D 


 


MPV   9.4 


 


Neut % (Auto)   90.3 H 


 


Lymph % (Auto)   3.7 L 


 


Mono % (Auto)   5.9 


 


Eos % (Auto)   0.0 


 


Baso % (Auto)   0.1 


 


Neut # (Auto)   22.0 H 


 


Lymph # (Auto)   0.9 L 


 


Mono # (Auto)   1.4 H 


 


Eos # (Auto)   0.0 


 


Baso # (Auto)   0.0 


 


Neutrophils % (Manual)   91 H 


 


Band Neutrophils %   1 


 


Lymphocytes % (Manual)   4 L 


 


Monocytes % (Manual)   4 


 


Toxic Granulation   Present 


 


Platelet Estimate   Decreased L 


 


Hypochromasia (manual)   Slight 


 


Poikilocytosis (manual   Slight 


 


Anisocytosis (manual)   Slight 


 


Macrocytosis (manual)   


 


Tear Drop Cells   Slight 


 


Ovalocytes   Slight 


 


[place] Cells   Slight 


 


pO2   


 


VBG pH   


 


VBG pCO2   


 


VBG HCO3   


 


VBG Total CO2   


 


VBG O2 Sat (Calc)   


 


VBG Base Excess   


 


VBG Potassium   


 


Sodium    145


 


Chloride    113 H


 


Glucose   


 


Lactate   


 


Crit Value Called To   


 


Crit Value Called By   


 


Crit Value Read Back   


 


Blood Gas Notified Time   


 


Potassium    4.6


 


Carbon Dioxide    18 L


 


Anion Gap    19


 


BUN    20


 


Creatinine    0.7 L


 


Est GFR ( Amer)    > 60


 


Est GFR (Non-Af Amer)    > 60


 


POC Glucose (mg/dL)  134 H  


 


Random Glucose    117 H


 


Lactic Acid   


 


Calcium    10.9 H


 


Total Bilirubin    1.9 H


 


AST    214 H D


 


ALT    63


 


Alkaline Phosphatase    95


 


Troponin I   


 


Total Protein    6.4


 


Albumin    2.6 L D


 


Globulin    3.8


 


Albumin/Globulin Ratio    0.7 L


 


Venous Blood Potassium   


 


Urine Color   


 


Urine Clarity   


 


Urine pH   


 


Ur Specific Gravity   


 


Urine Protein   


 


Urine Glucose (UA)   


 


Urine Ketones   


 


Urine Blood   


 


Urine Nitrate   


 


Urine Bilirubin   


 


Urine Urobilinogen   


 


Ur Leukocyte Esterase   


 


Urine WBC (Auto)   


 


Urine RBC (Auto)   


 


Ur Squamous Epith Cells   


 


Urine Bacteria   


 


Hyaline Casts   














  09/14/18 09/14/18 09/14/18





  06:26 11:27 11:38


 


WBC   


 


RBC   


 


Hgb   


 


Hct   


 


MCV   


 


MCH   


 


MCHC   


 


RDW   


 


Plt Count   


 


MPV   


 


Neut % (Auto)   


 


Lymph % (Auto)   


 


Mono % (Auto)   


 


Eos % (Auto)   


 


Baso % (Auto)   


 


Neut # (Auto)   


 


Lymph # (Auto)   


 


Mono # (Auto)   


 


Eos # (Auto)   


 


Baso # (Auto)   


 


Neutrophils % (Manual)   


 


Band Neutrophils %   


 


Lymphocytes % (Manual)   


 


Monocytes % (Manual)   


 


Toxic Granulation   


 


Platelet Estimate   


 


Hypochromasia (manual)   


 


Poikilocytosis (manual   


 


Anisocytosis (manual)   


 


Macrocytosis (manual)   


 


Tear Drop Cells   


 


Ovalocytes   


 


[place] Cells   


 


pO2   


 


VBG pH   


 


VBG pCO2   


 


VBG HCO3   


 


VBG Total CO2   


 


VBG O2 Sat (Calc)   


 


VBG Base Excess   


 


VBG Potassium   


 


Sodium   


 


Chloride   


 


Glucose   


 


Lactate   


 


Crit Value Called To   


 


Crit Value Called By   


 


Crit Value Read Back   


 


Blood Gas Notified Time   


 


Potassium   


 


Carbon Dioxide   


 


Anion Gap   


 


BUN   


 


Creatinine   


 


Est GFR ( Amer)   


 


Est GFR (Non-Af Amer)   


 


POC Glucose (mg/dL)  115 H   127 H


 


Random Glucose   


 


Lactic Acid   4.3 H* 


 


Calcium   


 


Total Bilirubin   


 


AST   


 


ALT   


 


Alkaline Phosphatase   


 


Troponin I   


 


Total Protein   


 


Albumin   


 


Globulin   


 


Albumin/Globulin Ratio   


 


Venous Blood Potassium   


 


Urine Color   


 


Urine Clarity   


 


Urine pH   


 


Ur Specific Gravity   


 


Urine Protein   


 


Urine Glucose (UA)   


 


Urine Ketones   


 


Urine Blood   


 


Urine Nitrate   


 


Urine Bilirubin   


 


Urine Urobilinogen   


 


Ur Leukocyte Esterase   


 


Urine WBC (Auto)   


 


Urine RBC (Auto)   


 


Ur Squamous Epith Cells   


 


Urine Bacteria   


 


Hyaline Casts   














- Imaging and Cardiology


  ** Chest x-ray


Status: Report reviewed by me (no active disease.  Lower lung volumes.)





Assessment & Plan


(1) Septicemia


Status: Acute   





(2) Abdominal pain


Status: Acute   





(3) Liver cancer, primary, with metastasis from liver to other site


Status: Acute   





(4) Ascites


Status: Acute   





(5) Dehydration


Status: Acute   





(6) Diabetes mellitus


Status: Chronic   





(7) Hepatitis C infection


Status: Chronic   





(8) Dehydration


Status: Acute   





- Assessment and Plan (Free Text)


Plan: 





PLAN;


pANCULTURES


CRP.


CEA,


LDH,CPK.


DC IV zOSYN.





START iv MERREM1 G 8 HOURLY.9/14/18


ADD IV fLAGYL 500 EVERY 8 HOURLY 9/14/18


CONTINUE iv VANCOMYCIN 1 G EVERY 12 HOURLY. 9/13/18.





FOLLOW-UP CULTURES TO ADJUST ANTIBIOTICS.


IV FLUIDS AS PER PMD.


WILL FOLLOW ALONG WITH YOU AND MAKE FURTHER RECOMMENDATIONS AS NEEDED.

## 2018-09-15 LAB
ANISOCYTOSIS BLD QL SMEAR: SLIGHT
ARTERIAL BLOOD GAS O2 SAT: 69.5 % (ref 95–98)
ARTERIAL BLOOD GAS PCO2: 21 MM/HG (ref 35–45)
ARTERIAL BLOOD GAS TCO2: 10 MMOL/L (ref 22–28)
BASOPHILS # BLD AUTO: 0 K/UL (ref 0–0.2)
BASOPHILS NFR BLD: 0.1 % (ref 0–2)
BUN SERPL-MCNC: 25 MG/DL (ref 9–20)
BURR CELLS BLD QL SMEAR: SLIGHT
CALCIUM SERPL-MCNC: 11 MG/DL (ref 8.6–10.4)
EOSINOPHIL # BLD AUTO: 0 K/UL (ref 0–0.7)
EOSINOPHIL NFR BLD: 0 % (ref 0–4)
ERYTHROCYTE [DISTWIDTH] IN BLOOD BY AUTOMATED COUNT: 16.4 % (ref 11.5–14.5)
GFR NON-AFRICAN AMERICAN: > 60
HCO3 BLDA-SCNC: 12 MMOL/L (ref 21–28)
HGB BLD-MCNC: 9.7 G/DL (ref 12–18)
HYPOCHROMIC: SLIGHT
INHALED O2 CONCENTRATION: 21 %
LYMPHOCYTE: 2 % (ref 20–40)
LYMPHOCYTES # BLD AUTO: 0.8 K/UL (ref 1–4.3)
LYMPHOCYTES NFR BLD AUTO: 3 % (ref 20–40)
MCH RBC QN AUTO: 34.2 PG (ref 27–31)
MCHC RBC AUTO-ENTMCNC: 33.1 G/DL (ref 33–37)
MCV RBC AUTO: 103.5 FL (ref 80–94)
MONOCYTE: 4 % (ref 0–10)
MONOCYTES # BLD: 1.6 K/UL (ref 0–0.8)
MONOCYTES NFR BLD: 5.7 % (ref 0–10)
MYELOCYTES NFR BLD: 2 % (ref 0–0)
NEUTROPHILS # BLD: 25.3 K/UL (ref 1.8–7)
NEUTROPHILS NFR BLD AUTO: 91.2 % (ref 50–75)
NEUTROPHILS NFR BLD AUTO: 92 % (ref 50–75)
NRBC BLD AUTO-RTO: 0.1 % (ref 0–2)
PH BLDA: 7.26 [PH] (ref 7.35–7.45)
PLATELET # BLD EST: (no result) 10*3/UL
PLATELET # BLD: 78 K/UL (ref 130–400)
PMV BLD AUTO: 8.8 FL (ref 7.2–11.7)
PO2 BLDA: 40 MM/HG (ref 80–100)
POIKILOCYTOSIS BLD QL SMEAR: SLIGHT
RBC # BLD AUTO: 2.84 MIL/UL (ref 4.4–5.9)
TOTAL CELLS COUNTED BLD: 100
WBC # BLD AUTO: 27.7 K/UL (ref 4.8–10.8)

## 2018-09-15 RX ADMIN — NYSTATIN SCH ML: 100000 SUSPENSION ORAL at 13:33

## 2018-09-15 RX ADMIN — ROSUVASTATIN CALCIUM SCH: 5 TABLET, FILM COATED ORAL at 21:14

## 2018-09-15 RX ADMIN — POLYETHYLENE GLYCOL 3350 SCH: 17 POWDER, FOR SOLUTION ORAL at 09:25

## 2018-09-15 RX ADMIN — HUMAN INSULIN SCH: 100 INJECTION, SOLUTION SUBCUTANEOUS at 08:13

## 2018-09-15 RX ADMIN — WATER SCH MLS/HR: 1 INJECTION INTRAMUSCULAR; INTRAVENOUS; SUBCUTANEOUS at 01:39

## 2018-09-15 RX ADMIN — WATER SCH MLS/HR: 1 INJECTION INTRAMUSCULAR; INTRAVENOUS; SUBCUTANEOUS at 08:54

## 2018-09-15 RX ADMIN — SODIUM BICARBONATE SCH MLS/HR: 84 INJECTION INTRAVENOUS at 11:00

## 2018-09-15 RX ADMIN — METOPROLOL SUCCINATE SCH MG: 50 TABLET, EXTENDED RELEASE ORAL at 09:24

## 2018-09-15 RX ADMIN — HUMAN INSULIN SCH: 100 INJECTION, SOLUTION SUBCUTANEOUS at 21:17

## 2018-09-15 RX ADMIN — NYSTATIN SCH: 100000 SUSPENSION ORAL at 21:15

## 2018-09-15 RX ADMIN — WATER SCH MLS/HR: 1 INJECTION INTRAMUSCULAR; INTRAVENOUS; SUBCUTANEOUS at 18:07

## 2018-09-15 RX ADMIN — ENOXAPARIN SODIUM SCH: 30 INJECTION SUBCUTANEOUS at 09:25

## 2018-09-15 RX ADMIN — FLUTICASONE PROPIONATE SCH SPR: 50 SPRAY, METERED NASAL at 10:22

## 2018-09-15 RX ADMIN — HUMAN INSULIN SCH: 100 INJECTION, SOLUTION SUBCUTANEOUS at 18:04

## 2018-09-15 RX ADMIN — SODIUM BICARBONATE SCH: 84 INJECTION INTRAVENOUS at 23:30

## 2018-09-15 RX ADMIN — HUMAN INSULIN SCH: 100 INJECTION, SOLUTION SUBCUTANEOUS at 11:48

## 2018-09-15 NOTE — CP.PCM.CON
<Kenny Partida - Last Filed: 09/15/18 10:50>





History of Present Illness





- History of Present Illness


History of Present Illness: 





ICU Consult for Dr. Mariama Partida, PGY-3 IM





Past Patient History





- Past Medical History & Family History


Past Medical History?: Yes





- Past Social History


Smoking Status: Former Smoker





- CARDIAC


Hx Cardiac Disorders: Yes


Hx Hypercholesterolemia: Yes


Hx Hypertension: Yes





- PULMONARY


Hx Respiratory Disorders: No





- NEUROLOGICAL


Hx Neurological Disorder: No





- HEENT


Hx HEENT Problems: No





- RENAL


Hx Chronic Kidney Disease: Yes


Hx Kidney Stones: Yes





- ENDOCRINE/METABOLIC


Hx Hypothyroidism: Yes





- HEMATOLOGICAL/ONCOLOGICAL


Hx Blood Disorders: Yes


Hx Anemia: Yes





- INTEGUMENTARY


Hx Dermatological Problems: No





- MUSCULOSKELETAL/RHEUMATOLOGICAL


Hx Falls: No





- GASTROINTESTINAL


Hx Gastrointestinal Disorders: Yes


Hx Gastritis: Yes





- GENITOURINARY/GYNECOLOGICAL


Hx Genitourinary Disorders: Yes


Hx Prostate Problems: Yes (ENLARGED PROSTATE)


Hx Urinary Tract Infection: Yes





- PSYCHIATRIC


Hx Substance Use: No





- SURGICAL HISTORY


Hx Surgeries: Yes (SEE COMMENT)


Hx Herniorrhaphy: Yes (UMBILICAL HERNIA REPAIR 2012)


Other/Comment: HX" SURGERY FOR KIDNEY STONES"





- ANESTHESIA


Hx Anesthesia: Yes


Hx Anesthesia Reactions: No


Hx Malignant Hyperthermia: No





Meds


Allergies/Adverse Reactions: 


 Allergies











Allergy/AdvReac Type Severity Reaction Status Date / Time


 


aspirin Allergy Unknown " MY Verified 07/30/18 10:47





   MOTHER  





   TOLD ME I  





   WAS  





   ALLERGIC  





   AND NOT TO  





   TAKE"  














- Medications


Medications: 


 Current Medications





Enoxaparin Sodium (Lovenox)  30 mg SC DAILY [facility]


   Last Admin: 09/15/18 09:25 Dose:  Not Given


Famotidine (Pepcid)  20 mg PO BID [facility]


   Last Admin: 09/15/18 09:25 Dose:  20 mg


Fluticasone Propionate (Flonase)  2 spr FILIPE DAILY [facility]


   Last Admin: 09/15/18 10:22 Dose:  2 spr


Sodium Chloride (Sodium Chloride 0.9%)  1,000 mls @ 100 mls/hr IV .Q10H [facility]


   Last Admin: 09/15/18 05:18 Dose:  100 mls/hr


Vancomycin HCl 1,000 mg/ (Sodium Chloride)  200 mls @ 133.333 mls/hr IVPB Q12H 

[facility]


   PRN Reason: Protocol


   Last Admin: 09/15/18 05:14 Dose:  133.333 mls/hr


Meropenem 1 gm/ Sodium (Chloride)  100 mls @ 100 mls/hr IVPB Q8H [facility]


   PRN Reason: Protocol


   Last Admin: 09/15/18 08:55 Dose:  100 mls/hr


Metronidazole (Flagyl)  500 mg in 100 mls @ 100 mls/hr IVPB Q8H [facility]


   PRN Reason: Protocol


   Last Admin: 09/15/18 08:54 Dose:  100 mls/hr


Sodium Bicarbonate 75 meq/ (Sodium Chloride)  1,000 mls @ 75 mls/hr IV .[id number] 

[facility]


Insulin Human Regular (Novolin R)  0 unit SC ACHS [facility]


   PRN Reason: Protocol


   Last Admin: 09/15/18 08:13 Dose:  Not Given


Levothyroxine Sodium (Synthroid)  25 mcg PO DAILY@0630 [facility]


   Last Admin: 09/15/18 06:36 Dose:  25 mcg


Losartan Potassium (Cozaar)  50 mg PO DAILY [facility]


   Last Admin: 09/15/18 09:24 Dose:  50 mg


Metformin HCl (Glucophage)  500 mg PO BID [facility]


   Last Admin: 09/15/18 09:24 Dose:  500 mg


Metoprolol Succinate (Toprol Xl)  50 mg PO DAILY [facility]


   Last Admin: 09/15/18 09:24 Dose:  50 mg


Pantoprazole Sodium (Protonix Inj)  40 mg IVP DAILY [facility]


   Last Admin: 09/15/18 10:19 Dose:  40 mg


Pneumococcal Polyvalent Vaccine (Pneumovax 23 Vaccine)  0.5 ml IM .ONCE ONE


   Stop: 09/16/18 12:01


Polyethylene Glycol (Miralax)  17 gm PO DAILY [facility]


   Last Admin: 09/15/18 09:25 Dose:  Not Given


Rosuvastatin Calcium (Crestor)  2.5 mg PO HS [facility]


   Last Admin: 09/14/18 22:21 Dose:  2.5 mg


Tamsulosin HCl (Flomax)  0.4 mg PO DAILY [facility]


   Last Admin: 09/15/18 09:24 Dose:  0.4 mg


Tramadol HCl (Ultram)  50 mg PO Q8 PRN


   PRN Reason: Pain, moderate (4-7)











Results





- Vital Signs


Recent Vital Signs: 


 Last Vital Signs











Temp  97.7 F   09/15/18 07:30


 


Pulse  108 H  09/15/18 07:30


 


Resp  22   09/15/18 07:30


 


BP  127/67   09/15/18 07:30


 


Pulse Ox  96   09/15/18 07:30














- Labs


Result Diagrams: 


 09/15/18 06:55





 09/15/18 06:55


Labs: 


 Laboratory Results - last 24 hr











  09/14/18 09/14/18 09/14/18





  11:27 11:38 16:47


 


WBC   


 


RBC   


 


Hgb   


 


Hct   


 


MCV   


 


MCH   


 


MCHC   


 


RDW   


 


Plt Count   


 


MPV   


 


Neut % (Auto)   


 


Lymph % (Auto)   


 


Mono % (Auto)   


 


Eos % (Auto)   


 


Baso % (Auto)   


 


Neut # (Auto)   


 


Lymph # (Auto)   


 


Mono # (Auto)   


 


Eos # (Auto)   


 


Baso # (Auto)   


 


Neutrophils % (Manual)   


 


Lymphocytes % (Manual)   


 


Monocytes % (Manual)   


 


Myelocytes %   


 


Platelet Estimate   


 


Hypochromasia (manual)   


 


Poikilocytosis (manual   


 


Anisocytosis (manual)   


 


[place] Cells   


 


Puncture Site   


 


pCO2   


 


pO2   


 


HCO3   


 


ABG pH   


 


ABG Total CO2   


 


ABG O2 Saturation   


 


ABG Base Excess   


 


Barry Test   


 


ABG Potassium   


 


A-a O2 Difference   


 


Respiratory Index   


 


Glucose   


 


Lactate   


 


FiO2   


 


Crit Value Called To   


 


Crit Value Called By   


 


Crit Value Read Back   


 


Blood Gas Notified Time   


 


Sodium   


 


Potassium   


 


Chloride   


 


Carbon Dioxide   


 


Anion Gap   


 


BUN   


 


Creatinine   


 


Est GFR ( Amer)   


 


Est GFR (Non-Af Amer)   


 


POC Glucose (mg/dL)   127 H  128 H


 


Random Glucose   


 


Lactic Acid  4.3 H*  


 


Calcium   


 


Ammonia   


 


Lactate Dehydrogenase   


 


Total Creatine Kinase   


 


C-Reactive Protein   


 


Arterial Blood Potassium   














  09/14/18 09/15/18 09/15/18





  21:45 06:11 06:55


 


WBC    27.7 H


 


RBC    2.84 L


 


Hgb    9.7 L


 


Hct    29.4 L


 


MCV    103.5 H


 


MCH    34.2 H


 


MCHC    33.1


 


RDW    16.4 H


 


Plt Count    78 L


 


MPV    8.8


 


Neut % (Auto)    91.2 H


 


Lymph % (Auto)    3.0 L


 


Mono % (Auto)    5.7


 


Eos % (Auto)    0.0


 


Baso % (Auto)    0.1


 


Neut # (Auto)    25.3 H


 


Lymph # (Auto)    0.8 L


 


Mono # (Auto)    1.6 H


 


Eos # (Auto)    0.0


 


Baso # (Auto)    0.0


 


Neutrophils % (Manual)    92 H


 


Lymphocytes % (Manual)    2 L


 


Monocytes % (Manual)    4


 


Myelocytes %    2 H


 


Platelet Estimate    Decreased L


 


Hypochromasia (manual)    Slight


 


Poikilocytosis (manual    Slight


 


Anisocytosis (manual)    Slight


 


[place] Cells    Slight


 


Puncture Site   


 


pCO2   


 


pO2   


 


HCO3   


 


ABG pH   


 


ABG Total CO2   


 


ABG O2 Saturation   


 


ABG Base Excess   


 


Barry Test   


 


ABG Potassium   


 


A-a O2 Difference   


 


Respiratory Index   


 


Glucose   


 


Lactate   


 


FiO2   


 


Crit Value Called To   


 


Crit Value Called By   


 


Crit Value Read Back   


 


Blood Gas Notified Time   


 


Sodium   


 


Potassium   


 


Chloride   


 


Carbon Dioxide   


 


Anion Gap   


 


BUN   


 


Creatinine   


 


Est GFR ( Amer)   


 


Est GFR (Non-Af Amer)   


 


POC Glucose (mg/dL)  121 H  114 H 


 


Random Glucose   


 


Lactic Acid   


 


Calcium   


 


Ammonia   


 


Lactate Dehydrogenase   


 


Total Creatine Kinase   


 


C-Reactive Protein   


 


Arterial Blood Potassium   














  09/15/18 09/15/18 09/15/18





  06:55 08:42 10:05


 


WBC   


 


RBC   


 


Hgb   


 


Hct   


 


MCV   


 


MCH   


 


MCHC   


 


RDW   


 


Plt Count   


 


MPV   


 


Neut % (Auto)   


 


Lymph % (Auto)   


 


Mono % (Auto)   


 


Eos % (Auto)   


 


Baso % (Auto)   


 


Neut # (Auto)   


 


Lymph # (Auto)   


 


Mono # (Auto)   


 


Eos # (Auto)   


 


Baso # (Auto)   


 


Neutrophils % (Manual)   


 


Lymphocytes % (Manual)   


 


Monocytes % (Manual)   


 


Myelocytes %   


 


Platelet Estimate   


 


Hypochromasia (manual)   


 


Poikilocytosis (manual   


 


Anisocytosis (manual)   


 


[place] Cells   


 


Puncture Site    Rba


 


pCO2    21 L


 


pO2    40 L*


 


HCO3    12.0 L


 


ABG pH    7.26 L


 


ABG Total CO2    10.0 L


 


ABG O2 Saturation    69.5 L


 


ABG Base Excess    -15.5 L


 


Barry Test    Na


 


ABG Potassium    3.9


 


A-a O2 Difference    83.0


 


Respiratory Index    2.1


 


Glucose    91


 


Lactate    6.0 H*


 


FiO2    21.0


 


Crit Value Called To    


 


Crit Value Called By    David panda,rafaela


 


Crit Value Read Back    Y


 


Blood Gas Notified Time    1010


 


Sodium  145   148.0


 


Potassium  4.9  


 


Chloride  116 H   124.0 H


 


Carbon Dioxide  11 L* D  


 


Anion Gap  23 H  


 


BUN  25 H  


 


Creatinine  1.0  


 


Est GFR ( Amer)  > 60  


 


Est GFR (Non-Af Amer)  > 60  


 


POC Glucose (mg/dL)   


 


Random Glucose  125 H  


 


Lactic Acid   


 


Calcium  11.0 H  


 


Ammonia   29  D 


 


Lactate Dehydrogenase  1167 H  


 


Total Creatine Kinase  < 20 L  


 


C-Reactive Protein  215.70 H  


 


Arterial Blood Potassium    3.9














<Beto Leslie - Last Filed: 09/15/18 17:17>





History of Present Illness





- History of Present Illness


History of Present Illness: 





Patient has worsening acidosis, secondary to metastatic hepatocellular 

carcinoma.  AMS from metabolic encephalopathy, patient is barely coherent.





Review of Systems





- Review of Systems


Systems not reviewed;Unavailable: Altered Mental Status





Meds





- Medications


Medications: 


 Current Medications





Enoxaparin Sodium (Lovenox)  30 mg SC DAILY [facility]


   Last Admin: 09/15/18 09:25 Dose:  Not Given


Famotidine (Pepcid)  20 mg PO BID [facility]


   Last Admin: 09/15/18 09:25 Dose:  20 mg


Fluticasone Propionate (Flonase)  2 spr FILIPE DAILY [facility]


   Last Admin: 09/15/18 10:22 Dose:  2 spr


Sodium Chloride (Sodium Chloride 0.9%)  1,000 mls @ 100 mls/hr IV .Q10H [facility]


   Last Admin: 09/15/18 11:00 Dose:  Not Given


Vancomycin HCl 1,000 mg/ (Sodium Chloride)  200 mls @ 133.333 mls/hr IVPB Q12H 

[facility]


   PRN Reason: Protocol


   Last Admin: 09/15/18 05:14 Dose:  133.333 mls/hr


Meropenem 1 gm/ Sodium (Chloride)  100 mls @ 100 mls/hr IVPB Q8H [facility]


   PRN Reason: Protocol


   Last Admin: 09/15/18 08:55 Dose:  100 mls/hr


Metronidazole (Flagyl)  500 mg in 100 mls @ 100 mls/hr IVPB Q8H [facility]


   PRN Reason: Protocol


   Last Admin: 09/15/18 08:54 Dose:  100 mls/hr


Sodium Bicarbonate 75 meq/ (Sodium Chloride)  1,000 mls @ 75 mls/hr IV .[id number] 

[facility]


   Last Admin: 09/15/18 11:00 Dose:  75 mls/hr


Insulin Human Regular (Novolin R)  0 unit SC ACHS [facility]


   PRN Reason: Protocol


   Last Admin: 09/15/18 11:48 Dose:  Not Given


Levothyroxine Sodium (Synthroid)  25 mcg PO DAILY@0630 [facility]


   Last Admin: 09/15/18 06:36 Dose:  25 mcg


Losartan Potassium (Cozaar)  50 mg PO DAILY [facility]


   Last Admin: 09/15/18 09:24 Dose:  50 mg


Metformin HCl (Glucophage)  500 mg PO BID [facility]


   Last Admin: 09/15/18 09:24 Dose:  500 mg


Metoprolol Succinate (Toprol Xl)  50 mg PO DAILY [facility]


   Last Admin: 09/15/18 09:24 Dose:  50 mg


Nystatin (Nystatin Oral Susp)  5 ml PO QID [facility]


   Last Admin: 09/15/18 13:33 Dose:  5 ml


Pantoprazole Sodium (Protonix Inj)  40 mg IVP DAILY [facility]


   Last Admin: 09/15/18 10:19 Dose:  40 mg


Pneumococcal Polyvalent Vaccine (Pneumovax 23 Vaccine)  0.5 ml IM .ONCE ONE


   Stop: 09/16/18 12:01


Polyethylene Glycol (Miralax)  17 gm PO DAILY [facility]


   Last Admin: 09/15/18 09:25 Dose:  Not Given


Rosuvastatin Calcium (Crestor)  2.5 mg PO HS [facility]


   Last Admin: 09/14/18 22:21 Dose:  2.5 mg


Tamsulosin HCl (Flomax)  0.4 mg PO DAILY [facility]


   Last Admin: 09/15/18 09:24 Dose:  0.4 mg


Tramadol HCl (Ultram)  50 mg PO Q8 PRN


   PRN Reason: Pain, moderate (4-7)











Physical Exam





- Head Exam


Head Exam: ATRAUMATIC, NORMAL INSPECTION, NORMOCEPHALIC





- Eye Exam


Eye Exam: EOMI, Normal appearance, PERRL, Scleral icterus





- ENT Exam


ENT Exam: Mucous Membranes Dry (oral thrush)





- Respiratory Exam


Respiratory Exam: Accessory Muscle Use, Clear to Auscultation Bilateral





- Cardiovascular Exam


Cardiovascular Exam: Tachycardia





- GI/Abdominal Exam


GI & Abdominal Exam: Normal Bowel Sounds, Soft.  absent: Tenderness





- Neurological Exam


Neurological exam: Alert (barely responsive)





Results





- Vital Signs


Recent Vital Signs: 


 Last Vital Signs











Temp  97.7 F   09/15/18 07:30


 


Pulse  106 H  09/15/18 12:25


 


Resp  22   09/15/18 14:15


 


BP  126/76   09/15/18 12:25


 


Pulse Ox  96   09/15/18 07:30














- Labs


Result Diagrams: 


 09/15/18 06:55





 09/15/18 06:55


Labs: 


 Laboratory Results - last 24 hr











  09/14/18 09/15/18 09/15/18





  21:45 06:11 06:55


 


WBC    27.7 H


 


RBC    2.84 L


 


Hgb    9.7 L


 


Hct    29.4 L


 


MCV    103.5 H


 


MCH    34.2 H


 


MCHC    33.1


 


RDW    16.4 H


 


Plt Count    78 L


 


MPV    8.8


 


Neut % (Auto)    91.2 H


 


Lymph % (Auto)    3.0 L


 


Mono % (Auto)    5.7


 


Eos % (Auto)    0.0


 


Baso % (Auto)    0.1


 


Neut # (Auto)    25.3 H


 


Lymph # (Auto)    0.8 L


 


Mono # (Auto)    1.6 H


 


Eos # (Auto)    0.0


 


Baso # (Auto)    0.0


 


Neutrophils % (Manual)    92 H


 


Lymphocytes % (Manual)    2 L


 


Monocytes % (Manual)    4


 


Myelocytes %    2 H


 


Platelet Estimate    Decreased L


 


Hypochromasia (manual)    Slight


 


Poikilocytosis (manual    Slight


 


Anisocytosis (manual)    Slight


 


[place] Cells    Slight


 


Puncture Site   


 


pCO2   


 


pO2   


 


HCO3   


 


ABG pH   


 


ABG Total CO2   


 


ABG O2 Saturation   


 


ABG Base Excess   


 


Barry Test   


 


ABG Potassium   


 


A-a O2 Difference   


 


Respiratory Index   


 


Glucose   


 


Lactate   


 


FiO2   


 


Crit Value Called To   


 


Crit Value Called By   


 


Crit Value Read Back   


 


Blood Gas Notified Time   


 


Sodium   


 


Potassium   


 


Chloride   


 


Carbon Dioxide   


 


Anion Gap   


 


BUN   


 


Creatinine   


 


Est GFR ( Amer)   


 


Est GFR (Non-Af Amer)   


 


POC Glucose (mg/dL)  121 H  114 H 


 


Random Glucose   


 


Calcium   


 


Ammonia   


 


Lactate Dehydrogenase   


 


Total Creatine Kinase   


 


C-Reactive Protein   


 


Arterial Blood Potassium   














  09/15/18 09/15/18 09/15/18





  06:55 08:42 10:05


 


WBC   


 


RBC   


 


Hgb   


 


Hct   


 


MCV   


 


MCH   


 


MCHC   


 


RDW   


 


Plt Count   


 


MPV   


 


Neut % (Auto)   


 


Lymph % (Auto)   


 


Mono % (Auto)   


 


Eos % (Auto)   


 


Baso % (Auto)   


 


Neut # (Auto)   


 


Lymph # (Auto)   


 


Mono # (Auto)   


 


Eos # (Auto)   


 


Baso # (Auto)   


 


Neutrophils % (Manual)   


 


Lymphocytes % (Manual)   


 


Monocytes % (Manual)   


 


Myelocytes %   


 


Platelet Estimate   


 


Hypochromasia (manual)   


 


Poikilocytosis (manual   


 


Anisocytosis (manual)   


 


[place] Cells   


 


Puncture Site    Rba


 


pCO2    21 L


 


pO2    40 L*


 


HCO3    12.0 L


 


ABG pH    7.26 L


 


ABG Total CO2    10.0 L


 


ABG O2 Saturation    69.5 L


 


ABG Base Excess    -15.5 L


 


Barry Test    Na


 


ABG Potassium    3.9


 


A-a O2 Difference    83.0


 


Respiratory Index    2.1


 


Glucose    91


 


Lactate    6.0 H*


 


FiO2    21.0


 


Crit Value Called To    


 


Crit Value Called By    David pandarrt


 


Crit Value Read Back    Y


 


Blood Gas Notified Time    1010


 


Sodium  145   148.0


 


Potassium  4.9  


 


Chloride  116 H   124.0 H


 


Carbon Dioxide  11 L* D  


 


Anion Gap  23 H  


 


BUN  25 H  


 


Creatinine  1.0  


 


Est GFR ( Amer)  > 60  


 


Est GFR (Non-Af Amer)  > 60  


 


POC Glucose (mg/dL)   


 


Random Glucose  125 H  


 


Calcium  11.0 H  


 


Ammonia   29  D 


 


Lactate Dehydrogenase  1167 H  


 


Total Creatine Kinase  < 20 L  


 


C-Reactive Protein  215.70 H  


 


Arterial Blood Potassium    3.9














  09/15/18





  11:46


 


WBC 


 


RBC 


 


Hgb 


 


Hct 


 


MCV 


 


MCH 


 


MCHC 


 


RDW 


 


Plt Count 


 


MPV 


 


Neut % (Auto) 


 


Lymph % (Auto) 


 


Mono % (Auto) 


 


Eos % (Auto) 


 


Baso % (Auto) 


 


Neut # (Auto) 


 


Lymph # (Auto) 


 


Mono # (Auto) 


 


Eos # (Auto) 


 


Baso # (Auto) 


 


Neutrophils % (Manual) 


 


Lymphocytes % (Manual) 


 


Monocytes % (Manual) 


 


Myelocytes % 


 


Platelet Estimate 


 


Hypochromasia (manual) 


 


Poikilocytosis (manual 


 


Anisocytosis (manual) 


 


[place] Cells 


 


Puncture Site 


 


pCO2 


 


pO2 


 


HCO3 


 


ABG pH 


 


ABG Total CO2 


 


ABG O2 Saturation 


 


ABG Base Excess 


 


Barry Test 


 


ABG Potassium 


 


A-a O2 Difference 


 


Respiratory Index 


 


Glucose 


 


Lactate 


 


FiO2 


 


Crit Value Called To 


 


Crit Value Called By 


 


Crit Value Read Back 


 


Blood Gas Notified Time 


 


Sodium 


 


Potassium 


 


Chloride 


 


Carbon Dioxide 


 


Anion Gap 


 


BUN 


 


Creatinine 


 


Est GFR ( Amer) 


 


Est GFR (Non-Af Amer) 


 


POC Glucose (mg/dL)  122 H


 


Random Glucose 


 


Calcium 


 


Ammonia 


 


Lactate Dehydrogenase 


 


Total Creatine Kinase 


 


C-Reactive Protein 


 


Arterial Blood Potassium 














Assessment & Plan


(1) Altered mental status


Assessment and Plan: 


The patient has metastatic liver cancer to the bone.  He is in metabolic 

acidosis with altered mental status.  After speaking with his sister, Hiral Stark

, his caretaker/decision maker, she has agreed to DNR/DNI and hospice.


The patient has an extremely poor prognosis with no hope of recovery and this 

is the best option for him





Critical Care Time 50 minutes


Status: Acute   





Attending/Attestation





- Attestation


I have personally seen and examined this patient.: Yes


I have fully participated in the care of the patient.: Yes


I have reviewed all pertinent clinical information: Yes

## 2018-09-15 NOTE — CP.PCM.PN
Subjective





- Date & Time of Evaluation


Date of Evaluation: 09/14/18





- Subjective


Subjective: 





sick weak, no nausea, no chest pain, no sob





Objective





- Vital Signs/Intake and Output


Vital Signs (last 24 hours): 


 











Temp Pulse Resp BP Pulse Ox


 


 98.5 F   90   20   132/70   100 


 


 09/14/18 15:00  09/14/18 17:00  09/14/18 15:00  09/14/18 15:00  09/14/18 15:00








Intake and Output: 


 











 09/14/18 09/15/18





 18:59 06:59


 


Intake Total 600 


 


Balance 600 














- Medications


Medications: 


 Current Medications





Enoxaparin Sodium (Lovenox)  30 mg SC DAILY [facility]


   Last Admin: 09/14/18 10:05 Dose:  30 mg


Famotidine (Pepcid)  20 mg PO BID [facility]


   Last Admin: 09/14/18 18:46 Dose:  20 mg


Fluticasone Propionate (Flonase)  2 spr FILIPE DAILY [facility]


   Last Admin: 09/14/18 10:05 Dose:  2 spr


Sodium Chloride (Sodium Chloride 0.9%)  1,000 mls @ 100 mls/hr IV .Q10H [facility]


   Last Admin: 09/14/18 14:04 Dose:  Not Given


Vancomycin HCl 1,000 mg/ (Sodium Chloride)  200 mls @ 133.333 mls/hr IVPB Q12H 

[facility]


   PRN Reason: Protocol


   Last Admin: 09/14/18 18:46 Dose:  133.333 mls/hr


Piperacillin Sod/Tazobactam (Sod 3.375 gm/ Sodium Chloride)  100 mls @ 200 mls/

hr IVPB Q6H [facility]


   PRN Reason: Protocol


   Last Admin: 09/14/18 20:28 Dose:  200 mls/hr


Insulin Human Regular (Novolin R)  0 unit SC ACHS [facility]


   PRN Reason: Protocol


   Last Admin: 09/14/18 22:22 Dose:  Not Given


Levothyroxine Sodium (Synthroid)  25 mcg PO DAILY@0630 [facility]


   Last Admin: 09/14/18 06:11 Dose:  25 mcg


Losartan Potassium (Cozaar)  50 mg PO DAILY [facility]


   Last Admin: 09/14/18 10:06 Dose:  50 mg


Metformin HCl (Glucophage)  500 mg PO BID [facility]


   Last Admin: 09/14/18 18:46 Dose:  500 mg


Metoprolol Succinate (Toprol Xl)  50 mg PO DAILY [facility]


   Last Admin: 09/14/18 10:06 Dose:  50 mg


Pneumococcal Polyvalent Vaccine (Pneumovax 23 Vaccine)  0.5 ml IM .ONCE ONE


   Stop: 09/16/18 12:01


Polyethylene Glycol (Miralax)  17 gm PO DAILY [facility]


   Last Admin: 09/14/18 10:05 Dose:  17 gm


Rosuvastatin Calcium (Crestor)  2.5 mg PO HS [facility]


   Last Admin: 09/14/18 22:21 Dose:  2.5 mg


Tamsulosin HCl (Flomax)  0.4 mg PO DAILY [facility]


   Last Admin: 09/14/18 10:06 Dose:  0.4 mg


Tramadol HCl (Ultram)  50 mg PO Q8 PRN


   PRN Reason: Pain, moderate (4-7)











- Labs


Labs: 


 





 09/14/18 06:15 





 09/14/18 06:15 











- Constitutional


Appears: Non-toxic, No Acute Distress, Chronically Ill





- Head Exam


Head Exam: ATRAUMATIC, NORMAL INSPECTION, NORMOCEPHALIC





- Eye Exam


Eye Exam: EOMI, Normal appearance, PERRL


Pupil Exam: NORMAL ACCOMODATION





- ENT Exam


ENT Exam: Mucous Membranes Moist, Normal Exam, Normal Oropharynx, TM's Normal 

Bilaterally





- Neck Exam


Neck Exam: Normal Inspection





- Respiratory Exam


Respiratory Exam: Clear to Ausculation Bilateral, NORMAL BREATHING PATTERN





- Cardiovascular Exam


Cardiovascular Exam: REGULAR RHYTHM, +S1, +S2





- GI/Abdominal Exam


GI & Abdominal Exam: Distended, Normal Bowel Sounds





- Rectal Exam


Rectal Exam: NORMAL INSPECTION





- Extremities Exam


Extremities Exam: Normal Capillary Refill





- Neurological Exam


Neurological Exam: Abnormal Gait, Alert, Awake, CN II-XII Intact, Oriented x3





- Psychiatric Exam


Psychiatric exam: Flat Affect





- Skin


Skin Exam: Intact





Assessment and Plan


(1) Septicemia


Status: Acute   





(2) HTN (hypertension)


Status: Chronic   





(3) Diabetes mellitus


Status: Chronic   





(4) Hepatitis C infection


Status: Chronic

## 2018-09-15 NOTE — CP.PCM.PN
Objective





- Vital Signs/Intake and Output


Vital Signs (last 24 hours): 


 











Temp Pulse Resp BP Pulse Ox


 


 97.2 F L  111 H  20   138/84   99 


 


 09/15/18 17:23  09/15/18 17:23  09/15/18 17:23  09/15/18 17:23  09/15/18 17:23








Intake and Output: 


 











 09/15/18 09/16/18





 18:59 06:59


 


Intake Total 1500 


 


Balance 1500 














- Medications


Medications: 


 Current Medications





Enoxaparin Sodium (Lovenox)  30 mg SC DAILY [facility]


   Last Admin: 09/15/18 09:25 Dose:  Not Given


Famotidine (Pepcid)  20 mg PO BID [facility]


   Last Admin: 09/15/18 09:25 Dose:  20 mg


Fluticasone Propionate (Flonase)  2 spr FILIPE DAILY [facility]


   Last Admin: 09/15/18 10:22 Dose:  2 spr


Sodium Chloride (Sodium Chloride 0.9%)  1,000 mls @ 100 mls/hr IV .Q10H [facility]


   Last Admin: 09/15/18 11:00 Dose:  Not Given


Vancomycin HCl 1,000 mg/ (Sodium Chloride)  200 mls @ 133.333 mls/hr IVPB Q12H 

[facility]


   PRN Reason: Protocol


   Last Admin: 09/15/18 18:07 Dose:  133.333 mls/hr


Meropenem 1 gm/ Sodium (Chloride)  100 mls @ 100 mls/hr IVPB Q8H [facility]


   PRN Reason: Protocol


   Last Admin: 09/15/18 18:06 Dose:  100 mls/hr


Metronidazole (Flagyl)  500 mg in 100 mls @ 100 mls/hr IVPB Q8H [facility]


   PRN Reason: Protocol


   Last Admin: 09/15/18 18:07 Dose:  100 mls/hr


Sodium Bicarbonate 75 meq/ (Sodium Chloride)  1,000 mls @ 75 mls/hr IV .[id number] 

[facility]


   Last Admin: 09/15/18 11:00 Dose:  75 mls/hr


Insulin Human Regular (Novolin R)  0 unit SC ACHS [facility]


   PRN Reason: Protocol


   Last Admin: 09/15/18 18:04 Dose:  Not Given


Levothyroxine Sodium (Synthroid)  25 mcg PO DAILY@0630 [facility]


   Last Admin: 09/15/18 06:36 Dose:  25 mcg


Losartan Potassium (Cozaar)  50 mg PO DAILY [facility]


   Last Admin: 09/15/18 09:24 Dose:  50 mg


Metformin HCl (Glucophage)  500 mg PO BID [facility]


   Last Admin: 09/15/18 09:24 Dose:  500 mg


Metoprolol Succinate (Toprol Xl)  50 mg PO DAILY [facility]


   Last Admin: 09/15/18 09:24 Dose:  50 mg


Morphine Sulfate (Morphine)  4 mg IV Q6 PRN


   PRN Reason: Pain, Mild (1-3)


Nystatin (Nystatin Oral Susp)  5 ml PO QID [facility]


   Last Admin: 09/15/18 13:33 Dose:  5 ml


Pantoprazole Sodium (Protonix Inj)  40 mg IVP DAILY [facility]


   Last Admin: 09/15/18 10:19 Dose:  40 mg


Pneumococcal Polyvalent Vaccine (Pneumovax 23 Vaccine)  0.5 ml IM .ONCE ONE


   Stop: 09/16/18 12:01


Polyethylene Glycol (Miralax)  17 gm PO DAILY [facility]


   Last Admin: 09/15/18 09:25 Dose:  Not Given


Rosuvastatin Calcium (Crestor)  2.5 mg PO HS [facility]


   Last Admin: 09/14/18 22:21 Dose:  2.5 mg


Tamsulosin HCl (Flomax)  0.4 mg PO DAILY [facility]


   Last Admin: 09/15/18 09:24 Dose:  0.4 mg


Tramadol HCl (Ultram)  50 mg PO Q8 PRN


   PRN Reason: Pain, moderate (4-7)











- Labs


Labs: 


 





 09/15/18 06:55 





 09/15/18 06:55 











Assessment and Plan


(1) Septicemia


Status: Acute   





(2) HTN (hypertension)


Status: Chronic   





(3) Diabetes mellitus


Status: Chronic   





(4) Hepatitis C infection


Status: Chronic

## 2018-09-15 NOTE — CARD
--------------- APPROVED REPORT --------------





Date of service: 09/13/2018



EKG Measurement

Heart [id number]

MS [id number]

[id number]

[id number]

[id number]



<Conclusion>

Sinus tachycardia with premature atrial complexes

Anterior infarct, age undetermined

Abnormal ECG

## 2018-09-15 NOTE — CP.PCM.PN
Subjective





- Date & Time of Evaluation


Date of Evaluation: 09/15/18


Time of Evaluation: 18:07





- Subjective


Subjective: 


 


afebrile


patient tachypnic and tachycardic


BARELY Arousable,


INCOHERANT





SEEN BY ICU TEAM








Labs reviewed -


WBC 27.9 INCREASING


 co2 -11, anion gap 23 , wbc- 27.7


 o2 sat - 97% RA 





DISCUSSIONS WITH ICU- TEAM NOTED.


PATIENT NOW DNR/DNI AS PER fAMILY MEMBERS.


PATIENT WITH SEVERE METABOLIC ACIDOSIS AND TOXIC /METABOLIC ENCEPHALOPATHY





CONTINUE COMFORT MEASURES/iv ANTIBIOTICS.


PROGNOSIS GUARDED.METASTATIC CA OF THE LIVER.








Objective





- Vital Signs/Intake and Output


Vital Signs (last 24 hours): 


 











Temp Pulse Resp BP Pulse Ox


 


 97.2 F L  111 H  20   138/84   99 


 


 09/15/18 17:23  09/15/18 17:23  09/15/18 17:23  09/15/18 17:23  09/15/18 17:23








Intake and Output: 


 











 09/15/18 09/15/18





 06:59 18:59


 


Intake Total  1500


 


Balance  1500














- Medications


Medications: 


 Current Medications





Enoxaparin Sodium (Lovenox)  30 mg SC DAILY [facility]


   Last Admin: 09/15/18 09:25 Dose:  Not Given


Famotidine (Pepcid)  20 mg PO BID [facility]


   Last Admin: 09/15/18 09:25 Dose:  20 mg


Fluticasone Propionate (Flonase)  2 spr FILIPE DAILY [facility]


   Last Admin: 09/15/18 10:22 Dose:  2 spr


Sodium Chloride (Sodium Chloride 0.9%)  1,000 mls @ 100 mls/hr IV .Q10H [facility]


   Last Admin: 09/15/18 11:00 Dose:  Not Given


Vancomycin HCl 1,000 mg/ (Sodium Chloride)  200 mls @ 133.333 mls/hr IVPB Q12H 

[facility]


   PRN Reason: Protocol


   Last Admin: 09/15/18 05:14 Dose:  133.333 mls/hr


Meropenem 1 gm/ Sodium (Chloride)  100 mls @ 100 mls/hr IVPB Q8H [facility]


   PRN Reason: Protocol


   Last Admin: 09/15/18 08:55 Dose:  100 mls/hr


Metronidazole (Flagyl)  500 mg in 100 mls @ 100 mls/hr IVPB Q8H [facility]


   PRN Reason: Protocol


   Last Admin: 09/15/18 08:54 Dose:  100 mls/hr


Sodium Bicarbonate 75 meq/ (Sodium Chloride)  1,000 mls @ 75 mls/hr IV .[id number] 

[facility]


   Last Admin: 09/15/18 11:00 Dose:  75 mls/hr


Insulin Human Regular (Novolin R)  0 unit SC ACHS [facility]


   PRN Reason: Protocol


   Last Admin: 09/15/18 11:48 Dose:  Not Given


Levothyroxine Sodium (Synthroid)  25 mcg PO DAILY@0630 [facility]


   Last Admin: 09/15/18 06:36 Dose:  25 mcg


Losartan Potassium (Cozaar)  50 mg PO DAILY [facility]


   Last Admin: 09/15/18 09:24 Dose:  50 mg


Metformin HCl (Glucophage)  500 mg PO BID [facility]


   Last Admin: 09/15/18 09:24 Dose:  500 mg


Metoprolol Succinate (Toprol Xl)  50 mg PO DAILY [facility]


   Last Admin: 09/15/18 09:24 Dose:  50 mg


Morphine Sulfate (Morphine)  4 mg IV Q6 PRN


   PRN Reason: Pain, Mild (1-3)


Nystatin (Nystatin Oral Susp)  5 ml PO QID [facility]


   Last Admin: 09/15/18 13:33 Dose:  5 ml


Pantoprazole Sodium (Protonix Inj)  40 mg IVP DAILY [facility]


   Last Admin: 09/15/18 10:19 Dose:  40 mg


Pneumococcal Polyvalent Vaccine (Pneumovax 23 Vaccine)  0.5 ml IM .ONCE ONE


   Stop: 09/16/18 12:01


Polyethylene Glycol (Miralax)  17 gm PO DAILY [facility]


   Last Admin: 09/15/18 09:25 Dose:  Not Given


Rosuvastatin Calcium (Crestor)  2.5 mg PO HS [facility]


   Last Admin: 09/14/18 22:21 Dose:  2.5 mg


Tamsulosin HCl (Flomax)  0.4 mg PO DAILY [facility]


   Last Admin: 09/15/18 09:24 Dose:  0.4 mg


Tramadol HCl (Ultram)  50 mg PO Q8 PRN


   PRN Reason: Pain, moderate (4-7)











- Labs


Labs: 


 





 09/15/18 06:55 





 09/15/18 06:55 











- Constitutional


Appears: Unkempt, Cachectic, Chronically Ill





- Head Exam


Head Exam: ATRAUMATIC





- Eye Exam


Eye Exam: PERRL, Scleral icterus





- ENT Exam


ENT Exam: Mucous Membranes Dry





- Neck Exam


Neck Exam: Normal Inspection





- Respiratory Exam


Respiratory Exam: Decreased Breath Sounds





- Cardiovascular Exam


Cardiovascular Exam: Tachycardia, REGULAR RHYTHM, +S1, +S2





- GI/Abdominal Exam


GI & Abdominal Exam: Distended, Guarding, Soft, Tenderness, Hypoactive Bowel 

Sounds





- Extremities Exam


Extremities Exam: Pedal Edema.  absent: Calf Tenderness





- Neurological Exam


Neurological Exam: Altered





- Psychiatric Exam


Psychiatric exam: Flat Affect





- Skin


Skin Exam: Diaphoretic, Warm





Assessment and Plan


(1) Septicemia


Status: Acute   





(2) Abdominal pain


Status: Acute   





(3) Liver cancer, primary, with metastasis from liver to other site


Status: Acute   





(4) Ascites


Status: Acute   





(5) Dehydration


Status: Acute   





(6) Diabetes mellitus


Status: Chronic   





(7) Hepatitis C infection


Status: Chronic   





(8) Dehydration


Status: Acute   





- Assessment and Plan (Free Text)


Plan: 





CONTINUE  iv MERREM1 G 8 HOURLY.9/14/18


ON IV fLAGYL 500 EVERY 8 HOURLY 9/14/18


CONTINUE iv VANCOMYCIN 1 G EVERY 12 HOURLY. 9/13/18.





FOLLOW-UP CULTURES TO ADJUST ANTIBIOTICS.


IV FLUIDS AS PER PMD.





PROGNOSIS VERY POOR.  TERMINAL METASTATIC CA OF HE LIVER.


 LIVER. FAMILY UNDERSTANDS AND PATIENT DNR/DNI NOW.


CONTINUE SUPPORTIVE CARE

## 2018-09-15 NOTE — PN
DATE:  09/15/2018



SUBJECTIVE:  The patient is acidotic.  He is sick.  He is hemodynamically

stable.  He has no _____.



PHYSICAL EXAMINATION:

VITAL SIGNS:  /84, pulse 111, respiratory rate 20, temperature 97.2.

LUNGS:  Bilaterally clear.

CARDIOVASCULAR SYSTEM:  S1 and S2 are regular.

ABDOMEN:  Soft.

CENTRAL NERVOUS SYSTEM:  The patient is lethargic and barely responsive.



ASSESSMENT:

1.  Terminal end-stage liver cancer.

2.  Septicemia.

3.  Metabolic acidosis due to septicemia.

4.  Dehydration.



PLAN:  The patient is DNR/DNI.  He is for hospice.  He is at the end of his

life.  Monitor the patient.





__________________________________________

Harrison Mckeon MD





DD:  09/15/2018 20:08:29

DT:  09/15/2018 22:48:31

Job # [id number]

## 2018-09-15 NOTE — CP.PCM.PN
Subjective





- Date & Time of Evaluation


Date of Evaluation: 09/15/18


Time of Evaluation: 08:30





- Subjective


Subjective: 





RN  reported patient tachypnic and tachycardic


Patient seen and examined at bed britney e, lathargic, arousable,


Labs reviewed - co2 -11, anion gap 23 , wbc- 27.7


 o2 sat - 97% RA 








Objective





- Vital Signs/Intake and Output


Vital Signs (last 24 hours): 


 











Temp Pulse Resp BP Pulse Ox


 


 98.2 F   107 H  20   157/79 H  95 


 


 09/14/18 23:50  09/15/18 02:14  09/14/18 23:50  09/14/18 23:50  09/14/18 23:50











- Medications


Medications: 


 Current Medications





Enoxaparin Sodium (Lovenox)  30 mg SC DAILY [facility]


   Last Admin: 09/14/18 10:05 Dose:  30 mg


Famotidine (Pepcid)  20 mg PO BID [facility]


   Last Admin: 09/14/18 18:46 Dose:  20 mg


Fluticasone Propionate (Flonase)  2 spr FILIPE DAILY [facility]


   Last Admin: 09/14/18 10:05 Dose:  2 spr


Sodium Chloride (Sodium Chloride 0.9%)  1,000 mls @ 100 mls/hr IV .Q10H [facility]


   Last Admin: 09/15/18 05:18 Dose:  100 mls/hr


Vancomycin HCl 1,000 mg/ (Sodium Chloride)  200 mls @ 133.333 mls/hr IVPB Q12H 

[facility]


   PRN Reason: Protocol


   Last Admin: 09/15/18 05:14 Dose:  133.333 mls/hr


Meropenem 1 gm/ Sodium (Chloride)  100 mls @ 100 mls/hr IVPB Q8H [facility]


   PRN Reason: Protocol


   Last Admin: 09/15/18 01:39 Dose:  100 mls/hr


Metronidazole (Flagyl)  500 mg in 100 mls @ 100 mls/hr IVPB Q8H [facility]


   PRN Reason: Protocol


   Last Admin: 09/15/18 01:39 Dose:  100 mls/hr


Sodium Chloride (Sodium Chloride 0.9%)  1,000 mls @ 500 mls/hr IV .Q2H [facility]


   Stop: 09/15/18 10:14


Insulin Human Regular (Novolin R)  0 unit SC ACHS [facility]


   PRN Reason: Protocol


   Last Admin: 09/15/18 08:13 Dose:  Not Given


Levothyroxine Sodium (Synthroid)  25 mcg PO DAILY@0630 [facility]


   Last Admin: 09/15/18 06:36 Dose:  25 mcg


Losartan Potassium (Cozaar)  50 mg PO DAILY [facility]


   Last Admin: 09/14/18 10:06 Dose:  50 mg


Metformin HCl (Glucophage)  500 mg PO BID [facility]


   Last Admin: 09/14/18 18:46 Dose:  500 mg


Metoprolol Succinate (Toprol Xl)  50 mg PO DAILY [facility]


   Last Admin: 09/14/18 10:06 Dose:  50 mg


Pneumococcal Polyvalent Vaccine (Pneumovax 23 Vaccine)  0.5 ml IM .ONCE ONE


   Stop: 09/16/18 12:01


Polyethylene Glycol (Miralax)  17 gm PO DAILY [facility]


   Last Admin: 09/14/18 10:05 Dose:  17 gm


Rosuvastatin Calcium (Crestor)  2.5 mg PO HS [facility]


   Last Admin: 09/14/18 22:21 Dose:  2.5 mg


Tamsulosin HCl (Flomax)  0.4 mg PO DAILY [facility]


   Last Admin: 09/14/18 10:06 Dose:  0.4 mg


Tramadol HCl (Ultram)  50 mg PO Q8 PRN


   PRN Reason: Pain, moderate (4-7)











- Labs


Labs: 


 





 09/15/18 06:55 





 09/15/18 06:55 











- Constitutional


Appears: In Acute Distress, Confused, Chronically Ill





- Respiratory Exam


Respiratory Exam: Decreased Breath Sounds (resp. distress, tachypnic )





- Cardiovascular Exam


Cardiovascular Exam: Tachycardia, REGULAR RHYTHM, +S1, +S2





Assessment and Plan





- Assessment and Plan (Free Text)


Assessment: 





A/P


66 y/o male with history of Liver CA with Metastasis brought to ED by EMS from 

   for evaluation of decreased po intake for 1 week and weakness as . c/

o worsening chronic abdominal pain and hypotensive 


 CT abd/plevis- Marked worsening of hepatic disease with a new 10 cm necrotic 

mass in the right hepatic lobe with additional right hepatic masses consistent 

with multicentric hepatoma. Redemonstration of cirrhotic liver with perihepatic 

ascites extending down the right paracolic gutter


Patient started on IVF and antibiotics 


patient lethargic this am arousable  but altered mental status 


labs this am  reviewed and noted sever met. acidosi s 


will do stat -VBG, amonia level  and  IVF  1 liter x 1 and continue at 100ml/.hr


Patient on antibiotics - flagyl, meerrum and vanco  and Dr. Barrera on ID 

consult 


D/W Dr. Canseco Intensivit  for  ICU eval 


seen by Dr. Canseco, intensivist, recommends NAHCO3 drip 


abg result relayed to Dr. Canseco intensivist, recommends  high flow oxygen 


The above plan discussed with Dr. Mckeon 


CT of chest PE protocol ordered , to r/o PE

## 2018-09-15 NOTE — CON
DATE:  09/14/2018



ONCOLOGY CONSULTATION



This is a 67-year-old man who has widely metastatic hepatoma and comes in

with severe weakness and hypotension.  I saw the patient only about 3 weeks

and at that time I ordered a CAT scan and PET scan and this showed a liver

that is cirrhotic with multiple metabolic lesions in the liver, the largest

being 8 cm, at both lobes of the liver.  He also has lymph nodes throughout

his abdomen.  Spleen is enlarged to 13.4 cm, and he also has metastatic

cancer to his bones.  The sternum has a 1 cm lesion, multiple lytic lesions

in his _____ body.  I had arranged for him to get Nexavar chemotherapy

pills, and I was able to order that for him, I came in to the office

yesterday to start the treatment when his blood pressure was 100/palp.  He

was markedly worse in terms of weakness and was incoherent.  We admitted

him to the hospital.  In the hospital, he was found to have a BUN of 22,

calcium is 11.6, and the ammonium level is 51, hemoglobin 9.4, so he has

hypercalcemia and dehydration, this is all due to progressive cancer.  I

tried to talk this to the sister, he lives with his sister, and I talked to

her the first time 3 weeks ago as well as yesterday about his terminal

status and inability to be cured or really be treated for this cancer, but

she does not want to hear anything of it, and she says _____, so she wants

everything done.  At this point, he is antibiotics.  He will need to be put

on medication for his hypercalcemia as per Renal if it does not improve on

the IV hydration.  No plans at present for active treatment for the cancer

as he is to take the pills.





__________________________________________

Bebeto Ortiz MD





DD:  09/14/2018 8:17:51

DT:  09/14/2018 11:41:48

Job # [id number]

## 2018-09-15 NOTE — PN
DATE:  09/15/2018



LOCATION:  559, bed B.



SUBJECTIVE:  This 67-year-old male seen initially for GI consultation on

09/14/2018 as requested by the admitting MD, reexamined again today early

in rounds with the staff in the floor, appeared to be lethargic with less

response to verbal stimuli adequately.



The entire chart is reviewed including but not limited to the most recent

lab and radiology study results, current and the previous medication list,

current and the previous medical events.



No reported active GI bleeding or reported significant increase of

shortness of breath, chills or fever.



Today's lab results showed leukocytosis of 27.7, hemoglobin 9.7, hematocrit

29.4, with thrombocytopenia of 79, with BUN of 25, but normal creatinine. 

Blood glucose level 125, calcium 11, with subsequent increase of lactic

acid yesterday to 4.3, albumin 2.9.



The official CAT scan of the abdomen and pelvis was previously reported and

reviewed today with nephrotic hepatic mass lesion seen with adjacent

additional right hepatic masses, indicative of hepatoma, with liver

cirrhosis and ascites.



PHYSICAL EXAMINATION:

GENERAL:  A 67-year-old male.

VITAL SIGNS:  Afebrile, with pulse of 102, blood pressure 150/76, with

respiratory rate 20-22.

HEENT:  Showed pale, dry oral mucous membranes, mildly icteric sclerae

bilaterally.

LUNGS:  Scattered bilateral crepitations.  Decreased air entry at bases.

HEART:  Positive S1 and S2.

ABDOMEN:  Soft, with mild-to-moderate distention with moderate amount of

ascites.  No mass or organomegaly.  No rebound tenderness or guarding.

EXTREMITIES:  With edematous changes.  No clubbing or cyanosis.

NEUROLOGIC:  No reported new neurological deficits, sensory or motor.  The

patient appears to be lethargic, does not respond adequately to any verbal

stimuli.



IMPRESSION:

1.  Hepatic carcinoma with metastatic lesion, to rule out metastasis to the

brain due to the patient's mental status changes.

2.  Multiple past medical history including but not limited to

hyperlipidemia, peptic ulcer disease, hypothyroidism, hypertension, renal

stone with chronic renal insufficiency as well as diabetes mellitus.

3.  Anemia, most likely secondary to above.

3.  Electrolyte imbalance with hypercalcemia.



SUGGESTIONS:

1.  Agree with your plan.

2.  Correct the patient's underlying lactic acidosis.

3.  Rehydration.

4.  Central hyperalimentation due to the patient's poor oral intake and

hypoalbuminemia with malnutrition.

5.  No aggressive GI workup at this point despite the family's request to

have complete workup.



We will follow up closely with you.





__________________________________________

Earle Chilel MD





DD:  09/15/2018 7:36:56

DT:  09/15/2018 7:40:34

Job # [id number]

## 2018-09-16 VITALS
SYSTOLIC BLOOD PRESSURE: 110 MMHG | HEART RATE: 106 BPM | TEMPERATURE: 99 F | DIASTOLIC BLOOD PRESSURE: 74 MMHG | OXYGEN SATURATION: 110 %

## 2018-09-16 VITALS — RESPIRATION RATE: 22 BRPM

## 2018-09-16 RX ADMIN — WATER SCH MLS/HR: 1 INJECTION INTRAMUSCULAR; INTRAVENOUS; SUBCUTANEOUS at 01:00

## 2018-09-16 NOTE — CP.PCM.DIS
Provider





- Provider


Date of Admission: 


18 15:38





Attending physician: 


Harrison Mckeon MD








Diagnosis





- Discharge Diagnosis


(1) Septicemia


Status: Acute   





(2) HTN (hypertension)


Status: Chronic   





(3) Diabetes mellitus


Status: Chronic   





(4) Hepatitis C infection


Status: Chronic   





Hospital Course





- Lab Results


Lab Results: 


 Micro Results





18 13:00   Blood   Blood Culture - Preliminary


                            NO GROWTH AFTER 3 DAYS


18 13:30   Blood   Blood Culture - Preliminary


                            NO GROWTH AFTER 3 DAYS


09/15/18 02:07   Naris   MRSA Culture (Admit) - Final


                            MRSA NOT DETECTED





 Most Recent Lab Values











WBC  27.7 K/uL (4.8-10.8)  H  09/15/18  06:55    


 


RBC  2.84 Mil/uL (4.40-5.90)  L  09/15/18  06:55    


 


Hgb  9.7 g/dL (12.0-18.0)  L  09/15/18  06:55    


 


Hct  29.4 % (35.0-51.0)  L  09/15/18  06:55    


 


MCV  103.5 fL (80.0-94.0)  H  09/15/18  06:55    


 


MCH  34.2 pg (27.0-31.0)  H  09/15/18  06:55    


 


MCHC  33.1 g/dL (33.0-37.0)   09/15/18  06:55    


 


RDW  16.4 % (11.5-14.5)  H  09/15/18  06:55    


 


Plt Count  78 K/uL (130-400)  L  09/15/18  06:55    


 


MPV  8.8 fL (7.2-11.7)   09/15/18  06:55    


 


Neut % (Auto)  91.2 % (50.0-75.0)  H  09/15/18  06:55    


 


Lymph % (Auto)  3.0 % (20.0-40.0)  L  09/15/18  06:55    


 


Mono % (Auto)  5.7 % (0.0-10.0)   09/15/18  06:55    


 


Eos % (Auto)  0.0 % (0.0-4.0)   09/15/18  06:55    


 


Baso % (Auto)  0.1 % (0.0-2.0)   09/15/18  06:55    


 


Neut # (Auto)  25.3 K/uL (1.8-7.0)  H  09/15/18  06:55    


 


Lymph # (Auto)  0.8 K/uL (1.0-4.3)  L  09/15/18  06:55    


 


Mono # (Auto)  1.6 K/uL (0.0-0.8)  H  09/15/18  06:55    


 


Eos # (Auto)  0.0 K/uL (0.0-0.7)   09/15/18  06:55    


 


Baso # (Auto)  0.0 K/uL (0.0-0.2)   09/15/18  06:55    


 


Neutrophils % (Manual)  92 % (50-75)  H  09/15/18  06:55    


 


Band Neutrophils %  1 % (0-2)   18  06:15    


 


Lymphocytes % (Manual)  2 % (20-40)  L  09/15/18  06:55    


 


Monocytes % (Manual)  4 % (0-10)   09/15/18  06:55    


 


Myelocytes %  2 % (0-0)  H  09/15/18  06:55    


 


Toxic Granulation  Present   18  06:15    


 


Platelet Estimate  Decreased  (NORMAL)  L  09/15/18  06:55    


 


Hypochromasia (manual)  Slight   09/15/18  06:55    


 


Poikilocytosis (manual  Slight   09/15/18  06:55    


 


Anisocytosis (manual)  Slight   09/15/18  06:55    


 


Macrocytosis (manual)  Slight   18  13:25    


 


Tear Drop Cells  Slight   18  06:15    


 


Ovalocytes  Slight   18  06:15    


 


Melrose Cells  Slight   09/15/18  06:55    


 


Puncture Site  Rba   09/15/18  10:05    


 


pCO2  21 mm/Hg (35-45)  L  09/15/18  10:05    


 


pO2  40 mm/Hg ()  L*  09/15/18  10:05    


 


HCO3  12.0 mmol/L (21-28)  L  09/15/18  10:05    


 


ABG pH  7.26  (7.35-7.45)  L  09/15/18  10:05    


 


ABG Total CO2  10.0 mmol/L (22-28)  L  09/15/18  10:05    


 


ABG O2 Saturation  69.5 % (95-98)  L  09/15/18  10:05    


 


ABG Base Excess  -15.5 mmol/L (-2.0-3.0)  L  09/15/18  10:05    


 


Barry Test  Na   09/15/18  10:05    


 


ABG Potassium  3.9 mmol/L (3.6-5.2)   09/15/18  10:05    


 


VBG pH  7.28  (7.32-7.43)  L  18  17:32    


 


VBG pCO2  39 mmHg (40-60)  L  18  17:32    


 


VBG HCO3  17.2 mmol/L  18  17:32    


 


VBG Total CO2  19.5 mmol/L (22-28)  L  18  17:32    


 


VBG O2 Sat (Calc)  42.6 % (40-65)   18  17:32    


 


VBG Base Excess  -7.9 mmol/L (0.0-2.0)  L  18  17:32    


 


VBG Potassium  4.0 mmol/L (3.6-5.2)   18  17:32    


 


A-a O2 Difference  83.0 mm/Hg  09/15/18  10:05    


 


Respiratory Index  2.1   09/15/18  10:05    


 


Sodium  148.0 mmol/l (132-148)   09/15/18  10:05    


 


Chloride  124.0 mmol/L ()  H  09/15/18  10:05    


 


Glucose  91 mg/dl ()   09/15/18  10:05    


 


Lactate  6.0 mmol/L (0.7-2.1)  H*  09/15/18  10:05    


 


FiO2  21.0 %  09/15/18  10:05    


 


Crit Value Called To     09/15/18  10:05    


 


Crit Value Called By  David panda,rrt   09/15/18  10:05    


 


Crit Value Read Back  Y   09/15/18  10:05    


 


Blood Gas Notified Time  1010   09/15/18  10:05    


 


Sodium  145 mmol/L (132-148)   09/15/18  06:55    


 


Potassium  4.9 mmol/L (3.6-5.2)   09/15/18  06:55    


 


Chloride  116 mmol/L ()  H  09/15/18  06:55    


 


Carbon Dioxide  11 mmol/L (22-30)  L* D 09/15/18  06:55    


 


Anion Gap  23  (10-20)  H  09/15/18  06:55    


 


BUN  25 mg/dL (9-20)  H  09/15/18  06:55    


 


Creatinine  1.0 mg/dL (0.8-1.5)   09/15/18  06:55    


 


Est GFR ( Amer)  > 60   09/15/18  06:55    


 


Est GFR (Non-Af Amer)  > 60   09/15/18  06:55    


 


POC Glucose (mg/dL)  133 mg/dL ()  H  09/15/18  20:59    


 


Random Glucose  125 mg/dL ()  H  09/15/18  06:55    


 


Lactic Acid  6.9 mmol/L (0.7-2.1)  H*  09/15/18  20:12    


 


Calcium  11.0 mg/dl (8.6-10.4)  H  09/15/18  06:55    


 


Total Bilirubin  1.9 mg/dL (0.2-1.3)  H  18  06:15    


 


AST  214 U/L (17-59)  H D 18  06:15    


 


ALT  63 U/L (21-72)   18  06:15    


 


Alkaline Phosphatase  95 U/L ()   18  06:15    


 


Ammonia  29 umol/L (9-33)  D 09/15/18  08:42    


 


Lactate Dehydrogenase  1167 U/L (313-618)  H  09/15/18  06:55    


 


Total Creatine Kinase  < 20 U/L ()  L  09/15/18  06:55    


 


CK-MB (Mass)  0.86 ng/mL (0.0-3.38)   18  13:25    


 


Troponin I  < 0.0120 ng/mL (0.00-0.120)   18  13:25    


 


C-Reactive Protein  215.70 mg/L (0.0-9.9)  H  09/15/18  06:55    


 


Total Protein  6.4 g/dL (6.3-8.3)   18  06:15    


 


Albumin  2.6 g/dL (3.5-5.0)  L D 18  06:15    


 


Globulin  3.8 gm/dL (2.2-3.9)   18  06:15    


 


Albumin/Globulin Ratio  0.7  (1.0-2.1)  L  18  06:15    


 


Arterial Blood Potassium  3.9 mmol/L (3.6-5.2)   09/15/18  10:05    


 


Venous Blood Potassium  4.0 mmol/L (3.6-5.2)   18  17:32    


 


Urine Color  Cesilia  (YELLOW)   18  15:11    


 


Urine Clarity  Hazy  (Clear)   18  15:11    


 


Urine pH  5.0  (5.0-8.0)   18  15:11    


 


Ur Specific Gravity  1.019  (1.003-1.030)   18  15:11    


 


Urine Protein  Negative mg/dL (NEGATIVE)   18  15:11    


 


Urine Glucose (UA)  Normal mg/dL (Normal)   18  15:11    


 


Urine Ketones  Trace mg/dL (NEGATIVE)   18  15:11    


 


Urine Blood  Negative  (NEGATIVE)   18  15:11    


 


Urine Nitrate  Negative  (NEGATIVE)   18  15:11    


 


Urine Bilirubin  Negative  (NEGATIVE)   18  15:11    


 


Urine Urobilinogen  4.0 mg/dL (0.2-1.0)   18  15:11    


 


Ur Leukocyte Esterase  Trace Ricki/uL (Negative)   18  15:11    


 


Urine WBC (Auto)  12 /hpf (0-5)  H  18  15:11    


 


Urine RBC (Auto)  2 /hpf (0-3)   18  15:11    


 


Ur Squamous Epith Cells  5 /hpf (0-5)   18  15:11    


 


Urine Bacteria  Occ  (<OCC)  H  18  15:11    


 


Hyaline Casts  >20 /lpf (0-2)  H  18  15:11    














- Hospital Course


Hospital Course: 





admitted with sepsis weakness, stayed sick, weak, no fever, high wbc, patient 

got sick and , 





Discharge Exam





- Head Exam


Head Exam: NORMAL INSPECTION





- Eye Exam


Eye Exam: EOMI, Normal appearance


Pupil Exam: NORMAL ACCOMODATION





- ENT Exam


ENT Exam: Mucous Membranes Moist, Normal Exam, Normal Oropharynx, TM's Normal 

Bilaterally





- Neck Exam


Neck exam: Normal Inspection





- Respiratory Exam


Respiratory Exam: NORMAL BREATHING PATTERN





- Cardiovascular Exam


Cardiovascular Exam: REGULAR RHYTHM, +S1, +S2





- GI/Abdominal Exam


GI & Abdominal Exam: Normal Bowel Sounds





- Rectal Exam


Rectal Exam: NORMAL INSPECTION





- Neurological Exam


Neurological exam: Abnormal Gait, Altered





Discharge Plan





- Follow Up Plan


Condition: GOOD


Disposition:  WITH WITHOUT AUTOPSY

## 2018-09-16 NOTE — CP.PCM.PRO
Pronouncement of Death Note





- Clinical Findings


Physical Exam: No Response Verbal/Painful Stimuli, Absent Peripheral Pulses{

Carotid & Femoral}, Absent Heart & Breath Sounds, Pupils Fixed & Dilated, 

Absence of Vital Signs





- Pronouncement Time


Time of Pronouncement of Death: 06:25





- Notifications


Pronouncement Notifications: Family Notified

## 2018-09-16 NOTE — CP.PCM.PRO
Pronouncement of Death Note





- [place]


Additional Comments: Patient was pronounced by Dr Marx this morning. Time 

of death prouncement at 6.25 am on 9/16/18.  Death certificate number [id number]

## 2018-09-17 NOTE — CON
DATE:  09/14/2018



That is from Earle Chilel MD to Harrison Mckeon MD.



I was called for GI consultation by the admitting medical team.  The

patient is seen and fully examined on 09/14/2018 as requested by Dr. Harrison Mckeon.  The entire chart is reviewed including but not limited to most

recent lab and radiology study results, current and the previous medication

list, current and the previous medical events, allergy to medication list

as well as all the available current and the previous medical notes and

records.  Case discussed with the staff as well as all the consultant at

the time of my GI consultation on 09/14/2018.



HISTORY OF PRESENT ILLNESS:   This is a 67-year-old male, known case for me

from previous admissions with known history of liver cancer and metastatic

lesions who was admitted to the hospital due to loss of appetite, decreased

oral intake, generalized weakness, and malaise with more aggressively

increased complain of abdominal pain and increased abdominal girth but no

reported actual chest pain, palpitation, significant shortness of breath or

vomiting.  No chills or fever.  However, the patient was complaining of

intermittent period of dyspepsia with nausea on and off.



PAST MEDICAL HISTORY:  Including mainly but not limited to,

1.  Liver cirrhosis.

2.  Peptic ulcer disease.

3.  Poorly controlled diabetes mellitus, hypertension, hyperlipidemia.

4.  Known history of hypothyroidism, renal stone with chronic renal

insufficiency.

5.  The patient had partial small bowel resection before with status post

gastrostomy tube previously as well as more one time of blood and platelet

transfusion previously.



FAMILY HISTORY:  Unknown.



SOCIAL HISTORY:  Reported history of cigarette smoking, but no recent

history of alcohol intake.



CURRENT MEDICATIONS:  Post admission medication list reviewed.



ALLERGIES TO MEDICATIONS:  UNCLEAR.



LABORATORY DATA:  Initial blood workup post admission showed leukocytosis

of 23.3, platelet count 96, hemoglobin 9.5, hematocrit 27.8.  Blood glucose

level 151, BUN 22 with normal creatinine, CO2 content of 19 indicative of

metabolic acidosis.



PHYSICAL EXAMINATION:

GENERAL:  A 67-year-old male, somewhat appears to be lethargic, afebrile.

VITAL SIGNS:  Pulse of 108, respiratory rate 18 to 20, blood pressure of

124/72.

HEENT:  Showed pale dry oral mucoid membrane with mild bilateral icteric

sclerae.

LYMPH NODES:  No lymphadenitis or lymphadenopathy.

LUNGS:  Few scattered mild crepitation.  Breathing sounds are present

bilaterally but decreased at bases.

HEART:  Positive S1 and S2 with increased rate.

ABDOMEN:   Soft with mild-to-moderate distention, positive for small amount

of ascites with generalized tenderness.  No mass or organomegaly.  No

rebound tenderness or guarding.

RECTAL:  The patient refused.

EXTREMITIES:  Lower extremity edematous changes.  No clubbing or cyanosis. 

Peripheral pulses are present but decreased at bases.

NEUROLOGIC:  No reported new neurological deficits, sensory or motor.  No

reported new focal deficits.



It has to be mentioned that all the official radiology study reports were

reviewed and seen.



IMPRESSION:

1.  Liver cancer with metastatic disease by history.

2.  Ascites, rule out spontaneous bacterial peritonitis, keeping in mind

the patient's leukocytosis.

3.  Anemia, most likely secondary to above.  The possibility of

gastrointestinal blood loss, however, was raised.

4.  Multiple past medical history as above.

5.  Anemia, the possibility of gastrointestinal blood loss upper versus

lower was raised versus, most likely anemia secondary to chronic disease.



SUGGESTIONS:

1.  Agree with your plan.

2.  Guaiac all the stools daily x3.

3.  Blood transfusion as needed.  Keep hemoglobin around 10 g percent, that

to be discussed with the Oncology/Hematology consultant on the case.

4.  Proton pump inhibitors.

5.  Abdominal paracentesis with followup evaluation and workup, and

analysis of the ascitic fluid.

6.  The patient may benefit from Flagyl I.V.

7.  Further recommendation to follow and no aggressive GI procedures in the

meantime as the patient carries very poor prognosis at this point.

8. Central hyperalimentation due to the patient's poor oral intake to be

ordered versus, less likely his need for PEG tube insertion if his

condition unchanged.

9.  Correct any underlying coagulopathy.

10.  Correct any underlying electrolyte imbalance.  Further recommendation

to follow.



Thank you for letting me participate in your patient's case management.





__________________________________________

Earle Chilel MD



DD:  09/16/2018 0:15:18

DT:  09/16/2018 0:21:12

Job # [id number]

## 2023-05-03 NOTE — CP.PCM.PN
Subjective





- Date & Time of Evaluation


Date of Evaluation: 10/24/17


Time of Evaluation: 20:00





- Subjective


Subjective: 





PT SEEN AND EXAMINED, STABLE NO SHORTNESS OF BREATH, FEELING BETTER. S/P 

INCARCERATED HERNIA POST OP





Objective





- Vital Signs/Intake and Output


Vital Signs (last 24 hours): 


 











Temp Pulse Resp BP Pulse Ox


 


 98.4 F   83   20   126/77   99 


 


 10/24/17 15:12  10/24/17 15:30  10/24/17 15:12  10/24/17 15:12  10/24/17 15:12








Intake and Output: 


 











 10/24/17 10/25/17





 18:59 06:59


 


Intake Total 450 


 


Output Total 415 


 


Balance 35 














- Medications


Medications: 


 Current Medications





Clonidine HCl (Catapres Tts1 0.1 Mg/24 Hr)  1 patch TD Q7D@1000 [facility]


   Last Admin: 10/18/17 09:58 Dose:  1 patch


Famotidine (Pepcid)  20 mg PO BID [facility]


   Last Admin: 10/24/17 17:23 Dose:  20 mg


Heparin Sodium (Porcine) (Heparin)  5,000 units SC Q8 [facility]


   Last Admin: 10/24/17 21:55 Dose:  5,000 units


Piperacillin Sod/Tazobactam Sod (Zosyn 3.375 Gm Iv Premix)  3.375 gm in 50 mls 

@ 100 mls/hr IVPB Q6H [facility]


   Last Admin: 10/24/17 21:00 Dose:  100 mls/hr


Insulin Aspart (Novolog)  0 unit SC ACHS [facility]


   PRN Reason: Protocol


   Last Admin: 10/24/17 22:04 Dose:  Not Given


Ondansetron HCl (Zofran Inj)  4 mg IVP Q4 PRN


   PRN Reason: nausea


   Last Admin: 10/17/17 14:41 Dose:  4 mg


Tramadol HCl (Ultram)  50 mg PO TID [facility]


   Last Admin: 10/24/17 17:28 Dose:  50 mg











- Labs


Labs: 


 





 10/24/17 12:21 





 10/24/17 12:21 





 











PT  15.5 SECONDS (9.7-12.2)  H  10/09/17  08:47    


 


INR  1.4   10/09/17  08:47    


 


APTT  31 SECONDS (21-34)   10/09/17  08:47    














- Constitutional


Appears: No Acute Distress





- Head Exam


Head Exam: ATRAUMATIC, NORMAL INSPECTION, NORMOCEPHALIC





- Eye Exam


Eye Exam: EOMI, Normal appearance, PERRL


Pupil Exam: NORMAL ACCOMODATION, PERRL





- Cardiovascular Exam


Cardiovascular Exam: REGULAR RHYTHM, +S1, +S2.  absent: Murmur





- GI/Abdominal Exam


GI & Abdominal Exam: Soft, Normal Bowel Sounds.  absent: Tenderness





- Rectal Exam


Rectal Exam: Deferred





Assessment and Plan


(1) Intestinal obstruction


Status: Acute   





(2) Thrombocytopenia


Status: Acute   





(3) Ventral hernia


Status: Acute   





(4) HTN (hypertension)


Status: Acute   





(5) Diabetes mellitus


Status: Chronic O[facility] Hern[facility]dez)  Surgery  [address] [place] [address]  Phone: [phone]  Fax: [phone]  Scheduled Appointment: 05/10/2023 01:40 PM
